# Patient Record
Sex: FEMALE | Race: WHITE | NOT HISPANIC OR LATINO | Employment: OTHER | URBAN - METROPOLITAN AREA
[De-identification: names, ages, dates, MRNs, and addresses within clinical notes are randomized per-mention and may not be internally consistent; named-entity substitution may affect disease eponyms.]

---

## 2017-01-17 ENCOUNTER — APPOINTMENT (OUTPATIENT)
Dept: PREADMISSION TESTING | Facility: HOSPITAL | Age: 47
End: 2017-01-17

## 2017-01-17 ENCOUNTER — HOSPITAL ENCOUNTER (OUTPATIENT)
Dept: GENERAL RADIOLOGY | Facility: HOSPITAL | Age: 47
Discharge: HOME | End: 2017-01-17
Admitting: ORTHOPAEDIC SURGERY

## 2017-01-17 VITALS — WEIGHT: 268.96 LBS | HEIGHT: 72 IN | BODY MASS INDEX: 36.43 KG/M2

## 2017-01-17 LAB
ALBUMIN SERPL-MCNC: 4.1 G/DL (ref 3.2–4.8)
ALBUMIN/GLOB SERPL: 1.1 G/DL (ref 1.5–2.5)
ALP SERPL-CCNC: 93 U/L (ref 25–100)
ALT SERPL W P-5'-P-CCNC: 25 U/L (ref 7–40)
ANION GAP SERPL CALCULATED.3IONS-SCNC: 11 MMOL/L (ref 3–11)
AST SERPL-CCNC: 31 U/L (ref 0–33)
BILIRUB SERPL-MCNC: 0.3 MG/DL (ref 0.3–1.2)
BUN BLD-MCNC: 10 MG/DL (ref 9–23)
BUN/CREAT SERPL: 14.3 (ref 7–25)
CALCIUM SPEC-SCNC: 9.8 MG/DL (ref 8.7–10.4)
CHLORIDE SERPL-SCNC: 100 MMOL/L (ref 99–109)
CO2 SERPL-SCNC: 35 MMOL/L (ref 20–31)
CREAT BLD-MCNC: 0.7 MG/DL (ref 0.6–1.3)
DEPRECATED RDW RBC AUTO: 48.5 FL (ref 37–54)
ERYTHROCYTE [DISTWIDTH] IN BLOOD BY AUTOMATED COUNT: 17.1 % (ref 11.3–14.5)
GFR SERPL CREATININE-BSD FRML MDRD: 90 ML/MIN/1.73
GLOBULIN UR ELPH-MCNC: 3.6 GM/DL
GLUCOSE BLD-MCNC: 127 MG/DL (ref 70–100)
HBA1C MFR BLD: 5.4 % (ref 4.8–5.6)
HCT VFR BLD AUTO: 41.2 % (ref 34.5–44)
HGB BLD-MCNC: 12.9 G/DL (ref 11.5–15.5)
MCH RBC QN AUTO: 24.5 PG (ref 27–31)
MCHC RBC AUTO-ENTMCNC: 31.3 G/DL (ref 32–36)
MCV RBC AUTO: 78.3 FL (ref 80–99)
PLATELET # BLD AUTO: 180 10*3/MM3 (ref 150–450)
PMV BLD AUTO: 9 FL (ref 6–12)
POTASSIUM BLD-SCNC: 3.6 MMOL/L (ref 3.5–5.5)
PROT SERPL-MCNC: 7.7 G/DL (ref 5.7–8.2)
RBC # BLD AUTO: 5.26 10*6/MM3 (ref 3.89–5.14)
SODIUM BLD-SCNC: 146 MMOL/L (ref 132–146)
WBC NRBC COR # BLD: 4.64 10*3/MM3 (ref 3.5–10.8)

## 2017-01-17 PROCEDURE — 85027 COMPLETE CBC AUTOMATED: CPT | Performed by: ORTHOPAEDIC SURGERY

## 2017-01-17 PROCEDURE — 80053 COMPREHEN METABOLIC PANEL: CPT | Performed by: ORTHOPAEDIC SURGERY

## 2017-01-17 PROCEDURE — 71020 HC CHEST PA AND LATERAL: CPT

## 2017-01-17 PROCEDURE — 83036 HEMOGLOBIN GLYCOSYLATED A1C: CPT | Performed by: ORTHOPAEDIC SURGERY

## 2017-01-17 PROCEDURE — 36415 COLL VENOUS BLD VENIPUNCTURE: CPT

## 2017-01-17 PROCEDURE — 93005 ELECTROCARDIOGRAM TRACING: CPT

## 2017-01-17 RX ORDER — PANTOPRAZOLE SODIUM 40 MG/1
40 TABLET, DELAYED RELEASE ORAL DAILY
Status: ON HOLD | COMMUNITY
End: 2017-01-23

## 2017-01-17 RX ORDER — CALCIUM POLYCARBOPHIL 625 MG 625 MG/1
625 TABLET ORAL
COMMUNITY
End: 2018-03-19

## 2017-01-17 RX ORDER — POLYETHYLENE GLYCOL 3350 17 G/17G
17 POWDER, FOR SOLUTION ORAL EVERY OTHER DAY
COMMUNITY
End: 2020-07-13

## 2017-01-17 NOTE — PAT
Patient to apply to surgical area (as instructed) the night before procedure and the AM of procedure.  MEASURED FOR TEDS/SCDS IN PAT    CALF MEASUREMENT    16.5  LENGTH MEASUREMENT 19    Called Dr. Peterson's office regarding Eliquis and spoke with Deedee. She stated that Dr. Peterson said she can stop Eliquis 2 days prior to surgery and she would place a note in Epic. I left another voice message asking to address the ASA 81 in the note to be placed in Epic. I stressed the importance of this to patient. She stated she has a follow-up appointment with Salbador blackmon.

## 2017-01-17 NOTE — PAT
Gave information to patient to stop Eliquis per md instructions x two  And per Kaelyn at Dr. Peterson patient must keep on ASA.  Notified Rebekah/Salbador's office and gave patient copy of letter

## 2017-01-23 ENCOUNTER — APPOINTMENT (OUTPATIENT)
Dept: GENERAL RADIOLOGY | Facility: HOSPITAL | Age: 47
End: 2017-01-23

## 2017-01-23 ENCOUNTER — ANESTHESIA (OUTPATIENT)
Dept: PERIOP | Facility: HOSPITAL | Age: 47
End: 2017-01-23

## 2017-01-23 ENCOUNTER — APPOINTMENT (OUTPATIENT)
Dept: GENERAL RADIOLOGY | Facility: HOSPITAL | Age: 47
End: 2017-01-23
Attending: ORTHOPAEDIC SURGERY

## 2017-01-23 ENCOUNTER — ANESTHESIA EVENT (OUTPATIENT)
Dept: PERIOP | Facility: HOSPITAL | Age: 47
End: 2017-01-23

## 2017-01-23 PROBLEM — I48.91 A-FIB: Status: ACTIVE | Noted: 2017-01-23

## 2017-01-23 PROBLEM — I50.9 CHF (CONGESTIVE HEART FAILURE): Status: ACTIVE | Noted: 2017-01-23

## 2017-01-23 PROBLEM — Z96.641 STATUS POST TOTAL REPLACEMENT OF RIGHT HIP: Status: ACTIVE | Noted: 2017-01-23

## 2017-01-23 PROBLEM — M16.11 ARTHRITIS OF RIGHT HIP: Status: ACTIVE | Noted: 2017-01-23

## 2017-01-23 PROBLEM — G47.33 OSA (OBSTRUCTIVE SLEEP APNEA): Status: ACTIVE | Noted: 2017-01-23

## 2017-01-23 PROCEDURE — 73502 X-RAY EXAM HIP UNI 2-3 VIEWS: CPT

## 2017-01-23 PROCEDURE — 25010000002 PROPOFOL 10 MG/ML EMULSION: Performed by: NURSE ANESTHETIST, CERTIFIED REGISTERED

## 2017-01-23 PROCEDURE — 25010000002 MIDAZOLAM PER 1 MG: Performed by: NURSE ANESTHETIST, CERTIFIED REGISTERED

## 2017-01-23 PROCEDURE — 25010000002 ONDANSETRON PER 1 MG: Performed by: NURSE ANESTHETIST, CERTIFIED REGISTERED

## 2017-01-23 PROCEDURE — 25010000002 NEOSTIGMINE PER 0.5 MG: Performed by: NURSE ANESTHETIST, CERTIFIED REGISTERED

## 2017-01-23 RX ORDER — PROPOFOL 10 MG/ML
VIAL (ML) INTRAVENOUS AS NEEDED
Status: DISCONTINUED | OUTPATIENT
Start: 2017-01-23 | End: 2017-01-23 | Stop reason: SURG

## 2017-01-23 RX ORDER — ONDANSETRON 2 MG/ML
INJECTION INTRAMUSCULAR; INTRAVENOUS AS NEEDED
Status: DISCONTINUED | OUTPATIENT
Start: 2017-01-23 | End: 2017-01-23 | Stop reason: SURG

## 2017-01-23 RX ORDER — ESMOLOL HYDROCHLORIDE 10 MG/ML
INJECTION INTRAVENOUS AS NEEDED
Status: DISCONTINUED | OUTPATIENT
Start: 2017-01-23 | End: 2017-01-23 | Stop reason: SURG

## 2017-01-23 RX ORDER — GLYCOPYRROLATE 0.2 MG/ML
INJECTION INTRAMUSCULAR; INTRAVENOUS AS NEEDED
Status: DISCONTINUED | OUTPATIENT
Start: 2017-01-23 | End: 2017-01-23 | Stop reason: SURG

## 2017-01-23 RX ORDER — PROPOFOL 10 MG/ML
VIAL (ML) INTRAVENOUS CONTINUOUS PRN
Status: DISCONTINUED | OUTPATIENT
Start: 2017-01-23 | End: 2017-01-23 | Stop reason: SURG

## 2017-01-23 RX ORDER — MIDAZOLAM HYDROCHLORIDE 1 MG/ML
INJECTION INTRAMUSCULAR; INTRAVENOUS AS NEEDED
Status: DISCONTINUED | OUTPATIENT
Start: 2017-01-23 | End: 2017-01-23 | Stop reason: SURG

## 2017-01-23 RX ORDER — ATRACURIUM BESYLATE 10 MG/ML
INJECTION, SOLUTION INTRAVENOUS AS NEEDED
Status: DISCONTINUED | OUTPATIENT
Start: 2017-01-23 | End: 2017-01-23 | Stop reason: SURG

## 2017-01-23 RX ORDER — LIDOCAINE HYDROCHLORIDE 10 MG/ML
INJECTION, SOLUTION INFILTRATION; PERINEURAL AS NEEDED
Status: DISCONTINUED | OUTPATIENT
Start: 2017-01-23 | End: 2017-01-23 | Stop reason: SURG

## 2017-01-23 RX ADMIN — PROPOFOL 25 MCG/KG/MIN: 10 INJECTION, EMULSION INTRAVENOUS at 11:05

## 2017-01-23 RX ADMIN — PROPOFOL 30 MG: 10 INJECTION, EMULSION INTRAVENOUS at 12:06

## 2017-01-23 RX ADMIN — ROBINUL 0.4 MG: 0.2 INJECTION INTRAMUSCULAR; INTRAVENOUS at 12:58

## 2017-01-23 RX ADMIN — ATRACURIUM BESYLATE 10 MG: 10 INJECTION, SOLUTION INTRAVENOUS at 11:45

## 2017-01-23 RX ADMIN — MIDAZOLAM HYDROCHLORIDE 2 MG: 1 INJECTION, SOLUTION INTRAMUSCULAR; INTRAVENOUS at 10:53

## 2017-01-23 RX ADMIN — EPHEDRINE SULFATE 10 MG: 50 INJECTION INTRAMUSCULAR; INTRAVENOUS; SUBCUTANEOUS at 11:10

## 2017-01-23 RX ADMIN — ONDANSETRON 4 MG: 2 INJECTION INTRAMUSCULAR; INTRAVENOUS at 12:46

## 2017-01-23 RX ADMIN — LIDOCAINE HYDROCHLORIDE 50 MG: 10 INJECTION, SOLUTION INFILTRATION; PERINEURAL at 10:54

## 2017-01-23 RX ADMIN — TRANEXAMIC ACID 1000 MG: 100 INJECTION, SOLUTION INTRAVENOUS at 11:05

## 2017-01-23 RX ADMIN — TRANEXAMIC ACID 1000 MG: 100 INJECTION, SOLUTION INTRAVENOUS at 12:41

## 2017-01-23 RX ADMIN — Medication 4 MG: at 12:58

## 2017-01-23 RX ADMIN — EPHEDRINE SULFATE 10 MG: 50 INJECTION INTRAMUSCULAR; INTRAVENOUS; SUBCUTANEOUS at 12:05

## 2017-01-23 RX ADMIN — EPHEDRINE SULFATE 10 MG: 50 INJECTION INTRAMUSCULAR; INTRAVENOUS; SUBCUTANEOUS at 12:24

## 2017-01-23 RX ADMIN — PROPOFOL 200 MG: 10 INJECTION, EMULSION INTRAVENOUS at 10:54

## 2017-01-23 RX ADMIN — ESMOLOL HYDROCHLORIDE 30 MG: 10 INJECTION, SOLUTION INTRAVENOUS at 10:53

## 2017-01-23 RX ADMIN — EPHEDRINE SULFATE 10 MG: 50 INJECTION INTRAMUSCULAR; INTRAVENOUS; SUBCUTANEOUS at 11:13

## 2017-01-23 RX ADMIN — EPHEDRINE SULFATE 20 MG: 50 INJECTION INTRAMUSCULAR; INTRAVENOUS; SUBCUTANEOUS at 12:41

## 2017-01-23 RX ADMIN — ATRACURIUM BESYLATE 50 MG: 10 INJECTION, SOLUTION INTRAVENOUS at 10:54

## 2017-01-23 NOTE — ANESTHESIA PROCEDURE NOTES
Airway  Urgency: elective    Airway not difficult    General Information and Staff    Patient location during procedure: OR  CRNA: MARBIN WHITMAN    Indications and Patient Condition  Indications for airway management: airway protection    Preoxygenated: yes  MILS not maintained throughout  Mask difficulty assessment: 1 - vent by mask    Final Airway Details  Final airway type: endotracheal airway      Successful airway: ETT  Cuffed: yes   Successful intubation technique: direct laryngoscopy  Endotracheal tube insertion site: oral  Blade: Juanis  Blade size: #3  ETT size: 7.0 mm  Cormack-Lehane Classification: grade I - full view of glottis  Placement verified by: chest auscultation and capnometry   Cuff volume (mL): 8  Measured from: lips  ETT to lips (cm): 20  Number of attempts at approach: 1    Additional Comments  Negative epigastric sounds, Breath sound equal bilaterally with symmetric chest rise and fall, Atraumatic, no damage to lips or teeth during intubation

## 2017-01-23 NOTE — ANESTHESIA POSTPROCEDURE EVALUATION
Patient: Danae Fong    Procedure Summary     Date Anesthesia Start Anesthesia Stop Room / Location    01/23/17 1053 1309 [facility] / [facility]       Procedure Diagnosis Surgeon Provider    RIGHT TOTAL HIP ARTHROPLASTY ANTERIOR  (Right Hip) No diagnosis on file. MD Lloyd Vanegas MD          Anesthesia Type: spinal, MAC  Last vitals  BP  124/56   Temp   98   Pulse  78   Resp   16   SpO2   99     Post Anesthesia Care and Evaluation    Patient location during evaluation: PACU  Patient participation: complete - patient participated  Level of consciousness: awake and alert  Pain score: 0  Pain management: adequate  Airway patency: patent  Anesthetic complications: No anesthetic complications  PONV Status: none  Cardiovascular status: hemodynamically stable and acceptable  Respiratory status: nonlabored ventilation, acceptable and nasal cannula  Hydration status: acceptable

## 2017-01-23 NOTE — ANESTHESIA PREPROCEDURE EVALUATION
Anesthesia Evaluation     Patient summary reviewed and Nursing notes reviewed    Airway   Mallampati: I  TM distance: >3 FB  Neck ROM: full  no difficulty expected  Dental      Pulmonary    (+) sleep apnea,   Cardiovascular   (+) hypertension, valvular problems/murmurs (AI now SP AVR - Mild MR), dysrhythmias (EKG NOW SB ) Atrial Fib, CHF (EF 55%),     ECG reviewed  Patient on routine beta blocker  ROS comment: EKG SB   ELIQUIS    Neuro/Psych  (+) psychiatric history Anxiety,    GI/Hepatic/Renal/Endo    (+)  liver disease (MCKEON ), diabetes mellitus (NO RX),     Musculoskeletal     Abdominal    Substance History      OB/GYN          Other   (+) arthritis    ROS/Med Hx Other: AVR 2016 FB pericaridal effusion drainage and pericardial stripping 2016      Phys Exam Other: Grade 1 intubation last year ( T Young)                      Anesthesia Plan    ASA 3     spinal and MAC   (Eliquis stopped)  intravenous induction   Anesthetic plan and risks discussed with patient.    Plan discussed with CRNA.

## 2017-01-23 NOTE — ANESTHESIA PROCEDURE NOTES
Peripheral Block    Patient location during procedure: pre-op  Start time: 1/23/2017 10:56 AM  Stop time: 1/23/2017 10:59 AM  Reason for block: procedure for pain and at surgeon's request  Performed by  CRNA: MARBIN WHITMAN  Preanesthetic Checklist  Completed: patient identified, site marked, surgical consent, pre-op evaluation, timeout performed, IV checked, risks and benefits discussed and monitors and equipment checked  Peripheral Block Prep:  Sterile barriers:cap, gloves and mask  Prep: ChloraPrep  Patient monitoring: blood pressure monitoring, continuous pulse oximetry and EKG  Peripheral Procedure  Sedation:no  Guidance:ultrasound guided  Images:still images obtained  Block Type:fascia iliaca catheter  Injection Technique:single-shotNeedle Type:echogenic  Needle Gauge:20 G  Catheter size: 20g.  Medications  Preservative Free Saline:0ml  Comment:PF Decadron 2mg  Buprenex 300mcg  Local Injected:bupivacaine 0.25% without epinephrine Local Amount Injected:50mL  Post Assessment  Patient Tolerance:comfortable throughout block  Complications:no  Additional Notes  Procedure:                                     Analgesia was achieved with General Anesthesia      Pt placed in supine position.   The insertion site was prepped in sterile fashion with Chlorapreop and clear plastic drapes.  A B-Trent 18 g , 4 inch echogenic Touhy needle was advance In-plane under ultrasound guidance. The   Anterior superior Iliac crest was initially visualized and the probe was directed slightly medially and slightly towards the umbilicus.  The course of the needle was tracked over the sartorius muscle through the fascia Iliacus and into the anterior portion of the Iliacus muscle.  Major vessels where identified and avoided as where structures of the peritoneal cavity.  LA injection was made incrementally in 1-5ml amounts spread was visualized superiorly below fascia iliacus.  Injection was completed with negative aspiration of blood and  negative intravascular injection.  Injection pressures where normal or minimal resistance.

## 2017-01-25 PROBLEM — D62 ACUTE BLOOD LOSS ANEMIA: Status: ACTIVE | Noted: 2017-01-25

## 2017-01-26 ENCOUNTER — ANESTHESIA EVENT (OUTPATIENT)
Dept: PERIOP | Facility: HOSPITAL | Age: 47
End: 2017-01-26

## 2017-01-26 ENCOUNTER — ANESTHESIA (OUTPATIENT)
Dept: PERIOP | Facility: HOSPITAL | Age: 47
End: 2017-01-26

## 2017-01-26 PROBLEM — M00.251 STREPTOCOCCAL ARTHRITIS OF RIGHT HIP (HCC): Status: ACTIVE | Noted: 2017-01-23

## 2017-01-26 PROBLEM — T84.51XA INFECTED PROSTHESIS OF RIGHT HIP (HCC): Status: ACTIVE | Noted: 2017-01-23

## 2017-01-26 PROBLEM — T84.52XA LEFT HIP PROSTHETIC JOINT INFECTION (HCC): Status: ACTIVE | Noted: 2017-01-23

## 2017-01-26 PROBLEM — M00.9 PYOGENIC ARTHRITIS OF RIGHT HIP (HCC): Status: ACTIVE | Noted: 2017-01-23

## 2017-01-26 PROBLEM — I48.0 PAROXYSMAL ATRIAL FIBRILLATION (HCC): Status: ACTIVE | Noted: 2017-01-23

## 2017-01-26 PROBLEM — I50.32 CHRONIC DIASTOLIC CONGESTIVE HEART FAILURE (HCC): Status: ACTIVE | Noted: 2017-01-23

## 2017-01-26 PROCEDURE — 25010000002 DEXAMETHASONE PER 1 MG: Performed by: NURSE ANESTHETIST, CERTIFIED REGISTERED

## 2017-01-26 PROCEDURE — 25010000002 PROPOFOL 10 MG/ML EMULSION: Performed by: NURSE ANESTHETIST, CERTIFIED REGISTERED

## 2017-01-26 PROCEDURE — 25010000002 FENTANYL CITRATE (PF) 100 MCG/2ML SOLUTION: Performed by: NURSE ANESTHETIST, CERTIFIED REGISTERED

## 2017-01-26 PROCEDURE — 25010000002 MIDAZOLAM PER 1 MG: Performed by: NURSE ANESTHETIST, CERTIFIED REGISTERED

## 2017-01-26 PROCEDURE — 25010000002 PROPOFOL 1000 MG/ML EMULSION: Performed by: NURSE ANESTHETIST, CERTIFIED REGISTERED

## 2017-01-26 PROCEDURE — 25010000002 ONDANSETRON PER 1 MG: Performed by: NURSE PRACTITIONER

## 2017-01-26 PROCEDURE — 25010000002 NEOSTIGMINE PER 0.5 MG: Performed by: NURSE ANESTHETIST, CERTIFIED REGISTERED

## 2017-01-26 RX ORDER — MIDAZOLAM HYDROCHLORIDE 1 MG/ML
INJECTION INTRAMUSCULAR; INTRAVENOUS AS NEEDED
Status: DISCONTINUED | OUTPATIENT
Start: 2017-01-26 | End: 2017-01-26 | Stop reason: SURG

## 2017-01-26 RX ORDER — FENTANYL CITRATE 50 UG/ML
INJECTION, SOLUTION INTRAMUSCULAR; INTRAVENOUS AS NEEDED
Status: DISCONTINUED | OUTPATIENT
Start: 2017-01-26 | End: 2017-01-26 | Stop reason: SURG

## 2017-01-26 RX ORDER — ROCURONIUM BROMIDE 10 MG/ML
INJECTION, SOLUTION INTRAVENOUS AS NEEDED
Status: DISCONTINUED | OUTPATIENT
Start: 2017-01-26 | End: 2017-01-26 | Stop reason: SURG

## 2017-01-26 RX ORDER — PROPOFOL 10 MG/ML
VIAL (ML) INTRAVENOUS AS NEEDED
Status: DISCONTINUED | OUTPATIENT
Start: 2017-01-26 | End: 2017-01-26 | Stop reason: SURG

## 2017-01-26 RX ORDER — DEXAMETHASONE SODIUM PHOSPHATE 4 MG/ML
INJECTION, SOLUTION INTRA-ARTICULAR; INTRALESIONAL; INTRAMUSCULAR; INTRAVENOUS; SOFT TISSUE AS NEEDED
Status: DISCONTINUED | OUTPATIENT
Start: 2017-01-26 | End: 2017-01-26 | Stop reason: SURG

## 2017-01-26 RX ORDER — KETAMINE HCL IN 0.9 % NACL 50 MG/5 ML
SYRINGE (ML) INTRAVENOUS AS NEEDED
Status: DISCONTINUED | OUTPATIENT
Start: 2017-01-26 | End: 2017-01-26 | Stop reason: SURG

## 2017-01-26 RX ORDER — GLYCOPYRROLATE 0.2 MG/ML
INJECTION INTRAMUSCULAR; INTRAVENOUS AS NEEDED
Status: DISCONTINUED | OUTPATIENT
Start: 2017-01-26 | End: 2017-01-26 | Stop reason: SURG

## 2017-01-26 RX ORDER — LIDOCAINE HYDROCHLORIDE 10 MG/ML
INJECTION, SOLUTION INFILTRATION; PERINEURAL AS NEEDED
Status: DISCONTINUED | OUTPATIENT
Start: 2017-01-26 | End: 2017-01-26 | Stop reason: SURG

## 2017-01-26 RX ADMIN — SODIUM CHLORIDE, POTASSIUM CHLORIDE, SODIUM LACTATE AND CALCIUM CHLORIDE: 600; 310; 30; 20 INJECTION, SOLUTION INTRAVENOUS at 10:28

## 2017-01-26 RX ADMIN — KETAMINE HCL-NACL SOLN PREF SY 50 MG/5ML-0.9% (10MG/ML) 10 MG: 10 SOLUTION PREFILLED SYRINGE at 11:40

## 2017-01-26 RX ADMIN — PROPOFOL 200 MG: 10 INJECTION, EMULSION INTRAVENOUS at 10:40

## 2017-01-26 RX ADMIN — FENTANYL CITRATE 100 MCG: 50 INJECTION, SOLUTION INTRAMUSCULAR; INTRAVENOUS at 10:31

## 2017-01-26 RX ADMIN — KETAMINE HCL-NACL SOLN PREF SY 50 MG/5ML-0.9% (10MG/ML) 20 MG: 10 SOLUTION PREFILLED SYRINGE at 10:46

## 2017-01-26 RX ADMIN — GLYCOPYRROLATE 0.4 MG: 0.2 INJECTION, SOLUTION INTRAMUSCULAR; INTRAVENOUS at 12:10

## 2017-01-26 RX ADMIN — PROPOFOL 25 MCG/KG/MIN: 10 INJECTION, EMULSION INTRAVENOUS at 10:46

## 2017-01-26 RX ADMIN — ONDANSETRON 4 MG: 2 INJECTION INTRAMUSCULAR; INTRAVENOUS at 12:08

## 2017-01-26 RX ADMIN — DEXAMETHASONE SODIUM PHOSPHATE 8 MG: 4 INJECTION, SOLUTION INTRAMUSCULAR; INTRAVENOUS at 11:23

## 2017-01-26 RX ADMIN — LIDOCAINE HYDROCHLORIDE 50 MG: 10 INJECTION, SOLUTION INFILTRATION; PERINEURAL at 10:40

## 2017-01-26 RX ADMIN — EPHEDRINE SULFATE 10 MG: 50 INJECTION INTRAMUSCULAR; INTRAVENOUS; SUBCUTANEOUS at 11:04

## 2017-01-26 RX ADMIN — MIDAZOLAM HYDROCHLORIDE 2 MG: 1 INJECTION, SOLUTION INTRAMUSCULAR; INTRAVENOUS at 10:31

## 2017-01-26 RX ADMIN — EPHEDRINE SULFATE 10 MG: 50 INJECTION INTRAMUSCULAR; INTRAVENOUS; SUBCUTANEOUS at 11:39

## 2017-01-26 RX ADMIN — Medication 2 MG: at 12:10

## 2017-01-26 RX ADMIN — KETAMINE HCL-NACL SOLN PREF SY 50 MG/5ML-0.9% (10MG/ML) 20 MG: 10 SOLUTION PREFILLED SYRINGE at 10:40

## 2017-01-26 RX ADMIN — ROCURONIUM BROMIDE 40 MG: 10 INJECTION INTRAVENOUS at 10:41

## 2017-01-26 RX ADMIN — SODIUM CHLORIDE, POTASSIUM CHLORIDE, SODIUM LACTATE AND CALCIUM CHLORIDE: 600; 310; 30; 20 INJECTION, SOLUTION INTRAVENOUS at 11:55

## 2017-01-26 NOTE — ANESTHESIA PREPROCEDURE EVALUATION
Anesthesia Evaluation     Patient summary reviewed and Nursing notes reviewed    Airway   Mallampati: I  TM distance: >3 FB  no difficulty expected  Dental      Pulmonary    (+) sleep apnea,   Cardiovascular   (+) hypertension, valvular problems/murmurs, dysrhythmias,     Neuro/Psych  GI/Hepatic/Renal/Endo    (+)  liver disease, diabetes mellitus,     Musculoskeletal     Abdominal    Substance History      OB/GYN          Other       ROS/Med Hx Other: HCT 26        SEE below : NSC since last sx, which she tolerated well   SP HIP SX 48 hours ago w p/o bleed   Now for W/O of hematoma         Anesthesia Plan    ASA 3     general   (GA  anti PONV propofol infusion )  intravenous induction   Anesthetic plan and risks discussed with patient.    Plan discussed with CRNA.     Patient summary reviewed and Nursing notes reviewed   Airway   Mallampati: I  TM distance: >3 FB  Neck ROM: full  no difficulty expected Dental     Pulmonary    (+) sleep apnea,  Cardiovascular   (+) hypertension, valvular problems/murmurs (AI now SP AVR - Mild MR), dysrhythmias (EKG NOW SB ) Atrial Fib, CHF (EF 55%),     ECG reviewed  Patient on routine beta blocker  ROS comment: EKG SB   ELIQUIS   Neuro/Psych  (+) psychiatric history Anxiety,  GI/Hepatic/Renal/Endo    (+) liver disease (MCKEON ), diabetes mellitus (NO RX),     Musculoskeletal  Abdominal    Substance History  OB/GYN       Other   (+) arthritis    ROS/Med Hx Other: AVR 2016 FB pericaridal effusion drainage and pericardial stripping 2016      Phys Exam Other: Grade 1 intubation last year ( AMANDA Walker)

## 2017-01-26 NOTE — ANESTHESIA PROCEDURE NOTES
Airway  Urgency: elective    Airway not difficult    General Information and Staff    Patient location during procedure: OR    Indications and Patient Condition  Indications for airway management: airway protection    Preoxygenated: yes  MILS not maintained throughout  Mask difficulty assessment: 1 - vent by mask    Final Airway Details  Final airway type: endotracheal airway      Successful airway: ETT  Cuffed: yes   Successful intubation technique: direct laryngoscopy  Endotracheal tube insertion site: oral  Blade: Juanis  Blade size: #3  ETT size: 7.0 mm  Cormack-Lehane Classification: grade I - full view of glottis  Placement verified by: chest auscultation and capnometry   Measured from: lips  ETT to lips (cm): 20  Number of attempts at approach: 1    Additional Comments  Negative epigastric sounds, Breath sound equal bilaterally with symmetric chest rise and fall, lips and teeth as pre op

## 2017-01-26 NOTE — ANESTHESIA POSTPROCEDURE EVALUATION
"Patient: Danae Fong    Procedure Summary     Date Anesthesia Start Anesthesia Stop Room / Location    01/26/17 1031  [facility] / [facility]       Procedure Diagnosis Surgeon Provider    RIGHT HIP WOUND WASHOUT AND DEBRIDEMENT, WITH POLYETHELENE FEMORAL HEAD EXCHANGE  (Right Hip) No diagnosis on file. MD Lloyd Vanegas MD          Anesthesia Type: general  Last vitals  BP      Temp      Pulse     Resp      SpO2        Post Anesthesia Care and Evaluation    Patient location during evaluation: PACU  Patient participation: complete - patient participated  Level of consciousness: awake and alert  Pain score: 0  Pain management: adequate  Airway patency: patent  Anesthetic complications: No anesthetic complications  PONV Status: none  Cardiovascular status: hemodynamically stable and acceptable  Respiratory status: nonlabored ventilation, acceptable and nasal cannula  Hydration status: acceptable    Comments: /59 (BP Location: Right arm, Patient Position: Lying)  Pulse 75 Temp 97.7 °F (36.5 °C) (Temporal Artery )   Resp 18  Ht 74\" (188 cm)  Wt 268 lb 15.4 oz (122 kg)  SpO2 99% on 4LNC BMI 34.53 kg/m2        "

## 2017-01-28 ENCOUNTER — APPOINTMENT (OUTPATIENT)
Dept: GENERAL RADIOLOGY | Facility: HOSPITAL | Age: 47
End: 2017-01-28

## 2017-01-28 PROCEDURE — 73502 X-RAY EXAM HIP UNI 2-3 VIEWS: CPT

## 2017-01-29 ENCOUNTER — ANESTHESIA EVENT (OUTPATIENT)
Dept: PERIOP | Facility: HOSPITAL | Age: 47
End: 2017-01-29

## 2017-01-29 ENCOUNTER — ANESTHESIA (OUTPATIENT)
Dept: PERIOP | Facility: HOSPITAL | Age: 47
End: 2017-01-29

## 2017-01-29 ENCOUNTER — APPOINTMENT (OUTPATIENT)
Dept: GENERAL RADIOLOGY | Facility: HOSPITAL | Age: 47
End: 2017-01-29

## 2017-01-29 PROCEDURE — 25010000002 FENTANYL CITRATE (PF) 100 MCG/2ML SOLUTION: Performed by: ANESTHESIOLOGY

## 2017-01-29 PROCEDURE — 25010000002 PROPOFOL 10 MG/ML EMULSION: Performed by: ANESTHESIOLOGY

## 2017-01-29 PROCEDURE — 72170 X-RAY EXAM OF PELVIS: CPT

## 2017-01-29 PROCEDURE — 25010000002 ONDANSETRON PER 1 MG: Performed by: ANESTHESIOLOGY

## 2017-01-29 PROCEDURE — 25010000002 DEXAMETHASONE PER 1 MG: Performed by: ANESTHESIOLOGY

## 2017-01-29 RX ORDER — LIDOCAINE HYDROCHLORIDE 10 MG/ML
INJECTION, SOLUTION INFILTRATION; PERINEURAL AS NEEDED
Status: DISCONTINUED | OUTPATIENT
Start: 2017-01-29 | End: 2017-01-29 | Stop reason: SURG

## 2017-01-29 RX ORDER — SODIUM CHLORIDE, SODIUM LACTATE, POTASSIUM CHLORIDE, CALCIUM CHLORIDE 600; 310; 30; 20 MG/100ML; MG/100ML; MG/100ML; MG/100ML
INJECTION, SOLUTION INTRAVENOUS CONTINUOUS PRN
Status: DISCONTINUED | OUTPATIENT
Start: 2017-01-29 | End: 2017-01-29 | Stop reason: SURG

## 2017-01-29 RX ORDER — FENTANYL CITRATE 50 UG/ML
INJECTION, SOLUTION INTRAMUSCULAR; INTRAVENOUS AS NEEDED
Status: DISCONTINUED | OUTPATIENT
Start: 2017-01-29 | End: 2017-01-29 | Stop reason: SURG

## 2017-01-29 RX ORDER — DEXAMETHASONE SODIUM PHOSPHATE 4 MG/ML
INJECTION, SOLUTION INTRA-ARTICULAR; INTRALESIONAL; INTRAMUSCULAR; INTRAVENOUS; SOFT TISSUE AS NEEDED
Status: DISCONTINUED | OUTPATIENT
Start: 2017-01-29 | End: 2017-01-29 | Stop reason: SURG

## 2017-01-29 RX ORDER — PROPOFOL 10 MG/ML
VIAL (ML) INTRAVENOUS AS NEEDED
Status: DISCONTINUED | OUTPATIENT
Start: 2017-01-29 | End: 2017-01-29 | Stop reason: SURG

## 2017-01-29 RX ORDER — ONDANSETRON 2 MG/ML
INJECTION INTRAMUSCULAR; INTRAVENOUS AS NEEDED
Status: DISCONTINUED | OUTPATIENT
Start: 2017-01-29 | End: 2017-01-29 | Stop reason: SURG

## 2017-01-29 RX ADMIN — PROPOFOL 150 MG: 10 INJECTION, EMULSION INTRAVENOUS at 08:10

## 2017-01-29 RX ADMIN — DEXAMETHASONE SODIUM PHOSPHATE 4 MG: 4 INJECTION, SOLUTION INTRAMUSCULAR; INTRAVENOUS at 08:13

## 2017-01-29 RX ADMIN — FENTANYL CITRATE 50 MCG: 50 INJECTION, SOLUTION INTRAMUSCULAR; INTRAVENOUS at 08:10

## 2017-01-29 RX ADMIN — LIDOCAINE HYDROCHLORIDE 50 MG: 10 INJECTION, SOLUTION INFILTRATION; PERINEURAL at 08:10

## 2017-01-29 RX ADMIN — SODIUM CHLORIDE, POTASSIUM CHLORIDE, SODIUM LACTATE AND CALCIUM CHLORIDE: 600; 310; 30; 20 INJECTION, SOLUTION INTRAVENOUS at 08:05

## 2017-01-29 RX ADMIN — ONDANSETRON 4 MG: 2 INJECTION INTRAMUSCULAR; INTRAVENOUS at 08:17

## 2017-01-29 NOTE — ANESTHESIA PREPROCEDURE EVALUATION
Anesthesia Evaluation     Patient summary reviewed and Nursing notes reviewed    No history of anesthetic complications   Airway   Mallampati: II  TM distance: >3 FB  Neck ROM: full  no difficulty expected  Dental - normal exam     Pulmonary - negative pulmonary ROS and normal exam    breath sounds clear to auscultation  Cardiovascular - normal exam  (+) hypertension well controlled, valvular problems/murmurs (h/o SBE secondary to septic arthritis s/p AVR), dysrhythmias, CHF (h/o CHF secondary to AI),     ECG reviewed  Patient on routine beta blocker and Beta blocker given within 24 hours of surgery  Rhythm: regular  Rate: normal  ROS comment: S/p avr, pericardial window, pericardiectomy.  EF 55%, mild MR, trace AI    Neuro/Psych- negative ROS  GI/Hepatic/Renal/Endo    (+) obesity,  diabetes mellitus (diet controlled),     Musculoskeletal         ROS comment: Dislocated left hip  Abdominal    Substance History      OB/GYN negative ob/gyn ROS         Other   (+) arthritis                          Anesthesia Plan    ASA 3     general     intravenous induction   Anesthetic plan and risks discussed with patient.

## 2017-01-29 NOTE — ANESTHESIA POSTPROCEDURE EVALUATION
Patient: Danae Fong    Procedure Summary     Date Anesthesia Start Anesthesia Stop Room / Location    01/29/17 0805 0835  [facility] / [facility]       Procedure Diagnosis Surgeon Provider    HIP CLOSED REDUCTION (N/A Hip) Dislocation of internal right hip prosthesis, initial encounter MD Amos Lim MD          Anesthesia Type: general  Last vitals  BP      Temp      Pulse     Resp      SpO2        Post Anesthesia Care and Evaluation    Patient location during evaluation: PACU  Patient participation: complete - patient participated  Level of consciousness: awake and alert  Pain score: 0  Pain management: adequate  Airway patency: patent  Anesthetic complications: No anesthetic complications  PONV Status: none  Cardiovascular status: hemodynamically stable and acceptable  Respiratory status: nonlabored ventilation, acceptable and nasal cannula  Hydration status: acceptable

## 2017-01-29 NOTE — ANESTHESIA PROCEDURE NOTES
Airway  Urgency: elective    Airway not difficult    General Information and Staff    Patient location during procedure: OR    Indications and Patient Condition  Indications for airway management: airway protection    Preoxygenated: yes  MILS not maintained throughout  Mask difficulty assessment: 1 - vent by mask    Final Airway Details  Final airway type: endotracheal airway      Successful airway: ETT  Cuffed: yes   Successful intubation technique: direct laryngoscopy  Endotracheal tube insertion site: oral  Blade: Juanis  Blade size: #3  ETT size: 7.0 mm  Cormack-Lehane Classification: grade I - full view of glottis  Placement verified by: chest auscultation and capnometry   Measured from: lips  ETT to lips (cm): 20  Number of attempts at approach: 1    Additional Comments  Negative epigastric sounds, Breath sound equal bilaterally with symmetric chest rise and fall

## 2017-01-30 ENCOUNTER — APPOINTMENT (OUTPATIENT)
Dept: GENERAL RADIOLOGY | Facility: HOSPITAL | Age: 47
End: 2017-01-30

## 2017-01-30 ENCOUNTER — ANESTHESIA EVENT (OUTPATIENT)
Dept: PERIOP | Facility: HOSPITAL | Age: 47
End: 2017-01-30

## 2017-01-30 ENCOUNTER — ANESTHESIA (OUTPATIENT)
Dept: PERIOP | Facility: HOSPITAL | Age: 47
End: 2017-01-30

## 2017-01-30 PROCEDURE — 25010000002 FENTANYL CITRATE (PF) 100 MCG/2ML SOLUTION: Performed by: NURSE ANESTHETIST, CERTIFIED REGISTERED

## 2017-01-30 PROCEDURE — 25010000003 CEFAZOLIN IN DEXTROSE 2-4 GM/100ML-% SOLUTION: Performed by: NURSE ANESTHETIST, CERTIFIED REGISTERED

## 2017-01-30 PROCEDURE — 25010000002 DEXAMETHASONE PER 1 MG: Performed by: NURSE ANESTHETIST, CERTIFIED REGISTERED

## 2017-01-30 PROCEDURE — 73502 X-RAY EXAM HIP UNI 2-3 VIEWS: CPT

## 2017-01-30 PROCEDURE — 25010000002 PROPOFOL 10 MG/ML EMULSION: Performed by: NURSE ANESTHETIST, CERTIFIED REGISTERED

## 2017-01-30 PROCEDURE — 25010000002 ONDANSETRON PER 1 MG: Performed by: NURSE PRACTITIONER

## 2017-01-30 RX ORDER — LIDOCAINE HYDROCHLORIDE 10 MG/ML
INJECTION, SOLUTION EPIDURAL; INFILTRATION; INTRACAUDAL; PERINEURAL AS NEEDED
Status: DISCONTINUED | OUTPATIENT
Start: 2017-01-30 | End: 2017-01-30 | Stop reason: SURG

## 2017-01-30 RX ORDER — FENTANYL CITRATE 50 UG/ML
INJECTION, SOLUTION INTRAMUSCULAR; INTRAVENOUS AS NEEDED
Status: DISCONTINUED | OUTPATIENT
Start: 2017-01-30 | End: 2017-01-30 | Stop reason: SURG

## 2017-01-30 RX ORDER — PROPOFOL 10 MG/ML
VIAL (ML) INTRAVENOUS AS NEEDED
Status: DISCONTINUED | OUTPATIENT
Start: 2017-01-30 | End: 2017-01-30 | Stop reason: SURG

## 2017-01-30 RX ORDER — DEXAMETHASONE SODIUM PHOSPHATE 4 MG/ML
INJECTION, SOLUTION INTRA-ARTICULAR; INTRALESIONAL; INTRAMUSCULAR; INTRAVENOUS; SOFT TISSUE AS NEEDED
Status: DISCONTINUED | OUTPATIENT
Start: 2017-01-30 | End: 2017-01-30 | Stop reason: SURG

## 2017-01-30 RX ORDER — CEFAZOLIN SODIUM 2 G/100ML
INJECTION, SOLUTION INTRAVENOUS AS NEEDED
Status: DISCONTINUED | OUTPATIENT
Start: 2017-01-30 | End: 2017-01-30 | Stop reason: SURG

## 2017-01-30 RX ORDER — ATRACURIUM BESYLATE 10 MG/ML
INJECTION, SOLUTION INTRAVENOUS AS NEEDED
Status: DISCONTINUED | OUTPATIENT
Start: 2017-01-30 | End: 2017-01-30 | Stop reason: SURG

## 2017-01-30 RX ADMIN — DEXAMETHASONE SODIUM PHOSPHATE 8 MG: 4 INJECTION, SOLUTION INTRAMUSCULAR; INTRAVENOUS at 16:45

## 2017-01-30 RX ADMIN — LIDOCAINE HYDROCHLORIDE 30 MG: 10 INJECTION, SOLUTION EPIDURAL; INFILTRATION; INTRACAUDAL; PERINEURAL at 16:45

## 2017-01-30 RX ADMIN — PROPOFOL 50 MG: 10 INJECTION, EMULSION INTRAVENOUS at 18:21

## 2017-01-30 RX ADMIN — CEFAZOLIN SODIUM 2 G: 2 INJECTION, SOLUTION INTRAVENOUS at 17:21

## 2017-01-30 RX ADMIN — ONDANSETRON 4 MG: 2 INJECTION INTRAMUSCULAR; INTRAVENOUS at 18:38

## 2017-01-30 RX ADMIN — FENTANYL CITRATE 100 MCG: 50 INJECTION, SOLUTION INTRAMUSCULAR; INTRAVENOUS at 16:45

## 2017-01-30 RX ADMIN — ATRACURIUM BESYLATE 40 MG: 10 INJECTION, SOLUTION INTRAVENOUS at 16:45

## 2017-01-30 RX ADMIN — PROPOFOL 200 MG: 10 INJECTION, EMULSION INTRAVENOUS at 16:45

## 2017-01-30 RX ADMIN — SODIUM CHLORIDE, POTASSIUM CHLORIDE, SODIUM LACTATE AND CALCIUM CHLORIDE: 600; 310; 30; 20 INJECTION, SOLUTION INTRAVENOUS at 16:42

## 2017-01-30 NOTE — ANESTHESIA POSTPROCEDURE EVALUATION
Patient: Danae Fong    Procedure Summary     Date Anesthesia Start Anesthesia Stop Room / Location    01/30/17 1642  [facility] / [facility]       Procedure Diagnosis Surgeon Provider    REPEAT WOUND DEBRIDEMENT ACETABULAR REVISION (Right Hip) No diagnosis on file. MD Lloyd Vanegas MD          Anesthesia Type: general  Last vitals  /88 (01/30/17 1849)    Temp 99.8 °F (37.7 °C) (01/30/17 1849)    Pulse 70 (01/30/17 1849)   Resp 18 (01/30/17 1849)    SpO2   100     Post Anesthesia Care and Evaluation    Patient location during evaluation: PACU  Patient participation: complete - patient participated  Level of consciousness: awake and alert  Pain score: 0  Pain management: adequate  Airway patency: patent  Anesthetic complications: No anesthetic complications  PONV Status: none  Cardiovascular status: hemodynamically stable and acceptable  Respiratory status: nonlabored ventilation, acceptable and nasal cannula  Hydration status: acceptable

## 2017-01-30 NOTE — ANESTHESIA PREPROCEDURE EVALUATION
Anesthesia Evaluation     Patient summary reviewed and Nursing notes reviewed    Airway   Mallampati: I  TM distance: >3 FB  Neck ROM: full  no difficulty expected  Dental - normal exam     Pulmonary    (+) sleep apnea,   Cardiovascular   (+) hypertension, valvular problems/murmurs (AVR), dysrhythmias Atrial Fib,     ECG reviewed  ROS comment: Diastolic CHF    Neuro/Psych  GI/Hepatic/Renal/Endo    (+)  liver disease, diabetes mellitus type 2,     Musculoskeletal     Abdominal    Substance History      OB/GYN          Other                           Anesthesia Plan    ASA 3     general     intravenous induction   Anesthetic plan and risks discussed with patient.    Plan discussed with CRNA.

## 2017-01-30 NOTE — ANESTHESIA PROCEDURE NOTES
Airway  Urgency: elective    Airway not difficult    General Information and Staff    Patient location during procedure: OR  Anesthesiologist: AIXA JACOBS  CRNA: MATEO DOWNS    Indications and Patient Condition  Indications for airway management: airway protection    Preoxygenated: yes  MILS not maintained throughout  Mask difficulty assessment: 1 - vent by mask    Final Airway Details  Final airway type: endotracheal airway      Successful airway: ETT  Cuffed: yes   Successful intubation technique: direct laryngoscopy  Endotracheal tube insertion site: oral  Blade: Cosby  Blade size: #2  ETT size: 7.0 mm  Cormack-Lehane Classification: grade I - full view of glottis  Placement verified by: chest auscultation and capnometry   Cuff volume (mL): 2  Measured from: lips  ETT to lips (cm): 23  Number of attempts at approach: 1    Additional Comments  Negative epigastric sounds, Breath sound equal bilaterally with symmetric chest rise and fall

## 2017-02-01 PROBLEM — E87.6 HYPOKALEMIA: Status: ACTIVE | Noted: 2017-02-01

## 2017-02-02 ENCOUNTER — APPOINTMENT (OUTPATIENT)
Dept: ULTRASOUND IMAGING | Facility: HOSPITAL | Age: 47
End: 2017-02-02

## 2017-02-02 PROCEDURE — 76775 US EXAM ABDO BACK WALL LIM: CPT

## 2017-05-08 ENCOUNTER — TELEPHONE (OUTPATIENT)
Dept: CARDIOLOGY | Facility: CLINIC | Age: 47
End: 2017-05-08

## 2017-06-06 ENCOUNTER — OFFICE VISIT (OUTPATIENT)
Dept: CARDIOLOGY | Facility: CLINIC | Age: 47
End: 2017-06-06

## 2017-06-06 VITALS
DIASTOLIC BLOOD PRESSURE: 84 MMHG | BODY MASS INDEX: 39.68 KG/M2 | HEIGHT: 72 IN | SYSTOLIC BLOOD PRESSURE: 120 MMHG | HEART RATE: 51 BPM | WEIGHT: 293 LBS

## 2017-06-06 DIAGNOSIS — I35.1 NONRHEUMATIC AORTIC VALVE INSUFFICIENCY: ICD-10-CM

## 2017-06-06 DIAGNOSIS — IMO0001 NORMAL CORONARY ARTERIES: ICD-10-CM

## 2017-06-06 DIAGNOSIS — Q21.12 PFO (PATENT FORAMEN OVALE): ICD-10-CM

## 2017-06-06 DIAGNOSIS — I10 ESSENTIAL HYPERTENSION: Primary | ICD-10-CM

## 2017-06-06 DIAGNOSIS — I48.19 PERSISTENT ATRIAL FIBRILLATION (HCC): ICD-10-CM

## 2017-06-06 DIAGNOSIS — I50.32 CHRONIC DIASTOLIC CONGESTIVE HEART FAILURE (HCC): ICD-10-CM

## 2017-06-06 PROCEDURE — 93000 ELECTROCARDIOGRAM COMPLETE: CPT | Performed by: NURSE PRACTITIONER

## 2017-06-06 PROCEDURE — 99214 OFFICE O/P EST MOD 30 MIN: CPT | Performed by: NURSE PRACTITIONER

## 2017-06-06 NOTE — ASSESSMENT & PLAN NOTE
· NYHA class II-III symptoms.  · Increase Bumex 3 mg daily ×4 days.  · Decrease metoprolol titrate 12.5 mg twice a day  · Repeat CMP in one week

## 2017-06-06 NOTE — PROGRESS NOTES
Encounter Date:06/06/2017    Patient ID: Danae Fong is a 46 y.o. female who resides in [place], [place].    CC/Reason for visit:  Atrial Fibrillation          Danae Fong returns today for follow-up after a lengthy hospitalization last fall where she was admitted in heart failure due to severe aortic regurgitation that was damaged because of strep septic arthritis to her right hip.  She underwent an aortic valve replacement and postoperatively had difficulties with atrial fibrillation that returned despite cardioversion.  She is continuing to take amiodarone 200 mg daily.  She has not experienced any more episodes of atrial fibrillation since her final conversion prior to discharge.  She is currently not taking Ellik was any longer.  She reports this was discontinued a couple of months ago prior to his treatment tooth extraction.  During her hospital course she also developed a pericardial effusion that required a pericardial window and ultimately a pericardiectomy.  This past January she underwent a hip replacement and then suffered a dislocation of her prosthetic hip requiring multiple surgeries.  She has gained in excess of 40 pounds since her hospitalization last fall.  She does not think this is fluid gain from heart failure but due to an increase in caloric intake as during her hospitalization she was unable to eat solid foods and was maintained mostly on boost supplemental drinks.  She has noticed in the past week some lower extremity edema and her feet.  She has also noticed a mild increase in her shortness of breath.  She was evaluated in December by Dr. Mauricio Grove and a repeat echocardiogram showed a normal functioning prosthetic aortic valve.   She denies chest pain, orthopnea, palpitations or syncope.  She denies any signs or symptoms of a TIA or stroke.  An EKG performed in the office verifies sinus bradycardia at 50.  The patient is asymptomatic with this and actually increased  "her beta blocker dose to 25 mg twice a day because she felt her blood pressure was getting too high.    Review of Systems   Cardiovascular: Positive for dyspnea on exertion.   Respiratory: Positive for shortness of breath.    Musculoskeletal: Positive for back pain.       The patient's past medical, social, and family history reviewed in the patient's electronic medical record.    Allergies  Review of patient's allergies indicates no known allergies.    Outpatient Prescriptions Marked as Taking for the 6/6/17 encounter (Office Visit) with BAUTISTA Malcolm   Medication Sig Dispense Refill   • amoxicillin (AMOXIL) 500 MG capsule Take 1 capsule by mouth 2 (Two) Times a Day. 60 capsule 0   • aspirin 81 MG chewable tablet Chew 1 tablet Daily for 360 days. Resume in 1 month 90 tablet 3   • Bumetanide (BUMEX PO) Take 1 mg by mouth 2 (Two) Times a Day.     • Docusate Calcium (STOOL SOFTENER PO) Take 1 tablet by mouth daily.     • ferrous sulfate 325 (65 FE) MG tablet Take 325 mg by mouth daily with breakfast.     • metoprolol tartrate (LOPRESSOR) 25 MG tablet Take 0.5 tablets by mouth 2 (Two) Times a Day. (Patient taking differently: Take 25 mg by mouth 2 (Two) Times a Day.) 30 tablet 0   • pantoprazole (PROTONIX) 40 MG EC tablet Take 40 mg by mouth Daily.     • PARoxetine (PAXIL) 20 MG tablet Take 20 mg by mouth every morning.     • polycarbophil (FIBERCON) 625 MG tablet Take 625 mg by mouth Every Other Day As Needed.     • polyethylene glycol (MIRALAX) packet Take 17 g by mouth Every Other Day.     • potassium chloride (K-DUR,KLOR-CON) 10 MEQ CR tablet Take 20 mEq by mouth Daily.     • [DISCONTINUED] amiodarone (PACERONE) 200 MG tablet Take 1 tablet by mouth daily. 90 tablet 0         Blood pressure 120/84, pulse 51, height 74\" (188 cm), weight (!) 313 lb 12.8 oz (142 kg).  Body mass index is 40.29 kg/(m^2).    Physical Exam   Constitutional: She is oriented to person, place, and time. She appears well-developed " and well-nourished.   HENT:   Head: Normocephalic and atraumatic.   Eyes: Pupils are equal, round, and reactive to light. No scleral icterus.   Neck: No JVD present. Carotid bruit is not present. No thyromegaly present.   Cardiovascular: Normal rate, regular rhythm, S1 normal and S2 normal.  Exam reveals no gallop.    No murmur heard.  Pulmonary/Chest: Effort normal and breath sounds normal.   Abdominal: Soft. There is no tenderness.   Musculoskeletal: She exhibits edema.   Neurological: She is alert and oriented to person, place, and time.   Skin: Skin is warm and dry. No cyanosis. Nails show no clubbing.   Psychiatric: She has a normal mood and affect. Her behavior is normal.       Data Review:     ECG 12 Lead  Date/Time: 6/6/2017 3:25 PM  Performed by: RAI GARCIA  Authorized by: RAI GARCIA   Rhythm: sinus bradycardia  BPM: 50  Clinical impression: normal ECG  Comments: Sinus bradycardia  QRS 96 MS  QT/QTc 474/432 MS          Laboratory data obtained 05/26/2017: Potassium 3.4, sodium 139, BUN/creatinine 10, creatinine 0.7, AST 29, a LT 35, WBC 6.2, hemoglobin 13.3, hematocrit 42.1, platelets 143, hemoglobin A1c 6.0       Problem List Items Addressed This Visit        Cardiovascular and Mediastinum    Severe Nonrheumatic aortic valve insufficiency status post bioprosthetic AVR 8/30/60    Overview     · Severe nonrheumatic aortic insufficiency by [facility], 8/24/2016  · Bioprosthetic AVR (25 mm Magna Ease) by Dr. Mauricio Grove, 8/30/3016  · Initial possibility of vegetation turned out to be a flail leaflet  · Echo (12/2/2017): Normal functioning bioprosthetic valve.  Mild MR.  LVEF 50-55%         Current Assessment & Plan     · Most recent echocardiogram shows normal functioning valve.         PFO (patent foramen ovale)    Current Assessment & Plan     · Continue aspirin 81 mg daily         Normal coronary arteries    Persistent atrial fibrillation    Overview     · Noted on postoperative day one status  post AVR  · Cardioversion on 9/2/2016 with subsequent return to atrial fibrillation  · ECV on 9/12/16 with initiation of amiodarone  · Recurrent atrial fibrillation, 9/22/16  · Finally resolved post pericardial stripping 10/5/16  · Chads Vasc= 2'  · Eliquis discontinued         Current Assessment & Plan     · Discontinue amiodarone         Chronic diastolic congestive heart failure    Overview     · Echo (10/10/16): Normal LVEF.  Bioprosthetic aortic valve functioning normally.  · Echo (12/2017); LVEF 50-55%.  Bioprosthetic aortic valve functioning normally         Current Assessment & Plan     · NYHA class II-III symptoms.  · Increase Bumex 3 mg daily ×4 days.  · Decrease metoprolol titrate 12.5 mg twice a day  · Repeat CMP in one week         Relevant Orders    Comprehensive Metabolic Panel    RESOLVED: Essential hypertension - Primary    Current Assessment & Plan     · Continue metoprolol tartrate 25 mg twice a day             Mrs. Fong is doing well considering her complicated lengthy hospitalization that occurred last fall.  She has not experienced any more atrial fibrillation.  She has already stopped her Eliquis.  We will stop her amiodarone.  She is concerned about a slight increase in shortness of breath and mild lower extremity edema.  We will have her increase her Bumex dose to 3 mg daily ×4 days with a repeat CMP in one week.  She will need to take a half tablet extra potassium during this time.  She is bradycardic with a heart rate of 50.  Although she is asymptomatic we will decrease her metoprolol to 12.5 mg twice a day we will schedule her for a 3 month follow-up       · Discontinue amiodarone  · Increase Bumex 3 mg daily ×4 days and extra half tablet potassium.  Repeat a CMP in one week  · Decrease metoprolol to 12.5 mg twice a day  · Follow-up 3 months or sooner if needed    Bertha BAUM evaluated this patient independently    BAUTISTA Finn  6/6/2017

## 2018-01-02 DIAGNOSIS — R06.02 SHORTNESS OF BREATH: Primary | ICD-10-CM

## 2018-01-25 ENCOUNTER — APPOINTMENT (OUTPATIENT)
Dept: GENERAL RADIOLOGY | Facility: HOSPITAL | Age: 48
End: 2018-01-25

## 2018-01-25 ENCOUNTER — APPOINTMENT (OUTPATIENT)
Dept: MRI IMAGING | Facility: HOSPITAL | Age: 48
End: 2018-01-25

## 2018-01-25 ENCOUNTER — HOSPITAL ENCOUNTER (INPATIENT)
Facility: HOSPITAL | Age: 48
LOS: 6 days | Discharge: HOME HEALTH | End: 2018-01-31
Attending: EMERGENCY MEDICINE | Admitting: HOSPITALIST

## 2018-01-25 DIAGNOSIS — Z74.09 IMPAIRED FUNCTIONAL MOBILITY, BALANCE, GAIT, AND ENDURANCE: ICD-10-CM

## 2018-01-25 DIAGNOSIS — E11.9 CONTROLLED TYPE 2 DIABETES MELLITUS WITHOUT COMPLICATION, WITHOUT LONG-TERM CURRENT USE OF INSULIN (HCC): ICD-10-CM

## 2018-01-25 DIAGNOSIS — M86.471 CHRONIC OSTEOMYELITIS OF RIGHT FOOT WITH DRAINING SINUS (HCC): ICD-10-CM

## 2018-01-25 DIAGNOSIS — M86.9 OSTEOMYELITIS OF RIGHT FOOT, UNSPECIFIED TYPE (HCC): Primary | ICD-10-CM

## 2018-01-25 LAB
ALBUMIN SERPL-MCNC: 4 G/DL (ref 3.2–4.8)
ALBUMIN/GLOB SERPL: 0.9 G/DL (ref 1.5–2.5)
ALP SERPL-CCNC: 140 U/L (ref 25–100)
ALT SERPL W P-5'-P-CCNC: 27 U/L (ref 7–40)
ANION GAP SERPL CALCULATED.3IONS-SCNC: 7 MMOL/L (ref 3–11)
AST SERPL-CCNC: 37 U/L (ref 0–33)
BASOPHILS # BLD AUTO: 0.04 10*3/MM3 (ref 0–0.2)
BASOPHILS NFR BLD AUTO: 0.6 % (ref 0–1)
BILIRUB SERPL-MCNC: 0.5 MG/DL (ref 0.3–1.2)
BUN BLD-MCNC: 7 MG/DL (ref 9–23)
BUN/CREAT SERPL: 8.8 (ref 7–25)
CALCIUM SPEC-SCNC: 9 MG/DL (ref 8.7–10.4)
CHLORIDE SERPL-SCNC: 98 MMOL/L (ref 99–109)
CO2 SERPL-SCNC: 29 MMOL/L (ref 20–31)
CREAT BLD-MCNC: 0.8 MG/DL (ref 0.6–1.3)
CRP SERPL-MCNC: 5 MG/DL (ref 0–1)
D-LACTATE SERPL-SCNC: 1.6 MMOL/L (ref 0.5–2)
DEPRECATED RDW RBC AUTO: 45.2 FL (ref 37–54)
EOSINOPHIL # BLD AUTO: 0.15 10*3/MM3 (ref 0–0.3)
EOSINOPHIL NFR BLD AUTO: 2.2 % (ref 0–3)
ERYTHROCYTE [DISTWIDTH] IN BLOOD BY AUTOMATED COUNT: 15 % (ref 11.3–14.5)
ERYTHROCYTE [SEDIMENTATION RATE] IN BLOOD: >130 MM/HR (ref 0–20)
GFR SERPL CREATININE-BSD FRML MDRD: 77 ML/MIN/1.73
GLOBULIN UR ELPH-MCNC: 4.3 GM/DL
GLUCOSE BLD-MCNC: 179 MG/DL (ref 70–100)
GLUCOSE BLDC GLUCOMTR-MCNC: 103 MG/DL (ref 70–130)
HCT VFR BLD AUTO: 45.3 % (ref 34.5–44)
HGB BLD-MCNC: 14.6 G/DL (ref 11.5–15.5)
IMM GRANULOCYTES # BLD: 0.03 10*3/MM3 (ref 0–0.03)
IMM GRANULOCYTES NFR BLD: 0.4 % (ref 0–0.6)
LYMPHOCYTES # BLD AUTO: 1.71 10*3/MM3 (ref 0.6–4.8)
LYMPHOCYTES NFR BLD AUTO: 24.8 % (ref 24–44)
MCH RBC QN AUTO: 26.7 PG (ref 27–31)
MCHC RBC AUTO-ENTMCNC: 32.2 G/DL (ref 32–36)
MCV RBC AUTO: 83 FL (ref 80–99)
MONOCYTES # BLD AUTO: 0.47 10*3/MM3 (ref 0–1)
MONOCYTES NFR BLD AUTO: 6.8 % (ref 0–12)
NEUTROPHILS # BLD AUTO: 4.5 10*3/MM3 (ref 1.5–8.3)
NEUTROPHILS NFR BLD AUTO: 65.2 % (ref 41–71)
PLATELET # BLD AUTO: 171 10*3/MM3 (ref 150–450)
PMV BLD AUTO: 9.2 FL (ref 6–12)
POTASSIUM BLD-SCNC: 3.5 MMOL/L (ref 3.5–5.5)
PROT SERPL-MCNC: 8.3 G/DL (ref 5.7–8.2)
RBC # BLD AUTO: 5.46 10*6/MM3 (ref 3.89–5.14)
SODIUM BLD-SCNC: 134 MMOL/L (ref 132–146)
WBC NRBC COR # BLD: 6.9 10*3/MM3 (ref 3.5–10.8)

## 2018-01-25 PROCEDURE — 99223 1ST HOSP IP/OBS HIGH 75: CPT | Performed by: HOSPITALIST

## 2018-01-25 PROCEDURE — 73630 X-RAY EXAM OF FOOT: CPT

## 2018-01-25 PROCEDURE — 73718 MRI LOWER EXTREMITY W/O DYE: CPT

## 2018-01-25 PROCEDURE — 86140 C-REACTIVE PROTEIN: CPT | Performed by: EMERGENCY MEDICINE

## 2018-01-25 PROCEDURE — 63710000001 INSULIN LISPRO (HUMAN) PER 5 UNITS: Performed by: HOSPITALIST

## 2018-01-25 PROCEDURE — 85025 COMPLETE CBC W/AUTO DIFF WBC: CPT | Performed by: EMERGENCY MEDICINE

## 2018-01-25 PROCEDURE — 82962 GLUCOSE BLOOD TEST: CPT

## 2018-01-25 PROCEDURE — 5A09357 ASSISTANCE WITH RESPIRATORY VENTILATION, LESS THAN 24 CONSECUTIVE HOURS, CONTINUOUS POSITIVE AIRWAY PRESSURE: ICD-10-PCS | Performed by: HOSPITALIST

## 2018-01-25 PROCEDURE — 80053 COMPREHEN METABOLIC PANEL: CPT | Performed by: EMERGENCY MEDICINE

## 2018-01-25 PROCEDURE — 25010000002 PIPERACILLIN SOD-TAZOBACTAM PER 1 G: Performed by: HOSPITALIST

## 2018-01-25 PROCEDURE — 83605 ASSAY OF LACTIC ACID: CPT | Performed by: EMERGENCY MEDICINE

## 2018-01-25 PROCEDURE — 99284 EMERGENCY DEPT VISIT MOD MDM: CPT

## 2018-01-25 PROCEDURE — 25010000002 VANCOMYCIN 10 G RECONSTITUTED SOLUTION: Performed by: EMERGENCY MEDICINE

## 2018-01-25 PROCEDURE — 25010000002 HEPARIN (PORCINE) PER 1000 UNITS: Performed by: HOSPITALIST

## 2018-01-25 PROCEDURE — 87040 BLOOD CULTURE FOR BACTERIA: CPT | Performed by: EMERGENCY MEDICINE

## 2018-01-25 PROCEDURE — 73660 X-RAY EXAM OF TOE(S): CPT

## 2018-01-25 PROCEDURE — 85652 RBC SED RATE AUTOMATED: CPT | Performed by: EMERGENCY MEDICINE

## 2018-01-25 RX ORDER — ACETAMINOPHEN 325 MG/1
650 TABLET ORAL EVERY 6 HOURS PRN
Status: DISCONTINUED | OUTPATIENT
Start: 2018-01-25 | End: 2018-01-31 | Stop reason: HOSPADM

## 2018-01-25 RX ORDER — SACCHAROMYCES BOULARDII 250 MG
250 CAPSULE ORAL 2 TIMES DAILY
Status: DISCONTINUED | OUTPATIENT
Start: 2018-01-25 | End: 2018-01-31 | Stop reason: HOSPADM

## 2018-01-25 RX ORDER — DEXTROSE MONOHYDRATE 25 G/50ML
25 INJECTION, SOLUTION INTRAVENOUS
Status: DISCONTINUED | OUTPATIENT
Start: 2018-01-25 | End: 2018-01-31 | Stop reason: HOSPADM

## 2018-01-25 RX ORDER — POTASSIUM CHLORIDE 750 MG/1
20 CAPSULE, EXTENDED RELEASE ORAL DAILY
Status: DISCONTINUED | OUTPATIENT
Start: 2018-01-25 | End: 2018-01-30 | Stop reason: SDUPTHER

## 2018-01-25 RX ORDER — ASPIRIN 81 MG/1
81 TABLET ORAL DAILY
Status: DISCONTINUED | OUTPATIENT
Start: 2018-01-25 | End: 2018-01-30 | Stop reason: SDUPTHER

## 2018-01-25 RX ORDER — DOCUSATE SODIUM 100 MG/1
100 CAPSULE, LIQUID FILLED ORAL 2 TIMES DAILY
Status: DISCONTINUED | OUTPATIENT
Start: 2018-01-25 | End: 2018-01-30

## 2018-01-25 RX ORDER — NICOTINE POLACRILEX 4 MG
15 LOZENGE BUCCAL
Status: DISCONTINUED | OUTPATIENT
Start: 2018-01-25 | End: 2018-01-31 | Stop reason: HOSPADM

## 2018-01-25 RX ORDER — SODIUM CHLORIDE 0.9 % (FLUSH) 0.9 %
1-10 SYRINGE (ML) INJECTION AS NEEDED
Status: DISCONTINUED | OUTPATIENT
Start: 2018-01-25 | End: 2018-01-31 | Stop reason: HOSPADM

## 2018-01-25 RX ORDER — PAROXETINE HYDROCHLORIDE 20 MG/1
20 TABLET, FILM COATED ORAL DAILY
Status: DISCONTINUED | OUTPATIENT
Start: 2018-01-25 | End: 2018-01-26

## 2018-01-25 RX ORDER — FERROUS SULFATE 325(65) MG
325 TABLET ORAL
Status: DISCONTINUED | OUTPATIENT
Start: 2018-01-26 | End: 2018-01-30 | Stop reason: SDUPTHER

## 2018-01-25 RX ORDER — HEPARIN SODIUM 5000 [USP'U]/ML
5000 INJECTION, SOLUTION INTRAVENOUS; SUBCUTANEOUS EVERY 8 HOURS SCHEDULED
Status: DISCONTINUED | OUTPATIENT
Start: 2018-01-25 | End: 2018-01-29

## 2018-01-25 RX ORDER — BUMETANIDE 1 MG/1
1 TABLET ORAL DAILY
Status: DISCONTINUED | OUTPATIENT
Start: 2018-01-25 | End: 2018-01-28

## 2018-01-25 RX ORDER — BUMETANIDE 1 MG/1
1 TABLET ORAL 2 TIMES DAILY
COMMUNITY
End: 2023-03-21 | Stop reason: SDUPTHER

## 2018-01-25 RX ORDER — PANTOPRAZOLE SODIUM 40 MG/1
40 TABLET, DELAYED RELEASE ORAL
Status: DISCONTINUED | OUTPATIENT
Start: 2018-01-26 | End: 2018-01-30

## 2018-01-25 RX ORDER — SODIUM CHLORIDE 0.9 % (FLUSH) 0.9 %
10 SYRINGE (ML) INJECTION AS NEEDED
Status: DISCONTINUED | OUTPATIENT
Start: 2018-01-25 | End: 2018-01-31 | Stop reason: HOSPADM

## 2018-01-25 RX ADMIN — ACETAMINOPHEN 650 MG: 325 TABLET, FILM COATED ORAL at 22:32

## 2018-01-25 RX ADMIN — METOPROLOL TARTRATE 25 MG: 25 TABLET ORAL at 20:16

## 2018-01-25 RX ADMIN — TAZOBACTAM SODIUM AND PIPERACILLIN SODIUM 3.38 G: 375; 3 INJECTION, SOLUTION INTRAVENOUS at 17:54

## 2018-01-25 RX ADMIN — VANCOMYCIN HYDROCHLORIDE 2000 MG: 10 INJECTION, POWDER, LYOPHILIZED, FOR SOLUTION INTRAVENOUS at 15:43

## 2018-01-25 RX ADMIN — HEPARIN SODIUM 5000 UNITS: 5000 INJECTION, SOLUTION INTRAVENOUS; SUBCUTANEOUS at 20:16

## 2018-01-25 RX ADMIN — BUMETANIDE 1 MG: 1 TABLET ORAL at 20:16

## 2018-01-26 ENCOUNTER — APPOINTMENT (OUTPATIENT)
Dept: CARDIOLOGY | Facility: HOSPITAL | Age: 48
End: 2018-01-26
Attending: ORTHOPAEDIC SURGERY

## 2018-01-26 PROBLEM — M86.471 CHRONIC OSTEOMYELITIS OF RIGHT FOOT WITH DRAINING SINUS: Status: ACTIVE | Noted: 2018-01-25

## 2018-01-26 PROBLEM — E11.9 CONTROLLED TYPE 2 DIABETES MELLITUS WITHOUT COMPLICATION, WITHOUT LONG-TERM CURRENT USE OF INSULIN: Status: ACTIVE | Noted: 2018-01-25

## 2018-01-26 LAB
ANION GAP SERPL CALCULATED.3IONS-SCNC: 7 MMOL/L (ref 3–11)
BH CV LOWER ARTERIAL LEFT ABI RATIO: 1.17
BH CV LOWER ARTERIAL LEFT DORSALIS PEDIS SYS MAX: 118 MMHG
BH CV LOWER ARTERIAL LEFT GREAT TOE SYS MAX: 78 MMHG
BH CV LOWER ARTERIAL LEFT POST TIBIAL SYS MAX: 130 MMHG
BH CV LOWER ARTERIAL LEFT TBI RATIO: 0.7
BH CV LOWER ARTERIAL RIGHT ABI RATIO: 1.14
BH CV LOWER ARTERIAL RIGHT DORSALIS PEDIS SYS MAX: 116 MMHG
BH CV LOWER ARTERIAL RIGHT GREAT TOE SYS MAX: 102 MMHG
BH CV LOWER ARTERIAL RIGHT POST TIBIAL SYS MAX: 126 MMHG
BH CV LOWER ARTERIAL RIGHT TBI RATIO: 0.92
BNP SERPL-MCNC: 12 PG/ML (ref 0–100)
BUN BLD-MCNC: 7 MG/DL (ref 9–23)
BUN/CREAT SERPL: 10 (ref 7–25)
CALCIUM SPEC-SCNC: 8.7 MG/DL (ref 8.7–10.4)
CHLORIDE SERPL-SCNC: 101 MMOL/L (ref 99–109)
CO2 SERPL-SCNC: 26 MMOL/L (ref 20–31)
CREAT BLD-MCNC: 0.7 MG/DL (ref 0.6–1.3)
DEPRECATED RDW RBC AUTO: 45 FL (ref 37–54)
ERYTHROCYTE [DISTWIDTH] IN BLOOD BY AUTOMATED COUNT: 14.9 % (ref 11.3–14.5)
GFR SERPL CREATININE-BSD FRML MDRD: 90 ML/MIN/1.73
GLUCOSE BLD-MCNC: 196 MG/DL (ref 70–100)
GLUCOSE BLDC GLUCOMTR-MCNC: 128 MG/DL (ref 70–130)
GLUCOSE BLDC GLUCOMTR-MCNC: 130 MG/DL (ref 70–130)
GLUCOSE BLDC GLUCOMTR-MCNC: 162 MG/DL (ref 70–130)
GLUCOSE BLDC GLUCOMTR-MCNC: 92 MG/DL (ref 70–130)
HBA1C MFR BLD: 7.5 % (ref 4.8–5.6)
HCT VFR BLD AUTO: 41.7 % (ref 34.5–44)
HGB BLD-MCNC: 13.3 G/DL (ref 11.5–15.5)
MAGNESIUM SERPL-MCNC: 2 MG/DL (ref 1.3–2.7)
MCH RBC QN AUTO: 26.6 PG (ref 27–31)
MCHC RBC AUTO-ENTMCNC: 31.9 G/DL (ref 32–36)
MCV RBC AUTO: 83.4 FL (ref 80–99)
PHOSPHATE SERPL-MCNC: 2.9 MG/DL (ref 2.4–5.1)
PLATELET # BLD AUTO: 151 10*3/MM3 (ref 150–450)
PMV BLD AUTO: 9.8 FL (ref 6–12)
POTASSIUM BLD-SCNC: 3.4 MMOL/L (ref 3.5–5.5)
RBC # BLD AUTO: 5 10*6/MM3 (ref 3.89–5.14)
SODIUM BLD-SCNC: 134 MMOL/L (ref 132–146)
UPPER ARTERIAL LEFT ARM BRACHIAL SYS MAX: 111 MMHG
WBC NRBC COR # BLD: 5.07 10*3/MM3 (ref 3.5–10.8)

## 2018-01-26 PROCEDURE — 82962 GLUCOSE BLOOD TEST: CPT

## 2018-01-26 PROCEDURE — 84100 ASSAY OF PHOSPHORUS: CPT | Performed by: HOSPITALIST

## 2018-01-26 PROCEDURE — 25010000002 HEPARIN (PORCINE) PER 1000 UNITS: Performed by: HOSPITALIST

## 2018-01-26 PROCEDURE — 99254 IP/OBS CNSLTJ NEW/EST MOD 60: CPT | Performed by: ORTHOPAEDIC SURGERY

## 2018-01-26 PROCEDURE — 83735 ASSAY OF MAGNESIUM: CPT | Performed by: HOSPITALIST

## 2018-01-26 PROCEDURE — 87147 CULTURE TYPE IMMUNOLOGIC: CPT | Performed by: PHYSICIAN ASSISTANT

## 2018-01-26 PROCEDURE — 83036 HEMOGLOBIN GLYCOSYLATED A1C: CPT | Performed by: HOSPITALIST

## 2018-01-26 PROCEDURE — 87070 CULTURE OTHR SPECIMN AEROBIC: CPT | Performed by: PHYSICIAN ASSISTANT

## 2018-01-26 PROCEDURE — 93923 UPR/LXTR ART STDY 3+ LVLS: CPT

## 2018-01-26 PROCEDURE — 80048 BASIC METABOLIC PNL TOTAL CA: CPT | Performed by: HOSPITALIST

## 2018-01-26 PROCEDURE — 93923 UPR/LXTR ART STDY 3+ LVLS: CPT | Performed by: INTERNAL MEDICINE

## 2018-01-26 PROCEDURE — 0JBR0ZZ EXCISION OF LEFT FOOT SUBCUTANEOUS TISSUE AND FASCIA, OPEN APPROACH: ICD-10-PCS | Performed by: ORTHOPAEDIC SURGERY

## 2018-01-26 PROCEDURE — 87076 CULTURE ANAEROBE IDENT EACH: CPT | Performed by: PHYSICIAN ASSISTANT

## 2018-01-26 PROCEDURE — 87077 CULTURE AEROBIC IDENTIFY: CPT | Performed by: PHYSICIAN ASSISTANT

## 2018-01-26 PROCEDURE — 87186 SC STD MICRODIL/AGAR DIL: CPT | Performed by: PHYSICIAN ASSISTANT

## 2018-01-26 PROCEDURE — 87205 SMEAR GRAM STAIN: CPT | Performed by: PHYSICIAN ASSISTANT

## 2018-01-26 PROCEDURE — 99232 SBSQ HOSP IP/OBS MODERATE 35: CPT | Performed by: INTERNAL MEDICINE

## 2018-01-26 PROCEDURE — 83880 ASSAY OF NATRIURETIC PEPTIDE: CPT | Performed by: HOSPITALIST

## 2018-01-26 PROCEDURE — 87075 CULTR BACTERIA EXCEPT BLOOD: CPT | Performed by: PHYSICIAN ASSISTANT

## 2018-01-26 PROCEDURE — 25010000002 LINEZOLID 600 MG/300ML SOLUTION: Performed by: INTERNAL MEDICINE

## 2018-01-26 PROCEDURE — 97597 DBRDMT OPN WND 1ST 20 CM/<: CPT | Performed by: ORTHOPAEDIC SURGERY

## 2018-01-26 PROCEDURE — 85027 COMPLETE CBC AUTOMATED: CPT | Performed by: HOSPITALIST

## 2018-01-26 PROCEDURE — 25010000002 PIPERACILLIN SOD-TAZOBACTAM PER 1 G: Performed by: HOSPITALIST

## 2018-01-26 PROCEDURE — 94799 UNLISTED PULMONARY SVC/PX: CPT

## 2018-01-26 PROCEDURE — 94660 CPAP INITIATION&MGMT: CPT

## 2018-01-26 PROCEDURE — 25010000002 VANCOMYCIN 10 G RECONSTITUTED SOLUTION

## 2018-01-26 RX ORDER — LINEZOLID 2 MG/ML
600 INJECTION, SOLUTION INTRAVENOUS EVERY 12 HOURS
Status: DISCONTINUED | OUTPATIENT
Start: 2018-01-26 | End: 2018-01-28

## 2018-01-26 RX ORDER — GLIPIZIDE 2.5 MG/1
2.5 TABLET, EXTENDED RELEASE ORAL
Status: DISCONTINUED | OUTPATIENT
Start: 2018-01-26 | End: 2018-01-30

## 2018-01-26 RX ORDER — MUPIROCIN CALCIUM 20 MG/G
CREAM TOPICAL
Status: DISCONTINUED | OUTPATIENT
Start: 2018-01-26 | End: 2018-01-31 | Stop reason: HOSPADM

## 2018-01-26 RX ADMIN — Medication 325 MG: at 10:03

## 2018-01-26 RX ADMIN — ASPIRIN 81 MG: 81 TABLET, COATED ORAL at 10:03

## 2018-01-26 RX ADMIN — Medication 250 MG: at 10:03

## 2018-01-26 RX ADMIN — LINEZOLID 600 MG: 600 INJECTION, SOLUTION INTRAVENOUS at 21:09

## 2018-01-26 RX ADMIN — HEPARIN SODIUM 5000 UNITS: 5000 INJECTION, SOLUTION INTRAVENOUS; SUBCUTANEOUS at 15:12

## 2018-01-26 RX ADMIN — TAZOBACTAM SODIUM AND PIPERACILLIN SODIUM 3.38 G: 375; 3 INJECTION, SOLUTION INTRAVENOUS at 17:14

## 2018-01-26 RX ADMIN — PAROXETINE HYDROCHLORIDE 20 MG: 20 TABLET, FILM COATED ORAL at 10:02

## 2018-01-26 RX ADMIN — ACETAMINOPHEN 650 MG: 325 TABLET, FILM COATED ORAL at 05:42

## 2018-01-26 RX ADMIN — METOPROLOL TARTRATE 25 MG: 25 TABLET ORAL at 21:08

## 2018-01-26 RX ADMIN — METOPROLOL TARTRATE 25 MG: 25 TABLET ORAL at 10:03

## 2018-01-26 RX ADMIN — POLYETHYLENE GLYCOL 3350 17 G: 17 POWDER, FOR SOLUTION ORAL at 10:04

## 2018-01-26 RX ADMIN — INSULIN LISPRO 3 UNITS: 100 INJECTION, SOLUTION INTRAVENOUS; SUBCUTANEOUS at 21:08

## 2018-01-26 RX ADMIN — TAZOBACTAM SODIUM AND PIPERACILLIN SODIUM 3.38 G: 375; 3 INJECTION, SOLUTION INTRAVENOUS at 10:03

## 2018-01-26 RX ADMIN — BUMETANIDE 1 MG: 1 TABLET ORAL at 10:03

## 2018-01-26 RX ADMIN — MUPIROCIN 1 APPLICATION: 2 CREAM TOPICAL at 15:13

## 2018-01-26 RX ADMIN — HEPARIN SODIUM 5000 UNITS: 5000 INJECTION, SOLUTION INTRAVENOUS; SUBCUTANEOUS at 21:08

## 2018-01-26 RX ADMIN — DOCUSATE SODIUM 100 MG: 100 CAPSULE, LIQUID FILLED ORAL at 10:02

## 2018-01-26 RX ADMIN — GLIPIZIDE 2.5 MG: 2.5 TABLET, FILM COATED, EXTENDED RELEASE ORAL at 17:14

## 2018-01-26 RX ADMIN — DOCUSATE SODIUM 100 MG: 100 CAPSULE, LIQUID FILLED ORAL at 21:08

## 2018-01-26 RX ADMIN — TAZOBACTAM SODIUM AND PIPERACILLIN SODIUM 3.38 G: 375; 3 INJECTION, SOLUTION INTRAVENOUS at 23:40

## 2018-01-26 RX ADMIN — VANCOMYCIN HYDROCHLORIDE 1750 MG: 10 INJECTION, POWDER, LYOPHILIZED, FOR SOLUTION INTRAVENOUS at 05:42

## 2018-01-26 RX ADMIN — INSULIN LISPRO 3 UNITS: 100 INJECTION, SOLUTION INTRAVENOUS; SUBCUTANEOUS at 10:04

## 2018-01-26 RX ADMIN — TAZOBACTAM SODIUM AND PIPERACILLIN SODIUM 3.38 G: 375; 3 INJECTION, SOLUTION INTRAVENOUS at 01:14

## 2018-01-26 RX ADMIN — Medication 250 MG: at 21:08

## 2018-01-26 RX ADMIN — PANTOPRAZOLE SODIUM 40 MG: 40 TABLET, DELAYED RELEASE ORAL at 05:42

## 2018-01-26 RX ADMIN — ACETAMINOPHEN 650 MG: 325 TABLET, FILM COATED ORAL at 17:21

## 2018-01-26 RX ADMIN — POTASSIUM CHLORIDE 20 MEQ: 750 CAPSULE, EXTENDED RELEASE ORAL at 10:03

## 2018-01-26 RX ADMIN — HEPARIN SODIUM 5000 UNITS: 5000 INJECTION, SOLUTION INTRAVENOUS; SUBCUTANEOUS at 05:42

## 2018-01-27 LAB
GLUCOSE BLDC GLUCOMTR-MCNC: 126 MG/DL (ref 70–130)
GLUCOSE BLDC GLUCOMTR-MCNC: 148 MG/DL (ref 70–130)
GLUCOSE BLDC GLUCOMTR-MCNC: 66 MG/DL (ref 70–130)
GLUCOSE BLDC GLUCOMTR-MCNC: 92 MG/DL (ref 70–130)
GLUCOSE BLDC GLUCOMTR-MCNC: 98 MG/DL (ref 70–130)

## 2018-01-27 PROCEDURE — 25010000002 PIPERACILLIN SOD-TAZOBACTAM PER 1 G: Performed by: INTERNAL MEDICINE

## 2018-01-27 PROCEDURE — 94799 UNLISTED PULMONARY SVC/PX: CPT

## 2018-01-27 PROCEDURE — 82962 GLUCOSE BLOOD TEST: CPT

## 2018-01-27 PROCEDURE — 25010000002 LINEZOLID 600 MG/300ML SOLUTION: Performed by: INTERNAL MEDICINE

## 2018-01-27 PROCEDURE — 25010000002 HEPARIN (PORCINE) PER 1000 UNITS: Performed by: HOSPITALIST

## 2018-01-27 PROCEDURE — 99232 SBSQ HOSP IP/OBS MODERATE 35: CPT | Performed by: INTERNAL MEDICINE

## 2018-01-27 PROCEDURE — 25010000002 PIPERACILLIN SOD-TAZOBACTAM PER 1 G: Performed by: HOSPITALIST

## 2018-01-27 RX ADMIN — HEPARIN SODIUM 5000 UNITS: 5000 INJECTION, SOLUTION INTRAVENOUS; SUBCUTANEOUS at 13:23

## 2018-01-27 RX ADMIN — HEPARIN SODIUM 5000 UNITS: 5000 INJECTION, SOLUTION INTRAVENOUS; SUBCUTANEOUS at 22:01

## 2018-01-27 RX ADMIN — Medication 325 MG: at 08:38

## 2018-01-27 RX ADMIN — LINEZOLID 600 MG: 600 INJECTION, SOLUTION INTRAVENOUS at 18:51

## 2018-01-27 RX ADMIN — Medication 250 MG: at 08:39

## 2018-01-27 RX ADMIN — PANTOPRAZOLE SODIUM 40 MG: 40 TABLET, DELAYED RELEASE ORAL at 05:24

## 2018-01-27 RX ADMIN — TAZOBACTAM SODIUM AND PIPERACILLIN SODIUM 3.38 G: 375; 3 INJECTION, SOLUTION INTRAVENOUS at 08:38

## 2018-01-27 RX ADMIN — Medication 250 MG: at 22:01

## 2018-01-27 RX ADMIN — ASPIRIN 81 MG: 81 TABLET, COATED ORAL at 08:39

## 2018-01-27 RX ADMIN — DOCUSATE SODIUM 100 MG: 100 CAPSULE, LIQUID FILLED ORAL at 22:01

## 2018-01-27 RX ADMIN — HEPARIN SODIUM 5000 UNITS: 5000 INJECTION, SOLUTION INTRAVENOUS; SUBCUTANEOUS at 05:24

## 2018-01-27 RX ADMIN — DOCUSATE SODIUM 100 MG: 100 CAPSULE, LIQUID FILLED ORAL at 08:39

## 2018-01-27 RX ADMIN — GLIPIZIDE 2.5 MG: 2.5 TABLET, FILM COATED, EXTENDED RELEASE ORAL at 08:38

## 2018-01-27 RX ADMIN — MUPIROCIN: 2 CREAM TOPICAL at 08:38

## 2018-01-27 RX ADMIN — BUMETANIDE 1 MG: 1 TABLET ORAL at 08:38

## 2018-01-27 RX ADMIN — POTASSIUM CHLORIDE 20 MEQ: 750 CAPSULE, EXTENDED RELEASE ORAL at 08:39

## 2018-01-27 RX ADMIN — METOPROLOL TARTRATE 25 MG: 25 TABLET ORAL at 22:00

## 2018-01-27 RX ADMIN — TAZOBACTAM SODIUM AND PIPERACILLIN SODIUM 3.38 G: 375; 3 INJECTION, SOLUTION INTRAVENOUS at 15:49

## 2018-01-27 RX ADMIN — LINEZOLID 600 MG: 600 INJECTION, SOLUTION INTRAVENOUS at 05:24

## 2018-01-27 RX ADMIN — TAZOBACTAM SODIUM AND PIPERACILLIN SODIUM 3.38 G: 375; 3 INJECTION, SOLUTION INTRAVENOUS at 22:01

## 2018-01-27 RX ADMIN — POLYETHYLENE GLYCOL 3350 17 G: 17 POWDER, FOR SOLUTION ORAL at 08:38

## 2018-01-27 RX ADMIN — ACETAMINOPHEN 650 MG: 325 TABLET, FILM COATED ORAL at 18:56

## 2018-01-28 LAB
BACTERIA SPEC AEROBE CULT: ABNORMAL
BACTERIA SPEC AEROBE CULT: ABNORMAL
GLUCOSE BLDC GLUCOMTR-MCNC: 127 MG/DL (ref 70–130)
GLUCOSE BLDC GLUCOMTR-MCNC: 84 MG/DL (ref 70–130)
GLUCOSE BLDC GLUCOMTR-MCNC: 93 MG/DL (ref 70–130)
GLUCOSE BLDC GLUCOMTR-MCNC: 99 MG/DL (ref 70–130)
GRAM STN SPEC: ABNORMAL

## 2018-01-28 PROCEDURE — 94660 CPAP INITIATION&MGMT: CPT

## 2018-01-28 PROCEDURE — 25010000003 CEFTRIAXONE PER 250 MG: Performed by: INTERNAL MEDICINE

## 2018-01-28 PROCEDURE — 99231 SBSQ HOSP IP/OBS SF/LOW 25: CPT | Performed by: INTERNAL MEDICINE

## 2018-01-28 PROCEDURE — 94799 UNLISTED PULMONARY SVC/PX: CPT

## 2018-01-28 PROCEDURE — 25010000002 PIPERACILLIN SOD-TAZOBACTAM PER 1 G: Performed by: INTERNAL MEDICINE

## 2018-01-28 PROCEDURE — 25010000002 HEPARIN (PORCINE) PER 1000 UNITS: Performed by: HOSPITALIST

## 2018-01-28 PROCEDURE — 82962 GLUCOSE BLOOD TEST: CPT

## 2018-01-28 PROCEDURE — 97161 PT EVAL LOW COMPLEX 20 MIN: CPT

## 2018-01-28 RX ORDER — CEFTRIAXONE SODIUM 2 G/50ML
2 INJECTION, SOLUTION INTRAVENOUS EVERY 24 HOURS
Status: DISCONTINUED | OUTPATIENT
Start: 2018-01-28 | End: 2018-01-31 | Stop reason: HOSPADM

## 2018-01-28 RX ORDER — BUMETANIDE 1 MG/1
1 TABLET ORAL
Status: DISCONTINUED | OUTPATIENT
Start: 2018-01-28 | End: 2018-01-30 | Stop reason: SDUPTHER

## 2018-01-28 RX ORDER — PAROXETINE HYDROCHLORIDE 20 MG/1
20 TABLET, FILM COATED ORAL DAILY
Status: DISCONTINUED | OUTPATIENT
Start: 2018-01-28 | End: 2018-01-30 | Stop reason: SDUPTHER

## 2018-01-28 RX ADMIN — GLIPIZIDE 2.5 MG: 2.5 TABLET, FILM COATED, EXTENDED RELEASE ORAL at 08:38

## 2018-01-28 RX ADMIN — BUMETANIDE 1 MG: 1 TABLET ORAL at 08:37

## 2018-01-28 RX ADMIN — METOPROLOL TARTRATE 12.5 MG: 25 TABLET, FILM COATED ORAL at 20:55

## 2018-01-28 RX ADMIN — BUMETANIDE 1 MG: 1 TABLET ORAL at 17:05

## 2018-01-28 RX ADMIN — TAZOBACTAM SODIUM AND PIPERACILLIN SODIUM 3.38 G: 375; 3 INJECTION, SOLUTION INTRAVENOUS at 02:47

## 2018-01-28 RX ADMIN — HEPARIN SODIUM 5000 UNITS: 5000 INJECTION, SOLUTION INTRAVENOUS; SUBCUTANEOUS at 20:56

## 2018-01-28 RX ADMIN — TAZOBACTAM SODIUM AND PIPERACILLIN SODIUM 3.38 G: 375; 3 INJECTION, SOLUTION INTRAVENOUS at 14:18

## 2018-01-28 RX ADMIN — POTASSIUM CHLORIDE 20 MEQ: 750 CAPSULE, EXTENDED RELEASE ORAL at 08:38

## 2018-01-28 RX ADMIN — DOCUSATE SODIUM 100 MG: 100 CAPSULE, LIQUID FILLED ORAL at 08:38

## 2018-01-28 RX ADMIN — HEPARIN SODIUM 5000 UNITS: 5000 INJECTION, SOLUTION INTRAVENOUS; SUBCUTANEOUS at 14:18

## 2018-01-28 RX ADMIN — Medication 250 MG: at 20:56

## 2018-01-28 RX ADMIN — MUPIROCIN: 2 CREAM TOPICAL at 12:18

## 2018-01-28 RX ADMIN — Medication 325 MG: at 08:37

## 2018-01-28 RX ADMIN — ASPIRIN 81 MG: 81 TABLET, COATED ORAL at 08:38

## 2018-01-28 RX ADMIN — PAROXETINE HYDROCHLORIDE 20 MG: 20 TABLET, FILM COATED ORAL at 12:18

## 2018-01-28 RX ADMIN — CEFTRIAXONE SODIUM 2 G: 2 INJECTION, SOLUTION INTRAVENOUS at 17:05

## 2018-01-28 RX ADMIN — Medication 250 MG: at 08:38

## 2018-01-28 RX ADMIN — POLYETHYLENE GLYCOL 3350 17 G: 17 POWDER, FOR SOLUTION ORAL at 08:37

## 2018-01-29 LAB
GLUCOSE BLDC GLUCOMTR-MCNC: 101 MG/DL (ref 70–130)
GLUCOSE BLDC GLUCOMTR-MCNC: 105 MG/DL (ref 70–130)
GLUCOSE BLDC GLUCOMTR-MCNC: 116 MG/DL (ref 70–130)
GLUCOSE BLDC GLUCOMTR-MCNC: 80 MG/DL (ref 70–130)

## 2018-01-29 PROCEDURE — 82962 GLUCOSE BLOOD TEST: CPT

## 2018-01-29 PROCEDURE — 99231 SBSQ HOSP IP/OBS SF/LOW 25: CPT | Performed by: INTERNAL MEDICINE

## 2018-01-29 PROCEDURE — 93010 ELECTROCARDIOGRAM REPORT: CPT | Performed by: INTERNAL MEDICINE

## 2018-01-29 PROCEDURE — 25010000003 CEFTRIAXONE PER 250 MG: Performed by: INTERNAL MEDICINE

## 2018-01-29 PROCEDURE — 93005 ELECTROCARDIOGRAM TRACING: CPT | Performed by: NURSE PRACTITIONER

## 2018-01-29 PROCEDURE — 25010000002 PROMETHAZINE PER 50 MG: Performed by: NURSE PRACTITIONER

## 2018-01-29 PROCEDURE — 99024 POSTOP FOLLOW-UP VISIT: CPT | Performed by: ORTHOPAEDIC SURGERY

## 2018-01-29 PROCEDURE — 25010000002 HEPARIN (PORCINE) PER 1000 UNITS: Performed by: HOSPITALIST

## 2018-01-29 RX ORDER — ONDANSETRON 4 MG/1
4 TABLET, FILM COATED ORAL EVERY 6 HOURS PRN
Qty: 15 TABLET | Refills: 0 | Status: SHIPPED | OUTPATIENT
Start: 2018-01-29 | End: 2018-03-19

## 2018-01-29 RX ORDER — OXYCODONE HYDROCHLORIDE AND ACETAMINOPHEN 5; 325 MG/1; MG/1
1-2 TABLET ORAL EVERY 6 HOURS PRN
Qty: 30 TABLET | Refills: 0 | Status: SHIPPED | OUTPATIENT
Start: 2018-01-29 | End: 2018-03-19

## 2018-01-29 RX ORDER — PROMETHAZINE HYDROCHLORIDE 25 MG/ML
6.25 INJECTION, SOLUTION INTRAMUSCULAR; INTRAVENOUS EVERY 6 HOURS PRN
Status: DISCONTINUED | OUTPATIENT
Start: 2018-01-29 | End: 2018-01-31 | Stop reason: HOSPADM

## 2018-01-29 RX ORDER — HYDROCODONE BITARTRATE AND ACETAMINOPHEN 7.5; 325 MG/1; MG/1
1-2 TABLET ORAL EVERY 6 HOURS PRN
Qty: 30 TABLET | Refills: 0 | Status: SHIPPED | OUTPATIENT
Start: 2018-01-29 | End: 2018-03-19

## 2018-01-29 RX ADMIN — Medication 250 MG: at 21:57

## 2018-01-29 RX ADMIN — POTASSIUM CHLORIDE 20 MEQ: 750 CAPSULE, EXTENDED RELEASE ORAL at 09:43

## 2018-01-29 RX ADMIN — PANTOPRAZOLE SODIUM 40 MG: 40 TABLET, DELAYED RELEASE ORAL at 05:58

## 2018-01-29 RX ADMIN — Medication 325 MG: at 09:42

## 2018-01-29 RX ADMIN — PAROXETINE HYDROCHLORIDE 20 MG: 20 TABLET, FILM COATED ORAL at 09:42

## 2018-01-29 RX ADMIN — MUPIROCIN: 2 CREAM TOPICAL at 09:48

## 2018-01-29 RX ADMIN — BUMETANIDE 1 MG: 1 TABLET ORAL at 19:08

## 2018-01-29 RX ADMIN — ASPIRIN 81 MG: 81 TABLET, COATED ORAL at 13:27

## 2018-01-29 RX ADMIN — METOPROLOL TARTRATE 12.5 MG: 25 TABLET, FILM COATED ORAL at 21:57

## 2018-01-29 RX ADMIN — Medication 250 MG: at 09:43

## 2018-01-29 RX ADMIN — HEPARIN SODIUM 5000 UNITS: 5000 INJECTION, SOLUTION INTRAVENOUS; SUBCUTANEOUS at 05:58

## 2018-01-29 RX ADMIN — HEPARIN SODIUM 5000 UNITS: 5000 INJECTION, SOLUTION INTRAVENOUS; SUBCUTANEOUS at 13:27

## 2018-01-29 RX ADMIN — Medication 10 ML: at 22:19

## 2018-01-29 RX ADMIN — GLIPIZIDE 2.5 MG: 2.5 TABLET, FILM COATED, EXTENDED RELEASE ORAL at 09:48

## 2018-01-29 RX ADMIN — CEFTRIAXONE SODIUM 2 G: 2 INJECTION, SOLUTION INTRAVENOUS at 19:35

## 2018-01-29 RX ADMIN — BUMETANIDE 1 MG: 1 TABLET ORAL at 09:43

## 2018-01-29 RX ADMIN — PROMETHAZINE HYDROCHLORIDE 6.25 MG: 25 INJECTION INTRAMUSCULAR; INTRAVENOUS at 22:19

## 2018-01-30 ENCOUNTER — ANESTHESIA EVENT (OUTPATIENT)
Dept: PERIOP | Facility: HOSPITAL | Age: 48
End: 2018-01-30

## 2018-01-30 ENCOUNTER — ANESTHESIA (OUTPATIENT)
Dept: PERIOP | Facility: HOSPITAL | Age: 48
End: 2018-01-30

## 2018-01-30 LAB
ANION GAP SERPL CALCULATED.3IONS-SCNC: 10 MMOL/L (ref 3–11)
B-HCG UR QL: NEGATIVE
BACTERIA SPEC AEROBE CULT: NORMAL
BACTERIA SPEC AEROBE CULT: NORMAL
BASOPHILS # BLD AUTO: 0.06 10*3/MM3 (ref 0–0.2)
BASOPHILS NFR BLD AUTO: 1.3 % (ref 0–1)
BUN BLD-MCNC: 6 MG/DL (ref 9–23)
BUN/CREAT SERPL: 8.6 (ref 7–25)
CALCIUM SPEC-SCNC: 8.7 MG/DL (ref 8.7–10.4)
CHLORIDE SERPL-SCNC: 101 MMOL/L (ref 99–109)
CO2 SERPL-SCNC: 24 MMOL/L (ref 20–31)
CREAT BLD-MCNC: 0.7 MG/DL (ref 0.6–1.3)
DEPRECATED RDW RBC AUTO: 45.6 FL (ref 37–54)
EOSINOPHIL # BLD AUTO: 0.1 10*3/MM3 (ref 0–0.3)
EOSINOPHIL NFR BLD AUTO: 2.2 % (ref 0–3)
ERYTHROCYTE [DISTWIDTH] IN BLOOD BY AUTOMATED COUNT: 15 % (ref 11.3–14.5)
GFR SERPL CREATININE-BSD FRML MDRD: 90 ML/MIN/1.73
GLUCOSE BLD-MCNC: 120 MG/DL (ref 70–100)
GLUCOSE BLDC GLUCOMTR-MCNC: 112 MG/DL (ref 70–130)
GLUCOSE BLDC GLUCOMTR-MCNC: 167 MG/DL (ref 70–130)
GLUCOSE BLDC GLUCOMTR-MCNC: 81 MG/DL (ref 70–130)
GLUCOSE BLDC GLUCOMTR-MCNC: 99 MG/DL (ref 70–130)
HCT VFR BLD AUTO: 46.1 % (ref 34.5–44)
HGB BLD-MCNC: 14.8 G/DL (ref 11.5–15.5)
IMM GRANULOCYTES # BLD: 0.02 10*3/MM3 (ref 0–0.03)
IMM GRANULOCYTES NFR BLD: 0.4 % (ref 0–0.6)
INTERNAL NEGATIVE CONTROL: NORMAL
INTERNAL POSITIVE CONTROL: NORMAL
LYMPHOCYTES # BLD AUTO: 1.66 10*3/MM3 (ref 0.6–4.8)
LYMPHOCYTES NFR BLD AUTO: 37.1 % (ref 24–44)
Lab: NORMAL
MCH RBC QN AUTO: 26.9 PG (ref 27–31)
MCHC RBC AUTO-ENTMCNC: 32.1 G/DL (ref 32–36)
MCV RBC AUTO: 83.7 FL (ref 80–99)
MONOCYTES # BLD AUTO: 0.36 10*3/MM3 (ref 0–1)
MONOCYTES NFR BLD AUTO: 8 % (ref 0–12)
NEUTROPHILS # BLD AUTO: 2.28 10*3/MM3 (ref 1.5–8.3)
NEUTROPHILS NFR BLD AUTO: 51 % (ref 41–71)
PLATELET # BLD AUTO: 170 10*3/MM3 (ref 150–450)
PMV BLD AUTO: 9.8 FL (ref 6–12)
POTASSIUM BLD-SCNC: 3.8 MMOL/L (ref 3.5–5.5)
RBC # BLD AUTO: 5.51 10*6/MM3 (ref 3.89–5.14)
SODIUM BLD-SCNC: 135 MMOL/L (ref 132–146)
WBC NRBC COR # BLD: 4.48 10*3/MM3 (ref 3.5–10.8)

## 2018-01-30 PROCEDURE — 99232 SBSQ HOSP IP/OBS MODERATE 35: CPT | Performed by: INTERNAL MEDICINE

## 2018-01-30 PROCEDURE — 25010000002 FENTANYL CITRATE (PF) 100 MCG/2ML SOLUTION: Performed by: ANESTHESIOLOGY

## 2018-01-30 PROCEDURE — 88311 DECALCIFY TISSUE: CPT | Performed by: ORTHOPAEDIC SURGERY

## 2018-01-30 PROCEDURE — 82962 GLUCOSE BLOOD TEST: CPT

## 2018-01-30 PROCEDURE — 87102 FUNGUS ISOLATION CULTURE: CPT | Performed by: ORTHOPAEDIC SURGERY

## 2018-01-30 PROCEDURE — 88305 TISSUE EXAM BY PATHOLOGIST: CPT | Performed by: ORTHOPAEDIC SURGERY

## 2018-01-30 PROCEDURE — 25010000002 ONDANSETRON PER 1 MG: Performed by: NURSE ANESTHETIST, CERTIFIED REGISTERED

## 2018-01-30 PROCEDURE — C1894 INTRO/SHEATH, NON-LASER: HCPCS

## 2018-01-30 PROCEDURE — 87206 SMEAR FLUORESCENT/ACID STAI: CPT | Performed by: ORTHOPAEDIC SURGERY

## 2018-01-30 PROCEDURE — 87075 CULTR BACTERIA EXCEPT BLOOD: CPT | Performed by: ORTHOPAEDIC SURGERY

## 2018-01-30 PROCEDURE — 0Y6R0Z0 DETACHMENT AT RIGHT 2ND TOE, COMPLETE, OPEN APPROACH: ICD-10-PCS | Performed by: ORTHOPAEDIC SURGERY

## 2018-01-30 PROCEDURE — 02HV33Z INSERTION OF INFUSION DEVICE INTO SUPERIOR VENA CAVA, PERCUTANEOUS APPROACH: ICD-10-PCS | Performed by: INTERNAL MEDICINE

## 2018-01-30 PROCEDURE — 87077 CULTURE AEROBIC IDENTIFY: CPT | Performed by: ORTHOPAEDIC SURGERY

## 2018-01-30 PROCEDURE — 87186 SC STD MICRODIL/AGAR DIL: CPT | Performed by: ORTHOPAEDIC SURGERY

## 2018-01-30 PROCEDURE — 25010000002 MIDAZOLAM PER 1 MG: Performed by: NURSE ANESTHETIST, CERTIFIED REGISTERED

## 2018-01-30 PROCEDURE — 25010000002 FENTANYL CITRATE (PF) 100 MCG/2ML SOLUTION: Performed by: NURSE ANESTHETIST, CERTIFIED REGISTERED

## 2018-01-30 PROCEDURE — 4A02X4A MEASUREMENT OF CARDIAC ELECTRICAL ACTIVITY, GUIDANCE, EXTERNAL APPROACH: ICD-10-PCS | Performed by: INTERNAL MEDICINE

## 2018-01-30 PROCEDURE — 87205 SMEAR GRAM STAIN: CPT | Performed by: ORTHOPAEDIC SURGERY

## 2018-01-30 PROCEDURE — 85025 COMPLETE CBC W/AUTO DIFF WBC: CPT | Performed by: INTERNAL MEDICINE

## 2018-01-30 PROCEDURE — 25010000002 HYDROMORPHONE PER 4 MG: Performed by: ANESTHESIOLOGY

## 2018-01-30 PROCEDURE — 25010000003 CEFTRIAXONE PER 250 MG: Performed by: INTERNAL MEDICINE

## 2018-01-30 PROCEDURE — 28820 AMPUTATION OF TOE: CPT | Performed by: ORTHOPAEDIC SURGERY

## 2018-01-30 PROCEDURE — 80048 BASIC METABOLIC PNL TOTAL CA: CPT | Performed by: INTERNAL MEDICINE

## 2018-01-30 PROCEDURE — 87176 TISSUE HOMOGENIZATION CULTR: CPT | Performed by: ORTHOPAEDIC SURGERY

## 2018-01-30 PROCEDURE — 25010000002 PROPOFOL 1000 MG/ML EMULSION: Performed by: NURSE ANESTHETIST, CERTIFIED REGISTERED

## 2018-01-30 PROCEDURE — C1751 CATH, INF, PER/CENT/MIDLINE: HCPCS

## 2018-01-30 PROCEDURE — 87147 CULTURE TYPE IMMUNOLOGIC: CPT | Performed by: ORTHOPAEDIC SURGERY

## 2018-01-30 PROCEDURE — 87070 CULTURE OTHR SPECIMN AEROBIC: CPT | Performed by: ORTHOPAEDIC SURGERY

## 2018-01-30 PROCEDURE — 94799 UNLISTED PULMONARY SVC/PX: CPT

## 2018-01-30 PROCEDURE — 87116 MYCOBACTERIA CULTURE: CPT | Performed by: ORTHOPAEDIC SURGERY

## 2018-01-30 RX ORDER — CALCIUM POLYCARBOPHIL 625 MG
625 TABLET ORAL
Status: DISCONTINUED | OUTPATIENT
Start: 2018-01-30 | End: 2018-01-31 | Stop reason: HOSPADM

## 2018-01-30 RX ORDER — DIPHENOXYLATE HYDROCHLORIDE AND ATROPINE SULFATE 2.5; .025 MG/1; MG/1
1 TABLET ORAL DAILY
Status: DISCONTINUED | OUTPATIENT
Start: 2018-01-30 | End: 2018-01-31 | Stop reason: HOSPADM

## 2018-01-30 RX ORDER — FENTANYL CITRATE 50 UG/ML
INJECTION, SOLUTION INTRAMUSCULAR; INTRAVENOUS AS NEEDED
Status: DISCONTINUED | OUTPATIENT
Start: 2018-01-30 | End: 2018-01-30 | Stop reason: SURG

## 2018-01-30 RX ORDER — MEPERIDINE HYDROCHLORIDE 25 MG/ML
12.5 INJECTION INTRAMUSCULAR; INTRAVENOUS; SUBCUTANEOUS
Status: DISCONTINUED | OUTPATIENT
Start: 2018-01-30 | End: 2018-01-30 | Stop reason: HOSPADM

## 2018-01-30 RX ORDER — POTASSIUM CHLORIDE 750 MG/1
20 CAPSULE, EXTENDED RELEASE ORAL DAILY
Status: DISCONTINUED | OUTPATIENT
Start: 2018-01-30 | End: 2018-01-31 | Stop reason: HOSPADM

## 2018-01-30 RX ORDER — AMOXICILLIN 250 MG/1
500 CAPSULE ORAL EVERY 8 HOURS SCHEDULED
Status: DISCONTINUED | OUTPATIENT
Start: 2018-01-30 | End: 2018-01-31 | Stop reason: HOSPADM

## 2018-01-30 RX ORDER — FENTANYL CITRATE 50 UG/ML
50 INJECTION, SOLUTION INTRAMUSCULAR; INTRAVENOUS
Status: DISCONTINUED | OUTPATIENT
Start: 2018-01-30 | End: 2018-01-30 | Stop reason: HOSPADM

## 2018-01-30 RX ORDER — LIDOCAINE HYDROCHLORIDE 10 MG/ML
0.5 INJECTION, SOLUTION EPIDURAL; INFILTRATION; INTRACAUDAL; PERINEURAL ONCE AS NEEDED
Status: DISCONTINUED | OUTPATIENT
Start: 2018-01-30 | End: 2018-01-30 | Stop reason: HOSPADM

## 2018-01-30 RX ORDER — SODIUM CHLORIDE 0.9 % (FLUSH) 0.9 %
1-10 SYRINGE (ML) INJECTION AS NEEDED
Status: DISCONTINUED | OUTPATIENT
Start: 2018-01-30 | End: 2018-01-31 | Stop reason: HOSPADM

## 2018-01-30 RX ORDER — OXYCODONE HYDROCHLORIDE AND ACETAMINOPHEN 5; 325 MG/1; MG/1
1 TABLET ORAL EVERY 4 HOURS PRN
Status: DISCONTINUED | OUTPATIENT
Start: 2018-01-30 | End: 2018-01-30

## 2018-01-30 RX ORDER — PANTOPRAZOLE SODIUM 40 MG/1
40 TABLET, DELAYED RELEASE ORAL DAILY
Status: DISCONTINUED | OUTPATIENT
Start: 2018-01-30 | End: 2018-01-31 | Stop reason: HOSPADM

## 2018-01-30 RX ORDER — ONDANSETRON 4 MG/1
4 TABLET, FILM COATED ORAL EVERY 6 HOURS PRN
Status: DISCONTINUED | OUTPATIENT
Start: 2018-01-30 | End: 2018-01-31 | Stop reason: HOSPADM

## 2018-01-30 RX ORDER — SODIUM CHLORIDE, SODIUM LACTATE, POTASSIUM CHLORIDE, CALCIUM CHLORIDE 600; 310; 30; 20 MG/100ML; MG/100ML; MG/100ML; MG/100ML
INJECTION, SOLUTION INTRAVENOUS CONTINUOUS PRN
Status: DISCONTINUED | OUTPATIENT
Start: 2018-01-30 | End: 2018-01-30 | Stop reason: SURG

## 2018-01-30 RX ORDER — SODIUM CHLORIDE 0.9 % (FLUSH) 0.9 %
1-10 SYRINGE (ML) INJECTION AS NEEDED
Status: DISCONTINUED | OUTPATIENT
Start: 2018-01-30 | End: 2018-01-30 | Stop reason: HOSPADM

## 2018-01-30 RX ORDER — HYDROCODONE BITARTRATE AND ACETAMINOPHEN 7.5; 325 MG/1; MG/1
1 TABLET ORAL EVERY 4 HOURS PRN
Status: DISCONTINUED | OUTPATIENT
Start: 2018-01-30 | End: 2018-01-31 | Stop reason: HOSPADM

## 2018-01-30 RX ORDER — HYDROMORPHONE HYDROCHLORIDE 1 MG/ML
0.5 INJECTION, SOLUTION INTRAMUSCULAR; INTRAVENOUS; SUBCUTANEOUS
Status: DISCONTINUED | OUTPATIENT
Start: 2018-01-30 | End: 2018-01-30 | Stop reason: HOSPADM

## 2018-01-30 RX ORDER — ONDANSETRON 2 MG/ML
4 INJECTION INTRAMUSCULAR; INTRAVENOUS ONCE AS NEEDED
Status: DISCONTINUED | OUTPATIENT
Start: 2018-01-30 | End: 2018-01-30 | Stop reason: HOSPADM

## 2018-01-30 RX ORDER — DOCUSATE SODIUM 100 MG/1
100 CAPSULE, LIQUID FILLED ORAL DAILY
Status: DISCONTINUED | OUTPATIENT
Start: 2018-01-30 | End: 2018-01-31 | Stop reason: HOSPADM

## 2018-01-30 RX ORDER — OXYCODONE HYDROCHLORIDE AND ACETAMINOPHEN 5; 325 MG/1; MG/1
1 TABLET ORAL EVERY 6 HOURS PRN
Status: DISCONTINUED | OUTPATIENT
Start: 2018-01-30 | End: 2018-01-31 | Stop reason: HOSPADM

## 2018-01-30 RX ORDER — OXYCODONE HYDROCHLORIDE AND ACETAMINOPHEN 5; 325 MG/1; MG/1
1 TABLET ORAL ONCE AS NEEDED
Status: DISCONTINUED | OUTPATIENT
Start: 2018-01-30 | End: 2018-01-30 | Stop reason: HOSPADM

## 2018-01-30 RX ORDER — LABETALOL HYDROCHLORIDE 5 MG/ML
5 INJECTION, SOLUTION INTRAVENOUS
Status: DISCONTINUED | OUTPATIENT
Start: 2018-01-30 | End: 2018-01-30 | Stop reason: HOSPADM

## 2018-01-30 RX ORDER — ONDANSETRON 2 MG/ML
INJECTION INTRAMUSCULAR; INTRAVENOUS AS NEEDED
Status: DISCONTINUED | OUTPATIENT
Start: 2018-01-30 | End: 2018-01-30 | Stop reason: SURG

## 2018-01-30 RX ORDER — OXYCODONE HYDROCHLORIDE AND ACETAMINOPHEN 5; 325 MG/1; MG/1
2 TABLET ORAL EVERY 4 HOURS PRN
Status: DISCONTINUED | OUTPATIENT
Start: 2018-01-30 | End: 2018-01-30

## 2018-01-30 RX ORDER — FAMOTIDINE 10 MG/ML
20 INJECTION, SOLUTION INTRAVENOUS ONCE
Status: CANCELLED | OUTPATIENT
Start: 2018-01-30 | End: 2018-01-30

## 2018-01-30 RX ORDER — LIDOCAINE HYDROCHLORIDE 10 MG/ML
INJECTION, SOLUTION EPIDURAL; INFILTRATION; INTRACAUDAL; PERINEURAL AS NEEDED
Status: DISCONTINUED | OUTPATIENT
Start: 2018-01-30 | End: 2018-01-30 | Stop reason: SURG

## 2018-01-30 RX ORDER — FERROUS SULFATE 325(65) MG
325 TABLET ORAL
Status: DISCONTINUED | OUTPATIENT
Start: 2018-01-30 | End: 2018-01-31 | Stop reason: HOSPADM

## 2018-01-30 RX ORDER — MUPIROCIN CALCIUM 20 MG/G
CREAM TOPICAL AS NEEDED
Status: DISCONTINUED | OUTPATIENT
Start: 2018-01-30 | End: 2018-01-30 | Stop reason: HOSPADM

## 2018-01-30 RX ORDER — BUMETANIDE 1 MG/1
1 TABLET ORAL 2 TIMES DAILY
Status: DISCONTINUED | OUTPATIENT
Start: 2018-01-30 | End: 2018-01-31 | Stop reason: HOSPADM

## 2018-01-30 RX ORDER — EPHEDRINE SULFATE 50 MG/ML
5 INJECTION, SOLUTION INTRAVENOUS ONCE AS NEEDED
Status: DISCONTINUED | OUTPATIENT
Start: 2018-01-30 | End: 2018-01-30 | Stop reason: HOSPADM

## 2018-01-30 RX ORDER — FAMOTIDINE 20 MG/1
20 TABLET, FILM COATED ORAL ONCE
Status: COMPLETED | OUTPATIENT
Start: 2018-01-30 | End: 2018-01-30

## 2018-01-30 RX ORDER — HYDROCODONE BITARTRATE AND ACETAMINOPHEN 7.5; 325 MG/1; MG/1
2 TABLET ORAL EVERY 4 HOURS PRN
Status: DISCONTINUED | OUTPATIENT
Start: 2018-01-30 | End: 2018-01-30

## 2018-01-30 RX ORDER — MIDAZOLAM HYDROCHLORIDE 1 MG/ML
INJECTION INTRAMUSCULAR; INTRAVENOUS AS NEEDED
Status: DISCONTINUED | OUTPATIENT
Start: 2018-01-30 | End: 2018-01-30 | Stop reason: SURG

## 2018-01-30 RX ORDER — ASPIRIN 81 MG/1
81 TABLET, CHEWABLE ORAL DAILY
Status: DISCONTINUED | OUTPATIENT
Start: 2018-01-30 | End: 2018-01-31 | Stop reason: HOSPADM

## 2018-01-30 RX ORDER — HYDROCODONE BITARTRATE AND ACETAMINOPHEN 7.5; 325 MG/1; MG/1
1 TABLET ORAL EVERY 6 HOURS PRN
Status: DISCONTINUED | OUTPATIENT
Start: 2018-01-30 | End: 2018-01-31 | Stop reason: HOSPADM

## 2018-01-30 RX ORDER — SODIUM CHLORIDE, SODIUM LACTATE, POTASSIUM CHLORIDE, CALCIUM CHLORIDE 600; 310; 30; 20 MG/100ML; MG/100ML; MG/100ML; MG/100ML
100 INJECTION, SOLUTION INTRAVENOUS CONTINUOUS
Status: DISCONTINUED | OUTPATIENT
Start: 2018-01-30 | End: 2018-01-31 | Stop reason: HOSPADM

## 2018-01-30 RX ORDER — PAROXETINE HYDROCHLORIDE 20 MG/1
20 TABLET, FILM COATED ORAL EVERY MORNING
Status: DISCONTINUED | OUTPATIENT
Start: 2018-01-30 | End: 2018-01-31 | Stop reason: HOSPADM

## 2018-01-30 RX ORDER — NALOXONE HCL 0.4 MG/ML
0.4 VIAL (ML) INJECTION AS NEEDED
Status: DISCONTINUED | OUTPATIENT
Start: 2018-01-30 | End: 2018-01-30 | Stop reason: HOSPADM

## 2018-01-30 RX ADMIN — PAROXETINE HYDROCHLORIDE 20 MG: 20 TABLET, FILM COATED ORAL at 12:45

## 2018-01-30 RX ADMIN — Medication 325 MG: at 12:45

## 2018-01-30 RX ADMIN — FENTANYL CITRATE 50 MCG: 50 INJECTION, SOLUTION INTRAMUSCULAR; INTRAVENOUS at 10:58

## 2018-01-30 RX ADMIN — FENTANYL CITRATE 50 MCG: 50 INJECTION, SOLUTION INTRAMUSCULAR; INTRAVENOUS at 10:30

## 2018-01-30 RX ADMIN — HYDROMORPHONE HYDROCHLORIDE 0.5 MG: 2 INJECTION INTRAMUSCULAR; INTRAVENOUS; SUBCUTANEOUS at 11:10

## 2018-01-30 RX ADMIN — FAMOTIDINE 20 MG: 20 TABLET, FILM COATED ORAL at 08:36

## 2018-01-30 RX ADMIN — BUMETANIDE 1 MG: 1 TABLET ORAL at 20:31

## 2018-01-30 RX ADMIN — MIDAZOLAM HYDROCHLORIDE 2 MG: 1 INJECTION, SOLUTION INTRAMUSCULAR; INTRAVENOUS at 09:37

## 2018-01-30 RX ADMIN — METOPROLOL TARTRATE 12.5 MG: 25 TABLET, FILM COATED ORAL at 08:35

## 2018-01-30 RX ADMIN — Medication 10 ML: at 20:32

## 2018-01-30 RX ADMIN — METOPROLOL TARTRATE 12.5 MG: 25 TABLET, FILM COATED ORAL at 20:32

## 2018-01-30 RX ADMIN — CEFTRIAXONE SODIUM 2 G: 2 INJECTION, SOLUTION INTRAVENOUS at 17:19

## 2018-01-30 RX ADMIN — AMOXICILLIN 500 MG: 250 CAPSULE ORAL at 14:13

## 2018-01-30 RX ADMIN — BUMETANIDE 1 MG: 1 TABLET ORAL at 12:45

## 2018-01-30 RX ADMIN — ONDANSETRON 4 MG: 2 INJECTION INTRAMUSCULAR; INTRAVENOUS at 10:02

## 2018-01-30 RX ADMIN — POLYETHYLENE GLYCOL 3350 17 G: 17 POWDER, FOR SOLUTION ORAL at 12:45

## 2018-01-30 RX ADMIN — SODIUM CHLORIDE, POTASSIUM CHLORIDE, SODIUM LACTATE AND CALCIUM CHLORIDE: 600; 310; 30; 20 INJECTION, SOLUTION INTRAVENOUS at 10:00

## 2018-01-30 RX ADMIN — OXYCODONE AND ACETAMINOPHEN 2 TABLET: 5; 325 TABLET ORAL at 11:49

## 2018-01-30 RX ADMIN — Medication 250 MG: at 20:31

## 2018-01-30 RX ADMIN — INSULIN LISPRO 2 UNITS: 100 INJECTION, SOLUTION INTRAVENOUS; SUBCUTANEOUS at 17:17

## 2018-01-30 RX ADMIN — FENTANYL CITRATE 100 MCG: 50 INJECTION, SOLUTION INTRAMUSCULAR; INTRAVENOUS at 09:37

## 2018-01-30 RX ADMIN — LIDOCAINE HYDROCHLORIDE 30 MG: 10 INJECTION, SOLUTION EPIDURAL; INFILTRATION; INTRACAUDAL; PERINEURAL at 09:37

## 2018-01-30 RX ADMIN — POTASSIUM CHLORIDE 20 MEQ: 750 CAPSULE, EXTENDED RELEASE ORAL at 12:45

## 2018-01-30 RX ADMIN — Medication 1 TABLET: at 12:44

## 2018-01-30 RX ADMIN — PANTOPRAZOLE SODIUM 40 MG: 40 TABLET, DELAYED RELEASE ORAL at 12:45

## 2018-01-30 RX ADMIN — HYDROCODONE BITARTRATE AND ACETAMINOPHEN 1 TABLET: 7.5; 325 TABLET ORAL at 20:32

## 2018-01-30 RX ADMIN — ASPIRIN 81 MG 81 MG: 81 TABLET ORAL at 12:45

## 2018-01-30 RX ADMIN — AMOXICILLIN 500 MG: 250 CAPSULE ORAL at 20:31

## 2018-01-30 RX ADMIN — PROPOFOL 125 MCG/KG/MIN: 10 INJECTION, EMULSION INTRAVENOUS at 09:37

## 2018-01-30 RX ADMIN — SODIUM CHLORIDE, POTASSIUM CHLORIDE, SODIUM LACTATE AND CALCIUM CHLORIDE: 600; 310; 30; 20 INJECTION, SOLUTION INTRAVENOUS at 09:32

## 2018-01-31 VITALS
RESPIRATION RATE: 16 BRPM | TEMPERATURE: 97.8 F | DIASTOLIC BLOOD PRESSURE: 73 MMHG | HEART RATE: 58 BPM | SYSTOLIC BLOOD PRESSURE: 121 MMHG | WEIGHT: 293 LBS | HEIGHT: 72 IN | BODY MASS INDEX: 39.68 KG/M2 | OXYGEN SATURATION: 91 %

## 2018-01-31 LAB
ANION GAP SERPL CALCULATED.3IONS-SCNC: 7 MMOL/L (ref 3–11)
BUN BLD-MCNC: 5 MG/DL (ref 9–23)
BUN/CREAT SERPL: 7.1 (ref 7–25)
CALCIUM SPEC-SCNC: 8.8 MG/DL (ref 8.7–10.4)
CHLORIDE SERPL-SCNC: 100 MMOL/L (ref 99–109)
CO2 SERPL-SCNC: 30 MMOL/L (ref 20–31)
CREAT BLD-MCNC: 0.7 MG/DL (ref 0.6–1.3)
CYTO UR: NORMAL
DEPRECATED RDW RBC AUTO: 46.2 FL (ref 37–54)
ERYTHROCYTE [DISTWIDTH] IN BLOOD BY AUTOMATED COUNT: 15.1 % (ref 11.3–14.5)
GFR SERPL CREATININE-BSD FRML MDRD: 90 ML/MIN/1.73
GLUCOSE BLD-MCNC: 88 MG/DL (ref 70–100)
GLUCOSE BLDC GLUCOMTR-MCNC: 103 MG/DL (ref 70–130)
GLUCOSE BLDC GLUCOMTR-MCNC: 89 MG/DL (ref 70–130)
HCT VFR BLD AUTO: 42 % (ref 34.5–44)
HGB BLD-MCNC: 13.2 G/DL (ref 11.5–15.5)
LAB AP CASE REPORT: NORMAL
LAB AP CLINICAL INFORMATION: NORMAL
Lab: NORMAL
MCH RBC QN AUTO: 26.5 PG (ref 27–31)
MCHC RBC AUTO-ENTMCNC: 31.4 G/DL (ref 32–36)
MCV RBC AUTO: 84.3 FL (ref 80–99)
PATH REPORT.FINAL DX SPEC: NORMAL
PATH REPORT.GROSS SPEC: NORMAL
PLATELET # BLD AUTO: 153 10*3/MM3 (ref 150–450)
PMV BLD AUTO: 9.8 FL (ref 6–12)
POTASSIUM BLD-SCNC: 3.7 MMOL/L (ref 3.5–5.5)
RBC # BLD AUTO: 4.98 10*6/MM3 (ref 3.89–5.14)
SODIUM BLD-SCNC: 137 MMOL/L (ref 132–146)
WBC NRBC COR # BLD: 4.7 10*3/MM3 (ref 3.5–10.8)

## 2018-01-31 PROCEDURE — 99024 POSTOP FOLLOW-UP VISIT: CPT | Performed by: ORTHOPAEDIC SURGERY

## 2018-01-31 PROCEDURE — 25010000003 CEFTRIAXONE PER 250 MG: Performed by: INTERNAL MEDICINE

## 2018-01-31 PROCEDURE — 85027 COMPLETE CBC AUTOMATED: CPT | Performed by: INTERNAL MEDICINE

## 2018-01-31 PROCEDURE — 99239 HOSP IP/OBS DSCHRG MGMT >30: CPT | Performed by: NURSE PRACTITIONER

## 2018-01-31 PROCEDURE — 80048 BASIC METABOLIC PNL TOTAL CA: CPT | Performed by: INTERNAL MEDICINE

## 2018-01-31 PROCEDURE — 82962 GLUCOSE BLOOD TEST: CPT

## 2018-01-31 RX ORDER — SACCHAROMYCES BOULARDII 250 MG
250 CAPSULE ORAL 2 TIMES DAILY
Qty: 60 CAPSULE | Refills: 0 | Status: SHIPPED | OUTPATIENT
Start: 2018-01-31 | End: 2018-03-19

## 2018-01-31 RX ORDER — MUPIROCIN CALCIUM 20 MG/G
CREAM TOPICAL
Qty: 15 G | Refills: 0 | Status: SHIPPED | OUTPATIENT
Start: 2018-01-31 | End: 2018-03-19

## 2018-01-31 RX ORDER — DIPHENOXYLATE HYDROCHLORIDE AND ATROPINE SULFATE 2.5; .025 MG/1; MG/1
1 TABLET ORAL DAILY
Qty: 30 TABLET
Start: 2018-02-01 | End: 2020-07-13

## 2018-01-31 RX ORDER — ASPIRIN 81 MG/1
81 TABLET, CHEWABLE ORAL DAILY
Status: ON HOLD
Start: 2018-02-01 | End: 2021-03-16 | Stop reason: SDUPTHER

## 2018-01-31 RX ORDER — ACETAMINOPHEN 325 MG/1
650 TABLET ORAL EVERY 6 HOURS PRN
Start: 2018-01-31 | End: 2018-03-19

## 2018-01-31 RX ORDER — CEFTRIAXONE SODIUM 2 G/50ML
2 INJECTION, SOLUTION INTRAVENOUS EVERY 24 HOURS
Refills: 0
Start: 2018-01-31 | End: 2018-02-04

## 2018-01-31 RX ADMIN — AMOXICILLIN 500 MG: 250 CAPSULE ORAL at 14:31

## 2018-01-31 RX ADMIN — CEFTRIAXONE SODIUM 2 G: 2 INJECTION, SOLUTION INTRAVENOUS at 14:31

## 2018-01-31 RX ADMIN — MUPIROCIN: 2 CREAM TOPICAL at 14:31

## 2018-01-31 RX ADMIN — Medication 250 MG: at 08:24

## 2018-01-31 RX ADMIN — ASPIRIN 81 MG 81 MG: 81 TABLET ORAL at 08:24

## 2018-01-31 RX ADMIN — AMOXICILLIN 500 MG: 250 CAPSULE ORAL at 06:13

## 2018-01-31 RX ADMIN — BUMETANIDE 1 MG: 1 TABLET ORAL at 08:24

## 2018-01-31 RX ADMIN — PANTOPRAZOLE SODIUM 40 MG: 40 TABLET, DELAYED RELEASE ORAL at 08:24

## 2018-01-31 RX ADMIN — Medication 325 MG: at 08:24

## 2018-01-31 RX ADMIN — POTASSIUM CHLORIDE 20 MEQ: 750 CAPSULE, EXTENDED RELEASE ORAL at 08:24

## 2018-01-31 RX ADMIN — PAROXETINE HYDROCHLORIDE 20 MG: 20 TABLET, FILM COATED ORAL at 06:13

## 2018-01-31 RX ADMIN — Medication 1 TABLET: at 08:25

## 2018-02-01 NOTE — PAYOR COMM NOTE
"Danae Fong (47 y.o. Female)     Date of Birth Social Security Number Address Home Phone MRN    1970  [address] [facility] [address] [phone] [id number]    Zoroastrianism Marital Status          Unknown        Admission Date Admission Type Admitting Provider Attending Provider Department, Room/Bed    1/25/18 [facility], Jono PARSONS MD  [facility] 3G, S365/1    Discharge Date Discharge Disposition Discharge Destination        1/31/2018 Home or Self Care Home            Attending Provider: (none)    Allergies:  No Known Allergies    Isolation:  None   Infection:  MRSA (08/28/15)   Code Status:  Prior    Ht:  185.4 cm (73\")   Wt:  155 kg (341 lb)    Admission Cmt:  None   Principal Problem:  Osteomyelitis of right foot [M86.9]                 Active Insurance as of 1/25/2018     Primary Coverage     Payor Plan Insurance Group Employer/Plan Group    [employer]UE [employer] PPO [id number]     Payor Plan Address Payor Plan Phone Number Effective From Effective To    [address] [phone] 4/26/2017     [place] [id number]       Subscriber Name Subscriber Birth Date Member ID       DANAE FONG 1970 [id number]                 Emergency Contacts      (Rel.) Home Phone Work Phone Mobile Phone    Anna Fong (Sister) [phone] -- --          Mookie Hernandez MD Physician Signed Infectious Disease Progress Notes Date of Service: 1/30/2018 12:50 PM         []Hide copied text  [facility] Progress Note     Admission Date: 1/25/2018     Danaeambika Fong  1970  [id number]     Date: 1/30/2018     Meds:    Anti-Infectives     Ordered       Dose/Rate Route Frequency Start Stop     01/30/18 1138   amoxicillin (AMOXIL) capsule 500 mg     Ordering Provider:  Charleen Nath MD     500 mg Oral Every 8 Hours Scheduled 01/30/18 1400 02/02/18 2159     01/28/18 1506   cefTRIAXone (ROCEPHIN) IVPB 2 g     Ordering Provider:  " Mookie Hernandez MD     2 g  100 mL/hr over 30 Minutes Intravenous Every 24 Hours 18 1600 18 1559     18 1710   piperacillin-tazobactam (ZOSYN) 3.375 g in iso-osmotic dextrose 50 ml (premix)     Ordering Provider:  Kanu Ahuja MD     3.375 g  over 30 Minutes Intravenous Once 18 1711 18 1837     18 1526   vancomycin 2000 mg/500 mL 0.9% NS IVPB ([facility])     Ordering Provider:  RHIANNON Lewis     2,000 mg  over 120 Minutes Intravenous Once 18 1528 18 1752            CC:  Right foot osteomyelitis     SUBJECTIVE:  Patient is a 47 y.o. female, known to Dr. Mookie Winchester, with h/o Afib, VHD/AVR, CHF, HTN, Obesity, controlled DM, pericarditis/pericardial window , MRSA nasal colonization, and GBS right hip septic arthritis/dislocation with revision in 2017 on chronic Amoxicillin suppressive therapy who presented to [facility] ED with fever and chills for the last couple of days.  She has right 2nd toe swelling and erythema and left plantar diabetic foot ulcer.  She has had h/o of left foot osteomyelitis with surgical debridement in the past.  She has been afebrile here with no elevation of WBC.  She has elevated inflammatory markers.  Xrays of both feet and MRI of both feet shows left plantar ulcer without evidence of osteomyelitis and right 2nd toe swelling with evidence of osteomyelitis. She was started on Zosyn and Vancomycin.  ID was asked to evaluate and manage her antibiotic therapy.      18; doing better; less pain, no diarrhea, rash, sore throat  18:   She thinks she broke her fever last pm.  No n/v/d.   18: Denies f/c, sob, n/v/d, rashes.  Awaiting surgery tomorrow for right 2nd toe amputation.  Denies pain.      18; had toe amputation today; feels well; no complaints, denies diarrhea, rash, sore throat     PE:   Vital Signs  Temp (24hrs), Av.4 °F (36.3 °C), Min:97 °F (36.1 °C), Max:98 °F (36.7 °C)     Temp  Min: 97 °F (36.1 °C)  Max: 98  °F (36.7 °C)  BP  Min: 111/77  Max: 149/88  Pulse  Min: 45  Max: 78  Resp  Min: 16  Max: 20  SpO2  Min: 95 %  Max: 100 %     GENERAL: Awake and alert, in no acute distress.   HEENT:  No conjunctival injection. No icterus. Oropharynx clear without evidence of thrush or exudate.     HEART: RRR; No murmur, rubs, gallops.   LUNGS: Clear to auscultation bilaterally without wheezing, rales, rhonchi. Normal respiratory effort. Nonlabored. No dullness.  ABDOMEN: Soft, nontender, nondistended. Positive bowel sounds. No rebound or guarding. NO mass or HSM.  EXT:  No cyanosis, clubbing or edema. No cord.     SKIN: Warm and dry without cutaneous eruptions on Inspection/palpation.   Left foot wrapped  NEURO: Alert and oriented x 3, Motor 5/5 bilaterally     Laboratory Data        Results from last 7 days  Lab Units 01/30/18  0734 01/26/18  0829 01/25/18  1307   WBC 10*3/mm3 4.48 5.07 6.90   HEMOGLOBIN g/dL 14.8 13.3 14.6   HEMATOCRIT % 46.1* 41.7 45.3*   PLATELETS 10*3/mm3 170 151 171         Results from last 7 days  Lab Units 01/30/18  0734   SODIUM mmol/L 135   POTASSIUM mmol/L 3.8   CHLORIDE mmol/L 101   CO2 mmol/L 24.0   BUN mg/dL 6*   CREATININE mg/dL 0.70   GLUCOSE mg/dL 120*   CALCIUM mg/dL 8.7         Results from last 7 days  Lab Units 01/25/18  1307   ALK PHOS U/L 140*   BILIRUBIN mg/dL 0.5   ALT (SGPT) U/L 27   AST (SGOT) U/L 37*         Results from last 7 days  Lab Units 01/25/18  1307   SED RATE mm/hr >130*         Results from last 7 days  Lab Units 01/25/18  1307   CRP mg/dL 5.00*         Results from last 7 days  Lab Units 01/25/18  1307   LACTATE mmol/L 1.6              Estimated Creatinine Clearance: 167.8 mL/min (by C-G formula based on Cr of 0.7).     Microbiology:        Blood Culture   Date Value Ref Range Status   01/25/2018 No growth at 3 days   Preliminary   01/25/2018 No growth at 3 days   Preliminary                                    Wound Culture   Date Value Ref Range Status   01/26/2018 Moderate  growth (3+) Staphylococcus aureus (A)   Final   MSSA     Radiology:      Imaging Results (last 72 hours)      ** No results found for the last 72 hours. **          Impression:   --Acute right 2nd digit infection/cellulitis/osteomyelitis, awaiting amputation  --Subacute left plantar diabetic foot ulcer, MSSA  --H/o MRSA infection/colonization  --Elevated inflammatory markers  --T2DM, no meds, only worsens with infections.   --H/o GBS right hip septic arthritis/dislocation with multiple surgeries and revision with last revision in 2017.  On chronic amoxicillin suppressive therapy.  --VHD/AVR  --Afib on no meds  --Obesity  --HTN  --H/o CHF ?systolic.  --H/o pericarditis/pericardial window      PLAN/RECOMMENDATIONS:       --Monitor cultures, labs, FLAKO.    --Continue rocephin  --Continue wound care  -- s/p right 2nd toe amputation     While cultures grew MSSA from left foot I suspect this may be involving right foot as well     Given toe has been removed will try 2-6 week course of luba bx     picc today  Iv rocephin x 2-6 weeks  Probable d/c tomorrow  F/u in office in 1 week  Dp/cm time 20 minutes           Mookie Hernandez MD  2018  12:50 PM                               Jono Arrington MD Physician Signed Medicine Progress Notes Date of Service: 2018 12:11 PM      Expand All Collapse All    []Hide copied text       [facility] Medicine Services  PROGRESS NOTE     Patient Name: Danae Fong  : 1970  MRN: [id number]     Date of Admission: 2018  Length of Stay: 5  Primary Care Physician: Leigh Casey MD        Subjective      Subjective      CC:  Right second toe and foot swelling erythema pain and fever     HPI:  Post op pain ok. No dyspnea.   Review of Systems     Otherwise ROS is negative except as mentioned in the HPI.        Objective      Objective      Vital Signs:   Temp:  [97 °F (36.1 °C)-98 °F (36.7 °C)] 97.3 °F (36.3 °C)  Heart Rate:  [45-78]  50  Resp:  [16-20] 18  BP: (111-149)/(61-89) 130/75      Physical Exam:  Vital signs reviewed and within normal range  Gen. appearance: Pleasant  female in no distress.  is awake and alert. She is oriented to person place and time  HEENT: Pupils reactive and responsive to light. Extraocular muscles are intact. Dry mucous membrane: Diminished skin turgor. No oral lesions thrush.  Chest: Clear to auscultation bilaterally.  No wheezing, rales or rhonchi  Cardiovascular: Regular rate and rhythm. No murmurs rubs or gallop no increased jugular venous pulsation  Abdomen: Soft. Non tender to palpation. No palpable masses. No CVA tenderness. Normal bowel sounds.   Extremities: Right foot cleanly dressed post op  Neurologic examination: Motor tone and strength are normal. Intact deep tendon reflexes. No focal neurological deficit.  Psychiatric: appropriate affect     Results Reviewed:  I have personally reviewed current lab, radiology, and data and agree.        Results from last 7 days  Lab Units 01/30/18  0734 01/26/18  0829 01/25/18  1307   WBC 10*3/mm3 4.48 5.07 6.90   HEMOGLOBIN g/dL 14.8 13.3 14.6   HEMATOCRIT % 46.1* 41.7 45.3*   PLATELETS 10*3/mm3 170 151 171        Results from last 7 days  Lab Units 01/30/18  0734 01/26/18  0829 01/25/18  1307   SODIUM mmol/L 135 134 134   POTASSIUM mmol/L 3.8 3.4* 3.5   CHLORIDE mmol/L 101 101 98*   CO2 mmol/L 24.0 26.0 29.0   BUN mg/dL 6* 7* 7*   CREATININE mg/dL 0.70 0.70 0.80   GLUCOSE mg/dL 120* 196* 179*   CALCIUM mg/dL 8.7 8.7 9.0   ALT (SGPT) U/L  --   --  27   AST (SGOT) U/L  --   --  37*     Estimated Creatinine Clearance: 167.8 mL/min (by C-G formula based on Cr of 0.7).  No results found for: BNP  No results found for: PHART     Microbiology Results Abnormal     Procedure Component Value - Date/Time            Blood Culture - Blood, [[id number]]  (Normal) Collected:  01/25/18 1319     Lab Status:  Preliminary result Specimen:  Blood from Arm, Right Updated:   01/29/18 1346       Blood Culture No growth at 4 days     Blood Culture - Blood, [[id number]]  (Normal) Collected:  01/25/18 1319     Lab Status:  Preliminary result Specimen:  Blood from Arm, Left Updated:  01/29/18 1346       Blood Culture No growth at 4 days     Wound Culture - Wound, Foot, Left [[id number]]  (Abnormal)  (Susceptibility) Collected:  01/26/18 1243     Lab Status:  Final result Specimen:  Wound from Foot, Left Updated:  01/28/18 1422       Wound Culture --         Moderate growth (3+) Staphylococcus aureus (A)       Gram Stain Result Occasional WBCs seen         Few (2+) Gram variable bacilli         Occasional Gram positive cocci in pairs               Susceptibility                  Staphylococcus aureus        CORNELIO        Ceftriaxone <=8 ug/ml Susceptible        Clindamycin >4 ug/ml Resistant        Erythromycin >4 ug/ml Resistant        Gentamicin <=4 ug/ml Susceptible        Levofloxacin <=1 ug/ml Susceptible  [1]         Linezolid 2 ug/ml Susceptible        Oxacillin <=0.25 ug/ml Susceptible        Penicillin G Resistant        Quinupristin + Dalfopristin <=0.5 ug/ml Susceptible        Rifampin <=1 ug/ml Susceptible        Tetracycline <=4 ug/ml Susceptible        Trimethoprim + Sulfamethoxazole <=0.5/9.5 ug/ml Susceptible        Vancomycin 2 ug/ml Susceptible                           [1]    Staphylococcus species may develop resistance during prolonged therapy with quinolones.  Isolates that are initially susceptible may become resistant within three to four days after initiation of therapy. Testing of repeat isolates may be warranted.                        Anaerobic Culture - Swab, Foot, Left [[id number]]  (Normal) Collected:  01/26/18 1243     Lab Status:  Preliminary result Specimen:  Swab from Foot, Left Updated:  01/28/18 1239       Culture No anaerobes isolated                Imaging Results (last 24 hours)      ** No results found for the last 24 hours. **          Results for orders  placed in visit on 12/02/16   SCANNED - ECHOCARDIOGRAM         I have reviewed the medications.        Assessment/Plan      Assessment / Plan          Hospital Problem List      * (Principal)Osteomyelitis of right foot     Controlled type 2 diabetes mellitus without complication, without long-term current use of insulin     Overview Signed 1/26/2018  1:36 PM by Charleen Nath MD       Added automatically from request for surgery [id number]                 This is a pleasant 47-year-old morbidly obese  female with past medical history significant for long-standing essential hypertension, hyperlipidemia, impaired fasting glucose, probable obstructive sleep apnea, Paroxysmal atrial fibrillation with previous ECG, severe nonrheumatic aortic valve insufficiency status post aortic valve replacement with bioprosthetic 25 mm magna valve on August 30, 2016 complicated by postoperative pericardial effusion and tamponade requiring pericardial window subsequent pericardectomy  Right hip septic arthritis status post washout debridement and right hip arthroplasty     She presents with 2 days of increased swelling and pain in her right second toe as well as dorsum of her foot associated with fever and chills findings of cellulitis and osteomyelitis of her second toe as well as acute skin and soft tissue infection of the dorsum of the foot  Evaluated by orthopedic surgery with plans for right second toe amputation     1.  Acute skin and soft tissue infection of right foot as well as second toe, complicated by osteomyelitis of the second toe.  This is due to onychomycosis and breakdown of the skin integrity.  Wound culture growing methicillin sensitive staph aureus.  Vancomycin and Zosyn discontinued and patient transition to intravenous ceftriaxone  -s/p second toe amputation on Tuesday, January 30, 2018     2.  Left foot ulcer without evidence of infection Status post debridement   Non-weightbearing to left foot to heal the  ulcer     3.  History of impaired fasting glucose.  Stable     4.  History of essential hypertension, stable     5.  Remote history of paroxysmal atrial fibrillation not on anticoagulation.  Currently in sinus rhythm     6.  History of severe nonrheumatic aortic valve insufficiency status post aortic valve replacement with bioprosthetic 25 mm magna valve on August 30, 2016 complicated by postoperative pericardial effusion and tamponade requiring pericardial window subsequent pericardectomy  No cardiac symptoms such as dyspnea, orthopnea, paroxysmal nocturnal dyspnea or chest pain        7.  History of group B streptococcus right hip septic arthritis and dislocation with multiple surgeries and revisions last revision in January 2017 on chronic amoxicillin suppressive therapy     8.  Morbid obesity with probable obstructive sleep apnea  sleep study recommended to the patient  ----------------------------------------------------  Plan:  -d/c glipizide while inpatient; a1c 7.5 (was diet controlled at home)  -metoprolol 12.5mg bid (down from 25mg bid due to mild asx bradycardia)  -cbc, bmp in a.m.  -s/p toe amputation today 1/30, per dr. Nath, ok w/ d/c home tomorrow  -final abx recs per dr. lowe        DVT Prophylaxis:  Lovenox     CODE STATUS: Full Code     Disposition: I expect the patient to be discharged to possibly home tomorrow if ok w/ id and final abx arranged  Mason Miller MD  01/30/18  12:11 PM                               Charleen Nath MD Physician Signed Orthopedics Op Note Date of Service: 1/30/2018 10:06 AM     Case ID: [id number] Case Time: 1/30/2018  9:55 AM Surgeon: Charleen Nath MD     Procedure: AMPUTATION RIGHT 2ND TOE Location:  [facility]          []Hide copied text  []Hover for attribution information  Operative Report     01/30/18  10:06 AM     Preoperative diagnosis: chronic osteomyelitis right 2nd toe     Postoperative diagnosis: Same     Anesthesia: local with sedation     Surgeon:  Charleen Nath M.D.     Assistant: Maryanne JURADO, she was present for the entire procedure including prepping, draping, retraction, closure, dressing.     Operative procedure: amputate right 2nd toe at MTPJ     Operative indications: this is a very pleasant 47 year old woman with severe diabetic neuropathy.  She has a greater than 6 months history of enlargement of the right 2nd toe with chronic ulcer on the distal phalanx.  She was admitted last week with cellulitis of the right foot.  She also has a chronic diabetic ulcer on the left foot under the 2nd metatarsal head, but no infection.  I was consulted regarding amputation of the right 2nd toe.  X-rays and MRI show chronic osteomyelitis of the toe, and clinically it is enlarged, stiff, dysfunctional with a chronic ulcer and exposed bone at the tip.  The toe is not salvageable and I recommended amputation.  She and I discussed it in detail and she agreed.       Operative procedure: the patient was taken to the operating room where she was given sedation.  She was already on IV antibiotics.  I placed a digital block in the 2nd toe using 10cc of 0.5% ropivacaine and 1% lidocaine.  The right leg was then prepped an draped in the usual sterile fashion.  The appropriate time out was called and I made a fish-mouth incision over the proximal phalanx of the right 2nd to. Vessels were coagulated, nerves were placed on stretch, cut sharply and allowed to retract proximally.  The toe was removed through the MTPJ which was the best clean level without infection.  I opened it on the back table to send cultures.  These instruments were discarded.  The wound was irrigated with copious amounts of antibiotic solution using pulsatile lavage.  No further necrosis nor sign of infection remained.  The incision was closed with nylon.  The foot was dressed sterily, she was awakened and transferred to recovery in stable condition.  Post op plan will be weight bearing on the heel in  a post op shoe.       Estimated blood loss: less than 5cc     Specimens: cultures and toe     Drains: none     Complications: none     Charleen Nath MD  01/30/18  10:06 AM

## 2018-02-02 ENCOUNTER — TELEPHONE (OUTPATIENT)
Dept: ORTHOPEDIC SURGERY | Facility: CLINIC | Age: 48
End: 2018-02-02

## 2018-02-02 LAB
BACTERIA SPEC AEROBE CULT: ABNORMAL
BACTERIA SPEC AEROBE CULT: ABNORMAL
GRAM STN SPEC: ABNORMAL

## 2018-02-02 NOTE — TELEPHONE ENCOUNTER
[facility] inform [facility] of a critical value on Ms. Fong. According to the lab she has MRSA in the Right toe tissue. I passed this along to Dr. Nath. --aco

## 2018-02-06 ENCOUNTER — OFFICE VISIT (OUTPATIENT)
Dept: CARDIOLOGY | Facility: CLINIC | Age: 48
End: 2018-02-06

## 2018-02-06 ENCOUNTER — HOSPITAL ENCOUNTER (OUTPATIENT)
Dept: CARDIOLOGY | Facility: HOSPITAL | Age: 48
Discharge: HOME | End: 2018-02-06
Admitting: NURSE PRACTITIONER

## 2018-02-06 VITALS
SYSTOLIC BLOOD PRESSURE: 140 MMHG | HEIGHT: 72 IN | HEART RATE: 61 BPM | OXYGEN SATURATION: 95 % | WEIGHT: 293 LBS | BODY MASS INDEX: 39.68 KG/M2 | DIASTOLIC BLOOD PRESSURE: 86 MMHG

## 2018-02-06 DIAGNOSIS — I50.32 CHRONIC DIASTOLIC CONGESTIVE HEART FAILURE (HCC): ICD-10-CM

## 2018-02-06 DIAGNOSIS — I48.19 PERSISTENT ATRIAL FIBRILLATION (HCC): ICD-10-CM

## 2018-02-06 DIAGNOSIS — I35.1 NONRHEUMATIC AORTIC VALVE INSUFFICIENCY: Primary | ICD-10-CM

## 2018-02-06 DIAGNOSIS — IMO0001 NORMAL CORONARY ARTERIES: ICD-10-CM

## 2018-02-06 DIAGNOSIS — R06.02 SHORTNESS OF BREATH: ICD-10-CM

## 2018-02-06 PROBLEM — D62 ACUTE BLOOD LOSS ANEMIA: Status: RESOLVED | Noted: 2017-01-25 | Resolved: 2018-02-06

## 2018-02-06 PROBLEM — I48.0 PAROXYSMAL ATRIAL FIBRILLATION: Status: RESOLVED | Noted: 2017-01-23 | Resolved: 2018-02-06

## 2018-02-06 PROBLEM — E87.6 HYPOKALEMIA: Status: RESOLVED | Noted: 2017-02-01 | Resolved: 2018-02-06

## 2018-02-06 LAB
AORTIC DIMENSIONLESS INDEX: 0.5 (DI)
BACTERIA SPEC ANAEROBE CULT: NORMAL
BH CV ECHO MEAS - AO MAX PG (FULL): 15.6 MMHG
BH CV ECHO MEAS - AO MAX PG: 20.6 MMHG
BH CV ECHO MEAS - AO MEAN PG (FULL): 8.8 MMHG
BH CV ECHO MEAS - AO MEAN PG: 11 MMHG
BH CV ECHO MEAS - AO ROOT AREA (BSA CORRECTED): 1.1
BH CV ECHO MEAS - AO ROOT AREA: 7.5 CM^2
BH CV ECHO MEAS - AO ROOT DIAM: 3.1 CM
BH CV ECHO MEAS - AO V2 MAX: 226.7 CM/SEC
BH CV ECHO MEAS - AO V2 MEAN: 146 CM/SEC
BH CV ECHO MEAS - AO V2 VTI: 59.8 CM
BH CV ECHO MEAS - AVA(I,A): 1.7 CM^2
BH CV ECHO MEAS - AVA(I,D): 1.7 CM^2
BH CV ECHO MEAS - AVA(V,A): 1.6 CM^2
BH CV ECHO MEAS - AVA(V,D): 1.6 CM^2
BH CV ECHO MEAS - BSA(HAYCOCK): 2.9 M^2
BH CV ECHO MEAS - BSA: 2.7 M^2
BH CV ECHO MEAS - BZI_BMI: 45 KILOGRAMS/M^2
BH CV ECHO MEAS - BZI_METRIC_HEIGHT: 185.4 CM
BH CV ECHO MEAS - BZI_METRIC_WEIGHT: 154.7 KG
BH CV ECHO MEAS - EDV(CUBED): 139.2 ML
BH CV ECHO MEAS - EDV(TEICH): 128.5 ML
BH CV ECHO MEAS - EF(CUBED): 65.2 %
BH CV ECHO MEAS - EF(TEICH): 56.4 %
BH CV ECHO MEAS - ESV(CUBED): 48.4 ML
BH CV ECHO MEAS - ESV(TEICH): 56.1 ML
BH CV ECHO MEAS - FS: 29.7 %
BH CV ECHO MEAS - IVS/LVPW: 0.95
BH CV ECHO MEAS - IVSD: 1.2 CM
BH CV ECHO MEAS - LA DIMENSION: 4.2 CM
BH CV ECHO MEAS - LA/AO: 1.4
BH CV ECHO MEAS - LV MASS(C)D: 257 GRAMS
BH CV ECHO MEAS - LV MASS(C)DI: 95.2 GRAMS/M^2
BH CV ECHO MEAS - LV MAX PG: 4.9 MMHG
BH CV ECHO MEAS - LV MEAN PG: 2.2 MMHG
BH CV ECHO MEAS - LV V1 MAX: 111.1 CM/SEC
BH CV ECHO MEAS - LV V1 MEAN: 66.6 CM/SEC
BH CV ECHO MEAS - LV V1 VTI: 31.3 CM
BH CV ECHO MEAS - LVIDD: 5.2 CM
BH CV ECHO MEAS - LVIDS: 3.6 CM
BH CV ECHO MEAS - LVOT AREA (M): 3.1 CM^2
BH CV ECHO MEAS - LVOT AREA: 3.2 CM^2
BH CV ECHO MEAS - LVOT DIAM: 2 CM
BH CV ECHO MEAS - LVPWD: 1.3 CM
BH CV ECHO MEAS - MV A MAX VEL: 50.3 CM/SEC
BH CV ECHO MEAS - MV DEC SLOPE: 366.1 CM/SEC^2
BH CV ECHO MEAS - MV DEC TIME: 0.25 SEC
BH CV ECHO MEAS - MV E MAX VEL: 69.6 CM/SEC
BH CV ECHO MEAS - MV E/A: 1.4
BH CV ECHO MEAS - MV P1/2T MAX VEL: 92.3 CM/SEC
BH CV ECHO MEAS - MV P1/2T: 73.8 MSEC
BH CV ECHO MEAS - MVA P1/2T LCG: 2.4 CM^2
BH CV ECHO MEAS - MVA(P1/2T): 3 CM^2
BH CV ECHO MEAS - PA ACC SLOPE: 417.6 CM/SEC^2
BH CV ECHO MEAS - PA ACC TIME: 0.17 SEC
BH CV ECHO MEAS - PA PR(ACCEL): 2.9 MMHG
BH CV ECHO MEAS - RV MAX PG: 1.8 MMHG
BH CV ECHO MEAS - RV V1 MAX: 67.6 CM/SEC
BH CV ECHO MEAS - SI(AO): 166.1 ML/M^2
BH CV ECHO MEAS - SI(CUBED): 33.6 ML/M^2
BH CV ECHO MEAS - SI(LVOT): 37.6 ML/M^2
BH CV ECHO MEAS - SI(TEICH): 26.8 ML/M^2
BH CV ECHO MEAS - SV(AO): 448.3 ML
BH CV ECHO MEAS - SV(CUBED): 90.8 ML
BH CV ECHO MEAS - SV(LVOT): 101.5 ML
BH CV ECHO MEAS - SV(TEICH): 72.5 ML
BH CV ECHO MEAS - TAPSE (>1.6): 1.6 CM2
BH CV XLRA - RV BASE: 4.6 CM
BH CV XLRA - RV LENGTH: 7.4 CM
BH CV XLRA - RV MID: 4 CM
BH CV XLRA - TDI S': 8.01 CM/SEC
LV EF 2D ECHO EST: 55 %
MAXIMAL PREDICTED HEART RATE: 173 BPM
STRESS TARGET HR: 147 BPM

## 2018-02-06 PROCEDURE — 93306 TTE W/DOPPLER COMPLETE: CPT | Performed by: INTERNAL MEDICINE

## 2018-02-06 PROCEDURE — 93306 TTE W/DOPPLER COMPLETE: CPT

## 2018-02-06 PROCEDURE — 99214 OFFICE O/P EST MOD 30 MIN: CPT | Performed by: NURSE PRACTITIONER

## 2018-02-06 NOTE — ASSESSMENT & PLAN NOTE
· Normal functioning bioprosthetic valve noted on echocardiogram  · We will repeat echo in 12 months

## 2018-02-06 NOTE — ASSESSMENT & PLAN NOTE
· Table NYHA class 2-3 symptoms  · Continue metoprolol tartrate 12.5 mg twice a day  · Continue Bumex 1 mg twice a day along with potassium 20 mEq daily

## 2018-02-06 NOTE — PROGRESS NOTES
Encounter Date:02/06/2018    Patient ID: Danae Fong is a 47 y.o. female who is single and resides in [place], [place].  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  The patient had a lengthy hospitalization last year where she was admitted with severe aortic regurgitation that was damaged due to strep septic arthritis of her right hip.  She underwent an aortic valve replacement and postoperatively suffered from persistent atrial fibrillation that would return despite multiple external cardioversions.  Since her last visit she has been off all anticoagulation and antiarrhythmics.  She has had no further episodes of atrial fibrillation that she can recall.  She denies any signs or symptoms of a TIA or stroke.  She unfortunately has suffered from a strep B and MRSA infection of her right foot that resulted in osteomyelitis and amputation of her second toe of the right foot.  She is requiring follow-up with infectious disease for antibiotics.  She is currently walking with a boot and walker.  She still maintains a positive attitude despite her multiple health issues over the past year and a half.  She is continuing to take daily aspirin and metoprolol.  Her blood pressures have stayed controlled.  She underwent an echocardiogram that showed a normal functioning bioprosthetic valve.    Review of Systems   Constitution: Positive for decreased appetite, weakness and weight gain.   Cardiovascular: Positive for dyspnea on exertion.   Musculoskeletal: Positive for arthritis, back pain, joint pain and joint swelling.   Gastrointestinal: Positive for bloating and diarrhea.       The patient's past medical, social, family history and ROS reviewed in the patient's electronic medical  record.    Allergies  Review of patient's allergies indicates no known allergies.    Outpatient Prescriptions Marked as Taking for the 2/6/18 encounter (Office Visit) with BAUTISTA Malcolm   Medication Sig Dispense Refill   • aspirin 81 MG chewable tablet Chew 1 tablet Daily.     • bumetanide (BUMEX) 1 MG tablet Take 1 mg by mouth 2 (Two) Times a Day.     • CefTRIAXone Sodium (ROCEPHIN IV) Infuse  into a venous catheter Daily.     • docusate sodium (COLACE) 50 MG capsule Take  by mouth Daily.     • ferrous sulfate 325 (65 FE) MG tablet Take 325 mg by mouth daily with breakfast.     • HYDROcodone-acetaminophen (NORCO) 7.5-325 MG per tablet Take 1-2 tablets by mouth Every 6 (Six) Hours As Needed for Moderate Pain  (as needed for pain). 30 tablet 0   • metoprolol tartrate (LOPRESSOR) 25 MG tablet Take 0.5 tablets by mouth Every 12 (Twelve) Hours. Home med/ dose decreased to 1/2 tab.  No new rx     • multivitamin (THERAGRAN) tablet tablet Take 1 tablet by mouth Daily. OTC 30 tablet    • mupirocin (BACTROBAN) 2 % cream Apply  topically Daily. Per Dr Brian yadav to wound with daily drsg changes 15 g 0   • ondansetron (ZOFRAN) 4 MG tablet Take 1 tablet by mouth Every 6 (Six) Hours As Needed for Nausea or Vomiting. 15 tablet 0   • oxyCODONE-acetaminophen (PERCOCET) 5-325 MG per tablet Take 1-2 tablets by mouth Every 6 (Six) Hours As Needed for Severe Pain  (as needed for severe pain). 30 tablet 0   • pantoprazole (PROTONIX) 40 MG EC tablet Take 40 mg by mouth Daily.     • PARoxetine (PAXIL) 20 MG tablet Take 20 mg by mouth As Needed.     • polycarbophil (FIBERCON) 625 MG tablet Take 625 mg by mouth Every Other Day As Needed.     • polyethylene glycol (MIRALAX) packet Take 17 g by mouth Every Other Day.     • potassium chloride (K-DUR,KLOR-CON) 10 MEQ CR tablet Take 20 mEq by mouth Daily.     • saccharomyces boulardii (FLORASTOR) 250 MG capsule Take 1 capsule by mouth 2 (Two) Times a Day. 60 capsule 0         Blood  "pressure 140/86, pulse 61, height 186.7 cm (73.5\"), weight (!) 155 kg (341 lb), SpO2 95 %, not currently breastfeeding.  Body mass index is 44.38 kg/(m^2).    Physical Exam   Constitutional: She is oriented to person, place, and time. She appears well-developed and well-nourished.   HENT:   Head: Normocephalic and atraumatic.   Eyes: Pupils are equal, round, and reactive to light. No scleral icterus.   Neck: No JVD present. Carotid bruit is not present. No thyromegaly present.   Cardiovascular: Normal rate, regular rhythm, S1 normal and S2 normal.  Exam reveals no gallop.    No murmur heard.  Pulmonary/Chest: Effort normal and breath sounds normal.   Abdominal: Soft. There is no hepatosplenomegaly. There is no tenderness.   Neurological: She is alert and oriented to person, place, and time.   Skin: Skin is warm and dry. No cyanosis. Nails show no clubbing.   Psychiatric: She has a normal mood and affect. Her behavior is normal.        Data Review:   Procedures       Problem List Items Addressed This Visit        Cardiovascular and Mediastinum    Status post aortic valve replacement with bioprosthetic valve - Primary    Overview     · Severe nonrheumatic aortic insufficiency by AIRAM, 8/24/2016  · Bioprosthetic AVR (25 mm Magna Ease) by Dr. Mauricio Grove, 8/30/3016  · Initial possibility of vegetation turned out to be a flail leaflet  · Echo (12/2/2017): Normal functioning bioprosthetic valve.  Mild MR.  LVEF 50-55%  · Echo (2/6/18): LVEF 55%. Normal functioning bioprosthetic aortic valve         Current Assessment & Plan     · Normal functioning bioprosthetic valve noted on echocardiogram  · We will repeat echo in 12 months         Normal coronary arteries    Persistent atrial fibrillation    Overview     · Postoperative afib after AVR  · Chads Vasc= 2         Current Assessment & Plan     · No further episodes of atrial fibrillation.         Chronic diastolic congestive heart failure    Overview     · Echo " (10/10/16): Normal LVEF.  Bioprosthetic aortic valve functioning normally.  · Echo (12/2017): LVEF 50-55%.  Bioprosthetic aortic valve functioning normally  ·          Current Assessment & Plan     · Table NYHA class 2-3 symptoms  · Continue metoprolol tartrate 12.5 mg twice a day  · Continue Bumex 1 mg twice a day along with potassium 20 mEq daily             The patient is doing well from a cardiovascular standpoint despite her multiple orthopedic issues due to osteomyelitis.  We will continue her daily aspirin, Bumex and metoprolol.  She has no signs or symptoms to suggest an acute exacerbation of heart failure or angina.  We'll schedule follow-up for 6 months or sooner if needed       · Continue current medications  · Follow-up 6 months or sooner if needed    Susan Butler, BAUTISTA  2/6/2018

## 2018-02-12 ENCOUNTER — OFFICE VISIT (OUTPATIENT)
Dept: ORTHOPEDIC SURGERY | Facility: CLINIC | Age: 48
End: 2018-02-12

## 2018-02-12 DIAGNOSIS — L97.524 DIABETIC ULCER OF OTHER PART OF LEFT FOOT ASSOCIATED WITH DIABETES MELLITUS DUE TO UNDERLYING CONDITION, WITH NECROSIS OF BONE (HCC): ICD-10-CM

## 2018-02-12 DIAGNOSIS — M86.9 OSTEOMYELITIS OF RIGHT FOOT, UNSPECIFIED TYPE (HCC): Primary | ICD-10-CM

## 2018-02-12 DIAGNOSIS — E08.621 DIABETIC ULCER OF OTHER PART OF LEFT FOOT ASSOCIATED WITH DIABETES MELLITUS DUE TO UNDERLYING CONDITION, WITH NECROSIS OF BONE (HCC): ICD-10-CM

## 2018-02-12 PROBLEM — L97.509 DIABETIC FOOT ULCER (HCC): Status: ACTIVE | Noted: 2018-02-12

## 2018-02-12 PROBLEM — E11.621 DIABETIC FOOT ULCER (HCC): Status: ACTIVE | Noted: 2018-02-12

## 2018-02-12 PROCEDURE — 97597 DBRDMT OPN WND 1ST 20 CM/<: CPT | Performed by: ORTHOPAEDIC SURGERY

## 2018-02-12 PROCEDURE — 99024 POSTOP FOLLOW-UP VISIT: CPT | Performed by: ORTHOPAEDIC SURGERY

## 2018-02-12 PROCEDURE — 99212 OFFICE O/P EST SF 10 MIN: CPT | Performed by: ORTHOPAEDIC SURGERY

## 2018-02-12 RX ORDER — LINEZOLID 600 MG/1
600 TABLET, FILM COATED ORAL
COMMUNITY
Start: 2018-02-07 | End: 2018-03-19

## 2018-02-12 RX ORDER — FLUCONAZOLE 200 MG/1
200 TABLET ORAL
COMMUNITY
Start: 2018-02-06 | End: 2018-03-19

## 2018-02-12 RX ORDER — EPINEPHRINE 0.3 MG/.3ML
INJECTION SUBCUTANEOUS ONCE
COMMUNITY
Start: 2018-01-31 | End: 2019-01-08

## 2018-02-12 NOTE — PROGRESS NOTES
Post-op (2 weeks s/p AMPUTATION RIGHT 2ND TOE - 01/30/18)      Danae Fong is 2 weeks status post amputation right second toe, 1/30/18. She reports no fever, chills.  She reports pain is well controlled.  They have been taking aspirin for DVT prophylaxis.  They have been using a half shoe on the left foot to keep pressure off the diabetic ulcer, and a postop shoe on the right foot      Past Surgical History:   Procedure Laterality Date   • AMPUTATION DIGIT Right 1/30/2018    Procedure: AMPUTATION RIGHT 2ND TOE;  Surgeon: Charleen Nath MD;  Location:  [facility] OR;  Service:    • AORTIC VALVE REPAIR/REPLACEMENT N/A 8/30/2016    Procedure: AIRAM, MEDIAN STERNOTOMY, AORTIC VALVE REPLACEMENT;  Surgeon: Mauricio Grove MD;  Location:  [facility] OR;  Service:    • CARDIAC CATHETERIZATION N/A 8/25/2016    Procedure: Left Heart Cath;  Surgeon: Tyler Peterson MD;  Location:  [facility]IVE [facility];  Service:    • CARDIAC CATHETERIZATION      x 2   • DILATATION AND CURETTAGE      of uterus   • FOOT SURGERY Left     for osteomyelitis   • HIP SURGERY Right     washout x 2 by Dr. Espitia   • INCISION AND DRAINAGE HIP Right 1/26/2017    Procedure: RIGHT HIP WOUND WASHOUT AND DEBRIDEMENT, WITH POLYETHELENE FEMORAL HEAD EXCHANGE ;  Surgeon: Owen Siu MD;  Location:  [facility] OR;  Service:    • OTHER SURGICAL HISTORY      Open heart surgery   • PERICARDIAL WINDOW N/A 9/16/2016    Procedure: PERICARDIOCENTESIS WITH PLACEMENT OF DRAINAGE TUBE .;  Surgeon: Mauricio Grove MD;  Location:  [facility];  Service:    • PERICARDIAL WINDOW N/A 9/20/2016    Procedure: SUBXYPHOID PERICARDIAL WINDOW;  Surgeon: Mauricio Grove MD;  Location:  [facility] OR;  Service:    • NV CLOSED RX TRAUMATIC HIP DISLOCATN N/A 1/29/2017    Procedure: HIP CLOSED REDUCTION;  Surgeon: Leander Li MD;  Location:  [facility] OR;  Service: Orthopedics   • THORACOTOMY Left 10/5/2016    Procedure: THORACOTOMY Pericardectomy,  AIRAM,  Placement pacing leads,  Pump standby;  Surgeon: Mauricio Grove MD;  Location:  [facility];  Service:    • TOTAL HIP ARTHROPLASTY Right 1/23/2017    Procedure: RIGHT TOTAL HIP ARTHROPLASTY ANTERIOR ;  Surgeon: Owen Siu MD;  Location:  [facility];  Service:    • TOTAL HIP ARTHROPLASTY REVISION Right 1/30/2017    Procedure: REPEAT WOUND DEBRIDEMENT ACETABULAR REVISION;  Surgeon: Owen Siu MD;  Location:  [facility];  Service:        There were no vitals taken for this visit.        No erythema, no drainage, no sign of infection, normal post op swelling. Right second toe amputation site is healing, not yet ready for all the stitches to be remove    Left foot diabetic ulcer is also much improved.  I debrided it extensively and the ulcers actually healed.  The central aspect of the skin is still tight quite tenuous.  I want her to keep using the half shoe for another week or 2.  I want her to start using a pumice stone on the surrounding callus.            none    Assessment and Plan:   1. Osteomyelitis of right foot, unspecified type  Right toe amputation site is healing, but not really ready for all the sutures to come out.  She will see Mrs. Nguyễn in a week.  If it looks good then the sutures can be removed.  At that time she may then get into a shoe with diabetic insert      Using a scalpel and pickups, I used sterile technique to extensively debride the ulcer and callus    2. Diabetic ulcer of other part of left foot associated with diabetes mellitus due to underlying condition, with necrosis of bone  On the left side she needs to stay in the half shoe and start working with a ped egg and moisturizing

## 2018-02-15 LAB
BACTERIA SPEC ANAEROBE CULT: ABNORMAL
STREP GROUPING: ABNORMAL

## 2018-02-19 ENCOUNTER — OFFICE VISIT (OUTPATIENT)
Dept: ORTHOPEDIC SURGERY | Facility: CLINIC | Age: 48
End: 2018-02-19

## 2018-02-19 DIAGNOSIS — L97.524 DIABETIC ULCER OF OTHER PART OF LEFT FOOT ASSOCIATED WITH DIABETES MELLITUS DUE TO UNDERLYING CONDITION, WITH NECROSIS OF BONE (HCC): ICD-10-CM

## 2018-02-19 DIAGNOSIS — E08.621 DIABETIC ULCER OF OTHER PART OF LEFT FOOT ASSOCIATED WITH DIABETES MELLITUS DUE TO UNDERLYING CONDITION, WITH NECROSIS OF BONE (HCC): ICD-10-CM

## 2018-02-19 DIAGNOSIS — M86.9 OSTEOMYELITIS OF RIGHT FOOT, UNSPECIFIED TYPE (HCC): Primary | ICD-10-CM

## 2018-02-19 PROCEDURE — 99024 POSTOP FOLLOW-UP VISIT: CPT | Performed by: PHYSICIAN ASSISTANT

## 2018-02-19 NOTE — PROGRESS NOTES
[facility] [place] [facility] [place]    Subjective     Follow-up of the Right Foot (1 week fu/Osteomyelitis of right foot/3 weeks s/p AMPUTATION RIGHT 2ND TOE - 01/30/18) and Follow-up of the Left Foot (1 week f/u/ Diabetic ulcer of other part of left foot associated with diabetes mellitus due to underlying condition, with necrosis of bone)      SHERMAN Fong is a 47 y.o. female. Patient returns to clinic for postop visit.  She is now 3 weeks status post amputation right second toe (DOS 1/30/18).  Patient feels that she's doing quite well following this procedure.  She is wearing her postop shoe on the right side as directed.  No reported fever, chills or night sweats.  She continues to use aspirin for DVT prophylaxis.  She also has a left foot diabetic pressure ulcer that's been debrided.  Patient is currently wearing a half shoe for this.  She's been using PedEgg and moisturizer as directed by Dr. Nath.    Past Medical History:   Diagnosis Date   • A-fib    • Active smoker    • Aftercare following right hip joint replacement surgery    • Anemia    • Anemia    • Anxiety    • Aortic valve regurgitation    • Cancer    • Chest pain    • CHF (congestive heart failure)    • Constipation    • Controlled type 2 diabetes mellitus without complication     not medicated??? not sure if type 2   • Diabetic neuropathy    • Diabetic Ulcer    • Diastolic murmur    • Dysfunctional uterine bleeding    • Heart valve implant    • Hypertension    • Iron deficiency anemia    • Low back pain    • MRSA infection 09/2015    TOE   • On supplemental oxygen therapy     at night 2 liters   • Primary Osteoarthritis of both hips    • Septic arthritis    • Sleep apnea     sleeps with 2l of 02 at night   • Sleep apnea    • SOB (shortness of breath)    • Streptococcal arthritis of right hip    • Subacute osteomyelitis of left foot    • Surgical aftercare, musculoskeletal system    • Thin blood    • Transfusion history    • Wears  reading eyeglasses       Past Surgical History:   Procedure Laterality Date   • AMPUTATION DIGIT Right 1/30/2018    Procedure: AMPUTATION RIGHT 2ND TOE;  Surgeon: Charleen Nath MD;  Location:  [facility];  Service:    • AORTIC VALVE REPAIR/REPLACEMENT N/A 8/30/2016    Procedure: AIRAM, MEDIAN STERNOTOMY, AORTIC VALVE REPLACEMENT;  Surgeon: Mauricio Grove MD;  Location:  [facility];  Service:    • CARDIAC CATHETERIZATION N/A 8/25/2016    Procedure: Left Heart Cath;  Surgeon: Tyler Peterson MD;  Location: [facility] CATH INVASIVE LOCATION;  Service:    • CARDIAC CATHETERIZATION      x 2   • DILATATION AND CURETTAGE      of uterus   • FOOT SURGERY Left     for osteomyelitis   • HIP SURGERY Right     washout x 2 by Dr. Espitia   • INCISION AND DRAINAGE HIP Right 1/26/2017    Procedure: RIGHT HIP WOUND WASHOUT AND DEBRIDEMENT, WITH POLYETHELENE FEMORAL HEAD EXCHANGE ;  Surgeon: Owen Siu MD;  Location:  [facility];  Service:    • OTHER SURGICAL HISTORY      Open heart surgery   • PERICARDIAL WINDOW N/A 9/16/2016    Procedure: PERICARDIOCENTESIS WITH PLACEMENT OF DRAINAGE TUBE .;  Surgeon: Mauricio Grove MD;  Location:  [facility];  Service:    • PERICARDIAL WINDOW N/A 9/20/2016    Procedure: SUBXYPHOID PERICARDIAL WINDOW;  Surgeon: Mauricio Grove MD;  Location:  [facility];  Service:    • MD CLOSED RX TRAUMATIC HIP DISLOCATN N/A 1/29/2017    Procedure: HIP CLOSED REDUCTION;  Surgeon: Leander Li MD;  Location:  [facility];  Service: Orthopedics   • THORACOTOMY Left 10/5/2016    Procedure: THORACOTOMY Pericardectomy,  AIRAM,  Placement pacing leads, Pump standby;  Surgeon: Mauricio Grove MD;  Location:  [facility];  Service:    • TOTAL HIP ARTHROPLASTY Right 1/23/2017    Procedure: RIGHT TOTAL HIP ARTHROPLASTY ANTERIOR ;  Surgeon: Owen Siu MD;  Location:  [facility];  Service:    • TOTAL HIP ARTHROPLASTY REVISION Right 1/30/2017    Procedure: REPEAT WOUND DEBRIDEMENT ACETABULAR REVISION;  Surgeon: Owen Siu,  MD;  Location: [facility];  Service:       Family History   Problem Relation Age of Onset   • Diabetes Mother    • Heart disease Mother    • Hypertension Mother    • Osteoarthritis Mother    • Diabetes Sister    • Arthritis Sister    • No Known Problems Father    • Diabetes Sister    • Diabetes Sister    • Heart disease Other    • Heart attack Other    • Heart disease Other    • Hypertension Other    • Osteoarthritis Other    • Rheum arthritis Other    • Cancer Neg Hx      Social History     Social History   • Marital status:      Spouse name: N/A   • Number of children: 0   • Years of education: N/A     Occupational History   • Employed      [employer]     Social History Main Topics   • Smoking status: Former Smoker     Packs/day: 0.50     Years: 15.00     Quit date: 7/15/2016   • Smokeless tobacco: Never Used   • Alcohol use No   • Drug use: No   • Sexual activity: Defer     Other Topics Concern   • Not on file     Social History Narrative    Patient denies caffeine intake    Patient lives at home with her sister.      Current Outpatient Prescriptions on File Prior to Visit   Medication Sig Dispense Refill   • acetaminophen (TYLENOL) 325 MG tablet Take 2 tablets by mouth Every 6 (Six) Hours As Needed for Mild Pain .     • amiodarone (PACERONE) 200 MG tablet Take 200 mg by mouth Take As Directed.     • amoxicillin (AMOXIL) 500 MG capsule Take 500 mg by mouth Take As Directed.     • aspirin 81 MG chewable tablet Chew 1 tablet Daily.     • bumetanide (BUMEX) 1 MG tablet Take 1 mg by mouth 2 (Two) Times a Day.     • CefTRIAXone Sodium (ROCEPHIN IV) Infuse  into a venous catheter Daily.     • docusate sodium (COLACE) 50 MG capsule Take  by mouth Daily.     • EPINEPHrine (EPIPEN) 0.3 MG/0.3ML solution auto-injector injection      • ferrous sulfate 325 (65 FE) MG tablet Take 325 mg by mouth daily with breakfast.     • fluconazole (DIFLUCAN) 200 MG tablet Take 200 mg by mouth.     •  HYDROcodone-acetaminophen (NORCO) 7.5-325 MG per tablet Take 1-2 tablets by mouth Every 6 (Six) Hours As Needed for Moderate Pain  (as needed for pain). 30 tablet 0   • linezolid (ZYVOX) 600 MG tablet Take 600 mg by mouth.     • metoprolol tartrate (LOPRESSOR) 25 MG tablet Take 0.5 tablets by mouth Every 12 (Twelve) Hours. Home med/ dose decreased to 1/2 tab.  No new rx     • Metoprolol-HCTZ ER 25-12.5 MG tablet sustained-release 24 hour Take  by mouth Take As Directed.     • multivitamin (THERAGRAN) tablet tablet Take 1 tablet by mouth Daily. OTC 30 tablet    • mupirocin (BACTROBAN) 2 % cream Apply  topically Daily. Per Dr Brian yadav to wound with daily drsg changes 15 g 0   • ondansetron (ZOFRAN) 4 MG tablet Take 1 tablet by mouth Every 6 (Six) Hours As Needed for Nausea or Vomiting. 15 tablet 0   • oxyCODONE-acetaminophen (PERCOCET) 5-325 MG per tablet Take 1-2 tablets by mouth Every 6 (Six) Hours As Needed for Severe Pain  (as needed for severe pain). 30 tablet 0   • pantoprazole (PROTONIX) 40 MG EC tablet Take 40 mg by mouth Daily.     • PARoxetine (PAXIL) 20 MG tablet Take 20 mg by mouth As Needed.     • polycarbophil (FIBERCON) 625 MG tablet Take 625 mg by mouth Every Other Day As Needed.     • polyethylene glycol (MIRALAX) packet Take 17 g by mouth Every Other Day.     • potassium chloride (K-DUR,KLOR-CON) 10 MEQ CR tablet Take 20 mEq by mouth Daily.     • potassium chloride (KLOR-CON) 20 MEQ packet Take 20 mEq by mouth Take As Directed.     • saccharomyces boulardii (FLORASTOR) 250 MG capsule Take 1 capsule by mouth 2 (Two) Times a Day. 60 capsule 0     No current facility-administered medications on file prior to visit.       No Known Allergies     The following portions of the patient's history were reviewed and updated as appropriate: allergies, current medications, past family history, past medical history, past social history, past surgical history and problem list.    Review of Systems    Constitutional: Positive for activity change, appetite change and fatigue.   HENT: Negative.    Eyes: Negative.    Respiratory: Negative.    Cardiovascular: Negative.    Gastrointestinal: Positive for abdominal pain and nausea.   Endocrine: Negative.    Genitourinary: Negative.    Musculoskeletal: Positive for arthralgias.   Skin: Negative.    Allergic/Immunologic: Negative.    Neurological: Negative.    Hematological: Negative.    Psychiatric/Behavioral: Negative.         Objective      Physical Exam  There were no vitals taken for this visit.    There is no height or weight on file to calculate BMI.    General  Mental Status - alert  General Appearance - cooperative, well groomed, not in acute distress  Orientation - Oriented X3  Build & Nutrition - well developed and well nourished  Posture - normal posture  Gait - normal gait     Integumentary  Global Assessment  Examination of related systems reveals - no lymphadenopathy  General Characteristics  Overall examination of the patient's skin reveals - no rashes, no evidence of scars, no suspicious lesions and no bruises.  Color - normal coloration of skin.    Ortho Exam  Right foot  Skin: No erythema, drainage or evidence of infection.  Still with mild postoperative swelling to the soft tissues.  Incision site is healing well and sutures will be removed today.    Left foot  Skin: The aforementioned diabetic ulcer has continued to heal in quite nicely.  You can also tell that she's been focusing on debriding as directed with the pumice stone.    Imaging/Studies  None today    Assessment:  1. Osteomyelitis of right foot, unspecified type    2. Diabetic ulcer of other part of left foot associated with diabetes mellitus due to underlying condition, with necrosis of bone        Plan:  1. Discussion was had with patient regarding exam, assessment and treatment options.  2. Sutures were removed today from the amputation site.  3. She has brought in and may now go into  her diabetic shoe with insert.  4. Reviewed with the patient signs and symptoms of infection and if she notes any knee she has returned back to the clinic or to the emergency department for further evaluation.  5. On the left foot she is continue to stay in the half shoe as well as continuing with her PedEgg and moisturizing.  6. Follow back up in 2 weeks for repeat evaluation sooner if there is any issues.  7. Questions and concerns answered.        Nayana Nguyễn PA-C  02/19/18  3:00 PM

## 2018-02-27 DIAGNOSIS — R07.9 CHEST PAIN, UNSPECIFIED TYPE: Primary | ICD-10-CM

## 2018-03-05 ENCOUNTER — OFFICE VISIT (OUTPATIENT)
Dept: ORTHOPEDIC SURGERY | Facility: CLINIC | Age: 48
End: 2018-03-05

## 2018-03-05 DIAGNOSIS — L97.524 DIABETIC ULCER OF OTHER PART OF LEFT FOOT ASSOCIATED WITH DIABETES MELLITUS DUE TO UNDERLYING CONDITION, WITH NECROSIS OF BONE (HCC): Primary | ICD-10-CM

## 2018-03-05 DIAGNOSIS — M86.9 OSTEOMYELITIS OF RIGHT FOOT, UNSPECIFIED TYPE (HCC): ICD-10-CM

## 2018-03-05 DIAGNOSIS — E08.621 DIABETIC ULCER OF OTHER PART OF LEFT FOOT ASSOCIATED WITH DIABETES MELLITUS DUE TO UNDERLYING CONDITION, WITH NECROSIS OF BONE (HCC): Primary | ICD-10-CM

## 2018-03-05 PROCEDURE — 99024 POSTOP FOLLOW-UP VISIT: CPT | Performed by: PHYSICIAN ASSISTANT

## 2018-03-05 NOTE — PROGRESS NOTES
[facility] Orthopaedic Surgery Clinic Note    Subjective     CC: Follow-up of the Left Foot (2 week follow up: Diabetic ulcer of other part of left foot associated with diabetes mellitus due to underlying condition, with necrosis of bone) and Post-op (2 week follow up, 4 weeks status post: Right foot amputation of 2nd toe - 01/30/18 and osteomyelitis of right foot)      SHERMAN Fong is a 47 y.o. female. Patient returns today to clinic for follow-up left foot diabetic pressure ulcer.  As well a 5 week status post right second toe amputation (DOS 1/30/18).  Patient is doing well no complaints.  She is a pumice stone to her left foot and moisturizer as directed.  She is wearing her diabetic shoe with insert on the right side and the half shoe on the left as directed.    PICC line to be discontinued next week per patient.    ROS:    Constiutional:Pt denies fever, chills, nausea, or vomiting.  MSK:as above    Objective      Past Medical History  Past Medical History:   Diagnosis Date   • A-fib    • Active smoker    • Aftercare following right hip joint replacement surgery    • Anemia    • Anemia    • Anxiety    • Aortic valve regurgitation    • Cancer    • Chest pain    • CHF (congestive heart failure)    • Constipation    • Controlled type 2 diabetes mellitus without complication     not medicated??? not sure if type 2   • Diabetic neuropathy    • Diabetic Ulcer    • Diastolic murmur    • Dysfunctional uterine bleeding    • Heart valve implant    • Hypertension    • Iron deficiency anemia    • Low back pain    • MRSA infection 09/2015    TOE   • On supplemental oxygen therapy     at night 2 liters   • Primary Osteoarthritis of both hips    • Septic arthritis    • Sleep apnea     sleeps with 2l of 02 at night   • Sleep apnea    • SOB (shortness of breath)    • Streptococcal arthritis of right hip    • Subacute osteomyelitis of left foot    • Surgical aftercare, musculoskeletal system    • Thin blood    •  Transfusion history    • Wears reading eyeglasses          Physical Exam  There were no vitals taken for this visit.    There is no height or weight on file to calculate BMI.    Patient is well nourished and well developed.        Ortho Exam  Sensation feet: absent.    Left foot  Skin: Diabetic ulcer is continuing to heal in nicely. Center scabbed area noted.  You can also tell that she's been focusing on debriding as directed with the pumice stone.    Right foot  Skin: No erythema, drainage or evidence of infection.  Still postoperative swelling to the soft tissues.  Incision site is healing well, no drainage or evidence of infection.       Imaging/Labs/EMG Reviewed:  None today.    Assessment:  1. Diabetic ulcer of other part of left foot associated with diabetes mellitus due to underlying condition, with necrosis of bone    2. Osteomyelitis of right foot, unspecified type        Plan:  1. Discussion was had with patient regarding exam, assessment and treatment options.  2. Left foot central skin was debrided today sterilely.  3. Left foot was cleaned and dressed.  4. Patient will continue with her diabetic shoe on the right side and her half shoe on the left side.  5. Continue with use of moisturizing and PedEgg.  6. Once again reviewed signs and symptoms of infection and if she notes any of these she should return to the clinic or to the ED for further evaluation and treatment.  7. Follow-up in 2 weeks repeat evaluation, sooner if there is any issues.  8. Questions and concerns answered.          Nayana Nguyễn PA-C  03/05/18  1:07 PM

## 2018-03-07 ENCOUNTER — HOSPITAL ENCOUNTER (OUTPATIENT)
Dept: CARDIOLOGY | Facility: HOSPITAL | Age: 48
Discharge: HOME | End: 2018-03-07
Attending: INTERNAL MEDICINE

## 2018-03-07 PROCEDURE — 93017 CV STRESS TEST TRACING ONLY: CPT

## 2018-03-07 PROCEDURE — A9500 TC99M SESTAMIBI: HCPCS | Performed by: INTERNAL MEDICINE

## 2018-03-07 PROCEDURE — 0 TECHNETIUM SESTAMIBI: Performed by: INTERNAL MEDICINE

## 2018-03-07 PROCEDURE — 25010000002 REGADENOSON 0.4 MG/5ML SOLUTION: Performed by: INTERNAL MEDICINE

## 2018-03-07 PROCEDURE — 78452 HT MUSCLE IMAGE SPECT MULT: CPT

## 2018-03-07 PROCEDURE — 78452 HT MUSCLE IMAGE SPECT MULT: CPT | Performed by: INTERNAL MEDICINE

## 2018-03-07 PROCEDURE — 93018 CV STRESS TEST I&R ONLY: CPT | Performed by: INTERNAL MEDICINE

## 2018-03-07 RX ADMIN — TECHNETIUM TC 99M SESTAMIBI 1 DOSE: 1 INJECTION INTRAVENOUS at 07:55

## 2018-03-07 RX ADMIN — REGADENOSON 0.4 MG: 0.08 INJECTION, SOLUTION INTRAVENOUS at 09:52

## 2018-03-07 RX ADMIN — TECHNETIUM TC 99M SESTAMIBI 1 DOSE: 1 INJECTION INTRAVENOUS at 09:55

## 2018-03-08 ENCOUNTER — TELEPHONE (OUTPATIENT)
Dept: CARDIOLOGY | Facility: CLINIC | Age: 48
End: 2018-03-08

## 2018-03-08 DIAGNOSIS — R94.39 ABNORMAL STRESS TEST: Primary | ICD-10-CM

## 2018-03-08 LAB
BH CV STRESS BP STAGE 1: NORMAL
BH CV STRESS BP STAGE 3: NORMAL
BH CV STRESS COMMENTS STAGE 1: NORMAL
BH CV STRESS DOSE REGADENOSON STAGE 1: 0.4
BH CV STRESS DURATION MIN STAGE 1: 0
BH CV STRESS DURATION MIN STAGE 2: 1
BH CV STRESS DURATION MIN STAGE 3: 1
BH CV STRESS DURATION MIN STAGE 4: 1
BH CV STRESS DURATION SEC STAGE 1: 10
BH CV STRESS DURATION SEC STAGE 2: 0
BH CV STRESS HR STAGE 1: 97
BH CV STRESS HR STAGE 2: 115
BH CV STRESS HR STAGE 3: 101
BH CV STRESS HR STAGE 4: 103
BH CV STRESS PROTOCOL 1: NORMAL
BH CV STRESS RECOVERY BP: NORMAL MMHG
BH CV STRESS RECOVERY HR: 85 BPM
BH CV STRESS STAGE 1: 1
BH CV STRESS STAGE 2: 2
BH CV STRESS STAGE 3: 3
BH CV STRESS STAGE 4: 4
LV EF NUC BP: 55 %
MAXIMAL PREDICTED HEART RATE: 173 BPM
PERCENT MAX PREDICTED HR: 67.05 %
STRESS BASELINE BP: NORMAL MMHG
STRESS BASELINE HR: 75 BPM
STRESS PERCENT HR: 79 %
STRESS POST PEAK BP: NORMAL MMHG
STRESS POST PEAK HR: 116 BPM
STRESS TARGET HR: 147 BPM

## 2018-03-08 RX ORDER — ISOSORBIDE MONONITRATE 30 MG/1
30 TABLET, EXTENDED RELEASE ORAL DAILY
Qty: 30 TABLET | Refills: 11 | Status: SHIPPED | OUTPATIENT
Start: 2018-03-08 | End: 2019-01-08

## 2018-03-08 NOTE — TELEPHONE ENCOUNTER
BAUTISTA Finn called the patient to review her stress results with her. The patient reports she is not feeling well. Susan spoke with [facility] and d/t abnormal stress test and patient symptoms, he would like a [facility]. Start on isosorbide  30 mg daily. Patient verbalized understanding. Orders placed.

## 2018-03-13 PROBLEM — R94.39 ABNORMAL STRESS TEST: Status: ACTIVE | Noted: 2018-03-13

## 2018-03-13 LAB
FUNGUS WND CULT: NORMAL
MYCOBACTERIUM SPEC CULT: NORMAL
NIGHT BLUE STAIN TISS: NORMAL

## 2018-03-15 ENCOUNTER — PREP FOR SURGERY (OUTPATIENT)
Dept: OTHER | Facility: HOSPITAL | Age: 48
End: 2018-03-15

## 2018-03-15 DIAGNOSIS — R94.39 ABNORMAL STRESS TEST: Primary | ICD-10-CM

## 2018-03-15 DIAGNOSIS — Z00.00 HEALTH CARE MAINTENANCE: ICD-10-CM

## 2018-03-15 RX ORDER — ASPIRIN 81 MG/1
81 TABLET ORAL DAILY
Status: CANCELLED | OUTPATIENT
Start: 2018-03-16

## 2018-03-15 RX ORDER — NITROGLYCERIN 0.4 MG/1
0.4 TABLET SUBLINGUAL
Status: CANCELLED | OUTPATIENT
Start: 2018-03-15

## 2018-03-15 RX ORDER — ACETAMINOPHEN 325 MG/1
650 TABLET ORAL EVERY 4 HOURS PRN
Status: CANCELLED | OUTPATIENT
Start: 2018-03-15

## 2018-03-15 RX ORDER — ASPIRIN 81 MG/1
324 TABLET, CHEWABLE ORAL ONCE
Status: CANCELLED | OUTPATIENT
Start: 2018-03-15 | End: 2018-03-15

## 2018-03-15 RX ORDER — SODIUM CHLORIDE 0.9 % (FLUSH) 0.9 %
1-10 SYRINGE (ML) INJECTION AS NEEDED
Status: CANCELLED | OUTPATIENT
Start: 2018-03-15

## 2018-03-16 ENCOUNTER — TELEPHONE (OUTPATIENT)
Dept: ORTHOPEDIC SURGERY | Facility: CLINIC | Age: 48
End: 2018-03-16

## 2018-03-16 NOTE — TELEPHONE ENCOUNTER
THE LAST [facility] PAPERWORK I HAVE RECEIVED WAS ON 2/19/18 AND IT WAS COMPLETED AND FAXED ON 2/23/18.  I WILL GLADLY FAX THE MOST CURRENT OFFICE NOTES TO THEM TODAY.

## 2018-03-16 NOTE — TELEPHONE ENCOUNTER
----- Message from Danae Fong sent at 3/15/2018  4:48 PM EDT -----  Regarding: RE: RE: Visit Follow-Up Question  Contact: [phone]    I am sorry...I do not.  [facility] has done all of the foot work on it.  I apologize I don't have that information.  I know they had sent one set of records to them already from prior visits. Thank you  ----- Message -----  From: Josette LUTHER  Sent: 3/15/18 4:45 PM  To: Danae Fong  Subject: RE: Visit Follow-Up Question    Do you know who was taking care of that, Debbie or Les?    ----- Message -----     From: Danae Fong     Sent: 3/15/2018  4:28 PM EDT       To: Charleen Torres MD  Subject: Visit Follow-Up Question    I was just wondering if my most recent medical records have been sent to [facility], my disability insurance?  They need the last office visit information. They said they had requested info right after my last visit

## 2018-03-19 ENCOUNTER — APPOINTMENT (OUTPATIENT)
Dept: PREADMISSION TESTING | Facility: HOSPITAL | Age: 48
End: 2018-03-19

## 2018-03-19 ENCOUNTER — OFFICE VISIT (OUTPATIENT)
Dept: ORTHOPEDIC SURGERY | Facility: CLINIC | Age: 48
End: 2018-03-19

## 2018-03-19 ENCOUNTER — HOSPITAL ENCOUNTER (OUTPATIENT)
Dept: GENERAL RADIOLOGY | Facility: HOSPITAL | Age: 48
Discharge: HOME | End: 2018-03-19
Admitting: NURSE PRACTITIONER

## 2018-03-19 DIAGNOSIS — Z89.421 S/P AMPUTATION OF LESSER TOE, RIGHT (HCC): ICD-10-CM

## 2018-03-19 DIAGNOSIS — E08.621 DIABETIC ULCER OF OTHER PART OF LEFT FOOT ASSOCIATED WITH DIABETES MELLITUS DUE TO UNDERLYING CONDITION, WITH NECROSIS OF BONE (HCC): Primary | ICD-10-CM

## 2018-03-19 DIAGNOSIS — Z00.00 HEALTH CARE MAINTENANCE: ICD-10-CM

## 2018-03-19 DIAGNOSIS — R94.39 ABNORMAL STRESS TEST: ICD-10-CM

## 2018-03-19 DIAGNOSIS — L97.524 DIABETIC ULCER OF OTHER PART OF LEFT FOOT ASSOCIATED WITH DIABETES MELLITUS DUE TO UNDERLYING CONDITION, WITH NECROSIS OF BONE (HCC): Primary | ICD-10-CM

## 2018-03-19 LAB
ALBUMIN SERPL-MCNC: 4.3 G/DL (ref 3.2–4.8)
ALBUMIN/GLOB SERPL: 1 G/DL (ref 1.5–2.5)
ALP SERPL-CCNC: 116 U/L (ref 25–100)
ALT SERPL W P-5'-P-CCNC: 45 U/L (ref 7–40)
ANION GAP SERPL CALCULATED.3IONS-SCNC: 15 MMOL/L (ref 3–11)
AST SERPL-CCNC: 45 U/L (ref 0–33)
B-HCG UR QL: NEGATIVE
BASOPHILS # BLD AUTO: 0.02 10*3/MM3 (ref 0–0.2)
BASOPHILS NFR BLD AUTO: 0.2 % (ref 0–1)
BILIRUB SERPL-MCNC: 0.5 MG/DL (ref 0.3–1.2)
BUN BLD-MCNC: 8 MG/DL (ref 9–23)
BUN/CREAT SERPL: 10 (ref 7–25)
CALCIUM SPEC-SCNC: 8.9 MG/DL (ref 8.7–10.4)
CHLORIDE SERPL-SCNC: 100 MMOL/L (ref 99–109)
CO2 SERPL-SCNC: 26 MMOL/L (ref 20–31)
CREAT BLD-MCNC: 0.8 MG/DL (ref 0.6–1.3)
DEPRECATED RDW RBC AUTO: 49.7 FL (ref 37–54)
EOSINOPHIL # BLD AUTO: 0.15 10*3/MM3 (ref 0–0.3)
EOSINOPHIL NFR BLD AUTO: 1.5 % (ref 0–3)
ERYTHROCYTE [DISTWIDTH] IN BLOOD BY AUTOMATED COUNT: 16.6 % (ref 11.3–14.5)
GFR SERPL CREATININE-BSD FRML MDRD: 77 ML/MIN/1.73
GLOBULIN UR ELPH-MCNC: 4.2 GM/DL
GLUCOSE BLD-MCNC: 82 MG/DL (ref 70–100)
HBA1C MFR BLD: 7.2 % (ref 4.8–5.6)
HCT VFR BLD AUTO: 45.9 % (ref 34.5–44)
HGB BLD-MCNC: 15.2 G/DL (ref 11.5–15.5)
IMM GRANULOCYTES # BLD: 0.01 10*3/MM3 (ref 0–0.03)
IMM GRANULOCYTES NFR BLD: 0.1 % (ref 0–0.6)
LYMPHOCYTES # BLD AUTO: 2.58 10*3/MM3 (ref 0.6–4.8)
LYMPHOCYTES NFR BLD AUTO: 26.1 % (ref 24–44)
MCH RBC QN AUTO: 27.6 PG (ref 27–31)
MCHC RBC AUTO-ENTMCNC: 33.1 G/DL (ref 32–36)
MCV RBC AUTO: 83.5 FL (ref 80–99)
MONOCYTES # BLD AUTO: 0.93 10*3/MM3 (ref 0–1)
MONOCYTES NFR BLD AUTO: 9.4 % (ref 0–12)
NEUTROPHILS # BLD AUTO: 6.18 10*3/MM3 (ref 1.5–8.3)
NEUTROPHILS NFR BLD AUTO: 62.7 % (ref 41–71)
PLATELET # BLD AUTO: 191 10*3/MM3 (ref 150–450)
PMV BLD AUTO: 9.7 FL (ref 6–12)
POTASSIUM BLD-SCNC: 4.4 MMOL/L (ref 3.5–5.5)
PROT SERPL-MCNC: 8.5 G/DL (ref 5.7–8.2)
RBC # BLD AUTO: 5.5 10*6/MM3 (ref 3.89–5.14)
SODIUM BLD-SCNC: 141 MMOL/L (ref 132–146)
WBC NRBC COR # BLD: 9.87 10*3/MM3 (ref 3.5–10.8)

## 2018-03-19 PROCEDURE — 36415 COLL VENOUS BLD VENIPUNCTURE: CPT

## 2018-03-19 PROCEDURE — 85025 COMPLETE CBC W/AUTO DIFF WBC: CPT | Performed by: NURSE PRACTITIONER

## 2018-03-19 PROCEDURE — 80053 COMPREHEN METABOLIC PANEL: CPT | Performed by: NURSE PRACTITIONER

## 2018-03-19 PROCEDURE — 81025 URINE PREGNANCY TEST: CPT | Performed by: NURSE PRACTITIONER

## 2018-03-19 PROCEDURE — 83036 HEMOGLOBIN GLYCOSYLATED A1C: CPT | Performed by: NURSE PRACTITIONER

## 2018-03-19 PROCEDURE — 93005 ELECTROCARDIOGRAM TRACING: CPT

## 2018-03-19 PROCEDURE — 99024 POSTOP FOLLOW-UP VISIT: CPT | Performed by: PHYSICIAN ASSISTANT

## 2018-03-19 PROCEDURE — 71046 X-RAY EXAM CHEST 2 VIEWS: CPT

## 2018-03-19 PROCEDURE — 93010 ELECTROCARDIOGRAM REPORT: CPT | Performed by: INTERNAL MEDICINE

## 2018-03-19 RX ORDER — METOPROLOL TARTRATE 50 MG/1
50 TABLET, FILM COATED ORAL DAILY
COMMUNITY
End: 2020-10-13

## 2018-03-19 NOTE — PROGRESS NOTES
[facility] Orthopaedic Surgery Clinic Note    Subjective     Post-op (2 weeks - Diabetic ulcer of other part of left foot associated with diabetes mellitus due to underlying condition, with necrosis of bone) and Post-op (2 week follow up, 4 weeks status post: Right foot amputation of 2nd toe - 01/30/18 and osteomyelitis of right foot))       SHERMAN Fong is a 47 y.o. female. The clinic for follow-up evaluation of left foot diabetic pressure ulcer and subsequently 7 weeks status post right second toe amputation (DOS 1/30/18).  Patient has no new complaints or issues at this time she is doing quite well.  She is continuing with prescribed footcare using pumice stone and moisturizing.  She also still wearing her diabetic shoe on the right side and the half shoe on the left side as directed.       ROS:    Constiutional:Pt denies fever, chills, nausea, or vomiting.  MSK:as above    Objective      Physical Exam  There were no vitals taken for this visit.    There is no height or weight on file to calculate BMI.        Ortho Exam  Sensation feet: absent.     Left foot  Skin: Diabetic ulcer has continued to heal.     Right foot  Skin: No erythema, drainage or evidence of infection.  Postoperative swelling improved.  Incision site is healed, no drainage or evidence of infection.       Imaging  None today      Assessment:  1. Diabetic ulcer of other part of left foot associated with diabetes mellitus due to underlying condition, with necrosis of bone    2. S/P amputation of lesser toe, right        Plan:  1. Discussion was had with patient regarding exam, assessment and treatment options.  2. Patient was given a prescription for diabetic shoes (extra-depth) and custom-made orthotics.  3. Patient understands the importance of continuing with moisturizing and use of pumice stone/Ped Egg for callus debridement.  4. Given a banana pad for the left foot to help alleviate pressure at the ulcer site.  5. She'll follow back  up in 6-8 weeks for repeat evaluation.  On questions and concerns answered.          Nayana Nguyễn PA-C  03/19/18  9:38 AM

## 2018-03-19 NOTE — DISCHARGE INSTRUCTIONS
The following instructions given during Pre Admission Testing visit:    Do not eat or drink anything after MN except for sips of water with your a.m. Prescription meds unless otherwise instructed by your physician.    Glasses and jewelry may be worn, but dentures must be removed prior to cath/procedure.    Leave any items you consider valuable at home.    Family members may wait in [facility] waiting area during procedure.    Bring all medications in their original containers the day of procedure.    Bring photo ID and insurance cards on the day of procedure.    Need to make arrangements for transportation prior to discharge.    The following handouts were given:     Heart Cath pathway (if applicable)   Cardiac Cath booklet published by [employer]    OR appropriate [employer] procedure booklet    If applicable, pt instructed to bring CPAP mask and tubing the day of procedure.

## 2018-03-21 ENCOUNTER — HOSPITAL ENCOUNTER (OUTPATIENT)
Facility: HOSPITAL | Age: 48
Setting detail: HOSPITAL OUTPATIENT SURGERY
Discharge: HOME | End: 2018-03-21
Attending: INTERNAL MEDICINE | Admitting: INTERNAL MEDICINE

## 2018-03-21 VITALS
SYSTOLIC BLOOD PRESSURE: 108 MMHG | DIASTOLIC BLOOD PRESSURE: 68 MMHG | HEIGHT: 72 IN | TEMPERATURE: 98.4 F | RESPIRATION RATE: 16 BRPM | OXYGEN SATURATION: 91 % | HEART RATE: 52 BPM | BODY MASS INDEX: 39.68 KG/M2 | WEIGHT: 293 LBS

## 2018-03-21 DIAGNOSIS — R94.39 ABNORMAL STRESS TEST: ICD-10-CM

## 2018-03-21 PROBLEM — R07.89 NON-CARDIAC CHEST PAIN: Status: ACTIVE | Noted: 2018-03-13

## 2018-03-21 PROBLEM — IMO0001 CLASS 3 OBESITY DUE TO EXCESS CALORIES IN ADULT: Status: ACTIVE | Noted: 2018-03-21

## 2018-03-21 LAB
GLUCOSE BLDC GLUCOMTR-MCNC: 118 MG/DL (ref 70–130)
GLUCOSE BLDC GLUCOMTR-MCNC: 98 MG/DL (ref 70–130)
HCG INTACT+B SERPL-ACNC: <5 MIU/ML

## 2018-03-21 PROCEDURE — 82962 GLUCOSE BLOOD TEST: CPT

## 2018-03-21 PROCEDURE — 0 IOPAMIDOL PER 1 ML: Performed by: INTERNAL MEDICINE

## 2018-03-21 PROCEDURE — C1894 INTRO/SHEATH, NON-LASER: HCPCS | Performed by: INTERNAL MEDICINE

## 2018-03-21 PROCEDURE — C1769 GUIDE WIRE: HCPCS | Performed by: INTERNAL MEDICINE

## 2018-03-21 PROCEDURE — C1894 INTRO/SHEATH, NON-LASER: HCPCS

## 2018-03-21 PROCEDURE — 93454 CORONARY ARTERY ANGIO S&I: CPT | Performed by: INTERNAL MEDICINE

## 2018-03-21 PROCEDURE — 25010000002 HEPARIN (PORCINE) PER 1000 UNITS: Performed by: INTERNAL MEDICINE

## 2018-03-21 PROCEDURE — 25010000002 FENTANYL CITRATE (PF) 100 MCG/2ML SOLUTION: Performed by: INTERNAL MEDICINE

## 2018-03-21 PROCEDURE — 25010000002 MIDAZOLAM PER 1 MG: Performed by: INTERNAL MEDICINE

## 2018-03-21 PROCEDURE — S0260 H&P FOR SURGERY: HCPCS | Performed by: INTERNAL MEDICINE

## 2018-03-21 PROCEDURE — 84702 CHORIONIC GONADOTROPIN TEST: CPT | Performed by: INTERNAL MEDICINE

## 2018-03-21 RX ORDER — ASPIRIN 81 MG/1
81 TABLET ORAL DAILY
Status: DISCONTINUED | OUTPATIENT
Start: 2018-03-22 | End: 2018-03-21 | Stop reason: HOSPADM

## 2018-03-21 RX ORDER — MIDAZOLAM HYDROCHLORIDE 1 MG/ML
INJECTION INTRAMUSCULAR; INTRAVENOUS AS NEEDED
Status: DISCONTINUED | OUTPATIENT
Start: 2018-03-21 | End: 2018-03-21 | Stop reason: HOSPADM

## 2018-03-21 RX ORDER — SODIUM CHLORIDE 9 MG/ML
1-3 INJECTION, SOLUTION INTRAVENOUS CONTINUOUS
Status: DISCONTINUED | OUTPATIENT
Start: 2018-03-21 | End: 2018-03-21 | Stop reason: HOSPADM

## 2018-03-21 RX ORDER — NITROGLYCERIN 0.4 MG/1
0.4 TABLET SUBLINGUAL
Status: DISCONTINUED | OUTPATIENT
Start: 2018-03-21 | End: 2018-03-21 | Stop reason: HOSPADM

## 2018-03-21 RX ORDER — SODIUM CHLORIDE 0.9 % (FLUSH) 0.9 %
1-10 SYRINGE (ML) INJECTION AS NEEDED
Status: DISCONTINUED | OUTPATIENT
Start: 2018-03-21 | End: 2018-03-21 | Stop reason: HOSPADM

## 2018-03-21 RX ORDER — ACETAMINOPHEN 325 MG/1
650 TABLET ORAL EVERY 4 HOURS PRN
Status: DISCONTINUED | OUTPATIENT
Start: 2018-03-21 | End: 2018-03-21 | Stop reason: HOSPADM

## 2018-03-21 RX ORDER — HEPARIN SODIUM 1000 [USP'U]/ML
INJECTION, SOLUTION INTRAVENOUS; SUBCUTANEOUS AS NEEDED
Status: DISCONTINUED | OUTPATIENT
Start: 2018-03-21 | End: 2018-03-21 | Stop reason: HOSPADM

## 2018-03-21 RX ORDER — LIDOCAINE HYDROCHLORIDE 10 MG/ML
INJECTION, SOLUTION EPIDURAL; INFILTRATION; INTRACAUDAL; PERINEURAL AS NEEDED
Status: DISCONTINUED | OUTPATIENT
Start: 2018-03-21 | End: 2018-03-21 | Stop reason: HOSPADM

## 2018-03-21 RX ORDER — FENTANYL CITRATE 50 UG/ML
INJECTION, SOLUTION INTRAMUSCULAR; INTRAVENOUS AS NEEDED
Status: DISCONTINUED | OUTPATIENT
Start: 2018-03-21 | End: 2018-03-21 | Stop reason: HOSPADM

## 2018-03-21 RX ORDER — ASPIRIN 81 MG/1
324 TABLET, CHEWABLE ORAL ONCE
Status: COMPLETED | OUTPATIENT
Start: 2018-03-21 | End: 2018-03-21

## 2018-03-21 RX ADMIN — ASPIRIN 81 MG 324 MG: 81 TABLET ORAL at 06:55

## 2018-03-21 RX ADMIN — SODIUM CHLORIDE 2.16 ML/KG/HR: 9 INJECTION, SOLUTION INTRAVENOUS at 08:10

## 2018-03-21 NOTE — CONSULTS
Patient does not meet diabetes education order criteria (A1c>7.5%), therefore patient was not seen for diabetes education at this time. A1c was 7.2%. Please re consult as needed.

## 2018-03-21 NOTE — H&P
Pre-cardiac Catheterization Report  Cardiovascular Laboratory  [facility]      Patient:  Danae Fong  :  1970  PCP:  Leigh Casey MD  PHONE:  [phone]    DATE: 3/21/2018    BRIEF HPI:  Danae Fong is a 47 y.o. female with an extensive medical history that had a lengthy hospitalization in  of  that underwent an aortic valve replacement due to damage caused by septic arthritis of her right hip. Post operatively she suffered from atrial fibrillation, pericardial effusion with tamponade requiring a pericardial window and subsequent pericardectomy.  She has since underwent a hip replacement x 2 and more recently was hospitalized due to strep B and MRSA that resulted in osteomyelitis of her right foot requiring amputation of her second toe with long term IV antibiotics managed by infectious disease.  She contacted our office earlier this month with reports of increased dyspnea and chest pain with mild activities.  She underwent a stress test that indicated a severe area of ischemia in the apex and  presents today to undergo a cardiac catheterization. Her last echocardiogram in February showed a normal functioning bioprosthetic aortic valve with mean gradient of 11 mmHg.  Her last cath in  prior to AVR showed normal coronaries. Her HgA1C is elevated at 7.2 indicating type II DM but she is not currently on any medications for this.      Cardiac Risk Factors: Obesity, Type II DM, HTN    Anginal class in last 2 weeks:  CCS class III    CHF Class in last 2 weeks:  NYHA Class III    Cardiogenic shock:  no    Cardiac arrest <24 hours:  no    Stress test within last 6 months:   yes   Details: MPS (2018): Small-sized, moderately severe area of ischemia located in the apex. This area of ischemia affects 3% of the myocardium. LVEF 55%    Previous cardiac catheterization:  yes  Details: Cardiac cath (2016): Normal coronaries    Previous CABG:  no  Details:       Allergies:     IV contrast allergy:  no  No Known Allergies    MEDICATIONS:  Prior to Admission medications    Medication Sig Start Date End Date Taking? Authorizing Provider   amoxicillin (AMOXIL) 500 MG capsule Take 500 mg by mouth Take As Directed. Dr sevilla aware   Yes Historical Provider, MD   aspirin 81 MG chewable tablet Chew 1 tablet Daily. 2/1/18  Yes BAUTISTA Wilson   bumetanide (BUMEX) 1 MG tablet Take 1 mg by mouth 2 (Two) Times a Day.   Yes Historical Provider, MD   docusate sodium (COLACE) 50 MG capsule Take  by mouth As Needed for Constipation.   Yes Historical Provider, MD   ferrous sulfate 325 (65 FE) MG tablet Take 325 mg by mouth daily with breakfast.   Yes Historical Provider, MD   isosorbide mononitrate (IMDUR) 30 MG 24 hr tablet Take 1 tablet by mouth Daily. 3/8/18  Yes BAUTISTA Malcolm   METOPROLOL TARTRATE PO Take 12.5 mg by mouth 2 (Two) Times a Day. Takes 50mg tablet but cuts in 1/4 tablet bid so 12.5 mg bid so didn't have to get new prescription   Yes Historical Provider, MD   multivitamin (THERAGRAN) tablet tablet Take 1 tablet by mouth Daily. OTC 2/1/18  Yes BAUTISTA Wilson   pantoprazole (PROTONIX) 40 MG EC tablet Take 40 mg by mouth Daily.   Yes Historical Provider, MD   PARoxetine (PAXIL) 20 MG tablet Take 20 mg by mouth Daily.   Yes Historical Provider, MD   polyethylene glycol (MIRALAX) packet Take 17 g by mouth Every Other Day.   Yes Historical Provider, MD   potassium chloride (K-DUR,KLOR-CON) 10 MEQ CR tablet Take 20 mEq by mouth Daily.   Yes Historical Provider, MD   EPINEPHrine (EPIPEN) 0.3 MG/0.3ML solution auto-injector injection 1 (One) Time. Pt has in case of antibiotic reaction, has not used 1/31/18   Historical Provider, MD       Past medical & surgical history, social and family history reviewed in the electronic medical record.    ROS:  Negative except pertinent positives listed in HPI all other systems reviewed and were  negative    Physical Exam:    Vitals:   Vitals:    03/21/18 0630   BP: 147/93   Pulse: 60   Resp: 16   Temp: 98.4 °F (36.9 °C)   SpO2: 92%    1    03/21/18  0630   Weight: (!) 153 kg (337 lb 1.3 oz)     Physical Exam   Constitutional: She is oriented to person, place, and time. She appears well-developed and well-nourished.   HENT:   Head: Normocephalic and atraumatic.   Eyes: Pupils are equal, round, and reactive to light. No scleral icterus.   Neck: No JVD present. Carotid bruit is not present. No thyromegaly present.   Cardiovascular: Normal rate, regular rhythm, S1 normal and S2 normal.  Exam reveals no gallop.    Murmur heard.  Pulmonary/Chest: Effort normal and breath sounds normal.   Abdominal: Soft. There is no hepatosplenomegaly. There is no tenderness.   Neurological: She is alert and oriented to person, place, and time.   Skin: Skin is warm and dry. No cyanosis. Nails show no clubbing.   Psychiatric: She has a normal mood and affect. Her behavior is normal.     Barbaeu Test:  Left: Normal  (oxymetric Allens) Right: Not assessed      Results from last 7 days  Lab Units 03/19/18  1245   SODIUM mmol/L 141   POTASSIUM mmol/L 4.4   CHLORIDE mmol/L 100   CO2 mmol/L 26.0   BUN mg/dL 8*   CREATININE mg/dL 0.80   GLUCOSE mg/dL 82   CALCIUM mg/dL 8.9       Results from last 7 days  Lab Units 03/19/18  1245   WBC 10*3/mm3 9.87   HEMOGLOBIN g/dL 15.2   HEMATOCRIT % 45.9*   PLATELETS 10*3/mm3 191     Lab Results   Component Value Date    TRIG 99 08/26/2016    HDL 27 (L) 08/26/2016    AST 45 (H) 03/19/2018    ALT 45 (H) 03/19/2018       Results from last 7 days  Lab Units 03/19/18  1245   HEMOGLOBIN A1C % 7.20*       IMPRESSION:  Hospital Problem List     * (Principal)Abnormal stress test    Overview Addendum 3/21/2018  8:05 AM by BAUTISTA Malcolm     · MPS (03/08/2018): Small-sized, moderately severe area of ischemia located in the apex. This area of ischemia affects 3% of the myocardium. LVEF 55%         Class  3 obesity due to excess calories in adult    Essential hypertension          PLAN:  · Procedure to perform: LHC  · Planned access:  Left radial  · DM needs to be treated  · Lifestyle modifications with weight loss and consistent low carbohydrate diet  · Further recommendations to follow      Bertha BAUM scribed for Dr Chapin Peterson IV, MD  3/21/2018

## 2018-03-22 ENCOUNTER — TELEPHONE (OUTPATIENT)
Dept: ORTHOPEDIC SURGERY | Facility: CLINIC | Age: 48
End: 2018-03-22

## 2018-03-22 NOTE — TELEPHONE ENCOUNTER
----- Message from Danae Fong sent at 3/22/2018  1:18 PM EDT -----  Regarding: Visit Follow-Up Question  Contact: [phone]  I sent a copy of my return to work statement on monday and today.  This has to be signed and returned before I can return to work on Monday the 26th.  I called office today and was advised that Les was not in.  Could someone please take care of this matter?  Thank you

## 2018-03-29 ENCOUNTER — TELEPHONE (OUTPATIENT)
Dept: ORTHOPEDIC SURGERY | Facility: CLINIC | Age: 48
End: 2018-03-29

## 2018-03-29 NOTE — TELEPHONE ENCOUNTER
----- Message from Danae Fong sent at 3/29/2018 12:55 PM EDT -----  Regarding: Non-Urgent Medical Question  Contact: [phone]  I am experiencing swelling and redness and heat in my left foot and ankle area.  This is the foot that she was treating the ulcer on the bottom.  There is no break in the skin or drainage.  The pain and swelling are moving up my shin area.  I am back at work and do sit at my job.  I am trying to elevate foot as much as possible.  I was wondering what if any, advice she might have or if she thought I might need an ultrasound to rule out DVT?  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      Thank you so much,  Danae   [phone]

## 2018-03-29 NOTE — TELEPHONE ENCOUNTER
----- Message from Danae Fong sent at 3/29/2018 12:55 PM EDT -----  Regarding: Non-Urgent Medical Question  Contact: [phone]  I am experiencing swelling and redness and heat in my left foot and ankle area.  This is the foot that she was treating the ulcer on the bottom.  There is no break in the skin or drainage.  The pain and swelling are moving up my shin area.  I am back at work and do sit at my job.  I am trying to elevate foot as much as possible.  I was wondering what if any, advice she might have or if she thought I might need an ultrasound to rule out DVT?  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      Thank you so much,  Danae   [phone]

## 2018-03-30 NOTE — TELEPHONE ENCOUNTER
Called patient again this morning. She states that she is having some swelling in the foot and ankle- mainly at work. She is not able to elevate as much as she can at home. She states that it is a little red, but not hot to touch, and she is not having any draingae. She states that the pain is more in the chin area- not in the calf area. I explained that we would be more concerned if the pain were in the calf area and if the calf were tight. 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 She does not have any open ulcers at the current moment. She is going to monitor her symptoms over the weekend and call back the first of the week if things get worse.     Linda

## 2018-04-11 ENCOUNTER — OFFICE VISIT (OUTPATIENT)
Dept: ORTHOPEDIC SURGERY | Facility: CLINIC | Age: 48
End: 2018-04-11

## 2018-04-11 DIAGNOSIS — S98.131A AMPUTATED TOE OF RIGHT FOOT (HCC): ICD-10-CM

## 2018-04-11 DIAGNOSIS — E11.42 DIABETIC POLYNEUROPATHY ASSOCIATED WITH TYPE 2 DIABETES MELLITUS (HCC): ICD-10-CM

## 2018-04-11 DIAGNOSIS — I87.8 VENOUS STASIS: ICD-10-CM

## 2018-04-11 DIAGNOSIS — M14.672 CHARCOT'S JOINT OF LEFT FOOT: Primary | ICD-10-CM

## 2018-04-11 PROCEDURE — 99214 OFFICE O/P EST MOD 30 MIN: CPT | Performed by: ORTHOPAEDIC SURGERY

## 2018-04-11 PROCEDURE — 99024 POSTOP FOLLOW-UP VISIT: CPT | Performed by: ORTHOPAEDIC SURGERY

## 2018-04-11 RX ORDER — POTASSIUM CHLORIDE 750 MG/1
20 TABLET, FILM COATED, EXTENDED RELEASE ORAL DAILY
Refills: 1 | COMMUNITY
Start: 2018-03-14 | End: 2019-01-08

## 2018-04-11 NOTE — PROGRESS NOTES
Post-op (3 weeks - 10 weeks S/P Right foot amputation of 2nd toe - 01/30/18 and osteomyelitis of right foot) and Follow-up (8 weeks - Diabetic ulcer of other part of left foot )      Danae Fong is here with a new problem.  The right foot amputation site is healed.  She now has left midfoot pain and swelling.  It is worse with increased activity, goes down somewhat over night.  No injury.  She reports her glucose control is improved.       Past Surgical History:   Procedure Laterality Date   • AMPUTATION DIGIT Right 1/30/2018    Procedure: AMPUTATION RIGHT 2ND TOE;  Surgeon: Charleen Nath MD;  Location:  [facility] OR;  Service:    • AORTIC VALVE REPAIR/REPLACEMENT N/A 8/30/2016    Procedure: AIRAM, MEDIAN STERNOTOMY, AORTIC VALVE REPLACEMENT;  Surgeon: Mauricio Grove MD;  Location:  [facility] OR;  Service:    • CARDIAC CATHETERIZATION N/A 8/25/2016    Procedure: Left Heart Cath;  Surgeon: Tyler Peterson MD;  Location:  [facility] CATH INVASIVE LOCATION;  Service:    • CARDIAC CATHETERIZATION      x 2   • CARDIAC CATHETERIZATION N/A 3/21/2018    Procedure: Coronary angiography;  Surgeon: Tyler Peterson IV, MD;  Location:  [facility] CATH INVASIVE LOCATION;  Service: Cardiovascular   • DILATATION AND CURETTAGE      of uterus   • FOOT SURGERY Left     for osteomyelitis   • HIP SURGERY Right     washout x 2 by Dr. Espitia   • INCISION AND DRAINAGE HIP Right 1/26/2017    Procedure: RIGHT HIP WOUND WASHOUT AND DEBRIDEMENT, WITH POLYETHELENE FEMORAL HEAD EXCHANGE ;  Surgeon: Owen Siu MD;  Location:  [facility] OR;  Service:    • OTHER SURGICAL HISTORY      Open heart surgery   • PERICARDIAL WINDOW N/A 9/16/2016    Procedure: PERICARDIOCENTESIS WITH PLACEMENT OF DRAINAGE TUBE .;  Surgeon: Mauricio Grove MD;  Location:  [facility] OR;  Service:    • PERICARDIAL WINDOW N/A 9/20/2016    Procedure: SUBXYPHOID PERICARDIAL WINDOW;  Surgeon: Mauricio Grove MD;  Location:  [facility] OR;  Service:    • IN CLOSED RX TRAUMATIC HIP  ERNA N/A 1/29/2017    Procedure: HIP CLOSED REDUCTION;  Surgeon: Leander Li MD;  Location:  [facility];  Service: Orthopedics   • THORACOTOMY Left 10/5/2016    Procedure: THORACOTOMY Pericardectomy,  AIRAM,  Placement pacing leads, Pump standby;  Surgeon: Mauricio Grove MD;  Location:  [facility];  Service:    • TOTAL HIP ARTHROPLASTY Right 1/23/2017    Procedure: RIGHT TOTAL HIP ARTHROPLASTY ANTERIOR ;  Surgeon: Owen Siu MD;  Location:  [facility];  Service:    • TOTAL HIP ARTHROPLASTY REVISION Right 1/30/2017    Procedure: REPEAT WOUND DEBRIDEMENT ACETABULAR REVISION;  Surgeon: Owen Siu MD;  Location:  [facility];  Service:    • WISDOM TOOTH EXTRACTION      all 4       There were no vitals taken for this visit.       Right foot toe amputation is healed.      Left foot has significant swelling and warmth in the midfoot, no deformity.  No change in dense neuropathy, no calluses, no new ulcers, no pre ulcerous lesions,    ordered and reviewed x-rays today    Assessment and Plan:   1. Charcot's joint of left foot  She has a new charcot joint in the left foot.  She has fragmentation of the navicular, and some irregularity of the middle cuneiform.  CHARCOT JOINTS       Roc Limon M.D. was a [place] physician who practiced in the late 1800's.  He was a neurologist and in 1868 he found patients with crumbling, fracture and dislocation of joints in their feet.  The problem occurred spontaneously in patients with neuropathy.  Any type of neuropathy (nerves that don't work) can lead to this problem which we call Charcot Arthropathy (Charcot joints).      In the [place], the leading cause of neuropathy is diabetes.  [place] has the largest diabetic population (%) in the country.  We have lots of Charcot joints.     SIGNS AND SYMPTOMS OF CHARCOT JOINTS      1. Hot, red, swollen foot or ankle.  Sometimes you see a deformity of the foot.    2. Pain- often aching and burning.    THREE STAGES OF CHARCOT JOINTS        Stage I: the early breaking apart phase.  The foot is at its most swollen, red and hot.  Initial x-rays might be normal, it can be confused with infection.    Stage II: middle phase.  Often seen on x-ray now, fragmentation and dislocation of the bones in the foot and/or ankle.  Often can now see deformity and collapse.    Stage III: end phase.  The bones are consolidated- not normal but they scar together like rocks in cement or fruit cocktail in a jello mold.  The swelling resolves, the redness and heat are gone.     TREATMENT      The best results occur if this is identified and treated early- before there is collapse and deformity.  The mainstay of treatment is casting.  Depending on the joints involved you might be able to walk on the cast.  Casting is done with changes every 6-8 weeks until the patient is in stage III.  This may take 6-9 MONTHS.  The goal of treatment is to keep the foot/ankle as straight as possible so the patient will be able to walk, and to have the lowest risk of ulcers and amputation.    When the foot/ankle is in stage III, the patient will be given a prescription for diabetic(neuropathic) shoes and inserts, and might need a permanent brace.  This will be determined based on the position and condition of the leg/ankle/foot. Most of the time, the patient will also need to wear a support stocking permanently.  (put the stocking on in the morning, take off at night)    PREVENTION    The best prevention is GOOD SUGAR/GLUCOSE CONTROL.  This means an HgA1C of less than 7.0, preferably under 6.0. The better control a patient has, the lower the risk of neuropathy and other complications.       2. Diabetic polyneuropathy associated with type 2 diabetes mellitus  As above, new charcot joint    3. Venous stasis  Needs to wear these daily    4. Amputated toe of right foot  Needs to wear diabetic shoe on the right instead of the Ked's she has on today- not safe!!      She needs to be in a tall boot  on the left, limit ambulation, and I will see her in 6 weeks for xray 2 left foot

## 2018-05-23 ENCOUNTER — OFFICE VISIT (OUTPATIENT)
Dept: ORTHOPEDIC SURGERY | Facility: CLINIC | Age: 48
End: 2018-05-23

## 2018-05-23 DIAGNOSIS — M14.672 CHARCOT'S JOINT OF LEFT FOOT: Primary | ICD-10-CM

## 2018-05-23 PROCEDURE — 99213 OFFICE O/P EST LOW 20 MIN: CPT | Performed by: ORTHOPAEDIC SURGERY

## 2018-05-23 NOTE — PROGRESS NOTES
ESTABLISHED PATIENT    Patient: Danae Fong  : 1970    Primary Care Provider: Leigh Casey MD    Requesting Provider: As above    No chief complaint on file.      History    Chief Complaint: left charcot foot    History of Present Illness: she is here for f/u of left Charcot midfoot.  The right toe amputation site is doing well    Current Outpatient Prescriptions on File Prior to Visit   Medication Sig Dispense Refill   • amoxicillin (AMOXIL) 500 MG capsule Take 500 mg by mouth Take As Directed. Dr sevilla aware     • aspirin 81 MG chewable tablet Chew 1 tablet Daily.     • bumetanide (BUMEX) 1 MG tablet Take 1 mg by mouth 2 (Two) Times a Day.     • docusate sodium (COLACE) 50 MG capsule Take  by mouth As Needed for Constipation.     • EPINEPHrine (EPIPEN) 0.3 MG/0.3ML solution auto-injector injection 1 (One) Time. Pt has in case of antibiotic reaction, has not used     • ferrous sulfate 325 (65 FE) MG tablet Take 325 mg by mouth daily with breakfast.     • isosorbide mononitrate (IMDUR) 30 MG 24 hr tablet Take 1 tablet by mouth Daily. 30 tablet 11   • KLOR-CON 10 MEQ CR tablet Take 20 mEq by mouth Daily.  1   • METOPROLOL TARTRATE PO Take 12.5 mg by mouth 2 (Two) Times a Day. Takes 50mg tablet but cuts in 1/4 tablet bid so 12.5 mg bid so didn't have to get new prescription     • multivitamin (THERAGRAN) tablet tablet Take 1 tablet by mouth Daily. OTC 30 tablet    • pantoprazole (PROTONIX) 40 MG EC tablet Take 40 mg by mouth Daily.     • PARoxetine (PAXIL) 20 MG tablet Take 20 mg by mouth Daily.     • polyethylene glycol (MIRALAX) packet Take 17 g by mouth Every Other Day.     • potassium chloride (K-DUR,KLOR-CON) 10 MEQ CR tablet Take 20 mEq by mouth Daily.       No current facility-administered medications on file prior to visit.       No Known Allergies   Past Medical History:   Diagnosis Date   • A-fib    • Active smoker    • Aftercare following right hip joint replacement surgery    • Anemia     • Anemia    • Anxiety    • Aortic valve regurgitation    • Cancer     elevated ca 125 but never diagnoised with anything    • Chest pain     no at this time    • CHF (congestive heart failure)    • Constipation    • Controlled type 2 diabetes mellitus without complication     not medicated??? not sure if type 2-  borderline diabetes    • Diabetic neuropathy    • Diabetic Ulcer    • Diastolic murmur    • Dysfunctional uterine bleeding    • Heart valve implant    • Hypertension    • Iron deficiency anemia    • Low back pain    • MRSA infection 09/2015    TOE   • On supplemental oxygen therapy     at night 2 liters   • Primary Osteoarthritis of both hips    • Septic arthritis    • Sleep apnea     sleeps with 2l of 02 at night   • Sleep apnea    • SOB (shortness of breath) on exertion    • Streptococcal arthritis of right hip    • Streptococcus infection, group B    • Subacute osteomyelitis of left foot    • Surgical aftercare, musculoskeletal system    • Thin blood    • Transfusion history    • Wears reading eyeglasses      Past Surgical History:   Procedure Laterality Date   • AMPUTATION DIGIT Right 1/30/2018    Procedure: AMPUTATION RIGHT 2ND TOE;  Surgeon: Charleen Nath MD;  Location:  [facility];  Service:    • AORTIC VALVE REPAIR/REPLACEMENT N/A 8/30/2016    Procedure: AIRAM, MEDIAN STERNOTOMY, AORTIC VALVE REPLACEMENT;  Surgeon: Mauricio Grove MD;  Location:  [facility];  Service:    • CARDIAC CATHETERIZATION N/A 8/25/2016    Procedure: Left Heart Cath;  Surgeon: Tyler Peterson MD;  Location:  [facility] CATH INVASIVE LOCATION;  Service:    • CARDIAC CATHETERIZATION      x 2   • CARDIAC CATHETERIZATION N/A 3/21/2018    Procedure: Coronary angiography;  Surgeon: Tyler Peterson IV, MD;  Location:  [facility] CATH INVASIVE LOCATION;  Service: Cardiovascular   • DILATATION AND CURETTAGE      of uterus   • FOOT SURGERY Left     for osteomyelitis   • HIP SURGERY Right     washout x 2 by Dr. Espitia   • INCISION  AND DRAINAGE HIP Right 1/26/2017    Procedure: RIGHT HIP WOUND WASHOUT AND DEBRIDEMENT, WITH POLYETHELENE FEMORAL HEAD EXCHANGE ;  Surgeon: Owen Siu MD;  Location:  [facility];  Service:    • OTHER SURGICAL HISTORY      Open heart surgery   • PERICARDIAL WINDOW N/A 9/16/2016    Procedure: PERICARDIOCENTESIS WITH PLACEMENT OF DRAINAGE TUBE .;  Surgeon: Mauricio Grove MD;  Location:  [facility];  Service:    • PERICARDIAL WINDOW N/A 9/20/2016    Procedure: SUBXYPHOID PERICARDIAL WINDOW;  Surgeon: Mauricio Grove MD;  Location:  [facility];  Service:    • MD CLOSED RX TRAUMATIC HIP DISLOCATN N/A 1/29/2017    Procedure: HIP CLOSED REDUCTION;  Surgeon: Leander Li MD;  Location:  [facility];  Service: Orthopedics   • THORACOTOMY Left 10/5/2016    Procedure: THORACOTOMY Pericardectomy,  AIRAM,  Placement pacing leads, Pump standby;  Surgeon: Mauricio Grove MD;  Location:  [facility];  Service:    • TOTAL HIP ARTHROPLASTY Right 1/23/2017    Procedure: RIGHT TOTAL HIP ARTHROPLASTY ANTERIOR ;  Surgeon: Owen Siu MD;  Location:  [facility];  Service:    • TOTAL HIP ARTHROPLASTY REVISION Right 1/30/2017    Procedure: REPEAT WOUND DEBRIDEMENT ACETABULAR REVISION;  Surgeon: Owen Siu MD;  Location:  [facility];  Service:    • WISDOM TOOTH EXTRACTION      all 4     Family History   Problem Relation Age of Onset   • Diabetes Mother    • Heart disease Mother    • Hypertension Mother    • Osteoarthritis Mother    • Diabetes Sister    • Arthritis Sister    • No Known Problems Father    • Diabetes Sister    • Diabetes Sister    • Heart disease Other    • Heart attack Other    • Heart disease Other    • Hypertension Other    • Osteoarthritis Other    • Rheum arthritis Other    • Cancer Neg Hx       Social History     Social History   • Marital status:      Spouse name: N/A   • Number of children: 0   • Years of education: N/A     Occupational History   • Employed      [employer]     Social History  Main Topics   • Smoking status: Former Smoker     Packs/day: 0.50     Years: 15.00     Types: Cigarettes     Quit date: 7/15/2016   • Smokeless tobacco: Never Used   • Alcohol use No   • Drug use: No   • Sexual activity: Defer     Other Topics Concern   • Not on file     Social History Narrative    Patient denies caffeine intake    Patient lives at home with her sister.        Review of Systems   Constitutional: Negative.    HENT: Negative.    Eyes: Negative.    Respiratory: Negative.    Cardiovascular: Negative.    Gastrointestinal: Negative.    Endocrine: Negative.    Genitourinary: Negative.    Musculoskeletal: Positive for joint swelling.   Skin: Negative.    Allergic/Immunologic: Negative.    Neurological: Negative.    Hematological: Negative.    Psychiatric/Behavioral: Negative.        The following portions of the patient's history were reviewed and updated as appropriate: allergies, current medications, past family history, past medical history, past social history, past surgical history and problem list.    Physical Exam:   There were no vitals taken for this visit.  GENERAL: Body habitus: morbidly obese    Lower extremity edema: Left: 1+ pitting; Right: 1+ pitting    Gait: antalgic     Mental Status:  awake and alert; oriented to person, place, and time  MSK:          Foot:  Right:  no callus, no ulcers, no new Charcot, the amputation site is healed; Left:  less warmth in midfoot, less swelling    NEURO Sensation:  globally diminished,       Medical Decision Making    Data Review:   ordered and reviewed x-rays today    Assessment/Plan/Diagnosis/Treatment Options:   1. Charcot's joint of left foot  I think she is entering stage II, continue boot, continue support stockings.  I will see her  In 8 weeks, xray left foot.  Right foot is healed  - XR Foot 2 View Left

## 2018-07-18 ENCOUNTER — OFFICE VISIT (OUTPATIENT)
Dept: ORTHOPEDIC SURGERY | Facility: CLINIC | Age: 48
End: 2018-07-18

## 2018-07-18 VITALS — WEIGHT: 293 LBS | OXYGEN SATURATION: 96 % | HEART RATE: 75 BPM | HEIGHT: 72 IN | BODY MASS INDEX: 39.68 KG/M2

## 2018-07-18 DIAGNOSIS — M14.672 CHARCOT'S JOINT OF LEFT FOOT: Primary | ICD-10-CM

## 2018-07-18 PROCEDURE — 99213 OFFICE O/P EST LOW 20 MIN: CPT | Performed by: ORTHOPAEDIC SURGERY

## 2018-07-18 NOTE — PROGRESS NOTES
ESTABLISHED PATIENT    Patient: Danae Fong  : 1970    Primary Care Provider: Leigh Casey MD    Requesting Provider: As above    Follow-up (8 week - Charcot's joint of left foot )      History    Chief Complaint: Diabetic foot problems    History of Present Illness: She returns for follow-up of her left Charcot foot, right second toe amputation.  No current ulcerations.    Current Outpatient Prescriptions on File Prior to Visit   Medication Sig Dispense Refill   • amoxicillin (AMOXIL) 500 MG capsule Take 500 mg by mouth Take As Directed. Dr sevilla aware     • aspirin 81 MG chewable tablet Chew 1 tablet Daily.     • bumetanide (BUMEX) 1 MG tablet Take 1 mg by mouth 2 (Two) Times a Day.     • docusate sodium (COLACE) 50 MG capsule Take  by mouth As Needed for Constipation.     • EPINEPHrine (EPIPEN) 0.3 MG/0.3ML solution auto-injector injection 1 (One) Time. Pt has in case of antibiotic reaction, has not used     • ferrous sulfate 325 (65 FE) MG tablet Take 325 mg by mouth daily with breakfast.     • isosorbide mononitrate (IMDUR) 30 MG 24 hr tablet Take 1 tablet by mouth Daily. 30 tablet 11   • KLOR-CON 10 MEQ CR tablet Take 20 mEq by mouth Daily.  1   • METOPROLOL TARTRATE PO Take 12.5 mg by mouth 2 (Two) Times a Day. Takes 50mg tablet but cuts in 1/4 tablet bid so 12.5 mg bid so didn't have to get new prescription     • multivitamin (THERAGRAN) tablet tablet Take 1 tablet by mouth Daily. OTC 30 tablet    • pantoprazole (PROTONIX) 40 MG EC tablet Take 40 mg by mouth Daily.     • PARoxetine (PAXIL) 20 MG tablet Take 20 mg by mouth Daily.     • polyethylene glycol (MIRALAX) packet Take 17 g by mouth Every Other Day.     • potassium chloride (K-DUR,KLOR-CON) 10 MEQ CR tablet Take 20 mEq by mouth Daily.       No current facility-administered medications on file prior to visit.       No Known Allergies   Past Medical History:   Diagnosis Date   • A-fib (CMS/HCC)    • Active smoker    • Aftercare  following right hip joint replacement surgery    • Anemia    • Anemia    • Anxiety    • Aortic valve regurgitation    • Cancer (CMS/HCC)     elevated ca 125 but never diagnoised with anything    • Chest pain     no at this time    • CHF (congestive heart failure) (CMS/HCC)    • Constipation    • Controlled type 2 diabetes mellitus without complication (CMS/HCC)     not medicated??? not sure if type 2-  borderline diabetes    • Diabetic neuropathy (CMS/HCC)    • Diabetic Ulcer    • Diastolic murmur    • Dysfunctional uterine bleeding    • Heart valve implant    • Hypertension    • Iron deficiency anemia    • Low back pain    • MRSA infection 09/2015    TOE   • On supplemental oxygen therapy     at night 2 liters   • Primary Osteoarthritis of both hips    • Septic arthritis (CMS/HCC)    • Sleep apnea     sleeps with 2l of 02 at night   • Sleep apnea    • SOB (shortness of breath) on exertion    • 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HCC)    • Streptococcus infection, group B    • Subacute osteomyelitis of left foot (CMS/HCC)    • Surgical aftercare, musculoskeletal system    • Thin blood (CMS/[facility])    • Transfusion history    • Wears reading eyeglasses      Past Surgical History:   Procedure Laterality Date   • AMPUTATION DIGIT Right 1/30/2018    Procedure: AMPUTATION RIGHT 2ND TOE;  Surgeon: Charleen Nath MD;  Location:  [facility];  Service:    • AORTIC VALVE REPAIR/REPLACEMENT N/A 8/30/2016    Procedure: AIRAM, MEDIAN STERNOTOMY, AORTIC VALVE REPLACEMENT;  Surgeon: Mauricio Grove MD;  Location:  [facility];  Service:    • CARDIAC CATHETERIZATION N/A 8/25/2016    Procedure: Left Heart Cath;  Surgeon: Tyler Peterson MD;  Location:  [facility] INVASIVE [facility];  Service:    • CARDIAC CATHETERIZATION      x 2   • CARDIAC CATHETERIZATION N/A 3/21/2018    Procedure: Coronary angiography;  Surgeon: Tyler Peterson IV, MD;  Location:  [facility] INVASIVE LOC[facility];  Service: Cardiovascular   •  DILATATION AND CURETTAGE      of uterus   • FOOT SURGERY Left     for osteomyelitis   • HIP SURGERY Right     washout x 2 by Dr. Espitia   • INCISION AND DRAINAGE HIP Right 1/26/2017    Procedure: RIGHT HIP WOUND WASHOUT AND DEBRIDEMENT, WITH POLYETHELENE FEMORAL HEAD EXCHANGE ;  Surgeon: Owen Siu MD;  Location:  [facility];  Service:    • OTHER SURGICAL HISTORY      Open heart surgery   • PERICARDIAL WINDOW N/A 9/16/2016    Procedure: PERICARDIOCENTESIS WITH PLACEMENT OF DRAINAGE TUBE .;  Surgeon: Mauricio Grove MD;  Location:  [facility];  Service:    • PERICARDIAL WINDOW N/A 9/20/2016    Procedure: SUBXYPHOID PERICARDIAL WINDOW;  Surgeon: Mauricio Grove MD;  Location:  [facility];  Service:    • CA CLOSED RX TRAUMATIC HIP DISLOCATN N/A 1/29/2017    Procedure: HIP CLOSED REDUCTION;  Surgeon: Leander Li MD;  Location:  [facility];  Service: Orthopedics   • THORACOTOMY Left 10/5/2016    Procedure: THORACOTOMY Pericardectomy,  AIRAM,  Placement pacing leads, Pump standby;  Surgeon: Mauricio Grove MD;  Location:  [facility];  Service:    • TOTAL HIP ARTHROPLASTY Right 1/23/2017    Procedure: RIGHT TOTAL HIP ARTHROPLASTY ANTERIOR ;  Surgeon: Owen Siu MD;  Location:  [facility];  Service:    • TOTAL HIP ARTHROPLASTY REVISION Right 1/30/2017    Procedure: REPEAT WOUND DEBRIDEMENT ACETABULAR REVISION;  Surgeon: Owen Siu MD;  Location:  [facility];  Service:    • WISDOM TOOTH EXTRACTION      all 4     Family History   Problem Relation Age of Onset   • Diabetes Mother    • Heart disease Mother    • Hypertension Mother    • Osteoarthritis Mother    • Diabetes Sister    • Arthritis Sister    • No Known Problems Father    • Diabetes Sister    • Diabetes Sister    • Heart disease Other    • Heart attack Other    • Heart disease Other    • Hypertension Other    • Osteoarthritis Other    • Rheum arthritis Other    • Cancer Neg Hx       Social History     Social History   • Marital status:      Spouse name: N/A  "  • Number of children: 0   • Years of education: N/A     Occupational History   • Employed      [employer]     Social History Main Topics   • Smoking status: Former Smoker     Packs/day: 0.50     Years: 15.00     Types: Cigarettes     Quit date: 7/15/2016   • Smokeless tobacco: Never Used   • Alcohol use No   • Drug use: No   • Sexual activity: Defer     Other Topics Concern   • Not on file     Social History Narrative    Patient denies caffeine intake    Patient lives at home with her sister.        Review of Systems    The following portions of the patient's history were reviewed and updated as appropriate: allergies, current medications, past family history, past medical history, past social history, past surgical history and problem list.    Physical Exam:   Pulse 75   Ht 188 cm (74.02\")   Wt (!) 153 kg (337 lb 4.9 oz)   SpO2 96%   BMI 43.29 kg/m²   GENERAL: Body habitus: morbidly obese    Lower extremity edema: Left: 1+ pitting; Right: 1+ pitting    Gait: antalgic     Mental Status:  awake and alert; oriented to person, place, and time  MSK  Diabetic Foot Exam:  Right: No ulcer, moderate claw toes, I showed her how to use a banana pad Left:  No ulcer, still warm over the Charcot joints but much less swelling  NEURO Sensation:  absent     Medical Decision Making    Data Review:   ordered and reviewed x-rays today    Assessment/Plan/Diagnosis/Treatment Options:   1. Charcot's joint of left foot  She is improving steadily with respect to the Charcot joint.  I want her to stay in the boot.  I'll see her again in 8 weeks with standing 2 views of the foot, hopefully she will be in stage III.  With respect to the right foot hammertoes I showed her how to use a banana pad.  We again discussed good diabetic foot care.  - XR Foot 2 View Left                            "

## 2018-09-19 ENCOUNTER — OFFICE VISIT (OUTPATIENT)
Dept: ORTHOPEDIC SURGERY | Facility: CLINIC | Age: 48
End: 2018-09-19

## 2018-09-19 VITALS — OXYGEN SATURATION: 98 % | BODY MASS INDEX: 39.68 KG/M2 | WEIGHT: 293 LBS | HEART RATE: 67 BPM | HEIGHT: 72 IN

## 2018-09-19 DIAGNOSIS — M14.672 CHARCOT'S JOINT OF LEFT FOOT: Primary | ICD-10-CM

## 2018-09-19 PROCEDURE — 99213 OFFICE O/P EST LOW 20 MIN: CPT | Performed by: ORTHOPAEDIC SURGERY

## 2018-09-19 NOTE — PROGRESS NOTES
ESTABLISHED PATIENT    Patient: Danae Fong  : 1970    Primary Care Provider: Leigh Casey MD    Requesting Provider: As above    Follow-up (9 weeks - Charcot's joint of left foot & Right foot hammertoes )      History    Chief Complaint: Left Charcot joint    History of Present Illness: She returns for follow-up of her diabetic foot problems and left Charcot midfoot.  The left foot has stabilized.  No active ulcerations on either foot    Current Outpatient Prescriptions on File Prior to Visit   Medication Sig Dispense Refill   • amoxicillin (AMOXIL) 500 MG capsule Take 500 mg by mouth Take As Directed. Dr sevilla aware     • aspirin 81 MG chewable tablet Chew 1 tablet Daily.     • bumetanide (BUMEX) 1 MG tablet Take 1 mg by mouth 2 (Two) Times a Day.     • docusate sodium (COLACE) 50 MG capsule Take  by mouth As Needed for Constipation.     • EPINEPHrine (EPIPEN) 0.3 MG/0.3ML solution auto-injector injection 1 (One) Time. Pt has in case of antibiotic reaction, has not used     • ferrous sulfate 325 (65 FE) MG tablet Take 325 mg by mouth daily with breakfast.     • isosorbide mononitrate (IMDUR) 30 MG 24 hr tablet Take 1 tablet by mouth Daily. 30 tablet 11   • KLOR-CON 10 MEQ CR tablet Take 20 mEq by mouth Daily.  1   • METOPROLOL TARTRATE PO Take 12.5 mg by mouth 2 (Two) Times a Day. Takes 50mg tablet but cuts in 1/4 tablet bid so 12.5 mg bid so didn't have to get new prescription     • multivitamin (THERAGRAN) tablet tablet Take 1 tablet by mouth Daily. OTC 30 tablet    • pantoprazole (PROTONIX) 40 MG EC tablet Take 40 mg by mouth Daily.     • PARoxetine (PAXIL) 20 MG tablet Take 20 mg by mouth Daily.     • polyethylene glycol (MIRALAX) packet Take 17 g by mouth Every Other Day.     • potassium chloride (K-DUR,KLOR-CON) 10 MEQ CR tablet Take 20 mEq by mouth Daily.       No current facility-administered medications on file prior to visit.       No Known Allergies   Past Medical History:    Diagnosis Date   • A-fib (CMS/[facility])    • Active smoker    • Aftercare following right hip joint replacement surgery    • Anemia    • Anemia    • Anxiety    • Aortic valve regurgitation    • Cancer (CMS/[facility])     elevated ca 125 but never diagnoised with anything    • Chest pain     no at this time    • CHF (congestive heart failure) (CMS/[facility])    • Constipation    • Controlled type 2 diabetes mellitus without complication (CMS/[facility])     not medicated??? not sure if type 2-  borderline diabetes    • Diabetic neuropathy (CMS/[facility])    • Diabetic Ulcer    • Diastolic murmur    • Dysfunctional uterine bleeding    • Heart valve implant    • Hypertension    • Iron deficiency anemia    • Low back pain    • MRSA infection 09/2015    TOE   • On supplemental oxygen therapy     at night 2 liters   • Primary Osteoarthritis of both hips    • Septic arthritis (CMS/[facility])    • Sleep apnea     sleeps with 2l of 02 at night   • Sleep apnea    • SOB (shortness of breath) on exertion    • 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[facility])    • Streptococcus infection, group B    • Subacute osteomyelitis of left foot (CMS/[facility])    • Surgical aftercare, musculoskeletal system    • Thin blood (CMS/[facility])    • Transfusion history    • Wears reading eyeglasses      Past Surgical History:   Procedure Laterality Date   • AMPUTATION DIGIT Right 1/30/2018    Procedure: AMPUTATION RIGHT 2ND TOE;  Surgeon: Charleen Nath MD;  Location:  [facility] OR;  Service:    • AORTIC VALVE REPAIR/REPLACEMENT N/A 8/30/2016    Procedure: AIRAM, MEDIAN STERNOTOMY, AORTIC VALVE REPLACEMENT;  Surgeon: Mauricio Grove MD;  Location:  [facility];  Service:    • CARDIAC CATHETERIZATION N/A 8/25/2016    Procedure: Left Heart Cath;  Surgeon: Tyler Peterson MD;  Location:  [facility] CATH INVASIVE LOCATION;  Service:    • CARDIAC CATHETERIZATION      x 2   • CARDIAC CATHETERIZATION N/A 3/21/2018    Procedure: Coronary angiography;  Surgeon: Tyler Peterson IV, MD;   Location:  [facility] CATH INVASIVE LOCATION;  Service: Cardiovascular   • DILATATION AND CURETTAGE      of uterus   • FOOT SURGERY Left     for osteomyelitis   • HIP SURGERY Right     washout x 2 by Dr. Espitia   • INCISION AND DRAINAGE HIP Right 1/26/2017    Procedure: RIGHT HIP WOUND WASHOUT AND DEBRIDEMENT, WITH POLYETHELENE FEMORAL HEAD EXCHANGE ;  Surgeon: Owen Siu MD;  Location:  [facility] OR;  Service:    • OTHER SURGICAL HISTORY      Open heart surgery   • PERICARDIAL WINDOW N/A 9/16/2016    Procedure: PERICARDIOCENTESIS WITH PLACEMENT OF DRAINAGE TUBE .;  Surgeon: Mauricio Grove MD;  Location:  [facility];  Service:    • PERICARDIAL WINDOW N/A 9/20/2016    Procedure: SUBXYPHOID PERICARDIAL WINDOW;  Surgeon: Mauricio Grove MD;  Location:  [facility];  Service:    • AL CLOSED RX TRAUMATIC HIP DISLOCATN N/A 1/29/2017    Procedure: HIP CLOSED REDUCTION;  Surgeon: Leander Li MD;  Location:  [facility];  Service: Orthopedics   • THORACOTOMY Left 10/5/2016    Procedure: THORACOTOMY Pericardectomy,  AIRAM,  Placement pacing leads, Pump standby;  Surgeon: Mauricio Grove MD;  Location:  [facility];  Service:    • TOTAL HIP ARTHROPLASTY Right 1/23/2017    Procedure: RIGHT TOTAL HIP ARTHROPLASTY ANTERIOR ;  Surgeon: Owen Siu MD;  Location:  [facility];  Service:    • TOTAL HIP ARTHROPLASTY REVISION Right 1/30/2017    Procedure: REPEAT WOUND DEBRIDEMENT ACETABULAR REVISION;  Surgeon: Owen Siu MD;  Location:  [facility];  Service:    • WISDOM TOOTH EXTRACTION      all 4     Family History   Problem Relation Age of Onset   • Diabetes Mother    • Heart disease Mother    • Hypertension Mother    • Osteoarthritis Mother    • Diabetes Sister    • Arthritis Sister    • No Known Problems Father    • Diabetes Sister    • Diabetes Sister    • Heart disease Other    • Heart attack Other    • Heart disease Other    • Hypertension Other    • Osteoarthritis Other    • Rheum arthritis Other    • Cancer Neg Hx       Social History  "    Social History   • Marital status:      Spouse name: N/A   • Number of children: 0   • Years of education: N/A     Occupational History   • Employed      [employer]     Social History Main Topics   • Smoking status: Former Smoker     Packs/day: 0.50     Years: 15.00     Types: Cigarettes     Quit date: 7/15/2016   • Smokeless tobacco: Never Used   • Alcohol use No   • Drug use: No   • Sexual activity: Defer     Other Topics Concern   • Not on file     Social History Narrative    Patient denies caffeine intake    Patient lives at home with her sister.        Review of Systems   Constitutional: Negative.    HENT: Negative.    Eyes: Negative.    Respiratory: Negative.    Cardiovascular: Negative.    Gastrointestinal: Negative.    Endocrine: Negative.    Genitourinary: Negative.    Musculoskeletal: Positive for arthralgias, gait problem and joint swelling.   Skin: Negative.    Allergic/Immunologic: Negative.    Hematological: Negative.    Psychiatric/Behavioral: Negative.        The following portions of the patient's history were reviewed and updated as appropriate: allergies, current medications, past family history, past medical history, past social history, past surgical history and problem list.    Physical Exam:   Pulse 67   Ht 188 cm (74.02\")   Wt (!) 151 kg (333 lb 5.4 oz)   SpO2 98%   BMI 42.78 kg/m²   GENERAL: Body habitus: trunkal obesity    Lower extremity edema: Left: none; Right: none    Gait: normal     Mental Status:  awake and alert; oriented to person, place, and time  MSK:  Right foot has no ulcerations, no signs of new Charcot, no change in dense neuropathy    Left foot Charcot joint is stable, no swelling, no warmth, no ulcerations, she does have a small callus under the fourth metatarsal head which is a little more prominent now due to the Charcot joint.  Discussed callus care.  Medical Decision Making    Data Review:   ordered and reviewed x-rays " today    Assessment/Plan/Diagnosis/Treatment Options:   1. Charcot's joint of left foot  She may now come out of her boot into a new diabetic shoe with new custom insert.  I gave her prescription for this.  She needs to take care of that callus under the fourth metatarsal head every day.  She needs to watch and make sure she does not develop an ulcer.  If she does develop an ulcer she must be nonweightbearing and return to the office.  I will see her any time  - XR Foot 2 View Left      2.  Right foot is stable, again we discussed diabetic foot care

## 2019-01-08 ENCOUNTER — OFFICE VISIT (OUTPATIENT)
Dept: CARDIOLOGY | Facility: CLINIC | Age: 49
End: 2019-01-08

## 2019-01-08 VITALS
HEIGHT: 72 IN | HEART RATE: 53 BPM | SYSTOLIC BLOOD PRESSURE: 124 MMHG | DIASTOLIC BLOOD PRESSURE: 68 MMHG | WEIGHT: 293 LBS | BODY MASS INDEX: 39.68 KG/M2

## 2019-01-08 DIAGNOSIS — R07.89 NON-CARDIAC CHEST PAIN: ICD-10-CM

## 2019-01-08 DIAGNOSIS — K74.60 LIVER CIRRHOSIS SECONDARY TO NASH (HCC): ICD-10-CM

## 2019-01-08 DIAGNOSIS — K75.81 LIVER CIRRHOSIS SECONDARY TO NASH (HCC): ICD-10-CM

## 2019-01-08 DIAGNOSIS — E11.9 CONTROLLED TYPE 2 DIABETES MELLITUS WITHOUT COMPLICATION, WITHOUT LONG-TERM CURRENT USE OF INSULIN (HCC): ICD-10-CM

## 2019-01-08 DIAGNOSIS — Z95.3 STATUS POST AORTIC VALVE REPLACEMENT WITH BIOPROSTHETIC VALVE: Primary | ICD-10-CM

## 2019-01-08 DIAGNOSIS — I50.32 CHRONIC DIASTOLIC CONGESTIVE HEART FAILURE (HCC): ICD-10-CM

## 2019-01-08 DIAGNOSIS — I48.19 PERSISTENT ATRIAL FIBRILLATION (HCC): ICD-10-CM

## 2019-01-08 PROCEDURE — 99214 OFFICE O/P EST MOD 30 MIN: CPT | Performed by: INTERNAL MEDICINE

## 2019-01-08 RX ORDER — SPIRONOLACTONE 50 MG/1
50 TABLET, FILM COATED ORAL DAILY
Qty: 90 TABLET | Refills: 1 | Status: SHIPPED | OUTPATIENT
Start: 2019-01-08 | End: 2020-03-04 | Stop reason: SDUPTHER

## 2019-01-08 NOTE — ASSESSMENT & PLAN NOTE
· No recurrent chest pain symptoms  · Stop isosorbide mononitrate  · Okay to discontinue metoprolol

## 2019-01-08 NOTE — ASSESSMENT & PLAN NOTE
· Patient previously scheduled to see Dr. Lee Re: [facility] but missed appointment due to family illness.  · Will call his office to schedule appointment with him  · Start spironolactone 50 mg daily for additional diuresis and suspected cirrhosis.

## 2019-01-08 NOTE — PROGRESS NOTES
Encounter Date:01/08/2019    Patient ID: Danae Fong is a  48 y.o. female who resides in [place], [place]. 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    CC/Reason for visit:  Severe Nonrheumatic aortic valve insufficiency and Hypertension           Problem List Items Addressed This Visit        Cardiology Problems    Status post aortic valve replacement with bioprosthetic valve - Primary    Overview     · Severe nonrheumatic aortic insufficiency by AIRAM, 8/24/2016  · Bioprosthetic AVR (25 mm Magna Ease) by Dr. Mauricio Grove, 8/30/3016  · Initial possibility of vegetation turned out to be a flail leaflet  · Echo (12/2/2017): Normal functioning bioprosthetic valve.  Mild MR.  LVEF 50-55%  · Echo (2/6/18): LVEF 55%. Normal functioning bioprosthetic aortic valve         Current Assessment & Plan     · Asymptomatic  · Repeat echocardiogram in one year         Relevant Orders    Adult Transthoracic Echo Complete W/ Cont if Necessary Per Protocol    Persistent atrial fibrillation (CMS/HCC)    Overview     · Postoperative afib after AVR  · Chads Vasc= 2         Current Assessment & Plan     · No symptomatic recurrence         Chronic diastolic congestive heart failure (CMS/HCC)    Overview     · Echo (10/10/16): Normal LVEF.  Bioprosthetic aortic valve functioning normally.  · Echo (12/2017): LVEF 50-55%.  Bioprosthetic aortic valve functioning normally         Relevant Medications    spironolactone (ALDACTONE) 50 MG tablet    Other Relevant Orders    Basic Metabolic Panel       Other    Non-cardiac chest pain    Overview     · Pharmacologic nuclear stress (03/08/2018): Small-sized, moderately severe area of ischemia located in the apex. This area of ischemia affects 3% of the myocardium. LVEF 55%  · Cardiac catheterization (3/21/18): Normal coronary arteries.         Current Assessment & Plan     · No recurrent chest pain symptoms  · Stop isosorbide mononitrate  · Okay to  discontinue metoprolol         Liver cirrhosis secondary to MCKEON (CMS/HCC)    Current Assessment & Plan     · Patient previously scheduled to see Dr. Lee Re: [facility] but missed appointment due to family illness.  · Will call his office to schedule appointment with him  · Start spironolactone 50 mg daily for additional diuresis and suspected cirrhosis.         Controlled type 2 diabetes mellitus without complication, without long-term current use of insulin (CMS/HCC)    Overview     Added automatically from request for surgery [id number]         Current Assessment & Plan     · Consider addition of statin and ACE inhibitor therapy due to diabetic status                    · Stop isosorbide mononitrate  · Stop metoprolol  · Start spironolactone 50 mg daily  · Discontinue potassium supplements  · BMP in 2-3 weeks  · Return in about 12 months (around 1/8/2020).         Danae Fong returns for follow-up of her non-rheumatic aortic valve insufficiency s/p replacement, persistent atrial fibrillation, and chronic diastolic congestive heart failure. She has not been in the hospital for almost a year. Since last visit, she has been doing well overall from a cardiovascular standpoint, though she does complain of intermittent SOB and dizziness. She believes this might be due to fluid buildup, and takes Bumex 1 mg BID for edema. She notes some intermittent memory loss since her valve replacement in 2016, and wonders whether this will improve over time. Patient otherwise denies chest pain, palpitations, syncope, or presyncope at this time.      Review of Systems   Constitution: Negative for weakness and malaise/fatigue.   Eyes: Negative for vision loss in left eye and vision loss in right eye.   Cardiovascular: Negative for chest pain, dyspnea on exertion, near-syncope, orthopnea, palpitations, paroxysmal nocturnal dyspnea and syncope.   Respiratory: Positive for shortness of breath.    Musculoskeletal: Negative for myalgias.  "  Neurological: Positive for dizziness. Negative for brief paralysis, excessive daytime sleepiness, focal weakness, numbness and paresthesias.   All other systems reviewed and are negative.      The patient's past medical, social, family history and ROS reviewed in the patient's electronic medical record.    Allergies  Patient has no known allergies.    Outpatient Medications Marked as Taking for the 1/8/19 encounter (Office Visit) with Tyler Peterson IV, MD   Medication Sig Dispense Refill   • amoxicillin (AMOXIL) 500 MG capsule Take 500 mg by mouth Take As Directed. Dr peterson aware     • aspirin 81 MG chewable tablet Chew 1 tablet Daily.     • bumetanide (BUMEX) 1 MG tablet Take 1 mg by mouth 2 (Two) Times a Day.     • docusate sodium (COLACE) 50 MG capsule Take  by mouth As Needed for Constipation.     • ferrous sulfate 325 (65 FE) MG tablet Take 325 mg by mouth daily with breakfast.     • METOPROLOL TARTRATE PO Take 12.5 mg by mouth 2 (Two) Times a Day. Takes 50mg tablet but cuts in 1/4 tablet bid so 12.5 mg bid so didn't have to get new prescription     • multivitamin (THERAGRAN) tablet tablet Take 1 tablet by mouth Daily. OTC 30 tablet    • pantoprazole (PROTONIX) 40 MG EC tablet Take 40 mg by mouth Daily.     • PARoxetine (PAXIL) 20 MG tablet Take 20 mg by mouth Daily.     • polyethylene glycol (MIRALAX) packet Take 17 g by mouth Every Other Day.     • [DISCONTINUED] EPINEPHrine (EPIPEN) 0.3 MG/0.3ML solution auto-injector injection 1 (One) Time. Pt has in case of antibiotic reaction, has not used     • [DISCONTINUED] isosorbide mononitrate (IMDUR) 30 MG 24 hr tablet Take 1 tablet by mouth Daily. 30 tablet 11   • [DISCONTINUED] KLOR-CON 10 MEQ CR tablet Take 20 mEq by mouth Daily.  1         Blood pressure 124/68, pulse 53, height 186.7 cm (73.5\"), weight (!) 154 kg (340 lb 9.6 oz), not currently breastfeeding.  Body mass index is 44.33 kg/m².       Physical Exam   Constitutional: She is oriented " to person, place, and time. She appears well-developed and well-nourished.   HENT:   Head: Normocephalic and atraumatic.   Eyes: Pupils are equal, round, and reactive to light. No scleral icterus.   Neck: No JVD present. Carotid bruit is not present. No thyromegaly present.   Cardiovascular: Normal rate, regular rhythm, S1 normal and S2 normal. Exam reveals no gallop.   Murmur heard.   Harsh midsystolic murmur is present with a grade of 2/6 at the upper right sternal border radiating to the neck.  Pulmonary/Chest: Effort normal and breath sounds normal.   Abdominal: Soft. There is no hepatosplenomegaly. There is no tenderness.   Neurological: She is alert and oriented to person, place, and time.   Skin: Skin is warm and dry. No cyanosis. Nails show no clubbing.   Psychiatric: She has a normal mood and affect. Her behavior is normal.       Data Review:     Procedures    Lab Results   Component Value Date    CHOL 160 08/26/2016    TRIG 99 08/26/2016    HDL 27 (L) 08/26/2016     08/26/2016    AST 45 (H) 03/19/2018    ALT 45 (H) 03/19/2018     Lab Results   Component Value Date    GLUCOSE 82 03/19/2018    CALCIUM 8.9 03/19/2018     03/19/2018    K 4.4 03/19/2018    CO2 26.0 03/19/2018     03/19/2018    BUN 8 (L) 03/19/2018    CREATININE 0.80 03/19/2018    EGFRIFNONA 77 03/19/2018    BCR 10.0 03/19/2018    ANIONGAP 15.0 (H) 03/19/2018     Lab Results   Component Value Date    WBC 9.87 03/19/2018    HGB 15.2 03/19/2018    HCT 45.9 (H) 03/19/2018    MCV 83.5 03/19/2018     03/19/2018     Lab Results   Component Value Date    HGBA1C 7.20 (H) 03/19/2018         Scribed for Tyler Peterson IV, MD by Cecily Cooley. 1/8/2019  5:41 PM      Tyler Peterson IV, MD  1/8/2019

## 2019-09-12 DIAGNOSIS — Z95.3 S/P AORTIC VALVE REPLACEMENT WITH BIOPROSTHETIC VALVE: Primary | ICD-10-CM

## 2019-09-17 DIAGNOSIS — R07.89 OTHER CHEST PAIN: ICD-10-CM

## 2019-09-17 DIAGNOSIS — R05.9 COUGH: Primary | ICD-10-CM

## 2020-01-13 NOTE — PROGRESS NOTES
[facility]  Office Visit Note    Encounter Date:01/14/2020    Patient ID: Danae Fong is a 49 y.o. female who is  and resides in [place], [place].    CC/Reason for visit:    • Valve problems         Problem List Items Addressed This Visit        Cardiovascular and Mediastinum    Status post aortic valve replacement with bioprosthetic valve - Primary    Overview     · Severe nonrheumatic aortic insufficiency by AIRAM, 8/24/2016  · Bioprosthetic AVR (25 mm Magna Ease) by Dr. Mauricio Grove, 8/30/3016  · Initial possibility of vegetation turned out to be a flail leaflet  · Echo (12/2/2017): Normal functioning bioprosthetic valve.  Mild MR.  LVEF 50-55%  · Echo (2/6/18): LVEF 55%. Normal functioning bioprosthetic aortic valve  · Echo (1/14/2020):LVEF = 60%.  Grade 1 diastolic dysfunction.  Bioprosthetic aortic valve well-seated and functions normally.  Mean gradient 14 mmHg           Current Assessment & Plan     · Stable NYHA class I-II symptoms  · Plan on repeat echocardiogram for surveillance of bioprosthetic valve in 2021  · Continue Bumex 1 mg twice daily  · Continue aspirin 81 mg daily         Persistent atrial fibrillation    Overview     · Postoperative afib after AVR  · Chads Vasc= 2         Current Assessment & Plan     · No further episodes of A. Fib.  Defer anticoagulation  · Signs or symptoms of a TIA or CVA         Chronic diastolic congestive heart failure (CMS/HCC)    Overview     · Echo (10/10/16): Normal LVEF.  Bioprosthetic aortic valve functioning normally.  · Echo (12/2017): LVEF 50-55%.  Bioprosthetic aortic valve functioning normally         Current Assessment & Plan     · Stable NYHA class II symptoms  · Continue spironolactone 50 mg daily         Essential hypertension    Current Assessment & Plan     · Hypertension is controlled  · Continue spironolactone 50 mg daily  · Metoprolol tartrate 12.5 mg as needed for SBP greater than 140          History of pericardial effusion    Overview     · Recurrent pericardial effusion resulting in tamponade requiring emergent pericardiocentesis and window, 9/19/16  · Recurrent effusion with pericardial stripping 10/5/16            Digestive    Liver cirrhosis secondary to MCKEON (CMS/HCC)    Current Assessment & Plan     · Schedule consult with Dr. Lee for work-up of liver cirrhosis secondary to MCKEON  · Continue spironolactone 50 mg daily            Other    Healthcare maintenance    Current Assessment & Plan     · Obtain CBC, CMP and lipid profile         Relevant Orders    Comprehensive Metabolic Panel    Lipid Panel    CBC & Differential        The patient is doing well from a cardiovascular standpoint.  Her heart failure symptoms are stable.  She underwent echocardiogram prior to her visit today that showed normal functioning bioprosthetic aortic valve with mean gradient 14 mmHg.  The patient has had no recent lab work and has a history of liver cirrhosis with Mckeon.  Send her a CBC, CMP and lipid profile today.  We will work on rescheduling her a consultation with Dr. Lee for evaluation of her liver cirrhosis.       · Continue current medications  · Schedule consultation with Dr. Lee for evaluation of liver cirrhosis  · Obtain CMP, CBC and lipid profile today  Return in about 1 year (around 1/14/2021), or if symptoms worsen or fail to improve.              Danae Fong returns today for yearly follow-up of her aortic valve replacement, persistent atrial fibrillation, chronic diastolic heart failure and cardiac risk factors.  At her last visit metoprolol and isosorbide were discontinued and she was started on spironolactone.  She reported having liver cirrhosis from Mckeon and was scheduled for follow-up with liver specialist but she canceled her appointment.  She has not had any repeat blood work since 2018.  She sometimes feels bloated and knows that she is holding onto fluid in her abdomen.   She denies any chest pain/pressure or tightness.  She denies increased dyspnea, orthopnea, palpitations or syncope.  She takes daily spironolactone as well as Bumex twice daily.  She has metoprolol that she will take approximately 1 time per month if she thinks her blood pressure is up.  Overall she reports feeling well and thinks her breathing is stable.  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    Review of Systems   Constitution: Negative for malaise/fatigue.   Eyes: Negative for vision loss in left eye and vision loss in right eye.   Cardiovascular: Positive for dyspnea on exertion. Negative for chest pain, near-syncope, orthopnea, palpitations, paroxysmal nocturnal dyspnea and syncope.   Respiratory: Positive for shortness of breath and wheezing.    Musculoskeletal: Negative for myalgias.   Neurological: Positive for light-headedness. Negative for brief paralysis, excessive daytime sleepiness, focal weakness, numbness, paresthesias and weakness.   All other systems reviewed and are negative.      The patient's past medical, social, family history and ROS reviewed in the patient's electronic medical record.    No Known Allergies      Current Outpatient Medications:   •  amoxicillin (AMOXIL) 500 MG capsule, Take 500 mg by mouth Take As Directed. Dr sevilla aware, Disp: , Rfl:   •  aspirin 81 MG chewable tablet, Chew 1 tablet Daily., Disp: , Rfl:   •  bumetanide (BUMEX) 1 MG tablet, Take 1 mg by mouth 2 (Two) Times a Day., Disp: , Rfl:   •  docusate sodium (COLACE) 50 MG capsule, Take  by mouth As Needed for Constipation., Disp: , Rfl:   •  FLUoxetine (PROzac) 40 MG capsule, Take 40 mg by mouth Daily., Disp: , Rfl:   •  METOPROLOL TARTRATE PO, Take 12.5 mg by mouth As Needed., Disp: , Rfl:   •  multivitamin (THERAGRAN) tablet tablet, Take 1 tablet by mouth Daily. OTC, Disp: 30 tablet, Rfl:   •  polyethylene glycol (MIRALAX) packet, Take 17 g by mouth Every Other Day., Disp: , Rfl:   •   spironolactone (ALDACTONE) 50 MG tablet, Take 1 tablet by mouth Daily., Disp: 90 tablet, Rfl: 1    Past Medical History:   Diagnosis Date   • A-fib (CMS/[facility])    • Active smoker    • Aftercare following right hip joint replacement surgery    • Anemia    • Anemia    • Anxiety    • Aortic valve regurgitation    • Cancer (CMS/[facility])     elevated ca 125 but never diagnoised with anything    • Chest pain     no at this time    • CHF (congestive heart failure) (CMS/[facility])    • Constipation    • Controlled type 2 diabetes mellitus without complication (CMS/[facility])     not medicated??? not sure if type 2-  borderline diabetes    • Diabetic neuropathy (CMS/[facility])    • Diabetic Ulcer    • Diastolic murmur    • Dysfunctional uterine bleeding    • Heart valve implant    • Hypertension    • Iron deficiency anemia    • Low back pain    • MRSA infection 09/2015    TOE   • On supplemental oxygen therapy     at night 2 liters   • Primary Osteoarthritis of both hips    • Septic arthritis (CMS/[facility])    • Sleep apnea     sleeps with 2l of 02 at night   • Sleep apnea    • SOB (shortness of breath) on exertion    • 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[facility])    • Streptococcus infection, group B    • Subacute osteomyelitis of left foot (CMS/[facility])    • Surgical aftercare, musculoskeletal system    • Thin blood (CMS/[facility])    • Transfusion history    • Wears reading eyeglasses        Past Surgical History:   Procedure Laterality Date   • AMPUTATION DIGIT Right 1/30/2018    Procedure: AMPUTATION RIGHT 2ND TOE;  Surgeon: Charleen Nath MD;  Location:  [facility];  Service:    • AORTIC VALVE REPAIR/REPLACEMENT N/A 8/30/2016    Procedure: AIRAM, MEDIAN STERNOTOMY, AORTIC VALVE REPLACEMENT;  Surgeon: Mauricio Grove MD;  Location:  [facility];  Service:    • CARDIAC CATHETERIZATION N/A 8/25/2016    Procedure: Left Heart Cath;  Surgeon: Tyler Peterson MD;  Location:  [facility];  Service:    • CARDIAC CATHETERIZATION      x 2   •  CARDIAC CATHETERIZATION N/A 3/21/2018    Procedure: Coronary angiography;  Surgeon: Tyler Peterson IV, MD;  Location:  [facility];  Service: Cardiovascular   • DILATATION AND CURETTAGE      of uterus   • FOOT SURGERY Left     for osteomyelitis   • HIP SURGERY Right     washout x 2 by Dr. Espitia   • INCISION AND DRAINAGE HIP Right 1/26/2017    Procedure: RIGHT HIP WOUND WASHOUT AND DEBRIDEMENT, WITH POLYETHELENE FEMORAL HEAD EXCHANGE ;  Surgeon: Owen Siu MD;  Location:  [facility];  Service:    • OTHER SURGICAL HISTORY      Open heart surgery   • PERICARDIAL WINDOW N/A 9/16/2016    Procedure: PERICARDIOCENTESIS WITH PLACEMENT OF DRAINAGE TUBE .;  Surgeon: Mauricio Grove MD;  Location:  [facility];  Service:    • PERICARDIAL WINDOW N/A 9/20/2016    Procedure: SUBXYPHOID PERICARDIAL WINDOW;  Surgeon: Mauricio Grove MD;  Location:  [facility];  Service:    • PA CLOSED RX TRAUMATIC HIP DISLOCATN N/A 1/29/2017    Procedure: HIP CLOSED REDUCTION;  Surgeon: Leander Li MD;  Location:  [facility];  Service: Orthopedics   • THORACOTOMY Left 10/5/2016    Procedure: THORACOTOMY Pericardectomy,  AIRAM,  Placement pacing leads, Pump standby;  Surgeon: Mauricio Grove MD;  Location:  [facility];  Service:    • TOE AMPUTATION      2nd toe right foot    • TOTAL HIP ARTHROPLASTY Right 1/23/2017    Procedure: RIGHT TOTAL HIP ARTHROPLASTY ANTERIOR ;  Surgeon: Owen Siu MD;  Location:  [facility];  Service:    • TOTAL HIP ARTHROPLASTY REVISION Right 1/30/2017    Procedure: REPEAT WOUND DEBRIDEMENT ACETABULAR REVISION;  Surgeon: Owen Siu MD;  Location:  [facility];  Service:    • WISDOM TOOTH EXTRACTION      all 4       Family History   Problem Relation Age of Onset   • Diabetes Mother    • Heart disease Mother    • Hypertension Mother    • Osteoarthritis Mother    • Diabetes Sister    • Arthritis Sister    • No Known Problems Father    • Diabetes Sister    • Diabetes Sister    • Heart disease Other   "  • Heart attack Other    • Heart disease Other    • Hypertension Other    • Osteoarthritis Other    • Rheum arthritis Other    • Cancer Neg Hx        Social History     Tobacco Use   • Smoking status: Former Smoker     Packs/day: 0.50     Years: 15.00     Pack years: 7.50     Types: Cigarettes     Last attempt to quit: 7/15/2016     Years since quitting: 3.5   • Smokeless tobacco: Never Used   Substance Use Topics   • Alcohol use: No           Blood pressure 126/80, pulse 55, height 186.7 cm (73.5\"), weight 130 kg (287 lb), SpO2 97 %, not currently breastfeeding.  Body mass index is 37.35 kg/m².  Vitals:    01/14/20 1543   Patient Position: Sitting       Physical Exam   Constitutional: She is oriented to person, place, and time. She appears well-developed and well-nourished.   HENT:   Head: Normocephalic and atraumatic.   Eyes: Conjunctivae are normal. No scleral icterus.   Neck: Normal range of motion. No JVD present. Carotid bruit is not present. No thyromegaly present.   Cardiovascular: Normal rate and regular rhythm. Exam reveals no gallop.   No murmur heard.  Pulmonary/Chest: Effort normal and breath sounds normal.   Abdominal: Soft. She exhibits no distension and no mass. There is no hepatosplenomegaly.   Musculoskeletal: She exhibits no edema.   Neurological: She is alert and oriented to person, place, and time.   Skin: Skin is warm and dry. No rash noted.   Psychiatric: She has a normal mood and affect. Her behavior is normal.       Data Review (reviewed with patient):     Procedures    Lab Results   Component Value Date    CHOL 160 08/26/2016    TRIG 99 08/26/2016    HDL 27 (L) 08/26/2016     08/26/2016    AST 45 (H) 03/19/2018    ALT 45 (H) 03/19/2018       Lab Results   Component Value Date    HGBA1C 7.20 (H) 03/19/2018           BAUTISTA Malcolm    1/14/2020  "

## 2020-01-14 ENCOUNTER — LAB (OUTPATIENT)
Dept: LAB | Facility: HOSPITAL | Age: 50
End: 2020-01-14

## 2020-01-14 ENCOUNTER — HOSPITAL ENCOUNTER (OUTPATIENT)
Dept: CARDIOLOGY | Facility: HOSPITAL | Age: 50
Discharge: HOME | End: 2020-01-14
Admitting: INTERNAL MEDICINE

## 2020-01-14 ENCOUNTER — OFFICE VISIT (OUTPATIENT)
Dept: CARDIOLOGY | Facility: CLINIC | Age: 50
End: 2020-01-14

## 2020-01-14 VITALS — HEIGHT: 72 IN | BODY MASS INDEX: 39.68 KG/M2 | WEIGHT: 293 LBS

## 2020-01-14 VITALS
HEART RATE: 55 BPM | WEIGHT: 287 LBS | SYSTOLIC BLOOD PRESSURE: 126 MMHG | BODY MASS INDEX: 38.87 KG/M2 | DIASTOLIC BLOOD PRESSURE: 80 MMHG | HEIGHT: 72 IN | OXYGEN SATURATION: 97 %

## 2020-01-14 DIAGNOSIS — Z95.3 STATUS POST AORTIC VALVE REPLACEMENT WITH BIOPROSTHETIC VALVE: Primary | ICD-10-CM

## 2020-01-14 DIAGNOSIS — Z00.00 HEALTHCARE MAINTENANCE: ICD-10-CM

## 2020-01-14 DIAGNOSIS — I48.19 PERSISTENT ATRIAL FIBRILLATION (HCC): ICD-10-CM

## 2020-01-14 DIAGNOSIS — I10 ESSENTIAL HYPERTENSION: ICD-10-CM

## 2020-01-14 DIAGNOSIS — K75.81 NASH (NONALCOHOLIC STEATOHEPATITIS): Primary | ICD-10-CM

## 2020-01-14 DIAGNOSIS — K75.81 LIVER CIRRHOSIS SECONDARY TO NASH (HCC): ICD-10-CM

## 2020-01-14 DIAGNOSIS — I50.32 CHRONIC DIASTOLIC CONGESTIVE HEART FAILURE (HCC): ICD-10-CM

## 2020-01-14 DIAGNOSIS — I31.39 PERICARDIAL EFFUSION: ICD-10-CM

## 2020-01-14 DIAGNOSIS — Z95.3 S/P AORTIC VALVE REPLACEMENT WITH BIOPROSTHETIC VALVE: ICD-10-CM

## 2020-01-14 DIAGNOSIS — K74.60 LIVER CIRRHOSIS SECONDARY TO NASH (HCC): ICD-10-CM

## 2020-01-14 LAB
ALBUMIN SERPL-MCNC: 4.3 G/DL (ref 3.5–5.2)
ALBUMIN/GLOB SERPL: 0.9 G/DL
ALP SERPL-CCNC: 124 U/L (ref 39–117)
ALT SERPL W P-5'-P-CCNC: 22 U/L (ref 1–33)
ANION GAP SERPL CALCULATED.3IONS-SCNC: 14.1 MMOL/L (ref 5–15)
AST SERPL-CCNC: 28 U/L (ref 1–32)
BASOPHILS # BLD AUTO: 0.08 10*3/MM3 (ref 0–0.2)
BASOPHILS NFR BLD AUTO: 0.8 % (ref 0–1.5)
BH CV ECHO MEAS - AO MAX PG (FULL): 29.8 MMHG
BH CV ECHO MEAS - AO MAX PG: 32.3 MMHG
BH CV ECHO MEAS - AO MEAN PG (FULL): 17.7 MMHG
BH CV ECHO MEAS - AO MEAN PG: 19 MMHG
BH CV ECHO MEAS - AO ROOT AREA (BSA CORRECTED): 1.2
BH CV ECHO MEAS - AO ROOT AREA: 8 CM^2
BH CV ECHO MEAS - AO ROOT DIAM: 3.2 CM
BH CV ECHO MEAS - AO V2 MAX: 284 CM/SEC
BH CV ECHO MEAS - AO V2 MEAN: 203 CM/SEC
BH CV ECHO MEAS - AO V2 VTI: 67.1 CM
BH CV ECHO MEAS - AVA(I,A): 0.82 CM^2
BH CV ECHO MEAS - AVA(I,D): 0.82 CM^2
BH CV ECHO MEAS - AVA(V,A): 0.87 CM^2
BH CV ECHO MEAS - AVA(V,D): 0.87 CM^2
BH CV ECHO MEAS - BSA(HAYCOCK): 2.9 M^2
BH CV ECHO MEAS - BSA: 2.7 M^2
BH CV ECHO MEAS - BZI_BMI: 44.9 KILOGRAMS/M^2
BH CV ECHO MEAS - BZI_METRIC_HEIGHT: 185.4 CM
BH CV ECHO MEAS - BZI_METRIC_WEIGHT: 154.2 KG
BH CV ECHO MEAS - EDV(CUBED): 138.2 ML
BH CV ECHO MEAS - EDV(MOD-SP2): 82 ML
BH CV ECHO MEAS - EDV(MOD-SP4): 106 ML
BH CV ECHO MEAS - EDV(TEICH): 127.8 ML
BH CV ECHO MEAS - EF(CUBED): 63.6 %
BH CV ECHO MEAS - EF(MOD-BP): 55 %
BH CV ECHO MEAS - EF(MOD-SP2): 54.9 %
BH CV ECHO MEAS - EF(MOD-SP4): 54.7 %
BH CV ECHO MEAS - EF(TEICH): 54.8 %
BH CV ECHO MEAS - ESV(CUBED): 50.2 ML
BH CV ECHO MEAS - ESV(MOD-SP2): 37 ML
BH CV ECHO MEAS - ESV(MOD-SP4): 48 ML
BH CV ECHO MEAS - ESV(TEICH): 57.8 ML
BH CV ECHO MEAS - FS: 28.6 %
BH CV ECHO MEAS - IVS/LVPW: 0.96
BH CV ECHO MEAS - IVSD: 1.1 CM
BH CV ECHO MEAS - LA/AO: 1.3
BH CV ECHO MEAS - LAD MAJOR: 5.7 CM
BH CV ECHO MEAS - LAT PEAK E' VEL: 8 CM/SEC
BH CV ECHO MEAS - LATERAL E/E' RATIO: 5.9
BH CV ECHO MEAS - LV DIASTOLIC VOL/BSA (35-75): 39.3 ML/M^2
BH CV ECHO MEAS - LV MASS(C)D: 222.8 GRAMS
BH CV ECHO MEAS - LV MASS(C)DI: 82.6 GRAMS/M^2
BH CV ECHO MEAS - LV MAX PG: 2.5 MMHG
BH CV ECHO MEAS - LV MEAN PG: 1.3 MMHG
BH CV ECHO MEAS - LV SYSTOLIC VOL/BSA (12-30): 17.8 ML/M^2
BH CV ECHO MEAS - LV V1 MAX: 78.5 CM/SEC
BH CV ECHO MEAS - LV V1 MEAN: 55.6 CM/SEC
BH CV ECHO MEAS - LV V1 VTI: 17.6 CM
BH CV ECHO MEAS - LVIDD: 5.2 CM
BH CV ECHO MEAS - LVIDS: 3.7 CM
BH CV ECHO MEAS - LVLD AP2: 8.1 CM
BH CV ECHO MEAS - LVLD AP4: 8.3 CM
BH CV ECHO MEAS - LVLS AP2: 7.3 CM
BH CV ECHO MEAS - LVLS AP4: 7.6 CM
BH CV ECHO MEAS - LVOT AREA (M): 3.1 CM^2
BH CV ECHO MEAS - LVOT AREA: 3.1 CM^2
BH CV ECHO MEAS - LVOT DIAM: 2 CM
BH CV ECHO MEAS - LVPWD: 1.1 CM
BH CV ECHO MEAS - MED PEAK E' VEL: 4.3 CM/SEC
BH CV ECHO MEAS - MEDIAL E/E' RATIO: 11
BH CV ECHO MEAS - MV A MAX VEL: 61.2 CM/SEC
BH CV ECHO MEAS - MV DEC TIME: 0.56 SEC
BH CV ECHO MEAS - MV E MAX VEL: 47.4 CM/SEC
BH CV ECHO MEAS - MV E/A: 0.77
BH CV ECHO MEAS - PA ACC SLOPE: 443 CM/SEC^2
BH CV ECHO MEAS - PA ACC TIME: 0.13 SEC
BH CV ECHO MEAS - PA PR(ACCEL): 20.5 MMHG
BH CV ECHO MEAS - PI END-D VEL: 71.8 CM/SEC
BH CV ECHO MEAS - SI(AO): 200.2 ML/M^2
BH CV ECHO MEAS - SI(CUBED): 32.6 ML/M^2
BH CV ECHO MEAS - SI(LVOT): 20.5 ML/M^2
BH CV ECHO MEAS - SI(MOD-SP2): 16.7 ML/M^2
BH CV ECHO MEAS - SI(MOD-SP4): 21.5 ML/M^2
BH CV ECHO MEAS - SI(TEICH): 26 ML/M^2
BH CV ECHO MEAS - SV(AO): 539.7 ML
BH CV ECHO MEAS - SV(CUBED): 87.9 ML
BH CV ECHO MEAS - SV(LVOT): 55.2 ML
BH CV ECHO MEAS - SV(MOD-SP2): 45 ML
BH CV ECHO MEAS - SV(MOD-SP4): 58 ML
BH CV ECHO MEAS - SV(TEICH): 70 ML
BH CV ECHO MEAS - TAPSE (>1.6): 1.2 CM2
BH CV ECHO MEASUREMENTS AVERAGE E/E' RATIO: 7.71
BH CV VAS BP LEFT ARM: NORMAL MMHG
BH CV XLRA - RV BASE: 3.8 CM
BH CV XLRA - RV LENGTH: 8.2 CM
BH CV XLRA - RV MID: 3.2 CM
BH CV XLRA - TDI S': 7.37 CM/SEC
BILIRUB SERPL-MCNC: 0.3 MG/DL (ref 0.2–1.2)
BUN BLD-MCNC: 9 MG/DL (ref 6–20)
BUN/CREAT SERPL: 10.6 (ref 7–25)
CALCIUM SPEC-SCNC: 9.7 MG/DL (ref 8.6–10.5)
CHLORIDE SERPL-SCNC: 94 MMOL/L (ref 98–107)
CHOLEST SERPL-MCNC: 211 MG/DL (ref 0–200)
CO2 SERPL-SCNC: 26.9 MMOL/L (ref 22–29)
CREAT BLD-MCNC: 0.85 MG/DL (ref 0.57–1)
DEPRECATED RDW RBC AUTO: 43.5 FL (ref 37–54)
EOSINOPHIL # BLD AUTO: 0.12 10*3/MM3 (ref 0–0.4)
EOSINOPHIL NFR BLD AUTO: 1.2 % (ref 0.3–6.2)
ERYTHROCYTE [DISTWIDTH] IN BLOOD BY AUTOMATED COUNT: 15.9 % (ref 12.3–15.4)
GFR SERPL CREATININE-BSD FRML MDRD: 71 ML/MIN/1.73
GLOBULIN UR ELPH-MCNC: 4.7 GM/DL
GLUCOSE BLD-MCNC: 80 MG/DL (ref 65–99)
HCT VFR BLD AUTO: 44.9 % (ref 34–46.6)
HDLC SERPL-MCNC: 33 MG/DL (ref 40–60)
HGB BLD-MCNC: 15.2 G/DL (ref 12–15.9)
IMM GRANULOCYTES # BLD AUTO: 0.02 10*3/MM3 (ref 0–0.05)
IMM GRANULOCYTES NFR BLD AUTO: 0.2 % (ref 0–0.5)
LDLC SERPL CALC-MCNC: 150 MG/DL (ref 0–100)
LDLC/HDLC SERPL: 4.56 {RATIO}
LEFT ATRIUM VOLUME INDEX: 16.7 ML/M^2
LEFT ATRIUM VOLUME: 45 ML
LV EF 2D ECHO EST: 60 %
LYMPHOCYTES # BLD AUTO: 3.62 10*3/MM3 (ref 0.7–3.1)
LYMPHOCYTES NFR BLD AUTO: 35.6 % (ref 19.6–45.3)
MCH RBC QN AUTO: 26.3 PG (ref 26.6–33)
MCHC RBC AUTO-ENTMCNC: 33.9 G/DL (ref 31.5–35.7)
MCV RBC AUTO: 77.5 FL (ref 79–97)
MONOCYTES # BLD AUTO: 0.7 10*3/MM3 (ref 0.1–0.9)
MONOCYTES NFR BLD AUTO: 6.9 % (ref 5–12)
NEUTROPHILS # BLD AUTO: 5.62 10*3/MM3 (ref 1.7–7)
NEUTROPHILS NFR BLD AUTO: 55.3 % (ref 42.7–76)
NRBC BLD AUTO-RTO: 0.1 /100 WBC (ref 0–0.2)
PLATELET # BLD AUTO: 223 10*3/MM3 (ref 140–450)
PMV BLD AUTO: 9.7 FL (ref 6–12)
POTASSIUM BLD-SCNC: 4 MMOL/L (ref 3.5–5.2)
PROT SERPL-MCNC: 9 G/DL (ref 6–8.5)
RBC # BLD AUTO: 5.79 10*6/MM3 (ref 3.77–5.28)
SODIUM BLD-SCNC: 135 MMOL/L (ref 136–145)
TRIGL SERPL-MCNC: 138 MG/DL (ref 0–150)
VLDLC SERPL-MCNC: 27.6 MG/DL (ref 5–40)
WBC NRBC COR # BLD: 10.16 10*3/MM3 (ref 3.4–10.8)

## 2020-01-14 PROCEDURE — 99214 OFFICE O/P EST MOD 30 MIN: CPT | Performed by: NURSE PRACTITIONER

## 2020-01-14 PROCEDURE — 85025 COMPLETE CBC W/AUTO DIFF WBC: CPT

## 2020-01-14 PROCEDURE — 80053 COMPREHEN METABOLIC PANEL: CPT

## 2020-01-14 PROCEDURE — 93306 TTE W/DOPPLER COMPLETE: CPT

## 2020-01-14 PROCEDURE — 93306 TTE W/DOPPLER COMPLETE: CPT | Performed by: INTERNAL MEDICINE

## 2020-01-14 PROCEDURE — 80061 LIPID PANEL: CPT

## 2020-01-14 PROCEDURE — 36415 COLL VENOUS BLD VENIPUNCTURE: CPT

## 2020-01-14 RX ORDER — FLUOXETINE HYDROCHLORIDE 40 MG/1
40 CAPSULE ORAL DAILY
COMMUNITY
End: 2020-08-04

## 2020-01-14 NOTE — ASSESSMENT & PLAN NOTE
· Stable NYHA class I-II symptoms  · Plan on repeat echocardiogram for surveillance of bioprosthetic valve in 2021  · Continue Bumex 1 mg twice daily  · Continue aspirin 81 mg daily

## 2020-01-14 NOTE — ASSESSMENT & PLAN NOTE
· Schedule consult with Dr. Lee for work-up of liver cirrhosis secondary to MCKEON  · Continue spironolactone 50 mg daily

## 2020-02-14 ENCOUNTER — OFFICE VISIT (OUTPATIENT)
Dept: GASTROENTEROLOGY | Facility: CLINIC | Age: 50
End: 2020-02-14

## 2020-02-14 VITALS
OXYGEN SATURATION: 98 % | RESPIRATION RATE: 18 BRPM | WEIGHT: 286 LBS | HEIGHT: 72 IN | SYSTOLIC BLOOD PRESSURE: 124 MMHG | DIASTOLIC BLOOD PRESSURE: 74 MMHG | TEMPERATURE: 97.5 F | HEART RATE: 59 BPM | BODY MASS INDEX: 38.74 KG/M2

## 2020-02-14 DIAGNOSIS — K76.0 NON-ALCOHOLIC FATTY LIVER DISEASE: ICD-10-CM

## 2020-02-14 DIAGNOSIS — R10.11 RIGHT UPPER QUADRANT ABDOMINAL PAIN: Primary | ICD-10-CM

## 2020-02-14 DIAGNOSIS — K59.00 CONSTIPATION, UNSPECIFIED CONSTIPATION TYPE: ICD-10-CM

## 2020-02-14 PROCEDURE — 99204 OFFICE O/P NEW MOD 45 MIN: CPT | Performed by: INTERNAL MEDICINE

## 2020-02-14 NOTE — PROGRESS NOTES
Answers for HPI/ROS submitted by the patient on 2/7/2020   Abdominal pain  What is the primary reason for your visit?: Abdominal Pain  PCP: Leigh Casey MD    Chief Complaint   Patient presents with   • NEW PATIENT FOR MCKEON     WILL POSSIBLY NEED A COLONSCOPY   • Constipation   • Abdominal Pain   • Nausea   • Heartburn       History of Present Illness:   HPI  Ms. Fong is a 48 yo with a history of hypertension, sleep apnea and prediabetes.  She presents for follow-up of fatty liver disease and possible MCKEON.  The patient has not had any recent imaging studies of the liver.  She admits to having nausea that can occur at anytime of the day.  There is no history of abdominal pain with meals or postprandially.  She has no unexplained weight loss.  Ms. Fong denies any difficult or painful swallowing.  She admits to issue with constipation for years.  The patient will take MiraLAX and a stool softener to help with spontaneous bowel movements.  The patient may have only one bowel movement a week.  There is a history of some bright red blood in the commode water and on the toilet paper.  She has not been aware of any yassine blood in the stool.  The patient states he has a history of having hemorrhoids.  She denies any family history of colon cancer or colon polyps.  Past Medical History:   Diagnosis Date   • A-fib (CMS/HCC)    • Active smoker    • Aftercare following right hip joint replacement surgery    • Anemia    • Anemia    • Anxiety    • Aortic valve regurgitation    • Cancer (CMS/HCC)     elevated ca 125 but never diagnoised with anything    • Chest pain     no at this time    • CHF (congestive heart failure) (CMS/HCC)    • Constipation    • Controlled type 2 diabetes mellitus without complication (CMS/HCC)     not medicated??? not sure if type 2-  borderline diabetes    • Diabetic neuropathy (CMS/HCC)    • Diabetic Ulcer    • Diastolic murmur    • Dysfunctional uterine bleeding    • Heart valve implant     • Hypertension    • Iron deficiency anemia    • Low back pain    • MRSA infection 09/2015    TOE   • On supplemental oxygen therapy     at night 2 liters   • Primary Osteoarthritis of both hips    • Septic arthritis (CMS/HCC)    • Sleep apnea     sleeps with 2l of 02 at night   • Sleep apnea    • SOB (shortness of breath) on exertion    • 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HCC)    • Streptococcus infection, group B    • Subacute osteomyelitis of left foot (CMS/[facility])    • Surgical aftercare, musculoskeletal system    • Thin blood (CMS/[facility])    • Transfusion history    • Wears reading eyeglasses        Past Surgical History:   Procedure Laterality Date   • AMPUTATION DIGIT Right 1/30/2018    Procedure: AMPUTATION RIGHT 2ND TOE;  Surgeon: Charleen Nath MD;  Location:  [facility];  Service:    • AORTIC VALVE REPAIR/REPLACEMENT N/A 8/30/2016    Procedure: AIRAM, MEDIAN STERNOTOMY, AORTIC VALVE REPLACEMENT;  Surgeon: Mauricio Grove MD;  Location:  [facility];  Service:    • CARDIAC CATHETERIZATION N/A 8/25/2016    Procedure: Left Heart Cath;  Surgeon: Tyler Peterson MD;  Location:  [facility] CATH INVASIVE LOCATION;  Service:    • CARDIAC CATHETERIZATION      x 2   • CARDIAC CATHETERIZATION N/A 3/21/2018    Procedure: Coronary angiography;  Surgeon: Tyler Peterson IV, MD;  Location:  [facility] CATH INVASIVE LOCATION;  Service: Cardiovascular   • DILATATION AND CURETTAGE      of uterus   • FOOT SURGERY Left     for osteomyelitis   • HIP SURGERY Right     washout x 2 by Dr. Espitia   • INCISION AND DRAINAGE HIP Right 1/26/2017    Procedure: RIGHT HIP WOUND WASHOUT AND DEBRIDEMENT, WITH POLYETHELENE FEMORAL HEAD EXCHANGE ;  Surgeon: Owen Siu MD;  Location:  [facility];  Service:    • OTHER SURGICAL HISTORY      Open heart surgery   • PERICARDIAL WINDOW N/A 9/16/2016    Procedure: PERICARDIOCENTESIS WITH PLACEMENT OF DRAINAGE TUBE .;  Surgeon: Mauricio Grove MD;  Location:  [facility];  Service:    •  PERICARDIAL WINDOW N/A 9/20/2016    Procedure: SUBXYPHOID PERICARDIAL WINDOW;  Surgeon: Mauricio Grove MD;  Location:  [facility];  Service:    • [place] CLOSED RX TRAUMATIC HIP DISLOCATN N/A 1/29/2017    Procedure: HIP CLOSED REDUCTION;  Surgeon: Leander Li MD;  Location:  [facility];  Service: Orthopedics   • THORACOTOMY Left 10/5/2016    Procedure: THORACOTOMY Pericardectomy,  AIRAM,  Placement pacing leads, Pump standby;  Surgeon: Mauricio Grove MD;  Location:  [facility];  Service:    • TOE AMPUTATION      2nd toe right foot    • TOTAL HIP ARTHROPLASTY Right 1/23/2017    Procedure: RIGHT TOTAL HIP ARTHROPLASTY ANTERIOR ;  Surgeon: Owen Siu MD;  Location:  [facility];  Service:    • TOTAL HIP ARTHROPLASTY REVISION Right 1/30/2017    Procedure: REPEAT WOUND DEBRIDEMENT ACETABULAR REVISION;  Surgeon: Owen Siu MD;  Location:  [facility];  Service:    • WISDOM TOOTH EXTRACTION      all 4         Current Outpatient Medications:   •  amoxicillin (AMOXIL) 500 MG capsule, Take 500 mg by mouth Take As Directed. Dr sevilla aware, Disp: , Rfl:   •  aspirin 81 MG chewable tablet, Chew 1 tablet Daily., Disp: , Rfl:   •  bumetanide (BUMEX) 1 MG tablet, Take 1 mg by mouth 2 (Two) Times a Day., Disp: , Rfl:   •  docusate sodium (COLACE) 50 MG capsule, Take  by mouth As Needed for Constipation., Disp: , Rfl:   •  FLUoxetine (PROzac) 40 MG capsule, Take 40 mg by mouth Daily., Disp: , Rfl:   •  METOPROLOL TARTRATE PO, Take 12.5 mg by mouth As Needed., Disp: , Rfl:   •  multivitamin (THERAGRAN) tablet tablet, Take 1 tablet by mouth Daily. OTC, Disp: 30 tablet, Rfl:   •  polyethylene glycol (MIRALAX) packet, Take 17 g by mouth Every Other Day., Disp: , Rfl:   •  spironolactone (ALDACTONE) 50 MG tablet, Take 1 tablet by mouth Daily., Disp: 90 tablet, Rfl: 1    No Known Allergies    Family History   Problem Relation Age of Onset   • Diabetes Mother    • Heart disease Mother    • Hypertension Mother    • Osteoarthritis Mother     • Diabetes Sister    • Arthritis Sister    • No Known Problems Father    • Diabetes Sister    • Diabetes Sister    • Heart disease Other    • Heart attack Other    • Heart disease Other    • Hypertension Other    • Osteoarthritis Other    • Rheum arthritis Other    • Cancer Neg Hx        Social History     Socioeconomic History   • Marital status:      Spouse name: Not on file   • Number of children: 0   • Years of education: Not on file   • Highest education level: Not on file   Occupational History   • Occupation: Employed     Comment: [employer]   Tobacco Use   • Smoking status: Former Smoker     Packs/day: 0.50     Years: 15.00     Pack years: 7.50     Types: Cigarettes     Last attempt to quit: 7/15/2016     Years since quitting: 3.5   • Smokeless tobacco: Never Used   Substance and Sexual Activity   • Alcohol use: No   • Drug use: No   • Sexual activity: Defer   Social History Narrative    Patient denies caffeine intake    Patient lives at home with her sister.       Review of Systems   Constitutional: Negative for appetite change, fatigue, fever and unexpected weight change.   HENT: Negative for dental problem, mouth sores, postnasal drip, sneezing, trouble swallowing and voice change.    Eyes: Negative for pain, redness and itching.   Respiratory: Negative for cough, shortness of breath and wheezing.    Cardiovascular: Negative for chest pain, palpitations and leg swelling.   Gastrointestinal: Positive for abdominal pain, constipation and nausea. Negative for abdominal distention, anal bleeding, blood in stool, diarrhea, rectal pain and vomiting.   Endocrine: Negative for cold intolerance, heat intolerance, polydipsia and polyuria.   Genitourinary: Negative for dysuria, enuresis, flank pain, frequency, hematuria and urgency.   Musculoskeletal: Positive for arthralgias and myalgias. Negative for back pain and joint swelling.   Skin: Negative for color change, pallor and rash.    Allergic/Immunologic: Negative for environmental allergies, food allergies and immunocompromised state.   Neurological: Positive for dizziness, light-headedness and headaches. Negative for tremors, seizures, facial asymmetry and numbness.   Psychiatric/Behavioral: Negative for behavioral problems, dysphoric mood, hallucinations and self-injury.       There were no vitals filed for this visit.    Physical Exam   Constitutional: She is oriented to person, place, and time. She appears well-nourished.   HENT:   Head: Atraumatic.   Mouth/Throat: Oropharynx is clear and moist. No oropharyngeal exudate.   Eyes: EOM are normal. No scleral icterus.   Neck: Neck supple. No thyromegaly present.   Cardiovascular: Normal rate and regular rhythm.   Murmur heard.  Pulmonary/Chest: Effort normal and breath sounds normal. She has no wheezes. She has no rales.   Abdominal: Soft. Bowel sounds are normal. There is tenderness (right upper quadrant). There is no rebound and no guarding.   Musculoskeletal: Normal range of motion. She exhibits no edema or tenderness.   Lymphadenopathy:     She has no cervical adenopathy.   Neurological: She is alert and oriented to person, place, and time. She exhibits normal muscle tone.   No asterixis   Skin: Skin is dry. No erythema.   No spider nevi   Psychiatric: She has a normal mood and affect. Her behavior is normal. Thought content normal.   Vitals reviewed.      Danae was seen today for new patient for keslser, constipation, abdominal pain, nausea and heartburn.    Diagnoses and all orders for this visit:    Right upper quadrant abdominal pain  -     US Liver; Future  -     Colonoscopy; Future    Non-alcoholic fatty liver disease  -     US Liver; Future    Constipation, unspecified constipation type  -     Colonoscopy; Future    The patient has nonalcoholic fatty liver disease.  She has not had any recent imaging studies to evaluate the parenchyma or for ascites.  The main synthetic liver  function tests are within normal limits and the patient also has a normal platelet count.  She has no stigmata of chronic liver disease office examination.  The patient has a issue with chronic constipation.  There does seem to be an element of slow transit constipation.  However, cannot exclude intraluminal colonic pathology and further evaluation is warranted.      Plan: Will schedule ultrasound of the liver.           Will schedule colonoscopy for further evaluation.  The risks and benefits were explained to patient and she agrees to proceed.      Answers for HPI/ROS submitted by the patient on 2/7/2020   Abdominal pain  What is the primary reason for your visit?: Abdominal Pain

## 2020-02-24 ENCOUNTER — OUTSIDE FACILITY SERVICE (OUTPATIENT)
Dept: GASTROENTEROLOGY | Facility: CLINIC | Age: 50
End: 2020-02-24

## 2020-02-24 ENCOUNTER — LAB REQUISITION (OUTPATIENT)
Dept: LAB | Facility: HOSPITAL | Age: 50
End: 2020-02-24

## 2020-02-24 DIAGNOSIS — K59.00 CONSTIPATION, UNSPECIFIED: ICD-10-CM

## 2020-02-24 DIAGNOSIS — K76.0 FATTY (CHANGE OF) LIVER, NOT ELSEWHERE CLASSIFIED: ICD-10-CM

## 2020-02-24 PROCEDURE — 88305 TISSUE EXAM BY PATHOLOGIST: CPT | Performed by: INTERNAL MEDICINE

## 2020-02-24 PROCEDURE — 45385 COLONOSCOPY W/LESION REMOVAL: CPT | Performed by: INTERNAL MEDICINE

## 2020-02-25 LAB
CYTO UR: NORMAL
LAB AP CASE REPORT: NORMAL
LAB AP CLINICAL INFORMATION: NORMAL
LAB AP DIAGNOSIS COMMENT: NORMAL
PATH REPORT.FINAL DX SPEC: NORMAL
PATH REPORT.GROSS SPEC: NORMAL

## 2020-02-26 ENCOUNTER — TELEPHONE (OUTPATIENT)
Dept: GASTROENTEROLOGY | Facility: CLINIC | Age: 50
End: 2020-02-26

## 2020-02-26 NOTE — TELEPHONE ENCOUNTER
I called and discussed the results of the polypectomy.  There was evidence of 3 adenoma-type polyps and one there was high-grade dysplasia with a negative margin.  She will need a repeat examination in 3 years.

## 2020-03-04 DIAGNOSIS — I50.32 CHRONIC DIASTOLIC CONGESTIVE HEART FAILURE (HCC): ICD-10-CM

## 2020-03-04 RX ORDER — SPIRONOLACTONE 50 MG/1
50 TABLET, FILM COATED ORAL DAILY
Qty: 90 TABLET | Refills: 1 | Status: SHIPPED | OUTPATIENT
Start: 2020-03-04 | End: 2020-04-21 | Stop reason: SDUPTHER

## 2020-04-21 DIAGNOSIS — I50.32 CHRONIC DIASTOLIC CONGESTIVE HEART FAILURE (HCC): ICD-10-CM

## 2020-04-21 RX ORDER — SPIRONOLACTONE 50 MG/1
50 TABLET, FILM COATED ORAL DAILY
Qty: 90 TABLET | Refills: 2 | Status: SHIPPED | OUTPATIENT
Start: 2020-04-21

## 2020-07-13 ENCOUNTER — OFFICE VISIT (OUTPATIENT)
Dept: ORTHOPEDIC SURGERY | Facility: CLINIC | Age: 50
End: 2020-07-13

## 2020-07-13 VITALS — BODY MASS INDEX: 38.82 KG/M2 | HEIGHT: 72 IN | HEART RATE: 74 BPM | OXYGEN SATURATION: 98 % | WEIGHT: 286.6 LBS

## 2020-07-13 DIAGNOSIS — M79.671 PAIN IN BOTH FEET: Primary | ICD-10-CM

## 2020-07-13 DIAGNOSIS — M79.672 PAIN IN BOTH FEET: Primary | ICD-10-CM

## 2020-07-13 DIAGNOSIS — E11.42 CONTROLLED TYPE 2 DIABETES MELLITUS WITH DIABETIC POLYNEUROPATHY, WITHOUT LONG-TERM CURRENT USE OF INSULIN (HCC): ICD-10-CM

## 2020-07-13 DIAGNOSIS — M14.672 CHARCOT'S JOINT OF LEFT FOOT: ICD-10-CM

## 2020-07-13 PROCEDURE — 99212 OFFICE O/P EST SF 10 MIN: CPT | Performed by: ORTHOPAEDIC SURGERY

## 2020-07-13 RX ORDER — DULOXETIN HYDROCHLORIDE 30 MG/1
30 CAPSULE, DELAYED RELEASE ORAL DAILY
COMMUNITY
Start: 2020-05-14

## 2020-07-13 RX ORDER — HYDROXYZINE HYDROCHLORIDE 25 MG/1
TABLET, FILM COATED ORAL
COMMUNITY
Start: 2020-07-13 | End: 2020-08-04

## 2020-07-13 NOTE — PROGRESS NOTES
ESTABLISHED PATIENT    Patient: Danae Fong  : 1970    Primary Care Provider: Leigh Casey MD    Requesting Provider: As above    Pain of the Right Foot and Follow-up (22 months- Charcot's joint of left foot)      History    Chief Complaint: diabetic foot exam    History of Present Illness: she is here for diabetic foot follow up.  She reports her HGA1c is under 6.  She needs new diabetic shoes and inserts.  She has having more callusing on her feet.  No open ulcers.    Current Outpatient Medications on File Prior to Visit   Medication Sig Dispense Refill   • aspirin 81 MG chewable tablet Chew 1 tablet Daily.     • bumetanide (BUMEX) 1 MG tablet Take 1 mg by mouth 2 (Two) Times a Day.     • DULoxetine (CYMBALTA) 30 MG capsule Take 30 mg by mouth Daily.     • FLUoxetine (PROzac) 40 MG capsule Take 40 mg by mouth Daily.     • hydrOXYzine (ATARAX) 25 MG tablet      • METOPROLOL TARTRATE PO Take 12.5 mg by mouth As Needed.     • Multiple Vitamins-Minerals (CENTRUM SILVER 50+WOMEN) tablet      • spironolactone (ALDACTONE) 50 MG tablet Take 1 tablet by mouth Daily. 90 tablet 2   • [DISCONTINUED] amoxicillin (AMOXIL) 500 MG capsule Take 500 mg by mouth Take As Directed. Dr sevilla aware     • [DISCONTINUED] docusate sodium (COLACE) 50 MG capsule Take  by mouth As Needed for Constipation.     • [DISCONTINUED] multivitamin (THERAGRAN) tablet tablet Take 1 tablet by mouth Daily. OTC 30 tablet    • [DISCONTINUED] polyethylene glycol (GoLYTELY) 236 g solution Fill container up at the line, add flavor . Starting at 6pm drink 1st half with 16 oz of water, at 11pm drink 2nd half with 16 oz. 4000 mL 0   • [DISCONTINUED] polyethylene glycol (MIRALAX) packet Take 17 g by mouth Every Other Day.       No current facility-administered medications on file prior to visit.       No Known Allergies   Past Medical History:   Diagnosis Date   • A-fib (CMS/HCC)    • Active smoker    • Aftercare following right hip  joint replacement surgery    • Anemia    • Anemia    • Anxiety    • Aortic valve regurgitation    • Cancer (CMS/[facility])     elevated ca 125 but never diagnoised with anything    • Chest pain     no at this time    • CHF (congestive heart failure) (CMS/[facility])    • Constipation    • Controlled type 2 diabetes mellitus without complication (CMS/[facility])     not medicated??? not sure if type 2-  borderline diabetes    • Diabetic neuropathy (CMS/[facility])    • Diabetic Ulcer    • Diastolic murmur    • Dysfunctional uterine bleeding    • Heart valve implant    • Hypertension    • Iron deficiency anemia    • Low back pain    • MRSA infection 09/2015    TOE   • On supplemental oxygen therapy     at night 2 liters   • Primary Osteoarthritis of both hips    • Septic arthritis (CMS/[facility])    • Sleep apnea     sleeps with 2l of 02 at night   • Sleep apnea    • SOB (shortness of breath) on exertion    • 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[facility])    • Streptococcus infection, group B    • Subacute osteomyelitis of left foot (CMS/[facility])    • Surgical aftercare, musculoskeletal system    • Thin blood (CMS/[facility])    • Transfusion history    • Wears reading eyeglasses      Past Surgical History:   Procedure Laterality Date   • AMPUTATION DIGIT Right 1/30/2018    Procedure: AMPUTATION RIGHT 2ND TOE;  Surgeon: Charleen Nath MD;  Location:  [facility] OR;  Service:    • AORTIC VALVE REPAIR/REPLACEMENT N/A 8/30/2016    Procedure: AIRAM, MEDIAN STERNOTOMY, AORTIC VALVE REPLACEMENT;  Surgeon: Mauricio Grove MD;  Location:  [facility] OR;  Service:    • CARDIAC CATHETERIZATION N/A 8/25/2016    Procedure: Left Heart Cath;  Surgeon: Tyler Peterson MD;  Location:  [facility] CATH INVASIVE LOCATION;  Service:    • CARDIAC CATHETERIZATION      x 2   • CARDIAC CATHETERIZATION N/A 3/21/2018    Procedure: Coronary angiography;  Surgeon: Tyler Peterson IV, MD;  Location:  [facility] CATH INVASIVE LOCATION;  Service: Cardiovascular   • DILATATION AND CURETTAGE       of uterus   • FOOT SURGERY Left     for osteomyelitis   • HIP SURGERY Right     washout x 2 by Dr. Espitia   • INCISION AND DRAINAGE HIP Right 1/26/2017    Procedure: RIGHT HIP WOUND WASHOUT AND DEBRIDEMENT, WITH POLYETHELENE FEMORAL HEAD EXCHANGE ;  Surgeon: Owen Siu MD;  Location:  [facility];  Service:    • OTHER SURGICAL HISTORY      Open heart surgery   • PERICARDIAL WINDOW N/A 9/16/2016    Procedure: PERICARDIOCENTESIS WITH PLACEMENT OF DRAINAGE TUBE .;  Surgeon: Mauricio Grove MD;  Location:  [facility];  Service:    • PERICARDIAL WINDOW N/A 9/20/2016    Procedure: SUBXYPHOID PERICARDIAL WINDOW;  Surgeon: Mauricio Grove MD;  Location:  [facility];  Service:    • NE CLOSED RX TRAUMATIC HIP DISLOCATN N/A 1/29/2017    Procedure: HIP CLOSED REDUCTION;  Surgeon: Leander Li MD;  Location:  [facility];  Service: Orthopedics   • THORACOTOMY Left 10/5/2016    Procedure: THORACOTOMY Pericardectomy,  AIRAM,  Placement pacing leads, Pump standby;  Surgeon: Mauricio Grove MD;  Location:  [facility];  Service:    • TOE AMPUTATION      2nd toe right foot    • TOTAL HIP ARTHROPLASTY Right 1/23/2017    Procedure: RIGHT TOTAL HIP ARTHROPLASTY ANTERIOR ;  Surgeon: Owen Siu MD;  Location:  [facility];  Service:    • TOTAL HIP ARTHROPLASTY REVISION Right 1/30/2017    Procedure: REPEAT WOUND DEBRIDEMENT ACETABULAR REVISION;  Surgeon: Owen Siu MD;  Location:  [facility];  Service:    • WISDOM TOOTH EXTRACTION      all 4     Family History   Problem Relation Age of Onset   • Diabetes Mother    • Heart disease Mother    • Hypertension Mother    • Osteoarthritis Mother    • Diabetes Sister    • Arthritis Sister    • No Known Problems Father    • Diabetes Sister    • Diabetes Sister    • Heart disease Other    • Heart attack Other    • Heart disease Other    • Hypertension Other    • Osteoarthritis Other    • Rheum arthritis Other    • Cancer Neg Hx       Social History     Socioeconomic History   • Marital status:   "    Spouse name: Not on file   • Number of children: 0   • Years of education: Not on file   • Highest education level: Not on file   Occupational History   • Occupation: Employed     Comment: [employer]   Tobacco Use   • Smoking status: Former Smoker     Packs/day: 0.50     Years: 15.00     Pack years: 7.50     Types: Cigarettes     Last attempt to quit: 7/15/2016     Years since quitting: 3.9   • Smokeless tobacco: Never Used   Substance and Sexual Activity   • Alcohol use: No   • Drug use: No   • Sexual activity: Defer   Social History Narrative    Patient denies caffeine intake    Patient lives at home with her sister.        Review of Systems   Constitutional: Negative.    HENT: Negative.    Eyes: Negative.    Respiratory: Negative.    Cardiovascular: Negative.    Gastrointestinal: Negative.    Endocrine: Negative.    Genitourinary: Negative.    Musculoskeletal: Positive for arthralgias.   Skin: Negative.    Allergic/Immunologic: Negative.    Neurological: Negative.    Hematological: Negative.    Psychiatric/Behavioral: Negative.        The following portions of the patient's history were reviewed and updated as appropriate: allergies, current medications, past family history, past medical history, past social history, past surgical history and problem list.    Physical Exam:   Pulse 74   Ht 186.7 cm (73.5\")   Wt 130 kg (286 lb 9.6 oz)   SpO2 98%   BMI 37.30 kg/m²   GENERAL: Body habitus: trunkal obesity    Lower extremity edema: Left: none; Right: none    Varicose veins:  Left: none; Right: none    Gait: normal     Mental Status:  awake and alert; oriented to person, place, and time    Voice:  clear    MSK:  Tibia:  Right:  non tender; Left:  non tender        Ankle:  Right: non tender; Left:  non tender        Foot:  Right:  Prior amputation healed, moderate calluses not pre-ulcerous; Left:  Moderate calluses not pre-ulcerous, no signs of new Charcot joint on either " side    NEURO     Foley-Arsalan 5.07 monofilament test: absent    Sensation:  diminished    Medical Decision Making    Data Review:   ordered and reviewed x-rays today    Assessment/Plan/Diagnosis/Treatment Options:   1.Charcot's joint of left foot  No new Charcot joint, I gave her a new prescription for shoes and inserts.  She needs to work on the calluses daily to prevent ulceration.  X-rays are stable, no signs of new Charcot joint.  She needs to take much better care of her feet to prevent further amputation    2. Controlled type 2 diabetes mellitus with diabetic polyneuropathy, without long-term current use of insulin (CMS/[facility])  Much better glucose control

## 2020-08-03 ENCOUNTER — PATIENT MESSAGE (OUTPATIENT)
Dept: GASTROENTEROLOGY | Facility: CLINIC | Age: 50
End: 2020-08-03

## 2020-08-03 NOTE — TELEPHONE ENCOUNTER
From: Danae Fong  To: Amos Lee MD  Sent: 8/3/2020 1:42 PM EDT  Subject: Test Results Question    Here is a copy of my latest abdominal ultrasound. My primary care physician is supposed to be sending referral for EGD also but, I wanted to make sure you had this. Thanks

## 2020-08-04 ENCOUNTER — OFFICE VISIT (OUTPATIENT)
Dept: CARDIOLOGY | Facility: CLINIC | Age: 50
End: 2020-08-04

## 2020-08-04 VITALS
BODY MASS INDEX: 36.19 KG/M2 | SYSTOLIC BLOOD PRESSURE: 108 MMHG | HEIGHT: 72 IN | OXYGEN SATURATION: 97 % | DIASTOLIC BLOOD PRESSURE: 72 MMHG | HEART RATE: 63 BPM | WEIGHT: 267.2 LBS

## 2020-08-04 DIAGNOSIS — I50.32 CHRONIC DIASTOLIC CONGESTIVE HEART FAILURE (HCC): Primary | ICD-10-CM

## 2020-08-04 DIAGNOSIS — I48.19 PERSISTENT ATRIAL FIBRILLATION (HCC): ICD-10-CM

## 2020-08-04 DIAGNOSIS — I10 ESSENTIAL HYPERTENSION: ICD-10-CM

## 2020-08-04 DIAGNOSIS — Q21.12 PFO (PATENT FORAMEN OVALE): ICD-10-CM

## 2020-08-04 DIAGNOSIS — Z95.3 STATUS POST AORTIC VALVE REPLACEMENT WITH BIOPROSTHETIC VALVE: ICD-10-CM

## 2020-08-04 DIAGNOSIS — R07.89 ATYPICAL CHEST PAIN: ICD-10-CM

## 2020-08-04 PROBLEM — M00.251: Status: RESOLVED | Noted: 2017-01-23 | Resolved: 2020-08-04

## 2020-08-04 PROBLEM — R07.9 CHEST PAIN: Status: ACTIVE | Noted: 2020-08-04

## 2020-08-04 PROBLEM — G47.33 OSA (OBSTRUCTIVE SLEEP APNEA): Status: RESOLVED | Noted: 2017-01-23 | Resolved: 2020-08-04

## 2020-08-04 PROBLEM — Z96.641 STATUS POST TOTAL REPLACEMENT OF RIGHT HIP: Status: RESOLVED | Noted: 2017-01-23 | Resolved: 2020-08-04

## 2020-08-04 PROBLEM — E11.42 DIABETIC POLYNEUROPATHY ASSOCIATED WITH TYPE 2 DIABETES MELLITUS: Status: RESOLVED | Noted: 2018-04-11 | Resolved: 2020-08-04

## 2020-08-04 PROBLEM — M86.9 OSTEOMYELITIS OF RIGHT FOOT: Status: RESOLVED | Noted: 2018-01-25 | Resolved: 2020-08-04

## 2020-08-04 PROBLEM — M86.471 CHRONIC OSTEOMYELITIS OF RIGHT FOOT WITH DRAINING SINUS: Status: RESOLVED | Noted: 2018-01-25 | Resolved: 2020-08-04

## 2020-08-04 PROBLEM — R00.2 PALPITATIONS: Status: ACTIVE | Noted: 2020-08-04

## 2020-08-04 PROCEDURE — 99214 OFFICE O/P EST MOD 30 MIN: CPT | Performed by: NURSE PRACTITIONER

## 2020-08-04 RX ORDER — VILAZODONE HYDROCHLORIDE 20 MG/1
20 TABLET ORAL DAILY
COMMUNITY

## 2020-08-04 NOTE — PROGRESS NOTES
"[facility]  Office Visit Note    DATE: 08/04/2020    IDENTIFICATION: Danae Fong is a 50 y.o. female who resides in [place], [place]    REASON FOR VISIT:  • Bioprosthetic aortic valve  • Diastolic heart failure  • Atrial fibrillation          Danae Fong returns today sooner than expected for follow-up of her bioprosthetic aortic valve, diastolic heart failure, persistent atrial fibrillation and cardiac risk factors.  At her last visit she was referred to Dr. Lee for evaluation of liver cirrhosis.  Patient underwent ultrasound and colonoscopyc.  Three polyps were removed and 1 was high-grade dysplasia with negative margin.  It was recommended a repeat colonoscopy in 3 years.  The patient comes in today after contacting our office reporting chest pain and worsening shortness of breath.  According to the patient she has been having \"episodes\" that have progressed and become more frequent even occurring 2 times a day.  She will feel her heart racing become short of breath and feel tightness in her chest.  She also gets lightheaded and dizzy when this occurs.  She has checked her blood pressure at home and her diastolic has been over 100 and her heart rate has been as high as the 120s.  These episodes are not related to exertional activities because actually she rides a stationary exercise bike every day for 6 miles and does not experience the symptoms.  She has had 2 cardiac caths one in 2016 1/2018 that showed normal coronary arteries.  She does have a bioprosthetic aortic valve and echocardiogram 6 months ago showed normal functioning valve and normal LVEF.  She does have a history of persistent atrial fibrillation that occurred during her lengthy hospitalization back in 2016 that kept recurring and ended up requiring cardioversion.  However, since her discharge from the hospital she has had no further episodes so her anticoagulation was discontinued sometime " back.  She denies signs or symptoms of TIA or CVA.  She denies any exertional chest pain.  She denies orthopnea or syncope.  She has metoprolol that she used to take regularly but because of bradycardia and low blood pressure she now only uses it as needed for palpitations.  For the past 2 days she is used it quite a bit.    Review of Systems   Constitution: Negative for malaise/fatigue.   Eyes: Negative for vision loss in left eye and vision loss in right eye.   Cardiovascular: Positive for chest pain and dyspnea on exertion. Negative for near-syncope, orthopnea, palpitations, paroxysmal nocturnal dyspnea and syncope.   Respiratory: Positive for shortness of breath.    Musculoskeletal: Negative for myalgias.   Neurological: Negative for brief paralysis, excessive daytime sleepiness, focal weakness, numbness, paresthesias and weakness.   All other systems reviewed and are negative.      The patient's past medical, social, family history and ROS reviewed in the patient's electronic medical record.    No Known Allergies      Current Outpatient Medications:   •  aspirin 81 MG chewable tablet, Chew 1 tablet Daily., Disp: , Rfl:   •  bumetanide (BUMEX) 1 MG tablet, Take 1 mg by mouth 2 (Two) Times a Day., Disp: , Rfl:   •  DULoxetine (CYMBALTA) 30 MG capsule, Take 30 mg by mouth Daily., Disp: , Rfl:   •  METOPROLOL TARTRATE PO, Take 12.5 mg by mouth As Needed., Disp: , Rfl:   •  Multiple Vitamins-Minerals (CENTRUM SILVER 50+WOMEN) tablet, , Disp: , Rfl:   •  spironolactone (ALDACTONE) 50 MG tablet, Take 1 tablet by mouth Daily., Disp: 90 tablet, Rfl: 2  •  vilazodone (VIIBRYD) 20 MG tablet tablet, Take 20 mg by mouth Daily., Disp: , Rfl:     Past Medical History:   Diagnosis Date   • A-fib (CMS/HCC)    • Active smoker    • Aftercare following right hip joint replacement surgery    • Anemia    • Anemia    • Anxiety    • Aortic valve regurgitation    • Cancer (CMS/HCC)     elevated ca 125 but never diagnoised with anything     • Chest pain     no at this time    • CHF (congestive heart failure) (CMS/[facility])    • Constipation    • Controlled type 2 diabetes mellitus without complication (CMS/[facility])     not medicated??? not sure if type 2-  borderline diabetes    • Diabetic neuropathy (CMS/[facility])    • Diabetic Ulcer    • Diastolic murmur    • Dysfunctional uterine bleeding    • Heart valve implant    • Hypertension    • Iron deficiency anemia    • Low back pain    • MRSA infection 09/2015    TOE   • On supplemental oxygen therapy     at night 2 liters   • Primary Osteoarthritis of both hips    • Septic arthritis (CMS/[facility])    • Sleep apnea     sleeps with 2l of 02 at night   • Sleep apnea    • SOB (shortness of breath) on exertion    • 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[facility])    • Streptococcus infection, group B    • Subacute osteomyelitis of left foot (CMS/[facility])    • Surgical aftercare, musculoskeletal system    • Thin blood (CMS/[facility])    • Transfusion history    • Wears reading eyeglasses        Past Surgical History:   Procedure Laterality Date   • AMPUTATION DIGIT Right 1/30/2018    Procedure: AMPUTATION RIGHT 2ND TOE;  Surgeon: Charleen Nath MD;  Location:  [facility];  Service:    • AORTIC VALVE REPAIR/REPLACEMENT N/A 8/30/2016    Procedure: AIRAM, MEDIAN STERNOTOMY, AORTIC VALVE REPLACEMENT;  Surgeon: Mauricio Grove MD;  Location:  [facility];  Service:    • CARDIAC CATHETERIZATION N/A 8/25/2016    Procedure: Left Heart Cath;  Surgeon: Tyler Peterson MD;  Location:  [facility] CATH INVASIVE LOCATION;  Service:    • CARDIAC CATHETERIZATION      x 2   • CARDIAC CATHETERIZATION N/A 3/21/2018    Procedure: Coronary angiography;  Surgeon: Tyler Peterson IV, MD;  Location:  [facility] CATH INVASIVE LOCATION;  Service: Cardiovascular   • DILATATION AND CURETTAGE      of uterus   • FOOT SURGERY Left     for osteomyelitis   • HIP SURGERY Right     washout x 2 by Dr. Espitia   • INCISION AND DRAINAGE HIP Right 1/26/2017    Procedure:  RIGHT HIP WOUND WASHOUT AND DEBRIDEMENT, WITH POLYETHELENE FEMORAL HEAD EXCHANGE ;  Surgeon: Owen Siu MD;  Location:  [facility];  Service:    • OTHER SURGICAL HISTORY      Open heart surgery   • PERICARDIAL WINDOW N/A 2016    Procedure: PERICARDIOCENTESIS WITH PLACEMENT OF DRAINAGE TUBE .;  Surgeon: Mauricio Grove MD;  Location:  [facility];  Service:    • PERICARDIAL WINDOW N/A 2016    Procedure: SUBXYPHOID PERICARDIAL WINDOW;  Surgeon: Mauricio Grove MD;  Location:  [facility];  Service:    • CA CLOSED RX TRAUMATIC HIP DISLOCATN N/A 2017    Procedure: HIP CLOSED REDUCTION;  Surgeon: Leander Li MD;  Location:  [facility];  Service: Orthopedics   • THORACOTOMY Left 10/5/2016    Procedure: THORACOTOMY Pericardectomy,  AIRAM,  Placement pacing leads, Pump standby;  Surgeon: Mauricio Grove MD;  Location:  [facility];  Service:    • TOE AMPUTATION      2nd toe right foot    • TOTAL HIP ARTHROPLASTY Right 2017    Procedure: RIGHT TOTAL HIP ARTHROPLASTY ANTERIOR ;  Surgeon: Owen Siu MD;  Location:  [facility];  Service:    • TOTAL HIP ARTHROPLASTY REVISION Right 2017    Procedure: REPEAT WOUND DEBRIDEMENT ACETABULAR REVISION;  Surgeon: Owen Siu MD;  Location:  [facility];  Service:    • WISDOM TOOTH EXTRACTION      all 4       Family History   Problem Relation Age of Onset   • Diabetes Mother    • Heart disease Mother    • Hypertension Mother    • Osteoarthritis Mother    • Diabetes Sister    • Arthritis Sister    • No Known Problems Father    • Diabetes Sister    • Diabetes Sister    • Heart disease Other    • Heart attack Other    • Heart disease Other    • Hypertension Other    • Osteoarthritis Other    • Rheum arthritis Other    • Cancer Neg Hx        Social History     Tobacco Use   • Smoking status: Former Smoker     Packs/day: 0.50     Years: 15.00     Pack years: 7.50     Types: Cigarettes     Last attempt to quit: 7/15/2016     Years since quittin.0   • Smokeless tobacco:  "Never Used   Substance Use Topics   • Alcohol use: No           Blood pressure 108/72, pulse 63, height 186.7 cm (73.5\"), weight 121 kg (267 lb 3.2 oz), SpO2 97 %, not currently breastfeeding.  Body mass index is 34.77 kg/m².  Vitals:    08/04/20 1522   Patient Position: Sitting       Physical Exam   Constitutional: She is oriented to person, place, and time. She appears well-developed and well-nourished.   HENT:   Head: Normocephalic and atraumatic.   Eyes: Pupils are equal, round, and reactive to light. Conjunctivae are normal. No scleral icterus.   Neck: Normal range of motion. No JVD present. Carotid bruit is not present. No thyromegaly present.   Cardiovascular: Normal rate, regular rhythm, S1 normal and S2 normal. Exam reveals no gallop.   No murmur heard.  Pulmonary/Chest: Effort normal and breath sounds normal.   Abdominal: Soft. She exhibits no distension and no mass. There is no hepatosplenomegaly. There is no tenderness.   Musculoskeletal: She exhibits no edema.   Neurological: She is alert and oriented to person, place, and time.   Skin: Skin is warm and dry. No rash noted. No cyanosis. Nails show no clubbing.   Psychiatric: She has a normal mood and affect. Her behavior is normal.       Data Review (reviewed with patient):     Procedures    Lab Results   Component Value Date    GLUCOSE 80 01/14/2020    BUN 9 01/14/2020    CREATININE 0.85 01/14/2020    EGFRIFNONA 71 01/14/2020    BCR 10.6 01/14/2020    K 4.0 01/14/2020    CO2 26.9 01/14/2020    CALCIUM 9.7 01/14/2020    ALBUMIN 4.30 01/14/2020    ALKPHOS 124 (H) 01/14/2020    AST 28 01/14/2020    ALT 22 01/14/2020      Lab Results   Component Value Date    CHOL 211 (H) 01/14/2020    TRIG 138 01/14/2020    HDL 33 (L) 01/14/2020     (H) 01/14/2020     Lab Results   Component Value Date    HGBA1C 7.20 (H) 03/19/2018     Lab Results   Component Value Date    WBC 10.16 01/14/2020    HGB 15.2 01/14/2020    HCT 44.9 01/14/2020    MCV 77.5 (L) 01/14/2020 "     01/14/2020     Lab Results   Component Value Date    TSH 2.028 01/26/2017            Problem List Items Addressed This Visit        Cardiovascular and Mediastinum    Chronic diastolic congestive heart failure (CMS/HCC) - Primary    Overview     · Echo (10/10/16): Normal LVEF.  Bioprosthetic aortic valve functioning normally.  · Echo (12/2017): LVEF 50-55%.  Bioprosthetic aortic valve functioning normally  · Echo (1/14/2020):LVEF = 60%.  Grade 1 diastolic dysfunction.  Bioprosthetic aortic valve well-seated and functions normally.  Mean gradient 14 mmHg           Current Assessment & Plan     · Patient reports worsening shortness of breath  · Continue Bumex 1 mg twice daily  · Continue spironolactone 50 mg daily         Essential hypertension    Current Assessment & Plan     · Hypertension is controlled  · Continue metoprolol tartrate 12.5 mg as needed  · Continue spironolactone 50 mg daily         Persistent atrial fibrillation    Overview     · Postoperative afib after AVR  · Chads Vasc= 2         Current Assessment & Plan     · Obtain 30-day monitor to ensure no episodes of atrial fibrillation to account for patient's symptoms         Relevant Orders    Mobile Cardiac Outpatient Telemetry    PFO (patent foramen ovale)    Current Assessment & Plan     · No signs or symptoms of TIA or CVA  · Continue aspirin 81 mg daily            Nervous and Auditory    Atypical chest pain    Overview     · Cardiac cath (8/25/2016): Normal coronaries  · MPS (3/8/2018):Small-sized, moderately severe area of ischemia located in the apex. This area of ischemia affects 3% of the myocardium.  Normal LVEF 55%\  · MPS (3/21/2018): Normal coronary arteries         Current Assessment & Plan     · Patient reports chest tightness that is associated with palpitations, and shortness of breath.  She has normal coronaries and recent echo showed normal functioning bioprosthetic aortic valve  · We will order 30-day monitor to see if  having any arrhythmias or episodes of atrial fibrillation            Other    Status post aortic valve replacement with bioprosthetic valve    Overview     · Severe nonrheumatic aortic insufficiency by AIRAM, 8/24/2016  · Bioprosthetic AVR (25 mm Magna Ease) by Dr. Mauricio Grove, 8/30/3016  · Initial possibility of vegetation turned out to be a flail leaflet  · Echo (12/2/2017): Normal functioning bioprosthetic valve.  Mild MR.  LVEF 50-55%  · Echo (2/6/18): LVEF 55%. Normal functioning bioprosthetic aortic valve  · Echo (1/14/2020):LVEF = 60%.  Grade 1 diastolic dysfunction.  Bioprosthetic aortic valve well-seated and functions normally.  Mean gradient 14 mmHg           Current Assessment & Plan     · Last echocardiogram 6 months ago showed well-seated bioprosthetic valve and normal LVEF  · Continue aspirin 81 mg daily             The patient is having episodes that could potentially be an arrhythmia such as atrial fibrillation particularly given her history.  I would ideally like to place a 30-day monitor but unfortunately her insurance will not cover this.  We will try a 30-day event monitor.  She had normal cath and recent echo showing well-seated bioprosthetic aortic valve.  We will continue all of her other medications and she will follow-up in 6 weeks to discuss the results of her monitor.       · Place 30-day event monitor to look for arrhythmias or episodes of atrial fibrillation to account for symptoms  · Return in about 6 weeks (around 9/15/2020), or if symptoms worsen or fail to improve, for Follow-up with Dr. Peterson next visit.     Bertha Butler, BAUTISTA    8/4/2020

## 2020-08-05 DIAGNOSIS — R00.2 PALPITATIONS: Primary | ICD-10-CM

## 2020-08-06 ENCOUNTER — TELEPHONE (OUTPATIENT)
Dept: GASTROENTEROLOGY | Facility: CLINIC | Age: 50
End: 2020-08-06

## 2020-08-06 PROBLEM — Z00.00 HEALTHCARE MAINTENANCE: Status: RESOLVED | Noted: 2020-01-14 | Resolved: 2020-08-06

## 2020-08-06 NOTE — TELEPHONE ENCOUNTER
Patient is cleared to have upper endoscopy as requested by PCP.    EMMANUEL Peterson MD [facility], [facility]  Interventional and General Cardiology

## 2020-08-06 NOTE — TELEPHONE ENCOUNTER
Patient appointment request sent through [facility]:    Pcp wanted egd due to nausea and decreased appetite.  I advised them to send referral, they did not. Having several episodes of nausea.  Chronic constipation.     Please note 8/4 office visit with Cardiology and let me know how to proceed.

## 2020-08-18 ENCOUNTER — LAB (OUTPATIENT)
Dept: PREADMISSION TESTING | Facility: HOSPITAL | Age: 50
End: 2020-08-18

## 2020-08-18 PROCEDURE — U0002 COVID-19 LAB TEST NON-CDC: HCPCS

## 2020-08-18 PROCEDURE — C9803 HOPD COVID-19 SPEC COLLECT: HCPCS

## 2020-08-18 PROCEDURE — U0004 COV-19 TEST NON-CDC HGH THRU: HCPCS

## 2020-08-19 LAB
REF LAB TEST METHOD: NORMAL
SARS-COV-2 RNA RESP QL NAA+PROBE: NOT DETECTED

## 2020-08-20 ENCOUNTER — OUTSIDE FACILITY SERVICE (OUTPATIENT)
Dept: GASTROENTEROLOGY | Facility: CLINIC | Age: 50
End: 2020-08-20

## 2020-08-20 PROCEDURE — 43239 EGD BIOPSY SINGLE/MULTIPLE: CPT | Performed by: INTERNAL MEDICINE

## 2020-08-20 PROCEDURE — 88305 TISSUE EXAM BY PATHOLOGIST: CPT | Performed by: INTERNAL MEDICINE

## 2020-08-21 ENCOUNTER — LAB REQUISITION (OUTPATIENT)
Dept: LAB | Facility: HOSPITAL | Age: 50
End: 2020-08-21

## 2020-08-21 DIAGNOSIS — R11.0 NAUSEA: ICD-10-CM

## 2020-08-24 LAB
CYTO UR: NORMAL
LAB AP CASE REPORT: NORMAL
LAB AP CLINICAL INFORMATION: NORMAL
PATH REPORT.FINAL DX SPEC: NORMAL
PATH REPORT.GROSS SPEC: NORMAL

## 2020-08-26 ENCOUNTER — TELEPHONE (OUTPATIENT)
Dept: GASTROENTEROLOGY | Facility: CLINIC | Age: 50
End: 2020-08-26

## 2020-08-26 DIAGNOSIS — R14.0 BLOATING: ICD-10-CM

## 2020-08-26 DIAGNOSIS — R11.0 NAUSEA: Primary | ICD-10-CM

## 2020-08-26 NOTE — TELEPHONE ENCOUNTER
I spoke with Ms Fong. I informed her that Dr eLe ordered 4 hour GES first. Depends on results of the GES before Hida scan is ordered. Patient voiced understanding.

## 2020-08-26 NOTE — TELEPHONE ENCOUNTER
----- Message from Amos Lee MD sent at 8/25/2020  5:10 PM EDT -----  Let Ms. Fong know there was no H. pylori on the stomach biopsies.  There is no evidence for celiac sprue.  The next studies to evaluate the issue with nausea would be a 4-hour gastric emptying study and HIDA scan.  Thank you,  BRAD

## 2020-08-31 ENCOUNTER — LAB (OUTPATIENT)
Dept: LAB | Facility: HOSPITAL | Age: 50
End: 2020-08-31

## 2020-08-31 ENCOUNTER — OFFICE VISIT (OUTPATIENT)
Dept: ORTHOPEDIC SURGERY | Facility: CLINIC | Age: 50
End: 2020-08-31

## 2020-08-31 VITALS — BODY MASS INDEX: 36.13 KG/M2 | HEART RATE: 73 BPM | WEIGHT: 266.76 LBS | OXYGEN SATURATION: 98 % | HEIGHT: 72 IN

## 2020-08-31 DIAGNOSIS — E11.42 CONTROLLED TYPE 2 DIABETES MELLITUS WITH DIABETIC POLYNEUROPATHY, WITHOUT LONG-TERM CURRENT USE OF INSULIN (HCC): ICD-10-CM

## 2020-08-31 DIAGNOSIS — E11.622 DIABETIC ULCER OF LOWER EXTREMITY (HCC): ICD-10-CM

## 2020-08-31 DIAGNOSIS — L97.909 DIABETIC ULCER OF LOWER EXTREMITY (HCC): ICD-10-CM

## 2020-08-31 DIAGNOSIS — E11.622 DIABETIC ULCER OF LOWER EXTREMITY (HCC): Primary | ICD-10-CM

## 2020-08-31 DIAGNOSIS — L97.909 DIABETIC ULCER OF LOWER EXTREMITY (HCC): Primary | ICD-10-CM

## 2020-08-31 LAB
BASOPHILS # BLD AUTO: 0.08 10*3/MM3 (ref 0–0.2)
BASOPHILS NFR BLD AUTO: 0.9 % (ref 0–1.5)
CRP SERPL-MCNC: 1.67 MG/DL (ref 0–0.5)
DEPRECATED RDW RBC AUTO: 43 FL (ref 37–54)
EOSINOPHIL # BLD AUTO: 0.12 10*3/MM3 (ref 0–0.4)
EOSINOPHIL NFR BLD AUTO: 1.4 % (ref 0.3–6.2)
ERYTHROCYTE [DISTWIDTH] IN BLOOD BY AUTOMATED COUNT: 14.2 % (ref 12.3–15.4)
ERYTHROCYTE [SEDIMENTATION RATE] IN BLOOD: 44 MM/HR (ref 0–20)
HCT VFR BLD AUTO: 47.1 % (ref 34–46.6)
HGB BLD-MCNC: 15.6 G/DL (ref 12–15.9)
IMM GRANULOCYTES # BLD AUTO: 0.03 10*3/MM3 (ref 0–0.05)
IMM GRANULOCYTES NFR BLD AUTO: 0.3 % (ref 0–0.5)
LYMPHOCYTES # BLD AUTO: 2.68 10*3/MM3 (ref 0.7–3.1)
LYMPHOCYTES NFR BLD AUTO: 31.2 % (ref 19.6–45.3)
MCH RBC QN AUTO: 27.7 PG (ref 26.6–33)
MCHC RBC AUTO-ENTMCNC: 33.1 G/DL (ref 31.5–35.7)
MCV RBC AUTO: 83.7 FL (ref 79–97)
MONOCYTES # BLD AUTO: 0.59 10*3/MM3 (ref 0.1–0.9)
MONOCYTES NFR BLD AUTO: 6.9 % (ref 5–12)
NEUTROPHILS NFR BLD AUTO: 5.09 10*3/MM3 (ref 1.7–7)
NEUTROPHILS NFR BLD AUTO: 59.3 % (ref 42.7–76)
NRBC BLD AUTO-RTO: 0 /100 WBC (ref 0–0.2)
PLATELET # BLD AUTO: 254 10*3/MM3 (ref 140–450)
PMV BLD AUTO: 9.6 FL (ref 6–12)
RBC # BLD AUTO: 5.63 10*6/MM3 (ref 3.77–5.28)
WBC # BLD AUTO: 8.59 10*3/MM3 (ref 3.4–10.8)

## 2020-08-31 PROCEDURE — 87070 CULTURE OTHR SPECIMN AEROBIC: CPT

## 2020-08-31 PROCEDURE — 85652 RBC SED RATE AUTOMATED: CPT

## 2020-08-31 PROCEDURE — 86140 C-REACTIVE PROTEIN: CPT

## 2020-08-31 PROCEDURE — 87075 CULTR BACTERIA EXCEPT BLOOD: CPT

## 2020-08-31 PROCEDURE — 87205 SMEAR GRAM STAIN: CPT

## 2020-08-31 PROCEDURE — 85025 COMPLETE CBC W/AUTO DIFF WBC: CPT

## 2020-08-31 PROCEDURE — 87186 SC STD MICRODIL/AGAR DIL: CPT

## 2020-08-31 PROCEDURE — 36415 COLL VENOUS BLD VENIPUNCTURE: CPT

## 2020-08-31 PROCEDURE — 99213 OFFICE O/P EST LOW 20 MIN: CPT | Performed by: ORTHOPAEDIC SURGERY

## 2020-08-31 PROCEDURE — 87147 CULTURE TYPE IMMUNOLOGIC: CPT

## 2020-08-31 PROCEDURE — 97597 DBRDMT OPN WND 1ST 20 CM/<: CPT | Performed by: ORTHOPAEDIC SURGERY

## 2020-08-31 PROCEDURE — 87076 CULTURE ANAEROBE IDENT EACH: CPT

## 2020-08-31 RX ORDER — HYDROXYZINE HYDROCHLORIDE 25 MG/1
25 TABLET, FILM COATED ORAL EVERY 8 HOURS PRN
COMMUNITY
Start: 2020-08-14 | End: 2020-10-13

## 2020-08-31 RX ORDER — CEPHALEXIN 500 MG/1
500 CAPSULE ORAL EVERY 6 HOURS
Qty: 40 CAPSULE | Refills: 0 | Status: SHIPPED | OUTPATIENT
Start: 2020-08-31 | End: 2020-10-13

## 2020-08-31 NOTE — PROGRESS NOTES
ESTABLISHED PATIENT    Patient: Danae Fong  : 1970    Primary Care Provider: Leigh Casey MD    Requesting Provider: As above    Pain of the Right Foot (ulcer)      History    Chief Complaint: new diabetic ulcer right foot, erythema    History of Present Illness: Ms Fong returns with a new diabetic ulcer on right foot, she noticed over the weekend that she had drainage and redness around the right 5th metatarsal head.  She felt chills but took her temp and it was normal.  She did use her half shoe after she found the ulcer, but is walking into the office today in flat, Ked-type tennis shoes.  She is undergoing a work up for GI problems    Current Outpatient Medications on File Prior to Visit   Medication Sig Dispense Refill   • aspirin 81 MG chewable tablet Chew 1 tablet Daily.     • bumetanide (BUMEX) 1 MG tablet Take 1 mg by mouth 2 (Two) Times a Day.     • DULoxetine (CYMBALTA) 30 MG capsule Take 30 mg by mouth Daily.     • hydrOXYzine (ATARAX) 25 MG tablet Take 25 mg by mouth Every 8 (Eight) Hours As Needed.     • METOPROLOL TARTRATE PO Take 12.5 mg by mouth As Needed.     • Multiple Vitamins-Minerals (CENTRUM SILVER 50+WOMEN) tablet      • spironolactone (ALDACTONE) 50 MG tablet Take 1 tablet by mouth Daily. 90 tablet 2   • vilazodone (VIIBRYD) 20 MG tablet tablet Take 20 mg by mouth Daily.       No current facility-administered medications on file prior to visit.       No Known Allergies   Past Medical History:   Diagnosis Date   • A-fib (CMS/HCC)    • Active smoker    • Aftercare following right hip joint replacement surgery    • Anemia    • Anemia    • Anxiety    • Aortic valve regurgitation    • Cancer (CMS/HCC)     elevated ca 125 but never diagnoised with anything    • Chest pain     no at this time    • CHF (congestive heart failure) (CMS/HCC)    • Constipation    • Controlled type 2 diabetes mellitus without complication (CMS/HCC)     not medicated??? not sure if type 2-   borderline diabetes    • Diabetic neuropathy (CMS/[facility])    • Diabetic Ulcer    • Diastolic murmur    • Dysfunctional uterine bleeding    • Heart valve implant    • Hypertension    • Iron deficiency anemia    • Low back pain    • MRSA infection 09/2015    TOE   • On supplemental oxygen therapy     at night 2 liters   • Primary Osteoarthritis of both hips    • Septic arthritis (CMS/[facility])    • Sleep apnea     sleeps with 2l of 02 at night   • Sleep apnea    • SOB (shortness of breath) on exertion    • 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[facility])    • Streptococcus infection, group B    • Subacute osteomyelitis of left foot (CMS/[facility])    • Surgical aftercare, musculoskeletal system    • Thin blood (CMS/[facility])    • Transfusion history    • Wears reading eyeglasses      Past Surgical History:   Procedure Laterality Date   • AMPUTATION DIGIT Right 1/30/2018    Procedure: AMPUTATION RIGHT 2ND TOE;  Surgeon: Charleen Nath MD;  Location:  [facility];  Service:    • AORTIC VALVE REPAIR/REPLACEMENT N/A 8/30/2016    Procedure: AIRAM, MEDIAN STERNOTOMY, AORTIC VALVE REPLACEMENT;  Surgeon: Mauricio Grove MD;  Location:  [facility];  Service:    • CARDIAC CATHETERIZATION N/A 8/25/2016    Procedure: Left Heart Cath;  Surgeon: Tyler Peterson MD;  Location:  [facility] INVASIVE LOCATION;  Service:    • CARDIAC CATHETERIZATION      x 2   • CARDIAC CATHETERIZATION N/A 3/21/2018    Procedure: Coronary angiography;  Surgeon: Tyler Peterson IV, MD;  Location:  [facility] CATH INVASIVE LOCATION;  Service: Cardiovascular   • DILATATION AND CURETTAGE      of uterus   • FOOT SURGERY Left     for osteomyelitis   • HIP SURGERY Right     washout x 2 by Dr. Espitia   • INCISION AND DRAINAGE HIP Right 1/26/2017    Procedure: RIGHT HIP WOUND WASHOUT AND DEBRIDEMENT, WITH POLYETHELENE FEMORAL HEAD EXCHANGE ;  Surgeon: Owen Siu MD;  Location:  [facility];  Service:    • OTHER SURGICAL HISTORY      Open heart surgery   • PERICARDIAL WINDOW  N/A 9/16/2016    Procedure: PERICARDIOCENTESIS WITH PLACEMENT OF DRAINAGE TUBE .;  Surgeon: Mauricio Grove MD;  Location:  [facility];  Service:    • PERICARDIAL WINDOW N/A 9/20/2016    Procedure: SUBXYPHOID PERICARDIAL WINDOW;  Surgeon: Mauricio Grove MD;  Location:  [facility];  Service:    • ME CLOSED RX TRAUMATIC HIP DISLOCATN N/A 1/29/2017    Procedure: HIP CLOSED REDUCTION;  Surgeon: Leander Li MD;  Location:  [facility];  Service: Orthopedics   • THORACOTOMY Left 10/5/2016    Procedure: THORACOTOMY Pericardectomy,  AIRAM,  Placement pacing leads, Pump standby;  Surgeon: Mauricio Grove MD;  Location:  [facility];  Service:    • TOE AMPUTATION      2nd toe right foot    • TOTAL HIP ARTHROPLASTY Right 1/23/2017    Procedure: RIGHT TOTAL HIP ARTHROPLASTY ANTERIOR ;  Surgeon: Owen Siu MD;  Location:  [facility];  Service:    • TOTAL HIP ARTHROPLASTY REVISION Right 1/30/2017    Procedure: REPEAT WOUND DEBRIDEMENT ACETABULAR REVISION;  Surgeon: Owen Siu MD;  Location:  [facility];  Service:    • WISDOM TOOTH EXTRACTION      all 4     Family History   Problem Relation Age of Onset   • Diabetes Mother    • Heart disease Mother    • Hypertension Mother    • Osteoarthritis Mother    • Diabetes Sister    • Arthritis Sister    • No Known Problems Father    • Diabetes Sister    • Diabetes Sister    • Heart disease Other    • Heart attack Other    • Heart disease Other    • Hypertension Other    • Osteoarthritis Other    • Rheum arthritis Other    • Cancer Neg Hx       Social History     Socioeconomic History   • Marital status:      Spouse name: Not on file   • Number of children: 0   • Years of education: Not on file   • Highest education level: Not on file   Occupational History   • Occupation: Employed     Comment: [employer]   Tobacco Use   • Smoking status: Former Smoker     Packs/day: 0.50     Years: 15.00     Pack years: 7.50     Types: Cigarettes     Last attempt to quit:  "7/15/2016     Years since quittin.1   • Smokeless tobacco: Never Used   Substance and Sexual Activity   • Alcohol use: No   • Drug use: No   • Sexual activity: Defer   Social History Narrative    Patient denies caffeine intake    Patient lives at home with her sister.        Review of Systems   Constitutional: Negative.    HENT: Negative.    Eyes: Negative.    Respiratory: Negative.    Cardiovascular: Negative.    Gastrointestinal: Negative.    Endocrine: Negative.    Genitourinary: Negative.    Musculoskeletal: Positive for arthralgias.   Skin: Negative.    Allergic/Immunologic: Negative.    Neurological: Negative.    Hematological: Negative.    Psychiatric/Behavioral: Negative.        The following portions of the patient's history were reviewed and updated as appropriate: allergies, current medications, past family history, past medical history, past social history, past surgical history and problem list.    Physical Exam:   Pulse 73   Ht 186.7 cm (73.5\")   Wt 121 kg (266 lb 12.1 oz)   SpO2 98%   BMI 34.71 kg/m²   GENERAL: Body habitus: trunkal obesity    Lower extremity edema: Left: none; Right: none    Gait: normal     Mental Status:  awake and alert; oriented to person, place, and time  MSK  Diabetic Foot Exam:  Right: new ulcer under 5th metatarsal head, 1cm erythema, some purulence under callus, it is superficial, no deep areas               Left:  No ulcer  NEURO Sensation:  absent     Medical Decision Making    Data Review:   phone conversation with physician, did cultures, ordered labs, antibiotics    Assessment/Plan/Diagnosis/Treatment Options:   1. Diabetic ulcer of lower extremity (CMS/HCC)  New diabetic ulcer with local erythema and some purulence on the right foot.  The ulcer is superficial, I debrided the callus and cultured the purulence under the callus.  I discussed this by phone with Dr. Mookie Winchester who is her infectious disease doctor, he will see her in the office this week.  We " will start her on Keflex.  We gave her another half shoe.  She needs to do a Bactroban dressing every day.  She absolutely needs to be nonweightbearing on this.  She is at very high risk to end up with a below-knee amputation.  I discussed this with her again in detail.  I will see her in a week, nonweightbearing 3 views of the foot with a marker on the ulcer at that time      Using a scalpel and pickups, I used sterile technique to extensively debride the ulcer and callus    - Anaerobic Culture - Swab, Foot, Right; Future  - Wound Culture - Wound, Foot, Right; Future  - CBC & Differential; Future  - C-reactive Protein; Future  - Sedimentation Rate; Future  - Ambulatory Referral to Infectious Disease    2. Controlled type 2 diabetes mellitus with diabetic polyneuropathy, without long-term current use of insulin (CMS/[facility])  Dense neuropathy unchanged

## 2020-09-02 LAB
BACTERIA SPEC AEROBE CULT: ABNORMAL
GRAM STN SPEC: ABNORMAL

## 2020-09-04 ENCOUNTER — TELEPHONE (OUTPATIENT)
Dept: ORTHOPEDIC SURGERY | Facility: CLINIC | Age: 50
End: 2020-09-04

## 2020-09-04 RX ORDER — FLUCONAZOLE 100 MG/1
150 TABLET ORAL DAILY
Qty: 1 TABLET | Refills: 2 | Status: SHIPPED | OUTPATIENT
Start: 2020-09-04 | End: 2020-10-13

## 2020-09-04 NOTE — TELEPHONE ENCOUNTER
----- Message from Danae Fong sent at 9/3/2020  7:35 PM EDT -----  Regarding: Non-Urgent Medical Question  Contact: [phone]  Could I possibly get some Diflucan or something sent in to [facility] in [place] where I am taking those antibiotics please??  THANKS SO MUCH

## 2020-09-05 ENCOUNTER — APPOINTMENT (OUTPATIENT)
Dept: GENERAL RADIOLOGY | Facility: HOSPITAL | Age: 50
End: 2020-09-05

## 2020-09-05 ENCOUNTER — APPOINTMENT (OUTPATIENT)
Dept: MRI IMAGING | Facility: HOSPITAL | Age: 50
End: 2020-09-05

## 2020-09-05 ENCOUNTER — APPOINTMENT (OUTPATIENT)
Dept: CT IMAGING | Facility: HOSPITAL | Age: 50
End: 2020-09-05

## 2020-09-05 ENCOUNTER — HOSPITAL ENCOUNTER (EMERGENCY)
Facility: HOSPITAL | Age: 50
Discharge: HOME | End: 2020-09-05
Attending: EMERGENCY MEDICINE | Admitting: EMERGENCY MEDICINE

## 2020-09-05 VITALS
DIASTOLIC BLOOD PRESSURE: 89 MMHG | RESPIRATION RATE: 18 BRPM | TEMPERATURE: 97.1 F | BODY MASS INDEX: 36.16 KG/M2 | HEIGHT: 72 IN | OXYGEN SATURATION: 95 % | HEART RATE: 84 BPM | WEIGHT: 267 LBS | SYSTOLIC BLOOD PRESSURE: 142 MMHG

## 2020-09-05 DIAGNOSIS — D72.829 LEUKOCYTOSIS, UNSPECIFIED TYPE: ICD-10-CM

## 2020-09-05 DIAGNOSIS — R70.0 ELEVATED SED RATE: ICD-10-CM

## 2020-09-05 DIAGNOSIS — M25.552 LEFT HIP PAIN: Primary | ICD-10-CM

## 2020-09-05 DIAGNOSIS — N39.0 ACUTE UTI: ICD-10-CM

## 2020-09-05 LAB
ALBUMIN SERPL-MCNC: 4.5 G/DL (ref 3.5–5.2)
ALBUMIN/GLOB SERPL: 0.9 G/DL
ALP SERPL-CCNC: 119 U/L (ref 39–117)
ALT SERPL W P-5'-P-CCNC: 21 U/L (ref 1–33)
ANION GAP SERPL CALCULATED.3IONS-SCNC: 15 MMOL/L (ref 5–15)
AST SERPL-CCNC: 32 U/L (ref 1–32)
B-HCG UR QL: NEGATIVE
BACTERIA SPEC ANAEROBE CULT: ABNORMAL
BACTERIA UR QL AUTO: ABNORMAL /HPF
BASOPHILS # BLD AUTO: 0.07 10*3/MM3 (ref 0–0.2)
BASOPHILS NFR BLD AUTO: 0.5 % (ref 0–1.5)
BILIRUB SERPL-MCNC: 0.5 MG/DL (ref 0–1.2)
BILIRUB UR QL STRIP: NEGATIVE
BUN SERPL-MCNC: 7 MG/DL (ref 6–20)
BUN/CREAT SERPL: 8.5 (ref 7–25)
CALCIUM SPEC-SCNC: 10.1 MG/DL (ref 8.6–10.5)
CHLORIDE SERPL-SCNC: 97 MMOL/L (ref 98–107)
CLARITY UR: ABNORMAL
CO2 SERPL-SCNC: 25 MMOL/L (ref 22–29)
COLOR UR: ABNORMAL
CREAT SERPL-MCNC: 0.82 MG/DL (ref 0.57–1)
CRP SERPL-MCNC: 0.63 MG/DL (ref 0–0.5)
DEPRECATED RDW RBC AUTO: 42.1 FL (ref 37–54)
EOSINOPHIL # BLD AUTO: 0.05 10*3/MM3 (ref 0–0.4)
EOSINOPHIL NFR BLD AUTO: 0.3 % (ref 0.3–6.2)
ERYTHROCYTE [DISTWIDTH] IN BLOOD BY AUTOMATED COUNT: 14.1 % (ref 12.3–15.4)
ERYTHROCYTE [SEDIMENTATION RATE] IN BLOOD: 88 MM/HR (ref 0–30)
GFR SERPL CREATININE-BSD FRML MDRD: 74 ML/MIN/1.73
GLOBULIN UR ELPH-MCNC: 5 GM/DL
GLUCOSE SERPL-MCNC: 142 MG/DL (ref 65–99)
GLUCOSE UR STRIP-MCNC: NEGATIVE MG/DL
HCT VFR BLD AUTO: 50.9 % (ref 34–46.6)
HGB BLD-MCNC: 16.7 G/DL (ref 12–15.9)
HGB UR QL STRIP.AUTO: ABNORMAL
HOLD SPECIMEN: NORMAL
HOLD SPECIMEN: NORMAL
HYALINE CASTS UR QL AUTO: ABNORMAL /LPF
IMM GRANULOCYTES # BLD AUTO: 0.06 10*3/MM3 (ref 0–0.05)
IMM GRANULOCYTES NFR BLD AUTO: 0.4 % (ref 0–0.5)
INTERNAL NEGATIVE CONTROL: NEGATIVE
INTERNAL POSITIVE CONTROL: POSITIVE
KETONES UR QL STRIP: ABNORMAL
LEUKOCYTE ESTERASE UR QL STRIP.AUTO: ABNORMAL
LIPASE SERPL-CCNC: 43 U/L (ref 13–60)
LYMPHOCYTES # BLD AUTO: 3.24 10*3/MM3 (ref 0.7–3.1)
LYMPHOCYTES NFR BLD AUTO: 21.9 % (ref 19.6–45.3)
Lab: NORMAL
MCH RBC QN AUTO: 27.2 PG (ref 26.6–33)
MCHC RBC AUTO-ENTMCNC: 32.8 G/DL (ref 31.5–35.7)
MCV RBC AUTO: 82.9 FL (ref 79–97)
MONOCYTES # BLD AUTO: 0.78 10*3/MM3 (ref 0.1–0.9)
MONOCYTES NFR BLD AUTO: 5.3 % (ref 5–12)
NEUTROPHILS NFR BLD AUTO: 10.61 10*3/MM3 (ref 1.7–7)
NEUTROPHILS NFR BLD AUTO: 71.6 % (ref 42.7–76)
NITRITE UR QL STRIP: NEGATIVE
NRBC BLD AUTO-RTO: 0 /100 WBC (ref 0–0.2)
PH UR STRIP.AUTO: 5.5 [PH] (ref 5–8)
PLATELET # BLD AUTO: 334 10*3/MM3 (ref 140–450)
PMV BLD AUTO: 9.2 FL (ref 6–12)
POTASSIUM SERPL-SCNC: 3.6 MMOL/L (ref 3.5–5.2)
PROT SERPL-MCNC: 9.5 G/DL (ref 6–8.5)
PROT UR QL STRIP: ABNORMAL
RBC # BLD AUTO: 6.14 10*6/MM3 (ref 3.77–5.28)
RBC # UR: ABNORMAL /HPF
REF LAB TEST METHOD: ABNORMAL
SODIUM SERPL-SCNC: 137 MMOL/L (ref 136–145)
SP GR UR STRIP: 1.02 (ref 1–1.03)
SQUAMOUS #/AREA URNS HPF: ABNORMAL /HPF
UROBILINOGEN UR QL STRIP: ABNORMAL
WBC # BLD AUTO: 14.81 10*3/MM3 (ref 3.4–10.8)
WBC UR QL AUTO: ABNORMAL /HPF
WHOLE BLOOD HOLD SPECIMEN: NORMAL
WHOLE BLOOD HOLD SPECIMEN: NORMAL
YEAST URNS QL MICRO: ABNORMAL /HPF

## 2020-09-05 PROCEDURE — 71045 X-RAY EXAM CHEST 1 VIEW: CPT

## 2020-09-05 PROCEDURE — 25010000002 HYDROMORPHONE PER 4 MG: Performed by: EMERGENCY MEDICINE

## 2020-09-05 PROCEDURE — 96374 THER/PROPH/DIAG INJ IV PUSH: CPT

## 2020-09-05 PROCEDURE — 83690 ASSAY OF LIPASE: CPT | Performed by: EMERGENCY MEDICINE

## 2020-09-05 PROCEDURE — 73723 MRI JOINT LWR EXTR W/O&W/DYE: CPT

## 2020-09-05 PROCEDURE — 85652 RBC SED RATE AUTOMATED: CPT | Performed by: PHYSICIAN ASSISTANT

## 2020-09-05 PROCEDURE — 81025 URINE PREGNANCY TEST: CPT | Performed by: EMERGENCY MEDICINE

## 2020-09-05 PROCEDURE — 96376 TX/PRO/DX INJ SAME DRUG ADON: CPT

## 2020-09-05 PROCEDURE — 25010000002 ONDANSETRON PER 1 MG: Performed by: EMERGENCY MEDICINE

## 2020-09-05 PROCEDURE — 86140 C-REACTIVE PROTEIN: CPT | Performed by: PHYSICIAN ASSISTANT

## 2020-09-05 PROCEDURE — 85025 COMPLETE CBC W/AUTO DIFF WBC: CPT | Performed by: EMERGENCY MEDICINE

## 2020-09-05 PROCEDURE — A9577 INJ MULTIHANCE: HCPCS | Performed by: EMERGENCY MEDICINE

## 2020-09-05 PROCEDURE — 96375 TX/PRO/DX INJ NEW DRUG ADDON: CPT

## 2020-09-05 PROCEDURE — 74177 CT ABD & PELVIS W/CONTRAST: CPT

## 2020-09-05 PROCEDURE — 87086 URINE CULTURE/COLONY COUNT: CPT | Performed by: PHYSICIAN ASSISTANT

## 2020-09-05 PROCEDURE — 25010000002 IOPAMIDOL 61 % SOLUTION: Performed by: EMERGENCY MEDICINE

## 2020-09-05 PROCEDURE — 80053 COMPREHEN METABOLIC PANEL: CPT | Performed by: EMERGENCY MEDICINE

## 2020-09-05 PROCEDURE — 0 GADOBENATE DIMEGLUMINE 529 MG/ML SOLUTION: Performed by: EMERGENCY MEDICINE

## 2020-09-05 PROCEDURE — 99284 EMERGENCY DEPT VISIT MOD MDM: CPT

## 2020-09-05 PROCEDURE — 81001 URINALYSIS AUTO W/SCOPE: CPT | Performed by: EMERGENCY MEDICINE

## 2020-09-05 RX ORDER — NITROFURANTOIN 25; 75 MG/1; MG/1
100 CAPSULE ORAL 2 TIMES DAILY
Qty: 14 CAPSULE | Refills: 0 | Status: SHIPPED | OUTPATIENT
Start: 2020-09-05 | End: 2020-10-13

## 2020-09-05 RX ORDER — HYDROMORPHONE HYDROCHLORIDE 1 MG/ML
0.5 INJECTION, SOLUTION INTRAMUSCULAR; INTRAVENOUS; SUBCUTANEOUS ONCE
Status: COMPLETED | OUTPATIENT
Start: 2020-09-05 | End: 2020-09-05

## 2020-09-05 RX ORDER — HYDROCODONE BITARTRATE AND ACETAMINOPHEN 7.5; 325 MG/1; MG/1
1 TABLET ORAL ONCE
Status: COMPLETED | OUTPATIENT
Start: 2020-09-05 | End: 2020-09-05

## 2020-09-05 RX ORDER — SODIUM CHLORIDE 0.9 % (FLUSH) 0.9 %
10 SYRINGE (ML) INJECTION AS NEEDED
Status: DISCONTINUED | OUTPATIENT
Start: 2020-09-05 | End: 2020-09-05 | Stop reason: HOSPADM

## 2020-09-05 RX ORDER — ONDANSETRON 2 MG/ML
4 INJECTION INTRAMUSCULAR; INTRAVENOUS ONCE
Status: COMPLETED | OUTPATIENT
Start: 2020-09-05 | End: 2020-09-05

## 2020-09-05 RX ORDER — CEFDINIR 300 MG/1
300 CAPSULE ORAL 2 TIMES DAILY
Qty: 14 CAPSULE | Refills: 0 | Status: SHIPPED | OUTPATIENT
Start: 2020-09-05 | End: 2020-09-05 | Stop reason: ALTCHOICE

## 2020-09-05 RX ORDER — MELOXICAM 15 MG/1
15 TABLET ORAL DAILY
Qty: 10 TABLET | Refills: 0 | Status: SHIPPED | OUTPATIENT
Start: 2020-09-05 | End: 2020-10-13

## 2020-09-05 RX ADMIN — IOPAMIDOL 100 ML: 612 INJECTION, SOLUTION INTRAVENOUS at 13:55

## 2020-09-05 RX ADMIN — HYDROCODONE BITARTRATE AND ACETAMINOPHEN 1 TABLET: 7.5; 325 TABLET ORAL at 15:21

## 2020-09-05 RX ADMIN — HYDROMORPHONE HYDROCHLORIDE 0.5 MG: 1 INJECTION, SOLUTION INTRAMUSCULAR; INTRAVENOUS; SUBCUTANEOUS at 17:16

## 2020-09-05 RX ADMIN — GADOBENATE DIMEGLUMINE 20 ML: 529 INJECTION, SOLUTION INTRAVENOUS at 20:12

## 2020-09-05 RX ADMIN — ONDANSETRON 4 MG: 2 INJECTION INTRAMUSCULAR; INTRAVENOUS at 12:16

## 2020-09-05 RX ADMIN — ONDANSETRON 4 MG: 2 INJECTION INTRAMUSCULAR; INTRAVENOUS at 17:16

## 2020-09-05 NOTE — ED PROVIDER NOTES
Subjective   50-year-old female presents to the emergency department with complaints of left hip pain.  The patient states that she has been having pain in the left hip off and on for several weeks but that it became constant over the past 24 hours.  The patient has also had some associated nausea and vomiting today.  She denies any abdominal pain.  She has chronic constipation.  The patient reports having had a low-grade fever last week but that has since resolved.  She notes a prior history of septic right hip in 2016 with arthroplasty in 2017.  She states that the sepsis in the hip came from a strep infection from the left foot after a surgery to remove a cyst was performed by Dr. Charleen Torres.  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  She is currently being treated for a right foot callus.  The patient states that she is able to bear weight but with increased pain.  No urinary symptoms.          Review of Systems   Constitutional: Positive for fever (Low-grade fever last week but now resolved). Negative for chills.   HENT: Negative for sore throat.    Respiratory: Positive for cough (Mild). Negative for shortness of breath.    Cardiovascular: Negative for chest pain and palpitations.   Gastrointestinal: Positive for constipation, nausea and vomiting. Negative for abdominal pain.   Endocrine: Negative for polydipsia, polyphagia and polyuria.   Genitourinary: Negative for dysuria.   Musculoskeletal: Positive for arthralgias (Left hip pain).   Skin: Negative for wound.   Neurological: Negative for headaches.   Hematological: Negative.    Psychiatric/Behavioral: Negative.        Past Medical History:   Diagnosis Date   • A-fib (CMS/HCC)    • Active smoker    • Aftercare following right hip joint replacement surgery    • Anemia    • Anemia    • Anxiety    • Aortic valve regurgitation    • Cancer (CMS/HCC)     elevated ca 125 but never diagnoised with anything     • Chest pain     no at this time    • CHF (congestive heart failure) (CMS/[facility])    • Constipation    • Controlled type 2 diabetes mellitus without complication (CMS/[facility])     not medicated??? not sure if type 2-  borderline diabetes    • Diabetic neuropathy (CMS/[facility])    • Diabetic Ulcer    • Diastolic murmur    • Dysfunctional uterine bleeding    • Heart valve implant    • Hypertension    • Iron deficiency anemia    • Low back pain    • MRSA infection 09/2015    TOE   • On supplemental oxygen therapy     at night 2 liters   • Primary Osteoarthritis of both hips    • Septic arthritis (CMS/[facility])    • Sleep apnea     sleeps with 2l of 02 at night   • Sleep apnea    • SOB (shortness of breath) on exertion    • 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[facility])    • Streptococcus infection, group B    • Subacute osteomyelitis of left foot (CMS/[facility])    • Surgical aftercare, musculoskeletal system    • Thin blood (CMS/[facility])    • Transfusion history    • Wears reading eyeglasses        No Known Allergies    Past Surgical History:   Procedure Laterality Date   • AMPUTATION DIGIT Right 1/30/2018    Procedure: AMPUTATION RIGHT 2ND TOE;  Surgeon: Charleen Nath MD;  Location:  [facility];  Service:    • AORTIC VALVE REPAIR/REPLACEMENT N/A 8/30/2016    Procedure: AIRAM, MEDIAN STERNOTOMY, AORTIC VALVE REPLACEMENT;  Surgeon: Mauricio Grove MD;  Location:  [facility];  Service:    • CARDIAC CATHETERIZATION N/A 8/25/2016    Procedure: Left Heart Cath;  Surgeon: Tyler Peterson MD;  Location:  [facility];  Service:    • CARDIAC CATHETERIZATION      x 2   • CARDIAC CATHETERIZATION N/A 3/21/2018    Procedure: Coronary angiography;  Surgeon: Tyler Peterson IV, MD;  Location:  [facility];  Service: Cardiovascular   • DILATATION AND CURETTAGE      of uterus   • FOOT SURGERY Left     for osteomyelitis   • HIP SURGERY Right     washout x 2 by Dr. Espitia   • INCISION AND DRAINAGE HIP Right  1/26/2017    Procedure: RIGHT HIP WOUND WASHOUT AND DEBRIDEMENT, WITH POLYETHELENE FEMORAL HEAD EXCHANGE ;  Surgeon: Owen Siu MD;  Location:  [facility];  Service:    • OTHER SURGICAL HISTORY      Open heart surgery   • PERICARDIAL WINDOW N/A 9/16/2016    Procedure: PERICARDIOCENTESIS WITH PLACEMENT OF DRAINAGE TUBE .;  Surgeon: Mauricio Grove MD;  Location:  [facility];  Service:    • PERICARDIAL WINDOW N/A 9/20/2016    Procedure: SUBXYPHOID PERICARDIAL WINDOW;  Surgeon: Mauricio Grove MD;  Location:  [facility];  Service:    • IA CLOSED RX TRAUMATIC HIP DISLOCATN N/A 1/29/2017    Procedure: HIP CLOSED REDUCTION;  Surgeon: Leander Li MD;  Location:  [facility];  Service: Orthopedics   • THORACOTOMY Left 10/5/2016    Procedure: THORACOTOMY Pericardectomy,  AIRAM,  Placement pacing leads, Pump standby;  Surgeon: Mauricio Grove MD;  Location:  [facility];  Service:    • TOE AMPUTATION      2nd toe right foot    • TOTAL HIP ARTHROPLASTY Right 1/23/2017    Procedure: RIGHT TOTAL HIP ARTHROPLASTY ANTERIOR ;  Surgeon: Owen Siu MD;  Location:  [facility];  Service:    • TOTAL HIP ARTHROPLASTY REVISION Right 1/30/2017    Procedure: REPEAT WOUND DEBRIDEMENT ACETABULAR REVISION;  Surgeon: Owen Siu MD;  Location:  [facility];  Service:    • WISDOM TOOTH EXTRACTION      all 4       Family History   Problem Relation Age of Onset   • Diabetes Mother    • Heart disease Mother    • Hypertension Mother    • Osteoarthritis Mother    • Diabetes Sister    • Arthritis Sister    • No Known Problems Father    • Diabetes Sister    • Diabetes Sister    • Heart disease Other    • Heart attack Other    • Heart disease Other    • Hypertension Other    • Osteoarthritis Other    • Rheum arthritis Other    • Cancer Neg Hx        Social History     Socioeconomic History   • Marital status:      Spouse name: Not on file   • Number of children: 0   • Years of education: Not on file   • Highest education level: Not on file    Occupational History   • Occupation: Employed     Comment: [employer]   Tobacco Use   • Smoking status: Former Smoker     Packs/day: 0.50     Years: 15.00     Pack years: 7.50     Types: Cigarettes     Last attempt to quit: 7/15/2016     Years since quittin.1   • Smokeless tobacco: Never Used   Substance and Sexual Activity   • Alcohol use: No   • Drug use: No   • Sexual activity: Defer   Social History Narrative    Patient denies caffeine intake    Patient lives at home with her sister.           Objective   Physical Exam   Constitutional: She is oriented to person, place, and time. She appears well-developed and well-nourished. No distress.   HENT:   Head: Normocephalic.   Nose: Nose normal.   Mouth/Throat: Oropharynx is clear and moist.   Eyes: Pupils are equal, round, and reactive to light. Conjunctivae are normal.   Neck: Normal range of motion. Neck supple.   Cardiovascular: Normal rate, regular rhythm, normal heart sounds and intact distal pulses.   No murmur heard.  Pulmonary/Chest: Effort normal.   Mild bilateral expiratory wheezes.   Abdominal: Soft. Bowel sounds are normal. There is no tenderness.   Musculoskeletal:   Mild to moderate pain on palpation of the anterior and lateral aspect of the left hip.  No increased heat or redness.  No deformity.  Normal range of motion.  Minimal pain on flexion and extension.   Neurological: She is alert and oriented to person, place, and time.   Skin: Skin is warm and dry. No rash noted.   Psychiatric: She has a normal mood and affect.       Procedures           ED Course      The pt has good ROM in the hip and is weight bearing, making a septic hip less likely.  She has non-specific findings of elevated inflammatory markers and a white count of 14.81.  No fever.  Her UA shows 1+ bacteria and 13-20 WBC but unfortunately also shows 7-12 epithelial cells, suggesting contamination.  She has no urinary symptoms.  Will culture the urine but not  treat with abx at this time.  CT shows some arthritic changes but no definite effusion, etc.  I spoke with Dr. Li who felt that septic hip is unlikely given her ROM and weight bearing, but felt that an MRI of the hip with and without contrast would better answer the question.  If negative, he felt she could go home to f/u with Dr. Siu on Tuesday.  I updated the pt on the plan and she is agreeable.  I will leave final dispo to Montrell Salas PA-C pending MRI results.    .                                       MDM    Final diagnoses:   Left hip pain   Leukocytosis, unspecified type   Elevated sed rate            Moody Lang PA  09/05/20 1631       Moody Lang PA  09/05/20 1645

## 2020-09-06 LAB — BACTERIA SPEC AEROBE CULT: ABNORMAL

## 2020-09-08 ENCOUNTER — TELEPHONE (OUTPATIENT)
Dept: ORTHOPEDIC SURGERY | Facility: CLINIC | Age: 50
End: 2020-09-08

## 2020-09-08 NOTE — TELEPHONE ENCOUNTER
From the patient via [employer]:    I just wanted to let you all know I was in the ER (at [facility]) on Saturday.   I presented with vomiting and left groin and back pain.  I was evaluated,  and a sedrate was drawn.  The level had increased by 40 points since Monday.  I just thought Dr Torres might want to know.   Thanks so much.  See ya Wednesday.

## 2020-09-09 ENCOUNTER — OFFICE VISIT (OUTPATIENT)
Dept: ORTHOPEDIC SURGERY | Facility: CLINIC | Age: 50
End: 2020-09-09

## 2020-09-09 VITALS — HEIGHT: 72 IN | OXYGEN SATURATION: 96 % | HEART RATE: 81 BPM | WEIGHT: 266.76 LBS | BODY MASS INDEX: 36.13 KG/M2

## 2020-09-09 DIAGNOSIS — E11.622 DIABETIC ULCER OF LOWER EXTREMITY (HCC): Primary | ICD-10-CM

## 2020-09-09 DIAGNOSIS — L97.909 DIABETIC ULCER OF LOWER EXTREMITY (HCC): Primary | ICD-10-CM

## 2020-09-09 PROCEDURE — 99212 OFFICE O/P EST SF 10 MIN: CPT | Performed by: ORTHOPAEDIC SURGERY

## 2020-09-09 NOTE — PROGRESS NOTES
ESTABLISHED PATIENT    Patient: Danae Fong  : 1970    Primary Care Provider: Leigh Casey MD    Requesting Provider: As above    Follow-up (10 days- Diabetic ulcer of lower extremity )      History    Chief Complaint: Right foot diabetic ulcer    History of Present Illness: She returns for follow-up of her right foot diabetic ulcer.  She was in the emergency room yesterday with a significant urinary infection, her sed rate and CRP were quite elevated.  The foot is getting better.  It is much less erythematous.  The cultures grew a Finegoldia magna.  This is evidently related to Peptostreptococcus, it should be covered by the Keflex.  The foot is much improved she was unable to get to infectious disease because her [employer] insurance will not pay for outpatient visits at [place] [facility], and  will only see hepatitis and HIV.  But there is an infectious disease doctor in [place] and she might be able to get an appointment there.    Current Outpatient Medications on File Prior to Visit   Medication Sig Dispense Refill   • aspirin 81 MG chewable tablet Chew 1 tablet Daily.     • bumetanide (BUMEX) 1 MG tablet Take 1 mg by mouth 2 (Two) Times a Day.     • cephalexin (KEFLEX) 500 MG capsule Take 1 capsule by mouth Every 6 (Six) Hours. 40 capsule 0   • DULoxetine (CYMBALTA) 30 MG capsule Take 30 mg by mouth Daily.     • fluconazole (Diflucan) 100 MG tablet Take 1.5 tablets by mouth Daily. 1 tablet 2   • hydrOXYzine (ATARAX) 25 MG tablet Take 25 mg by mouth Every 8 (Eight) Hours As Needed.     • meloxicam (MOBIC) 15 MG tablet Take 1 tablet by mouth Daily. 10 tablet 0   • METOPROLOL TARTRATE PO Take 12.5 mg by mouth As Needed.     • Multiple Vitamins-Minerals (CENTRUM SILVER 50+WOMEN) tablet      • mupirocin (Bactroban) 2 % ointment Apply  topically to the appropriate area as directed Daily. 30 g 5   • nitrofurantoin, macrocrystal-monohydrate, (MACROBID) 100 MG capsule Take 1 capsule by  mouth 2 (Two) Times a Day. 14 capsule 0   • spironolactone (ALDACTONE) 50 MG tablet Take 1 tablet by mouth Daily. 90 tablet 2   • vilazodone (VIIBRYD) 20 MG tablet tablet Take 20 mg by mouth Daily.       No current facility-administered medications on file prior to visit.       No Known Allergies   Past Medical History:   Diagnosis Date   • A-fib (CMS/[facility])    • Active smoker    • Aftercare following right hip joint replacement surgery    • Anemia    • Anemia    • Anxiety    • Aortic valve regurgitation    • Cancer (CMS/[facility])     elevated ca 125 but never diagnoised with anything    • Chest pain     no at this time    • CHF (congestive heart failure) (CMS/[facility])    • Constipation    • Controlled type 2 diabetes mellitus without complication (CMS/[facility])     not medicated??? not sure if type 2-  borderline diabetes    • Diabetic neuropathy (CMS/[facility])    • Diabetic Ulcer    • Diastolic murmur    • Dysfunctional uterine bleeding    • Heart valve implant    • Hypertension    • Iron deficiency anemia    • Low back pain    • MRSA infection 09/2015    TOE   • On supplemental oxygen therapy     at night 2 liters   • Primary Osteoarthritis of both hips    • Septic arthritis (CMS/[facility])    • Sleep apnea     sleeps with 2l of 02 at night   • Sleep apnea    • SOB (shortness of breath) on exertion    • 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[facility])    • Streptococcus infection, group B    • Subacute osteomyelitis of left foot (CMS/[facility])    • Surgical aftercare, musculoskeletal system    • Thin blood (CMS/[facility])    • Transfusion history    • Wears reading eyeglasses      Past Surgical History:   Procedure Laterality Date   • AMPUTATION DIGIT Right 1/30/2018    Procedure: AMPUTATION RIGHT 2ND TOE;  Surgeon: Charleen Nath MD;  Location:  [facility];  Service:    • AORTIC VALVE REPAIR/REPLACEMENT N/A 8/30/2016    Procedure: AIRAM, MEDIAN STERNOTOMY, AORTIC VALVE REPLACEMENT;  Surgeon: Mauricio Grove MD;  Location:  [facility];  Service:    • CARDIAC  CATHETERIZATION N/A 8/25/2016    Procedure: Left Heart Cath;  Surgeon: Tyler Peterson MD;  Location:  [facility];  Service:    • CARDIAC CATHETERIZATION      x 2   • CARDIAC CATHETERIZATION N/A 3/21/2018    Procedure: Coronary angiography;  Surgeon: Tyler Peterson IV, MD;  Location:  [facility];  Service: Cardiovascular   • DILATATION AND CURETTAGE      of uterus   • FOOT SURGERY Left     for osteomyelitis   • HIP SURGERY Right     washout x 2 by Dr. Espitia   • INCISION AND DRAINAGE HIP Right 1/26/2017    Procedure: RIGHT HIP WOUND WASHOUT AND DEBRIDEMENT, WITH POLYETHELENE FEMORAL HEAD EXCHANGE ;  Surgeon: Owen Siu MD;  Location:  [facility];  Service:    • OTHER SURGICAL HISTORY      Open heart surgery   • PERICARDIAL WINDOW N/A 9/16/2016    Procedure: PERICARDIOCENTESIS WITH PLACEMENT OF DRAINAGE TUBE .;  Surgeon: Mauricio Grove MD;  Location:  [facility];  Service:    • PERICARDIAL WINDOW N/A 9/20/2016    Procedure: SUBXYPHOID PERICARDIAL WINDOW;  Surgeon: Mauricio Grove MD;  Location:  [facility];  Service:    • [place] CLOSED RX TRAUMATIC HIP DISLOCATN N/A 1/29/2017    Procedure: HIP CLOSED REDUCTION;  Surgeon: Leander Li MD;  Location:  [facility];  Service: Orthopedics   • THORACOTOMY Left 10/5/2016    Procedure: THORACOTOMY Pericardectomy,  AIRAM,  Placement pacing leads, Pump standby;  Surgeon: Mauricio Grove MD;  Location:  [facility];  Service:    • TOE AMPUTATION      2nd toe right foot    • TOTAL HIP ARTHROPLASTY Right 1/23/2017    Procedure: RIGHT TOTAL HIP ARTHROPLASTY ANTERIOR ;  Surgeon: Owen Siu MD;  Location:  [facility];  Service:    • TOTAL HIP ARTHROPLASTY REVISION Right 1/30/2017    Procedure: REPEAT WOUND DEBRIDEMENT ACETABULAR REVISION;  Surgeon: Owen Siu MD;  Location:  [facility];  Service:    • WISDOM TOOTH EXTRACTION      all 4     Family History   Problem Relation Age of Onset   • Diabetes Mother    • Heart disease Mother   "  • Hypertension Mother    • Osteoarthritis Mother    • Diabetes Sister    • Arthritis Sister    • No Known Problems Father    • Diabetes Sister    • Diabetes Sister    • Heart disease Other    • Heart attack Other    • Heart disease Other    • Hypertension Other    • Osteoarthritis Other    • Rheum arthritis Other    • Cancer Neg Hx       Social History     Socioeconomic History   • Marital status:      Spouse name: Not on file   • Number of children: 0   • Years of education: Not on file   • Highest education level: Not on file   Occupational History   • Occupation: Employed     Comment: [employer]   Tobacco Use   • Smoking status: Former Smoker     Packs/day: 0.50     Years: 15.00     Pack years: 7.50     Types: Cigarettes     Last attempt to quit: 7/15/2016     Years since quittin.1   • Smokeless tobacco: Never Used   Substance and Sexual Activity   • Alcohol use: No   • Drug use: No   • Sexual activity: Defer   Social History Narrative    Patient denies caffeine intake    Patient lives at home with her sister.        Review of Systems    The following portions of the patient's history were reviewed and updated as appropriate: allergies, current medications, past family history, past medical history, past social history, past surgical history and problem list.    Physical Exam:   Pulse 81   Ht 186.7 cm (73.5\")   Wt 121 kg (266 lb 12.1 oz)   SpO2 96%   BMI 34.71 kg/m²   GENERAL: Body habitus: trunkal obesity    Lower extremity edema: Left: none; Right: none    Gait: Using half shoe     Mental Status:  awake and alert; oriented to person, place, and time  MSK  Diabetic Foot Exam:  Right: Ulcer is much more superficial                 Left:  No ulcer  NEURO Sensation:  absent     Medical Decision Making    Data Review:   ordered and reviewed x-rays today    Assessment/Plan/Diagnosis/Treatment Options:   1. Diabetic ulcer of lower extremity (CMS/HCC)  She is much improved, the " erythema has resolved, it is dry, there is no purulence, it is very superficial and smaller.  I think the Keflex is appropriate.  She will try to get an appointment with ID in [place].  She needs to stay in a half shoe and continue the Bactroban until this heals.  X-rays today did not show any obvious erosions, nothing that looks like osteomyelitis.  I debrided the ulcer again today.  - XR Foot 3+ View Right        I cut my left thumb with the scalpel after I did the debridement, no contamination of patient with my blood.  I explained this to her, she understood.

## 2020-09-09 NOTE — TELEPHONE ENCOUNTER
I spoke with the patient and she has not been in to see a ID doctor in [place] because she was unaware of this. She is currently on an antibiotic for the UTI for which she was at the ED for. I asked her how her foot looked and she mentioned that it looks good and healing up well. She did say that most of the redness that she had has gone away and she is still wearing her boot. She has an appointment with [facility] today.    Ela

## 2020-09-30 ENCOUNTER — APPOINTMENT (OUTPATIENT)
Dept: NUCLEAR MEDICINE | Facility: HOSPITAL | Age: 50
End: 2020-09-30

## 2020-10-12 NOTE — PROGRESS NOTES
[facility]  Office Visit Note    DATE: 10/13/2020    IDENTIFICATION: Danae Fong is a 50 y.o. female who resides in [place], [place]    REASON FOR VISIT:  • Chronic diastolic heart failure  • Paroxysmal atrial fibrillation  • Aortic valve replacement  • Atypical chest pain  • Palpitations            Danae Fong returns today for reassessment of her palpitations and other symptoms and to discuss results of 30-day monitor.  Her last visit the patient reported lightheadedness and palpitations.  She had not been taking her metoprolol but restarted it.  A 2-week monitor was placed and  showed rare PACs and PVCs but no episodes of atrial fibrillation.  Her lightheadedness corresponded with sinus bradycardia with a heart rate in the 50s.  She has been taking 50 mg of metoprolol once daily.  She actually feels better now than she did at her last visit.  She denies any chest pain, dyspnea, orthopnea, or syncope.  She is not really having many palpitations now.  She does still get lightheaded particularly when standing from a sitting position.       Review of Systems   Constitution: Negative for malaise/fatigue.   Eyes: Negative for vision loss in left eye and vision loss in right eye.   Cardiovascular: Negative for chest pain, dyspnea on exertion, near-syncope, orthopnea, palpitations, paroxysmal nocturnal dyspnea and syncope.   Musculoskeletal: Negative for myalgias.   Neurological: Negative for brief paralysis, excessive daytime sleepiness, focal weakness, numbness, paresthesias and weakness.   All other systems reviewed and are negative.      The patient's past medical, social, family history and ROS reviewed in the patient's electronic medical record.    No Known Allergies      Current Outpatient Medications:   •  aspirin 81 MG chewable tablet, Chew 1 tablet Daily., Disp: , Rfl:   •  bumetanide (BUMEX) 1 MG tablet, Take 1 mg by mouth 2 (Two) Times a Day., Disp: ,  Rfl:   •  DULoxetine (CYMBALTA) 30 MG capsule, Take 30 mg by mouth Daily., Disp: , Rfl:   •  metoprolol tartrate (LOPRESSOR) 25 MG tablet, Take 0.5 tablets by mouth Daily., Disp: 90 tablet, Rfl: 0  •  Multiple Vitamins-Minerals (CENTRUM SILVER 50+WOMEN) tablet, , Disp: , Rfl:   •  spironolactone (ALDACTONE) 50 MG tablet, Take 1 tablet by mouth Daily., Disp: 90 tablet, Rfl: 2  •  vilazodone (VIIBRYD) 20 MG tablet tablet, Take 20 mg by mouth Daily., Disp: , Rfl:     Past Medical History:   Diagnosis Date   • A-fib (CMS/HCC)    • Active smoker    • Aftercare following right hip joint replacement surgery    • Anemia    • Anemia    • Anxiety    • Aortic valve regurgitation    • Cancer (CMS/HCC)     elevated ca 125 but never diagnoised with anything    • Chest pain     no at this time    • CHF (congestive heart failure) (CMS/[facility])    • Constipation    • Controlled type 2 diabetes mellitus without complication (CMS/HCC)     not medicated??? not sure if type 2-  borderline diabetes    • Diabetic neuropathy (CMS/HCC)    • Diabetic Ulcer    • Diastolic murmur    • Dysfunctional uterine bleeding    • Heart valve implant    • Hypertension    • Iron deficiency anemia    • Low back pain    • MRSA infection 09/2015    TOE   • On supplemental oxygen therapy     at night 2 liters   • Primary Osteoarthritis of both hips    • Septic arthritis (CMS/HCC)    • Sleep apnea     sleeps with 2l of 02 at night   • Sleep apnea    • SOB (shortness of breath) on exertion    • 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[facility])    • Streptococcus infection, group B    • Subacute osteomyelitis of left foot (CMS/[facility])    • Surgical aftercare, musculoskeletal system    • Thin blood (CMS/HCC)    • Transfusion history    • Wears reading eyeglasses        Past Surgical History:   Procedure Laterality Date   • AMPUTATION DIGIT Right 1/30/2018    Procedure: AMPUTATION RIGHT 2ND TOE;  Surgeon: Charleen Nath MD;  Location: [facility];  Service:    • AORTIC VALVE  REPAIR/REPLACEMENT N/A 8/30/2016    Procedure: AIRAM, MEDIAN STERNOTOMY, AORTIC VALVE REPLACEMENT;  Surgeon: Mauricio Grove MD;  [facility]:  [facility];  Service:    • CARDIAC CATHETERIZATION N/A 8/25/2016    Procedure: Left Heart Cath;  Surgeon: Tyler Peterson MD;  [facility];  Service:    • CARDIAC CATHETERIZATION      x 2   • CARDIAC CATHETERIZATION N/A 3/21/2018    Procedure: Coronary angiography;  Surgeon: Tyler Peterson IV, MD;  [facility]:  [facility];  Service: Cardiovascular   • DILATATION AND CURETTAGE      of uterus   • FOOT SURGERY Left     for osteomyelitis   • HIP SURGERY Right     washout x 2 by Dr. Espitia   • INCISION AND DRAINAGE HIP Right 1/26/2017    Procedure: RIGHT HIP WOUND WASHOUT AND DEBRIDEMENT, WITH POLYETHELENE FEMORAL HEAD EXCHANGE ;  Surgeon: Owen Siu MD;  Location:  [facility];  Service:    • OTHER SURGICAL HISTORY      Open heart surgery   • PERICARDIAL WINDOW N/A 9/16/2016    Procedure: PERICARDIOCENTESIS WITH PLACEMENT OF DRAINAGE TUBE .;  Surgeon: Mauricio Grove MD;  Location:  [facility];  Service:    • PERICARDIAL WINDOW N/A 9/20/2016    Procedure: SUBXYPHOID PERICARDIAL WINDOW;  Surgeon: Mauricio Grove MD;  Location:  [facility];  Service:    • [place] CLOSED RX TRAUMATIC HIP DISLOCATN N/A 1/29/2017    Procedure: HIP CLOSED REDUCTION;  Surgeon: Leander Li MD;  Location:  [facility];  Service: Orthopedics   • THORACOTOMY Left 10/5/2016    Procedure: THORACOTOMY Pericardectomy,  AIRAM,  Placement pacing leads, Pump standby;  Surgeon: Mauricio Grove MD;  Location:  [facility];  Service:    • TOE AMPUTATION      2nd toe right foot    • TOTAL HIP ARTHROPLASTY Right 1/23/2017    Procedure: RIGHT TOTAL HIP ARTHROPLASTY ANTERIOR ;  Surgeon: Owen Siu MD;  Location:  [facility];  Service:    • TOTAL HIP ARTHROPLASTY REVISION Right 1/30/2017    Procedure: REPEAT WOUND DEBRIDEMENT ACETABULAR REVISION;  Surgeon: Owen Siu,  "MD;  Location: [facility];  Service:    • WISDOM TOOTH EXTRACTION      all 4       Family History   Problem Relation Age of Onset   • Diabetes Mother    • Heart disease Mother    • Hypertension Mother    • Osteoarthritis Mother    • Diabetes Sister    • Arthritis Sister    • No Known Problems Father    • Diabetes Sister    • Diabetes Sister    • Heart disease Other    • Heart attack Other    • Heart disease Other    • Hypertension Other    • Osteoarthritis Other    • Rheum arthritis Other    • Cancer Neg Hx        Social History     Tobacco Use   • Smoking status: Former Smoker     Packs/day: 0.50     Years: 15.00     Pack years: 7.50     Types: Cigarettes     Quit date: 7/15/2016     Years since quittin.2   • Smokeless tobacco: Never Used   Substance Use Topics   • Alcohol use: No           Blood pressure 102/66, pulse 51, temperature 96.8 °F (36 °C), height 186.7 cm (73.5\"), weight 117 kg (258 lb), SpO2 95 %, not currently breastfeeding.  Body mass index is 33.58 kg/m².  Vitals:    10/13/20 1426   Patient Position: Sitting       Constitutional:       Appearance: Healthy appearance. Well-developed.   Eyes:      General: Lids are normal. No scleral icterus.     Conjunctiva/sclera: Conjunctivae normal.   HENT:      Head: Normocephalic and atraumatic.   Neck:      Musculoskeletal: Normal range of motion.      Thyroid: No thyromegaly.      Vascular: No carotid bruit or JVD.   Pulmonary:      Effort: Pulmonary effort is normal.      Breath sounds: Normal breath sounds. No wheezing. No rhonchi. No rales.   Cardiovascular:      Normal rate. Regular rhythm.      Murmurs: There is no murmur.      No gallop. No rub.   Pulses:     Intact distal pulses.   Edema:     Peripheral edema absent.   Abdominal:      General: There is no distension.      Palpations: Abdomen is soft. There is no abdominal mass.   Skin:     General: Skin is warm and dry.      Findings: No rash.   Neurological:      General: No focal deficit present. "      Mental Status: Alert and oriented to person, place, and time.      Gait: Gait is intact.   Psychiatric:         Attention and Perception: Attention normal.         Mood and Affect: Mood normal.         Behavior: Behavior normal.         Data Review (reviewed with patient):     Procedures    Lab Results   Component Value Date    GLUCOSE 142 (H) 09/05/2020    BUN 7 09/05/2020    CREATININE 0.82 09/05/2020    EGFRIFNONA 74 09/05/2020    BCR 8.5 09/05/2020    K 3.6 09/05/2020    CO2 25.0 09/05/2020    CALCIUM 10.1 09/05/2020    ALBUMIN 4.50 09/05/2020    ALKPHOS 119 (H) 09/05/2020    AST 32 09/05/2020    ALT 21 09/05/2020      Lab Results   Component Value Date    CHOL 211 (H) 01/14/2020    TRIG 138 01/14/2020    HDL 33 (L) 01/14/2020     (H) 01/14/2020     Lab Results   Component Value Date    HGBA1C 7.20 (H) 03/19/2018     Lab Results   Component Value Date    WBC 14.81 (H) 09/05/2020    HGB 16.7 (H) 09/05/2020    HCT 50.9 (H) 09/05/2020    MCV 82.9 09/05/2020     09/05/2020     Lab Results   Component Value Date    TSH 2.028 01/26/2017            Problem List Items Addressed This Visit        Cardiovascular and Mediastinum    Chronic diastolic congestive heart failure (CMS/HCC) - Primary    Overview     · Echo (10/10/16): Normal LVEF.  Bioprosthetic aortic valve functioning normally.  · Echo (12/2017): LVEF 50-55%.  Bioprosthetic aortic valve functioning normally  · Echo (1/14/2020):LVEF = 60%.  Grade 1 diastolic dysfunction.  Bioprosthetic aortic valve well-seated and functions normally.  Mean gradient 14 mmHg           Current Assessment & Plan     · Stable NYHA class II symptoms  · Continue Bumex 1 mg BID  · Continue spironolactone 50 mg daily         Relevant Medications    metoprolol tartrate (LOPRESSOR) 25 MG tablet    Essential hypertension    Current Assessment & Plan     · Borderline low blood pressure  · Continue spironolactone 50 mg daily   · Decrease metoprolol to tartrate 25 mg twice  daily           Relevant Medications    metoprolol tartrate (LOPRESSOR) 25 MG tablet    Palpitations    Current Assessment & Plan     · Controlled on beta-blocker therapy  · Having lightheadedness due to bradycardia decrease metoprolol to tartrate 12.5 mg twice daily         Paroxysmal atrial fibrillation (CMS/HCC)    Overview     · Postoperative afib after AVR  · Chads Vasc= 2  · 2-week monitor (10/7/2020): Predominantly normal sinus rhythm without atrial fibrillation/flutter.  1% PACs and PVCs.  Lightheadedness associated with heart rates in the 50 bpm.         Current Assessment & Plan     · Defer anticoagulation as no further episodes of atrial fibrillation  · Recent cardiac monitor showed no episodes of atrial fibrillation.         Relevant Medications    metoprolol tartrate (LOPRESSOR) 25 MG tablet    PFO (patent foramen ovale)    Current Assessment & Plan     · No signs or symptoms of TIA or CVA therefore defer any need for closure         Relevant Medications    metoprolol tartrate (LOPRESSOR) 25 MG tablet       Nervous and Auditory    Atypical chest pain    Overview     · Cardiac cath (8/25/2016): Normal coronaries  · MPS (3/8/2018):Small-sized, moderately severe area of ischemia located in the apex. This area of ischemia affects 3% of the myocardium.  Normal LVEF 55%\  · MPS (3/21/2018): Normal coronary arteries         Current Assessment & Plan     · Denies any chest pain            Other    Status post aortic valve replacement with bioprosthetic valve    Overview     · Severe nonrheumatic aortic insufficiency by AIRAM, 8/24/2016  · Bioprosthetic AVR (25 mm Magna Ease) by Dr. Mauricio Grove, 8/30/3016  · Initial possibility of vegetation turned out to be a flail leaflet  · Echo (12/2/2017): Normal functioning bioprosthetic valve.  Mild MR.  LVEF 50-55%  · Echo (2/6/18): LVEF 55%. Normal functioning bioprosthetic aortic valve  · Echo (1/14/2020):LVEF = 60%.  Grade 1 diastolic dysfunction.  Bioprosthetic  aortic valve well-seated and functions normally.  Mean gradient 14 mmHg           Current Assessment & Plan     · Stable NYHA class II symptoms  · Continue Bumex 1 mg twice daily  · We will plan on repeat echocardiogram in 2022             Patient is having some lightheadedness that corresponds to bradycardia with heart rates in the 50s.  I will decrease her metoprolol to tartrate 12.5 mg twice daily.  We will continue all of her other medications as prescribed she will follow-up 6 months or sooner if needed.       · Decrease metoprolol to tartrate 12.5 mg twice daily due to symptomatic bradycardia  · Continue all other medications as prescribed  No follow-ups on file.      Bertha Butler, APRN  10/13/2020

## 2020-10-13 ENCOUNTER — OFFICE VISIT (OUTPATIENT)
Dept: CARDIOLOGY | Facility: CLINIC | Age: 50
End: 2020-10-13

## 2020-10-13 VITALS
HEIGHT: 72 IN | BODY MASS INDEX: 34.95 KG/M2 | SYSTOLIC BLOOD PRESSURE: 102 MMHG | DIASTOLIC BLOOD PRESSURE: 66 MMHG | OXYGEN SATURATION: 95 % | HEART RATE: 51 BPM | WEIGHT: 258 LBS | TEMPERATURE: 96.8 F

## 2020-10-13 DIAGNOSIS — I10 ESSENTIAL HYPERTENSION: ICD-10-CM

## 2020-10-13 DIAGNOSIS — I48.0 PAROXYSMAL ATRIAL FIBRILLATION (HCC): ICD-10-CM

## 2020-10-13 DIAGNOSIS — R07.89 ATYPICAL CHEST PAIN: ICD-10-CM

## 2020-10-13 DIAGNOSIS — Q21.12 PFO (PATENT FORAMEN OVALE): ICD-10-CM

## 2020-10-13 DIAGNOSIS — R00.2 PALPITATIONS: ICD-10-CM

## 2020-10-13 DIAGNOSIS — I50.32 CHRONIC DIASTOLIC CONGESTIVE HEART FAILURE (HCC): Primary | ICD-10-CM

## 2020-10-13 DIAGNOSIS — Z95.3 STATUS POST AORTIC VALVE REPLACEMENT WITH BIOPROSTHETIC VALVE: ICD-10-CM

## 2020-10-13 PROCEDURE — 99214 OFFICE O/P EST MOD 30 MIN: CPT | Performed by: NURSE PRACTITIONER

## 2020-10-13 NOTE — ASSESSMENT & PLAN NOTE
· Stable NYHA class II symptoms  · Continue Bumex 1 mg twice daily  · We will plan on repeat echocardiogram in 2022

## 2020-10-13 NOTE — ASSESSMENT & PLAN NOTE
· Controlled on beta-blocker therapy  · Having lightheadedness due to bradycardia decrease metoprolol to tartrate 12.5 mg twice daily

## 2020-10-13 NOTE — ASSESSMENT & PLAN NOTE
· Defer anticoagulation as no further episodes of atrial fibrillation  · Recent cardiac monitor showed no episodes of atrial fibrillation.

## 2020-10-13 NOTE — ASSESSMENT & PLAN NOTE
· Borderline low blood pressure  · Continue spironolactone 50 mg daily   · Decrease metoprolol to tartrate 25 mg twice daily

## 2020-10-27 ENCOUNTER — PATIENT MESSAGE (OUTPATIENT)
Dept: GASTROENTEROLOGY | Facility: CLINIC | Age: 50
End: 2020-10-27

## 2020-10-27 NOTE — TELEPHONE ENCOUNTER
From: Danae Fong  To: Amos Lee MD  Sent: 10/27/2020 8:31 AM EDT  Subject: Non-Urgent Medical Question    I had a gastric emptying study schedule for [employer] thru your office. I had to cancel due to an unexpected illness which has left me in an orthopedic boot. My question is, could I possibly get this scheduled at my local hospital due to my limited driving due to boot. If you could fax the order to my local hospital I can schedule it with them. I know you will have to change facility on authorization, I hope this is not too much trouble. I have tried to wait until I am out of boot, but my stomach issues are worsening.   My local hospital in [facility]. You can just let me know when it is okay to schedule. The fax number to send the order is [phone]. Thank you all so much.

## 2020-11-16 ENCOUNTER — TELEPHONE (OUTPATIENT)
Dept: GASTROENTEROLOGY | Facility: CLINIC | Age: 50
End: 2020-11-16

## 2020-11-16 NOTE — TELEPHONE ENCOUNTER
JIMI FROM  [facility] [employer] CALLED TODAY IN RE: TO PATIENT RESCHEDULE GES. STATES THE AUTH HAS SINCE  AND WANTED TO MAKE US AWARE.

## 2021-01-05 ENCOUNTER — PATIENT MESSAGE (OUTPATIENT)
Dept: GASTROENTEROLOGY | Facility: CLINIC | Age: 51
End: 2021-01-05

## 2021-02-04 ENCOUNTER — PATIENT MESSAGE (OUTPATIENT)
Dept: CARDIOLOGY | Facility: CLINIC | Age: 51
End: 2021-02-04

## 2021-02-08 ENCOUNTER — TELEPHONE (OUTPATIENT)
Dept: GASTROENTEROLOGY | Facility: CLINIC | Age: 51
End: 2021-02-08

## 2021-02-08 NOTE — TELEPHONE ENCOUNTER
Spoke with Mr Fong this morning. Patient confirmed that she will be having GES at [facility]. Submitted a URGENT PA request with Kar[facility]. [phone]. Clinical faxed. Case# [id number]

## 2021-02-09 NOTE — TELEPHONE ENCOUNTER
I call [facility]. ISRAEL JURADO Approved. #[id number]. I will faxed approval letter to [phone].

## 2021-02-22 ENCOUNTER — TELEPHONE (OUTPATIENT)
Dept: CARDIOLOGY | Facility: CLINIC | Age: 51
End: 2021-02-22

## 2021-02-22 ENCOUNTER — PATIENT MESSAGE (OUTPATIENT)
Dept: GASTROENTEROLOGY | Facility: CLINIC | Age: 51
End: 2021-02-22

## 2021-02-23 NOTE — TELEPHONE ENCOUNTER
From: Danae Fong  To: Amos Lee MD  Sent: 2/22/2021 1:09 PM EST  Subject: Test Results Question    I was just going to let you know I had gastric emptying test today at [facility] in [place].

## 2021-03-01 ENCOUNTER — TELEPHONE (OUTPATIENT)
Dept: GASTROENTEROLOGY | Facility: CLINIC | Age: 51
End: 2021-03-01

## 2021-03-01 NOTE — TELEPHONE ENCOUNTER
I tried to call Ms Fong to give GES results. No answer; left voice message. I will mail result letter and send through my chart also.

## 2021-03-01 NOTE — TELEPHONE ENCOUNTER
The gastric emptying study done at the [facility] was not a 4-hour study.  The report of that gastric empty study was normal. Dr Lee.

## 2021-03-05 ENCOUNTER — HOSPITAL ENCOUNTER (OUTPATIENT)
Dept: GENERAL RADIOLOGY | Facility: HOSPITAL | Age: 51
Discharge: HOME | End: 2021-03-05

## 2021-03-05 ENCOUNTER — APPOINTMENT (OUTPATIENT)
Dept: PREADMISSION TESTING | Facility: HOSPITAL | Age: 51
End: 2021-03-05

## 2021-03-05 VITALS — WEIGHT: 250.4 LBS | BODY MASS INDEX: 33.92 KG/M2 | HEIGHT: 72 IN

## 2021-03-05 LAB
ABO GROUP BLD: NORMAL
ALBUMIN SERPL-MCNC: 4.2 G/DL (ref 3.5–5.2)
ALBUMIN/GLOB SERPL: 1.1 G/DL
ALP SERPL-CCNC: 119 U/L (ref 39–117)
ALT SERPL W P-5'-P-CCNC: 14 U/L (ref 1–33)
ANION GAP SERPL CALCULATED.3IONS-SCNC: 11 MMOL/L (ref 5–15)
ANTI-E: NORMAL
ANTI-K: NORMAL
APTT PPP: 33 SECONDS (ref 24–37)
AST SERPL-CCNC: 21 U/L (ref 1–32)
BASOPHILS # BLD AUTO: 0.06 10*3/MM3 (ref 0–0.2)
BASOPHILS NFR BLD AUTO: 0.6 % (ref 0–1.5)
BILIRUB SERPL-MCNC: 0.3 MG/DL (ref 0–1.2)
BLD GP AB SCN SERPL QL: POSITIVE
BUN SERPL-MCNC: 12 MG/DL (ref 6–20)
BUN/CREAT SERPL: 17.1 (ref 7–25)
CALCIUM SPEC-SCNC: 9.4 MG/DL (ref 8.6–10.5)
CHLORIDE SERPL-SCNC: 98 MMOL/L (ref 98–107)
CO2 SERPL-SCNC: 26 MMOL/L (ref 22–29)
CREAT SERPL-MCNC: 0.7 MG/DL (ref 0.57–1)
CRP SERPL-MCNC: 0.44 MG/DL (ref 0–0.5)
DEPRECATED RDW RBC AUTO: 43.3 FL (ref 37–54)
EOSINOPHIL # BLD AUTO: 0.12 10*3/MM3 (ref 0–0.4)
EOSINOPHIL NFR BLD AUTO: 1.2 % (ref 0.3–6.2)
ERYTHROCYTE [DISTWIDTH] IN BLOOD BY AUTOMATED COUNT: 14.2 % (ref 12.3–15.4)
ERYTHROCYTE [SEDIMENTATION RATE] IN BLOOD: 66 MM/HR (ref 0–30)
GFR SERPL CREATININE-BSD FRML MDRD: 89 ML/MIN/1.73
GLOBULIN UR ELPH-MCNC: 3.8 GM/DL
GLUCOSE SERPL-MCNC: 96 MG/DL (ref 65–99)
HBA1C MFR BLD: 5.2 % (ref 4.8–5.6)
HCT VFR BLD AUTO: 47.2 % (ref 34–46.6)
HGB BLD-MCNC: 15.4 G/DL (ref 12–15.9)
IMM GRANULOCYTES # BLD AUTO: 0.04 10*3/MM3 (ref 0–0.05)
IMM GRANULOCYTES NFR BLD AUTO: 0.4 % (ref 0–0.5)
INR PPP: 1.16 (ref 0.85–1.16)
LYMPHOCYTES # BLD AUTO: 3.33 10*3/MM3 (ref 0.7–3.1)
LYMPHOCYTES NFR BLD AUTO: 34.4 % (ref 19.6–45.3)
MCH RBC QN AUTO: 27.4 PG (ref 26.6–33)
MCHC RBC AUTO-ENTMCNC: 32.6 G/DL (ref 31.5–35.7)
MCV RBC AUTO: 83.8 FL (ref 79–97)
MONOCYTES # BLD AUTO: 0.66 10*3/MM3 (ref 0.1–0.9)
MONOCYTES NFR BLD AUTO: 6.8 % (ref 5–12)
NEUTROPHILS NFR BLD AUTO: 5.47 10*3/MM3 (ref 1.7–7)
NEUTROPHILS NFR BLD AUTO: 56.6 % (ref 42.7–76)
NRBC BLD AUTO-RTO: 0 /100 WBC (ref 0–0.2)
PLATELET # BLD AUTO: 236 10*3/MM3 (ref 140–450)
PMV BLD AUTO: 9.2 FL (ref 6–12)
POTASSIUM SERPL-SCNC: 3.9 MMOL/L (ref 3.5–5.2)
PROT SERPL-MCNC: 8 G/DL (ref 6–8.5)
PROTHROMBIN TIME: 14.6 SECONDS (ref 11.5–14)
RBC # BLD AUTO: 5.63 10*6/MM3 (ref 3.77–5.28)
RH BLD: POSITIVE
SODIUM SERPL-SCNC: 135 MMOL/L (ref 136–145)
WBC # BLD AUTO: 9.68 10*3/MM3 (ref 3.4–10.8)

## 2021-03-05 PROCEDURE — 93005 ELECTROCARDIOGRAM TRACING: CPT

## 2021-03-05 PROCEDURE — 86900 BLOOD TYPING SEROLOGIC ABO: CPT

## 2021-03-05 PROCEDURE — 36415 COLL VENOUS BLD VENIPUNCTURE: CPT

## 2021-03-05 PROCEDURE — 83036 HEMOGLOBIN GLYCOSYLATED A1C: CPT

## 2021-03-05 PROCEDURE — 86901 BLOOD TYPING SEROLOGIC RH(D): CPT

## 2021-03-05 PROCEDURE — 85025 COMPLETE CBC W/AUTO DIFF WBC: CPT

## 2021-03-05 PROCEDURE — 71046 X-RAY EXAM CHEST 2 VIEWS: CPT

## 2021-03-05 PROCEDURE — 80053 COMPREHEN METABOLIC PANEL: CPT

## 2021-03-05 PROCEDURE — 86850 RBC ANTIBODY SCREEN: CPT

## 2021-03-05 PROCEDURE — 86870 RBC ANTIBODY IDENTIFICATION: CPT

## 2021-03-05 PROCEDURE — 93010 ELECTROCARDIOGRAM REPORT: CPT | Performed by: INTERNAL MEDICINE

## 2021-03-05 PROCEDURE — 85652 RBC SED RATE AUTOMATED: CPT

## 2021-03-05 PROCEDURE — 85730 THROMBOPLASTIN TIME PARTIAL: CPT

## 2021-03-05 PROCEDURE — 85610 PROTHROMBIN TIME: CPT

## 2021-03-05 PROCEDURE — 86140 C-REACTIVE PROTEIN: CPT

## 2021-03-05 RX ORDER — DOCUSATE SODIUM 100 MG/1
100 CAPSULE, LIQUID FILLED ORAL 2 TIMES DAILY PRN
COMMUNITY
End: 2022-10-31

## 2021-03-05 NOTE — PAT
Cardiac clearance In epic. Medical clearance note on chart.     Patient to apply Chlorhexadine wipes  to surgical area (as instructed) the night before procedure and the AM of procedure. Wipes provided.    Patient instructed to drink 20 ounces (or until full) of Gatorade and it needs to be completed 1 hour before given arrival time for procedure (NO RED Gatorade)    Patient verbalized understanding.    Clean catch urinalysis not indicated because patient denied recent urinary frequency, urinary urgency, burning or pain upon urination, or flank pain. No recent UTIs.

## 2021-03-08 LAB
QT INTERVAL: 404 MS
QTC INTERVAL: 426 MS

## 2021-03-12 ENCOUNTER — APPOINTMENT (OUTPATIENT)
Dept: PREADMISSION TESTING | Facility: HOSPITAL | Age: 51
End: 2021-03-12

## 2021-03-12 PROCEDURE — C9803 HOPD COVID-19 SPEC COLLECT: HCPCS

## 2021-03-12 PROCEDURE — U0004 COV-19 TEST NON-CDC HGH THRU: HCPCS

## 2021-03-13 LAB — SARS-COV-2 RNA NOSE QL NAA+PROBE: NOT DETECTED

## 2021-03-15 ENCOUNTER — APPOINTMENT (OUTPATIENT)
Dept: GENERAL RADIOLOGY | Facility: HOSPITAL | Age: 51
End: 2021-03-15

## 2021-03-15 ENCOUNTER — HOSPITAL ENCOUNTER (OUTPATIENT)
Facility: HOSPITAL | Age: 51
Discharge: HOME | End: 2021-03-16
Attending: ORTHOPAEDIC SURGERY | Admitting: ORTHOPAEDIC SURGERY

## 2021-03-15 ENCOUNTER — ANESTHESIA EVENT (OUTPATIENT)
Dept: PERIOP | Facility: HOSPITAL | Age: 51
End: 2021-03-15

## 2021-03-15 ENCOUNTER — ANESTHESIA (OUTPATIENT)
Dept: PERIOP | Facility: HOSPITAL | Age: 51
End: 2021-03-15

## 2021-03-15 DIAGNOSIS — Z98.890 S/P HARDWARE REMOVAL: Primary | ICD-10-CM

## 2021-03-15 DIAGNOSIS — M77.9 INFLAMMATION AROUND JOINT: ICD-10-CM

## 2021-03-15 LAB
ABO GROUP BLD: NORMAL
ANTI-E: NORMAL
ANTI-K: NORMAL
B-HCG UR QL: NEGATIVE
BLD GP AB SCN SERPL QL: POSITIVE
GLUCOSE BLDC GLUCOMTR-MCNC: 66 MG/DL (ref 70–130)
GLUCOSE BLDC GLUCOMTR-MCNC: 87 MG/DL (ref 70–130)
INTERNAL NEGATIVE CONTROL: NEGATIVE
INTERNAL POSITIVE CONTROL: POSITIVE
Lab: NORMAL
POTASSIUM SERPL-SCNC: 3.4 MMOL/L (ref 3.5–5.2)
RH BLD: POSITIVE
T&S EXPIRATION DATE: NORMAL

## 2021-03-15 PROCEDURE — 87070 CULTURE OTHR SPECIMN AEROBIC: CPT | Performed by: ORTHOPAEDIC SURGERY

## 2021-03-15 PROCEDURE — 25010000002 VANCOMYCIN 10 G RECONSTITUTED SOLUTION: Performed by: ORTHOPAEDIC SURGERY

## 2021-03-15 PROCEDURE — 87176 TISSUE HOMOGENIZATION CULTR: CPT | Performed by: ORTHOPAEDIC SURGERY

## 2021-03-15 PROCEDURE — 97161 PT EVAL LOW COMPLEX 20 MIN: CPT

## 2021-03-15 PROCEDURE — 25010000002 PROPOFOL 10 MG/ML EMULSION: Performed by: NURSE ANESTHETIST, CERTIFIED REGISTERED

## 2021-03-15 PROCEDURE — 84132 ASSAY OF SERUM POTASSIUM: CPT | Performed by: ORTHOPAEDIC SURGERY

## 2021-03-15 PROCEDURE — 86902 BLOOD TYPE ANTIGEN DONOR EA: CPT

## 2021-03-15 PROCEDURE — 20680 REMOVAL OF IMPLANT DEEP: CPT | Performed by: PHYSICIAN ASSISTANT

## 2021-03-15 PROCEDURE — 87102 FUNGUS ISOLATION CULTURE: CPT | Performed by: ORTHOPAEDIC SURGERY

## 2021-03-15 PROCEDURE — 86922 COMPATIBILITY TEST ANTIGLOB: CPT

## 2021-03-15 PROCEDURE — 86850 RBC ANTIBODY SCREEN: CPT | Performed by: ORTHOPAEDIC SURGERY

## 2021-03-15 PROCEDURE — 81025 URINE PREGNANCY TEST: CPT | Performed by: ORTHOPAEDIC SURGERY

## 2021-03-15 PROCEDURE — 87206 SMEAR FLUORESCENT/ACID STAI: CPT | Performed by: ORTHOPAEDIC SURGERY

## 2021-03-15 PROCEDURE — 86870 RBC ANTIBODY IDENTIFICATION: CPT | Performed by: ORTHOPAEDIC SURGERY

## 2021-03-15 PROCEDURE — 87205 SMEAR GRAM STAIN: CPT | Performed by: ORTHOPAEDIC SURGERY

## 2021-03-15 PROCEDURE — 86900 BLOOD TYPING SEROLOGIC ABO: CPT | Performed by: ORTHOPAEDIC SURGERY

## 2021-03-15 PROCEDURE — 97116 GAIT TRAINING THERAPY: CPT

## 2021-03-15 PROCEDURE — 86920 COMPATIBILITY TEST SPIN: CPT

## 2021-03-15 PROCEDURE — 87075 CULTR BACTERIA EXCEPT BLOOD: CPT | Performed by: ORTHOPAEDIC SURGERY

## 2021-03-15 PROCEDURE — 25010000002 DEXAMETHASONE PER 1 MG: Performed by: NURSE ANESTHETIST, CERTIFIED REGISTERED

## 2021-03-15 PROCEDURE — 25010000002 ONDANSETRON PER 1 MG: Performed by: NURSE ANESTHETIST, CERTIFIED REGISTERED

## 2021-03-15 PROCEDURE — 73502 X-RAY EXAM HIP UNI 2-3 VIEWS: CPT

## 2021-03-15 PROCEDURE — 87116 MYCOBACTERIA CULTURE: CPT | Performed by: ORTHOPAEDIC SURGERY

## 2021-03-15 PROCEDURE — 82962 GLUCOSE BLOOD TEST: CPT

## 2021-03-15 PROCEDURE — 94799 UNLISTED PULMONARY SVC/PX: CPT

## 2021-03-15 PROCEDURE — 86901 BLOOD TYPING SEROLOGIC RH(D): CPT | Performed by: ORTHOPAEDIC SURGERY

## 2021-03-15 RX ORDER — MAGNESIUM HYDROXIDE 1200 MG/15ML
LIQUID ORAL AS NEEDED
Status: DISCONTINUED | OUTPATIENT
Start: 2021-03-15 | End: 2021-03-15 | Stop reason: HOSPADM

## 2021-03-15 RX ORDER — FAMOTIDINE 20 MG/1
20 TABLET, FILM COATED ORAL ONCE
Status: COMPLETED | OUTPATIENT
Start: 2021-03-15 | End: 2021-03-15

## 2021-03-15 RX ORDER — LABETALOL HYDROCHLORIDE 5 MG/ML
10 INJECTION, SOLUTION INTRAVENOUS EVERY 4 HOURS PRN
Status: DISCONTINUED | OUTPATIENT
Start: 2021-03-15 | End: 2021-03-16 | Stop reason: HOSPADM

## 2021-03-15 RX ORDER — EPHEDRINE SULFATE 50 MG/ML
INJECTION, SOLUTION INTRAVENOUS AS NEEDED
Status: DISCONTINUED | OUTPATIENT
Start: 2021-03-15 | End: 2021-03-15 | Stop reason: SURG

## 2021-03-15 RX ORDER — SODIUM CHLORIDE 0.9 % (FLUSH) 0.9 %
10 SYRINGE (ML) INJECTION EVERY 12 HOURS SCHEDULED
Status: DISCONTINUED | OUTPATIENT
Start: 2021-03-15 | End: 2021-03-15

## 2021-03-15 RX ORDER — HYDROMORPHONE HYDROCHLORIDE 1 MG/ML
0.5 INJECTION, SOLUTION INTRAMUSCULAR; INTRAVENOUS; SUBCUTANEOUS
Status: DISCONTINUED | OUTPATIENT
Start: 2021-03-15 | End: 2021-03-15 | Stop reason: HOSPADM

## 2021-03-15 RX ORDER — FAMOTIDINE 20 MG/1
20 TABLET, FILM COATED ORAL
Status: DISCONTINUED | OUTPATIENT
Start: 2021-03-15 | End: 2021-03-15

## 2021-03-15 RX ORDER — HYDROMORPHONE HYDROCHLORIDE 1 MG/ML
0.5 INJECTION, SOLUTION INTRAMUSCULAR; INTRAVENOUS; SUBCUTANEOUS
Status: DISCONTINUED | OUTPATIENT
Start: 2021-03-15 | End: 2021-03-16 | Stop reason: HOSPADM

## 2021-03-15 RX ORDER — MIDAZOLAM HYDROCHLORIDE 1 MG/ML
2 INJECTION INTRAMUSCULAR; INTRAVENOUS
Status: DISCONTINUED | OUTPATIENT
Start: 2021-03-15 | End: 2021-03-15

## 2021-03-15 RX ORDER — ASPIRIN 325 MG
325 TABLET ORAL DAILY
Status: DISCONTINUED | OUTPATIENT
Start: 2021-03-16 | End: 2021-03-16 | Stop reason: HOSPADM

## 2021-03-15 RX ORDER — DULOXETIN HYDROCHLORIDE 30 MG/1
30 CAPSULE, DELAYED RELEASE ORAL DAILY
Status: DISCONTINUED | OUTPATIENT
Start: 2021-03-16 | End: 2021-03-16 | Stop reason: HOSPADM

## 2021-03-15 RX ORDER — BUPIVACAINE HYDROCHLORIDE 5 MG/ML
INJECTION, SOLUTION PERINEURAL
Status: COMPLETED | OUTPATIENT
Start: 2021-03-15 | End: 2021-03-15

## 2021-03-15 RX ORDER — SPIRONOLACTONE 50 MG/1
50 TABLET, FILM COATED ORAL DAILY
Status: DISCONTINUED | OUTPATIENT
Start: 2021-03-16 | End: 2021-03-16 | Stop reason: HOSPADM

## 2021-03-15 RX ORDER — SODIUM CHLORIDE, SODIUM LACTATE, POTASSIUM CHLORIDE, CALCIUM CHLORIDE 600; 310; 30; 20 MG/100ML; MG/100ML; MG/100ML; MG/100ML
9 INJECTION, SOLUTION INTRAVENOUS CONTINUOUS PRN
Status: DISCONTINUED | OUTPATIENT
Start: 2021-03-15 | End: 2021-03-15

## 2021-03-15 RX ORDER — BUMETANIDE 1 MG/1
1 TABLET ORAL 2 TIMES DAILY
Status: DISCONTINUED | OUTPATIENT
Start: 2021-03-16 | End: 2021-03-16 | Stop reason: HOSPADM

## 2021-03-15 RX ORDER — LIDOCAINE HYDROCHLORIDE 10 MG/ML
0.5 INJECTION, SOLUTION EPIDURAL; INFILTRATION; INTRACAUDAL; PERINEURAL ONCE AS NEEDED
Status: COMPLETED | OUTPATIENT
Start: 2021-03-15 | End: 2021-03-15

## 2021-03-15 RX ORDER — SODIUM CHLORIDE 9 MG/ML
150 INJECTION, SOLUTION INTRAVENOUS CONTINUOUS
Status: ACTIVE | OUTPATIENT
Start: 2021-03-15 | End: 2021-03-16

## 2021-03-15 RX ORDER — PROMETHAZINE HYDROCHLORIDE 12.5 MG/1
12.5 TABLET ORAL EVERY 6 HOURS PRN
Status: DISCONTINUED | OUTPATIENT
Start: 2021-03-15 | End: 2021-03-16 | Stop reason: HOSPADM

## 2021-03-15 RX ORDER — FENTANYL CITRATE 50 UG/ML
50 INJECTION, SOLUTION INTRAMUSCULAR; INTRAVENOUS
Status: DISCONTINUED | OUTPATIENT
Start: 2021-03-15 | End: 2021-03-15 | Stop reason: HOSPADM

## 2021-03-15 RX ORDER — ONDANSETRON 2 MG/ML
INJECTION INTRAMUSCULAR; INTRAVENOUS AS NEEDED
Status: DISCONTINUED | OUTPATIENT
Start: 2021-03-15 | End: 2021-03-15 | Stop reason: SURG

## 2021-03-15 RX ORDER — MIDAZOLAM HYDROCHLORIDE 1 MG/ML
1 INJECTION INTRAMUSCULAR; INTRAVENOUS
Status: DISCONTINUED | OUTPATIENT
Start: 2021-03-15 | End: 2021-03-15

## 2021-03-15 RX ORDER — HYDROCODONE BITARTRATE AND ACETAMINOPHEN 5; 325 MG/1; MG/1
1 TABLET ORAL EVERY 4 HOURS PRN
Status: DISCONTINUED | OUTPATIENT
Start: 2021-03-15 | End: 2021-03-16 | Stop reason: HOSPADM

## 2021-03-15 RX ORDER — NALOXONE HCL 0.4 MG/ML
0.1 VIAL (ML) INJECTION
Status: DISCONTINUED | OUTPATIENT
Start: 2021-03-15 | End: 2021-03-16 | Stop reason: HOSPADM

## 2021-03-15 RX ORDER — SODIUM CHLORIDE 0.9 % (FLUSH) 0.9 %
10 SYRINGE (ML) INJECTION AS NEEDED
Status: DISCONTINUED | OUTPATIENT
Start: 2021-03-15 | End: 2021-03-15

## 2021-03-15 RX ORDER — LIDOCAINE HYDROCHLORIDE 10 MG/ML
0.5 INJECTION, SOLUTION EPIDURAL; INFILTRATION; INTRACAUDAL; PERINEURAL ONCE AS NEEDED
Status: DISCONTINUED | OUTPATIENT
Start: 2021-03-15 | End: 2021-03-15

## 2021-03-15 RX ORDER — SODIUM CHLORIDE, SODIUM LACTATE, POTASSIUM CHLORIDE, CALCIUM CHLORIDE 600; 310; 30; 20 MG/100ML; MG/100ML; MG/100ML; MG/100ML
9 INJECTION, SOLUTION INTRAVENOUS CONTINUOUS
Status: DISCONTINUED | OUTPATIENT
Start: 2021-03-15 | End: 2021-03-16 | Stop reason: HOSPADM

## 2021-03-15 RX ORDER — PREGABALIN 75 MG/1
75 CAPSULE ORAL ONCE
Status: COMPLETED | OUTPATIENT
Start: 2021-03-15 | End: 2021-03-15

## 2021-03-15 RX ORDER — DEXAMETHASONE SODIUM PHOSPHATE 4 MG/ML
INJECTION, SOLUTION INTRA-ARTICULAR; INTRALESIONAL; INTRAMUSCULAR; INTRAVENOUS; SOFT TISSUE AS NEEDED
Status: DISCONTINUED | OUTPATIENT
Start: 2021-03-15 | End: 2021-03-15 | Stop reason: SURG

## 2021-03-15 RX ORDER — FAMOTIDINE 10 MG/ML
20 INJECTION, SOLUTION INTRAVENOUS ONCE
Status: DISCONTINUED | OUTPATIENT
Start: 2021-03-15 | End: 2021-03-15

## 2021-03-15 RX ADMIN — ONDANSETRON 4 MG: 2 INJECTION INTRAMUSCULAR; INTRAVENOUS at 08:27

## 2021-03-15 RX ADMIN — SODIUM CHLORIDE 150 ML/HR: 9 INJECTION, SOLUTION INTRAVENOUS at 09:41

## 2021-03-15 RX ADMIN — LIDOCAINE HYDROCHLORIDE 0.5 ML: 10 INJECTION, SOLUTION EPIDURAL; INFILTRATION; INTRACAUDAL; PERINEURAL at 06:58

## 2021-03-15 RX ADMIN — EPHEDRINE SULFATE 15 MG: 50 INJECTION, SOLUTION INTRAVENOUS at 07:53

## 2021-03-15 RX ADMIN — EPHEDRINE SULFATE 10 MG: 50 INJECTION, SOLUTION INTRAVENOUS at 08:17

## 2021-03-15 RX ADMIN — PROPOFOL 100 MCG/KG/MIN: 10 INJECTION, EMULSION INTRAVENOUS at 07:44

## 2021-03-15 RX ADMIN — MUPIROCIN 1 APPLICATION: 20 OINTMENT TOPICAL at 06:58

## 2021-03-15 RX ADMIN — SODIUM CHLORIDE, POTASSIUM CHLORIDE, SODIUM LACTATE AND CALCIUM CHLORIDE 9 ML/HR: 600; 310; 30; 20 INJECTION, SOLUTION INTRAVENOUS at 06:58

## 2021-03-15 RX ADMIN — VANCOMYCIN HYDROCHLORIDE 1750 MG: 100 INJECTION, POWDER, LYOPHILIZED, FOR SOLUTION INTRAVENOUS at 07:04

## 2021-03-15 RX ADMIN — BUPIVACAINE HYDROCHLORIDE 2.5 ML: 5 INJECTION, SOLUTION PERINEURAL at 07:51

## 2021-03-15 RX ADMIN — Medication 1000 MG: at 07:48

## 2021-03-15 RX ADMIN — METOPROLOL TARTRATE 12.5 MG: 25 TABLET, FILM COATED ORAL at 20:24

## 2021-03-15 RX ADMIN — FAMOTIDINE 20 MG: 20 TABLET ORAL at 06:58

## 2021-03-15 RX ADMIN — DEXAMETHASONE SODIUM PHOSPHATE 8 MG: 4 INJECTION, SOLUTION INTRA-ARTICULAR; INTRALESIONAL; INTRAMUSCULAR; INTRAVENOUS; SOFT TISSUE at 07:47

## 2021-03-15 RX ADMIN — EPHEDRINE SULFATE 10 MG: 50 INJECTION, SOLUTION INTRAVENOUS at 07:57

## 2021-03-15 RX ADMIN — PREGABALIN 75 MG: 75 CAPSULE ORAL at 06:58

## 2021-03-15 RX ADMIN — EPHEDRINE SULFATE 10 MG: 50 INJECTION, SOLUTION INTRAVENOUS at 08:29

## 2021-03-15 RX ADMIN — HYDROCODONE BITARTRATE AND ACETAMINOPHEN 1 TABLET: 5; 325 TABLET ORAL at 20:24

## 2021-03-15 NOTE — ANESTHESIA PROCEDURE NOTES
Spinal Block      Patient reassessed immediately prior to procedure    Patient location during procedure: OR  Indication:at surgeon's request  Performed By  CRNA: Selvin Macias CRNA  Preanesthetic Checklist  Completed: patient identified, IV checked, site marked, risks and benefits discussed, surgical consent, monitors and equipment checked, pre-op evaluation and timeout performed  Spinal Block Prep:  Patient Position:sitting  Sterile Tech:cap, gloves, sterile barriers and mask  Prep:Chloraprep  Patient Monitoring:blood pressure monitoring, continuous pulse oximetry and EKG  Spinal Block Procedure  Approach:midline  Guidance:landmark technique and palpation technique  Location:L4-L5  Needle Type:Sprotte  Needle Gauge:25 G  Placement of Spinal needle event:cerebrospinal fluid aspirated  Paresthesia: no  Fluid Appearance:clear  Medications: bupivacaine (MARCAINE) 0.5 % injection, 2.5 mL   Post Assessment  Patient Tolerance:patient tolerated the procedure well with no apparent complications  Complications no  Additional Notes  Procedure:  Pt assisted to sitting position, with legs in position of comfort over side of bed.  Pt. instructed in optimal spine presentation, the spine was prepped/ Draped and the skin at insertion site was anesthetized with 1% Lidocaine 2 ml.  The spinal needle was then advanced until CSF flow was obtained and LA was injected:

## 2021-03-15 NOTE — ANESTHESIA POSTPROCEDURE EVALUATION
"Patient: Danae Fong    Procedure Summary     Date: 03/15/21 Room / Location:  [facility] [address] [place] [address] [facility]    Anesthesia Start: 0736 Anesthesia Stop:     Procedure: REMOVAL OF CERCLAGE WIRE OF RIGHT PROXIMAL FEMUR - RIGHT (Right Hip) Diagnosis:     Surgeons: Owen Siu MD Provider: Sabino Kan MD    Anesthesia Type: general ASA Status: 3          Anesthesia Type: general    Vitals  No vitals data found for the desired time range.          Post Anesthesia Care and Evaluation    Patient location during evaluation: PACU  Patient participation: complete - patient participated  Level of consciousness: awake and responsive to verbal stimuli  Pain score: 2  Pain management: adequate  Airway patency: patent  Anesthetic complications: No anesthetic complications    Cardiovascular status: acceptable  Respiratory status: acceptable  Hydration status: acceptable    Comments: Pt awake and responsive. SV. VSS. Report to RN. Patient Vitals in the past 24 hrs:  03/15/21 0632, BP:146/80, Temp:96.9 °F (36.1 °C), Temp src:Tympanic, Pulse:55, Resp:18, SpO2:96 %, Height:186.7 cm (73.5\"), Weight:113 kg (250 lb)  133/78. p 72. r 16. t 98.1                  "

## 2021-03-15 NOTE — DISCHARGE PLACEMENT REQUEST
Danae Fong (50 y.o. Female)   [address]  [address] [facility] [address]  Phone:  [phone]  Fax:  [phone] Date: Mar 15, 2021      Ambulatory Referral to Home Health     Patient:  Danae Fong MRN:  [id number]   [place]  [facility] [address] :  1970  SSN:    Phone: [phone] Sex:  F      INSURANCE PAYOR PLAN GROUP # SUBSCRIBER ID   Primary:    [facility] [id number]   [id number]      Referring Provider Information:  KATHERINE VIRK Phone: [phone] Fax: [phone]      Referral Information:   # Visits:  1 Referral Type: Home Health [42]   Urgency:  Routine Referral Reason: Specialty Services Required   Start Date: Mar 15, 2021 End Date:  To be determined by Insurer   Diagnosis: S/P hardware removal (Z98.890 [ICD-10-CM] V45.89 [ICD-9-CM])      Refer to Dept:   Refer to Provider:   Refer to Facility:       Face to Face Visit Date: 3/15/2021  Follow-up provider for Plan of Care? I will be treating the patient on an ongoing basis.  Please send me the Plan of Care for signature.  Follow-up provider: KATHERINE VIRK [[id number]]  Reason/Clinical Findings: removal of cerclage wire right prox. femur  Describe mobility limitations that make leaving home difficult: pain, mobility impairment  Nursing/Therapeutic Services Requested: Physical Therapy  PT orders:  (s/p cerclage wire removal r prox. femur)  Frequency: 1 Week 1     This document serves as a request of services and does not constitute Insurance authorization or approval of services.  To determine eligibility, please contact the members Insurance carrier to verify and review coverage.     If you have medical questions regarding this request for services. Please contact [address] at [phone] during normal business hours.       Authorizing Provider:Katherine Virk MD  Authorizing Provider's NPI: [id number]  Order Entered By: Fiordaliza Hernandez RN 3/15/2021   "2:16 PM     Electronically signed by: Owen Siu MD 3/15/2021  2:16 PM            Date of Birth Social Security Number Address Home Phone MRN    1970  [address]  [place] [facility] [address] [phone] [id number]    Jehovah's witness Marital Status          None        Admission Date Admission Type Admitting Provider Attending Provider Department, Room/Bed    3/15/21 Elective Owen Siu MD Ryan, Andrew W, MD [facility] 3H, S373/1    Discharge Date Discharge Disposition Discharge Destination         Home or Self Care              Attending Provider: Owen Siu MD    Allergies: No Known Allergies    Isolation: None   Infection: MRSA (08/28/15)   Code Status: CPR    Ht: 186.7 cm (73.5\")   Wt: 113 kg (250 lb)    Admission Cmt: None   Principal Problem: S/P hardware removal( painful circlage right proximal femur/right hip) [Z98.890]                 Active Insurance as of 3/15/2021     Primary Coverage     Payor Plan Insurance Group Employer/Plan Group    [employer] [place] [employer]ICA[place]      Payor Plan Address Payor Plan Phone Number Payor Plan Fax Number Effective Dates    [address] [address] [phone]  2020 - None Entered    [facility] [id number]       Subscriber Name Subscriber Birth Date Member ID       DANAE FONG 1970 [id number]                 Emergency Contacts      (Rel.) Home Phone Work Phone Mobile Phone    Anna Fong (Sister) -- -- [phone]               History & Physical      Divina Miguel MD at 03/15/21 1103          Patient Name: Danae Fong  MRN: [id number]  : 1970  DOS: 3/15/2021    Attending: Owen Siu MD    Primary Care Provider: Leigh Casey MD      Chief complaint: Right hip pain    Subjective   Patient is a pleasant 50 y.o. female presented for scheduled surgery by Dr. Siu.    Per his note (50-year-old woman with diabetes and right hip spontaneous septic arthritis about 5 years ago.  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  Underwent bovine heart valve replacement in that same timeframe and suffered early bleeding related to Eliquis and then had dislocation requiring conversion to locking components.  She had done recently well into the last few months and has had increasing pain in the right hip.  Has been full weightbearing.  X-rays show retained cerclage band but no evidence of osteomyelitis.  Inflammatory markers are elevated but she also has a right fourth metatarsal head diabetic ulcer in progress.  Risks benefits indication rationale for hardware removal of the cerclage band cerclage band hardware removal are discussed at length with patient.  She signed her consent after all questions were answered.).  She underwent removal of cerclage band right hip and proximal femur by Dr. Siu under spinal anesthesia.  She tolerated surgery well and was admitted for further management.  Since then she has ambulated and voided.  She states her pain is very well controlled.    Noted is her extensive past medical history including multiple infectious complications requiring aortic valve replacement with bioprosthetic valve in 2016 with postop complications including atrial fibrillation.  She had a pericardial window in September 2016 with pericardial stripping.  She also has history of diabetic foot ulcer/osteomyelitis requiring surgery, toe amputation on the right.    No Known Allergies      Medications Prior to Admission   Medication Sig Dispense Refill Last Dose   • aspirin 81 MG chewable tablet Chew 1 tablet Daily.   3/15/2021 at 0400   • bumetanide (BUMEX) 1 MG tablet Take 1 mg by mouth 2 (Two) Times a Day.   3/15/2021 at Unknown time   • docusate sodium (COLACE) 100 MG capsule Take 100 mg by mouth 2 (Two) Times a Day As Needed for Constipation.   3/14/2021 at Unknown time   • DULoxetine (CYMBALTA) 30 MG capsule Take 30 mg by mouth Daily.   3/15/2021 at Unknown  time   • metoprolol tartrate (LOPRESSOR) 25 MG tablet Take 0.5 tablets by mouth 2 (Two) Times a Day. 30 tablet 5 3/15/2021 at 0400   • Multiple Vitamins-Minerals (CENTRUM SILVER 50+WOMEN) tablet    3/14/2021 at Unknown time   • spironolactone (ALDACTONE) 50 MG tablet Take 1 tablet by mouth Daily. 90 tablet 2 3/15/2021 at Unknown time   • vilazodone (VIIBRYD) 20 MG tablet tablet Take 20 mg by mouth Daily.   3/15/2021 at Unknown time         History:   Past Medical History:   Diagnosis Date   • A-fib (CMS/[facility]) 2016    REMOTE HISTORY AFTER VALVE    • Anemia    • Anxiety    • Aortic valve regurgitation 2016    VALVE REPLACEMENT    • CHF (congestive heart failure) (CMS/[facility]) 2016   • Constipation    • Controlled type 2 diabetes mellitus without complication (CMS/[facility])     not medicated??? not sure if type 2-  borderline diabetes    • COPD (chronic obstructive pulmonary disease) (CMS/[facility])    • Diabetic neuropathy (CMS/[facility])    • Diabetic Ulcer    • Dysfunctional uterine bleeding    • Endocarditis 2016   • GERD (gastroesophageal reflux disease)    • Hypertension    • Low back pain    • MRSA infection 09/2015    TOE   • Primary Osteoarthritis of both hips    • Sepsis (CMS/[facility])    • Septic arthritis (CMS/[facility])    • Sleep apnea     RESOLVED - PER PATIENT    • SOB (shortness of breath) on exertion    • 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[facility])    • Streptococcus infection, group B    • Subacute osteomyelitis of left foot (CMS/[facility])    • Transfusion history    • Wears reading eyeglasses      Past Surgical History:   Procedure Laterality Date   • AMPUTATION DIGIT Right 1/30/2018    Procedure: AMPUTATION RIGHT 2ND TOE;  Surgeon: Charleen Nath MD;  Location:  [facility];  Service:    • AORTIC VALVE REPAIR/REPLACEMENT N/A 8/30/2016    Procedure: AIRAM, MEDIAN STERNOTOMY, AORTIC VALVE REPLACEMENT;  Surgeon: Mauricio Grove MD;  Location:  [facility];  Service:    • CARDIAC CATHETERIZATION N/A 8/25/2016    Procedure: Left Heart Cath;   Surgeon: Tyler Peterson MD;  [facility]:  [facility];  Service:    • CARDIAC CATHETERIZATION N/A 3/21/2018    Procedure: Coronary angiography;  Surgeon: Tyler Peterson IV, MD;  Location:  [facility];  Service: Cardiovascular   • COLONOSCOPY     • DILATATION AND CURETTAGE      of uterus   • ENDOSCOPY     • FOOT SURGERY Left     for osteomyelitis   • INCISION AND DRAINAGE HIP Right 1/26/2017    Procedure: RIGHT HIP WOUND WASHOUT AND DEBRIDEMENT, WITH POLYETHELENE FEMORAL HEAD EXCHANGE ;  Surgeon: Owen Siu MD;  Location:  [facility];  Service:    • PERICARDIAL WINDOW N/A 9/16/2016    Procedure: PERICARDIOCENTESIS WITH PLACEMENT OF DRAINAGE TUBE .;  Surgeon: Mauricio Grove MD;  Location:  [facility];  Service:    • PERICARDIAL WINDOW N/A 9/20/2016    Procedure: SUBXYPHOID PERICARDIAL WINDOW;  Surgeon: Mauricio Grove MD;  Location:  [facility];  Service:    • [place] CLOSED RX TRAUMATIC HIP DISLOCATN N/A 1/29/2017    Procedure: HIP CLOSED REDUCTION;  Surgeon: Leander Li MD;  Location:  [facility];  Service: Orthopedics   • THORACOTOMY Left 10/5/2016    Procedure: THORACOTOMY Pericardectomy,  AIRAM,  Placement pacing leads, Pump standby;  Surgeon: Mauricio Grove MD;  Location:  [facility];  Service:    • TOTAL HIP ARTHROPLASTY Right 1/23/2017    Procedure: RIGHT TOTAL HIP ARTHROPLASTY ANTERIOR ;  Surgeon: Owen Siu MD;  Location:  [facility];  Service:    • TOTAL HIP ARTHROPLASTY REVISION Right 1/30/2017    Procedure: REPEAT WOUND DEBRIDEMENT ACETABULAR REVISION;  Surgeon: Owen Siu MD;  Location:  [facility];  Service:    • WISDOM TOOTH EXTRACTION      all 4     Family History   Problem Relation Age of Onset   • Diabetes Mother    • Heart disease Mother    • Hypertension Mother    • Osteoarthritis Mother    • Diabetes Sister    • Arthritis Sister    • No Known Problems Father    • Diabetes Sister    • Diabetes Sister    • Heart disease Other    • Heart attack  "Other    • Heart disease Other    • Hypertension Other    • Osteoarthritis Other    • Rheum arthritis Other    • Cancer Neg Hx      Social History     Tobacco Use   • Smoking status: Former Smoker     Packs/day: 0.50     Years: 15.00     Pack years: 7.50     Types: Cigarettes     Quit date: 7/15/2016     Years since quittin.6   • Smokeless tobacco: Never Used   Vaping Use   • Vaping Use: Never used   Substance Use Topics   • Alcohol use: No   • Drug use: Yes     Types: Marijuana     Comment: THC gummies occasionally for pain       Review of Systems  Pertinent items are noted in HPI, all other systems reviewed and negative  She has history of Arzate cirrhosis, 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    Vital Signs  /68 (BP Location: Left arm, Patient Position: Lying)   Pulse 59   Temp 98.5 °F (36.9 °C) (Temporal)   Resp 16   Ht 186.7 cm (73.5\")   Wt 113 kg (250 lb)   SpO2 99%   BMI 32.54 kg/m²     Physical Exam:    General Appearance:    Alert, cooperative, in no acute distress   Head:    Normocephalic, without obvious abnormality, atraumatic   Eyes:            Lids and lashes normal, conjunctivae and sclerae normal, no   icterus, no pallor, corneas clear    Ears:    Ears appear intact with no abnormalities noted   Throat:   No oral lesions, no thrush, oral mucosa moist   Neck:   No adenopathy, supple, trachea midline, no thyromegaly         Lungs:     Clear to auscultation,respirations regular, even and   unlabored. No wheezes or rales.    Heart:    Regular rhythm and normal rate, normal S1 and S2, no murmur, no gallop   Abdomen:     Normal bowel sounds, no masses, no organomegaly, soft        non-tender, non-distended, no guarding, no rebound                 tenderness   Genitalia:    Deferred   Extremities:  Right LE, CDI adonis dressing over right hip.  No clubbing, cyanosis, or edema distally.   Pulses:   Pulses palpable and equal bilaterally   Skin:   No bleeding, bruising or rash   Neurologic:   Cranial nerves 2 - " 12 grossly intact, awake alert and oriented.      I reviewed the patient's new clinical results.             Invalid input(s): NEUTOPHILPCT,  EOSPCT  Results from last 7 days   Lab Units 03/15/21  0647   POTASSIUM mmol/L 3.4*     Lab Results   Component Value Date    HGBA1C 5.20 03/05/2021     Results for ELDER JAMES (MRN [id number]) as of 3/15/2021 11:01   Ref. Range 3/5/2021 16:36   Glucose Latest Ref Range: 65 - 99 mg/dL 96   Sodium Latest Ref Range: 136 - 145 mmol/L 135 (L)   Potassium Latest Ref Range: 3.5 - 5.2 mmol/L 3.9   CO2 Latest Ref Range: 22.0 - 29.0 mmol/L 26.0   Chloride Latest Ref Range: 98 - 107 mmol/L 98   Anion Gap Latest Ref Range: 5.0 - 15.0 mmol/L 11.0   Creatinine Latest Ref Range: 0.57 - 1.00 mg/dL 0.70   BUN Latest Ref Range: 6 - 20 mg/dL 12   BUN/Creatinine Ratio Latest Ref Range: 7.0 - 25.0  17.1   Calcium Latest Ref Range: 8.6 - 10.5 mg/dL 9.4   eGFR Non  Am Latest Ref Range: >60 mL/min/1.73 89   Alkaline Phosphatase Latest Ref Range: 39 - 117 U/L 119 (H)   Total Protein Latest Ref Range: 6.0 - 8.5 g/dL 8.0   ALT (SGPT) Latest Ref Range: 1 - 33 U/L 14   AST (SGOT) Latest Ref Range: 1 - 32 U/L 21   Total Bilirubin Latest Ref Range: 0.0 - 1.2 mg/dL 0.3   Albumin Latest Ref Range: 3.50 - 5.20 g/dL 4.20   Globulin Latest Units: gm/dL 3.8   A/G Ratio Latest Units: g/dL 1.1     Results for ELDER JAMES (MRN [id number]) as of 3/15/2021 11:01   Ref. Range 3/5/2021 16:36   WBC Latest Ref Range: 3.40 - 10.80 10*3/mm3 9.68   RBC Latest Ref Range: 3.77 - 5.28 10*6/mm3 5.63 (H)   Hemoglobin Latest Ref Range: 12.0 - 15.9 g/dL 15.4   Hematocrit Latest Ref Range: 34.0 - 46.6 % 47.2 (H)   RDW Latest Ref Range: 12.3 - 15.4 % 14.2   MCV Latest Ref Range: 79.0 - 97.0 fL 83.8   MCH Latest Ref Range: 26.6 - 33.0 pg 27.4   MCHC Latest Ref Range: 31.5 - 35.7 g/dL 32.6   MPV Latest Ref Range: 6.0 - 12.0 fL 9.2   Platelets Latest Ref Range: 140 - 450 10*3/mm3 236       Assessment and Plan:        S/P hardware removal( painful circlage right proximal femur/right hip)    Essential hypertension    Status post aortic valve replacement with bioprosthetic valve    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 PT/OT,  Weight bearing as tolerated right LE.  2. Pain control-prns  3. IS-encourage  4. DVT proph- Mechanicals and aspirin  5. Bowel regimen  6. Resume home medications as appropriate  7. Monitor post-op labs  8. DC planning for home    -Resume diuretics for patient's diastolic heart failure and Arzate cirrhosis.    -Patient will resume Viibryd following discharge home tomorrow.    Discussed with Ms. [place] postop management plan, she expressed understanding and agreement.      Dragon disclaimer:  Part of this encounter note is an electronic transcription/translation of spoken language to printed text. The electronic translation of spoken language may permit erroneous, or at times, nonsensical words or phrases to be inadvertently transcribed; Although I have reviewed the note for such errors, some may still exist.    Divina Miguel MD  03/15/21  11:03 EDT            Electronically signed by Divina Miguel MD at 03/15/21 1413     Owen Siu MD at 03/15/21 0658            Pre-Op H&P  Danae Fong  [id number]  1970      Chief complaint: Hx right total hip      Subjective:  Patient is a 50 y.o.female presents for scheduled surgery by Dr. Siu. She anticipates a REMOVAL OF CERCLAGE WIRE OF RIGHT PROXIMAL FEMUR - RIGHT today. She had total hip replacement in 2017. She has had recurrent infections and is followed by infectious disease. She has been off abx for about 6 months.      Review of Systems:  Constitutional-- No fever, chills or sweats. No fatigue.  CV-- No chest pain, palpitation or syncope. +HTN +cardiac clearance   Resp-- No SOB, cough, hemoptysis  Skin--No rashes or  lesions      Allergies: No Known Allergies      Home Meds:  Medications Prior to Admission   Medication Sig Dispense Refill Last Dose   • aspirin 81 MG chewable tablet Chew 1 tablet Daily.   3/15/2021 at 0400   • bumetanide (BUMEX) 1 MG tablet Take 1 mg by mouth 2 (Two) Times a Day.   3/15/2021 at Unknown time   • docusate sodium (COLACE) 100 MG capsule Take 100 mg by mouth 2 (Two) Times a Day As Needed for Constipation.   3/14/2021 at Unknown time   • DULoxetine (CYMBALTA) 30 MG capsule Take 30 mg by mouth Daily.   3/15/2021 at Unknown time   • metoprolol tartrate (LOPRESSOR) 25 MG tablet Take 0.5 tablets by mouth 2 (Two) Times a Day. 30 tablet 5 3/15/2021 at 0400   • Multiple Vitamins-Minerals (CENTRUM SILVER 50+WOMEN) tablet    3/14/2021 at Unknown time   • spironolactone (ALDACTONE) 50 MG tablet Take 1 tablet by mouth Daily. 90 tablet 2 3/15/2021 at Unknown time   • vilazodone (VIIBRYD) 20 MG tablet tablet Take 20 mg by mouth Daily.   3/15/2021 at Unknown time         PMH:   Past Medical History:   Diagnosis Date   • A-fib (CMS/[facility]) 2016    REMOTE HISTORY AFTER VALVE    • Anemia    • Anxiety    • Aortic valve regurgitation 2016    VALVE REPLACEMENT    • CHF (congestive heart failure) (CMS/[facility]) 2016   • Constipation    • Controlled type 2 diabetes mellitus without complication (CMS/[facility])     not medicated??? not sure if type 2-  borderline diabetes    • COPD (chronic obstructive pulmonary disease) (CMS/[facility])    • Diabetic neuropathy (CMS/[facility])    • Diabetic Ulcer    • Dysfunctional uterine bleeding    • Endocarditis 2016   • GERD (gastroesophageal reflux disease)    • Hypertension    • Low back pain    • MRSA infection 09/2015    TOE   • Primary Osteoarthritis of both hips    • Sepsis (CMS/[facility])    • Septic arthritis (CMS/[facility])    • Sleep apnea     RESOLVED - PER PATIENT    • SOB (shortness of breath) on exertion    • 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[facility])    • Streptococcus infection, group B    • Subacute  osteomyelitis of left foot (CMS/HCC)    • Transfusion history    • Wears reading eyeglasses      PSH:    Past Surgical History:   Procedure Laterality Date   • AMPUTATION DIGIT Right 1/30/2018    Procedure: AMPUTATION RIGHT 2ND TOE;  Surgeon: Charleen Nath MD;  Location:  [facility];  Service:    • AORTIC VALVE REPAIR/REPLACEMENT N/A 8/30/2016    Procedure: AIRAM, MEDIAN STERNOTOMY, AORTIC VALVE REPLACEMENT;  Surgeon: Mauricio Grove MD;  Location:  [facility];  Service:    • CARDIAC CATHETERIZATION N/A 8/25/2016    Procedure: Left Heart Cath;  Surgeon: Tyler Peterson MD;  [facility]  [facility];  Service:    • CARDIAC CATHETERIZATION N/A 3/21/2018    Procedure: Coronary angiography;  Surgeon: Tyler Peterson IV, MD;  Location:  [facility];  Service: Cardiovascular   • COLONOSCOPY     • DILATATION AND CURETTAGE      of uterus   • ENDOSCOPY     • FOOT SURGERY Left     for osteomyelitis   • INCISION AND DRAINAGE HIP Right 1/26/2017    Procedure: RIGHT HIP WOUND WASHOUT AND DEBRIDEMENT, WITH POLYETHELENE FEMORAL HEAD EXCHANGE ;  Surgeon: Owen Siu MD;  Location:  [facility];  Service:    • PERICARDIAL WINDOW N/A 9/16/2016    Procedure: PERICARDIOCENTESIS WITH PLACEMENT OF DRAINAGE TUBE .;  Surgeon: Mauricio Grove MD;  Location:  [facility];  Service:    • PERICARDIAL WINDOW N/A 9/20/2016    Procedure: SUBXYPHOID PERICARDIAL WINDOW;  Surgeon: Mauricio Grove MD;  Location:  [facility];  Service:    • [place] CLOSED RX TRAUMATIC HIP DISLOCATN N/A 1/29/2017    Procedure: HIP CLOSED REDUCTION;  Surgeon: Leander Li MD;  Location:  [facility];  Service: Orthopedics   • THORACOTOMY Left 10/5/2016    Procedure: THORACOTOMY Pericardectomy,  IARAM,  Placement pacing leads, Pump standby;  Surgeon: Mauricio Grove MD;  [facility]:  [facility];  Service:    • TOTAL HIP ARTHROPLASTY Right 1/23/2017    Procedure: RIGHT TOTAL HIP ARTHROPLASTY ANTERIOR ;  Surgeon: Owen Siu MD;   "Location:  [facility];  Service:    • TOTAL HIP ARTHROPLASTY REVISION Right 2017    Procedure: REPEAT WOUND DEBRIDEMENT ACETABULAR REVISION;  Surgeon: Owen Siu MD;  Location:  [facility];  Service:    • WISDOM TOOTH EXTRACTION      all 4       Immunization History:  Influenza: 2020  Pneumococcal: UTD  Tetanus: UTD  Covid : No    Social History:   Tobacco:   Social History     Tobacco Use   Smoking Status Former Smoker   • Packs/day: 0.50   • Years: 15.00   • Pack years: 7.50   • Types: Cigarettes   • Quit date: 7/15/2016   • Years since quittin.6   Smokeless Tobacco Never Used      Alcohol:     Social History     Substance and Sexual Activity   Alcohol Use No         Physical Exam:/80 (BP Location: Right arm, Patient Position: Lying)   Pulse 55   Temp 96.9 °F (36.1 °C) (Tympanic)   Resp 18   Ht 186.7 cm (73.5\")   Wt 113 kg (250 lb)   SpO2 96%   BMI 32.54 kg/m²       General Appearance:    Alert, cooperative, no distress, appears stated age   Head:    Normocephalic, without obvious abnormality, atraumatic   Lungs:     Clear to auscultation bilaterally, respirations unlabored    Heart:   Regular rate and rhythm, S1 and S2 normal    Abdomen:    Soft without tenderness   Extremities:   Extremities normal, atraumatic, no cyanosis or edema   Skin:   Skin color, texture, turgor normal, no rashes or lesions   Neurologic:   Grossly intact     Results Review:     LABS:  Lab Results   Component Value Date    WBC 9.68 2021    HGB 15.4 2021    HCT 47.2 (H) 2021    MCV 83.8 2021     2021    NEUTROABS 5.47 2021    GLUCOSE 96 2021    BUN 12 2021    CREATININE 0.70 2021    EGFRIFNONA 89 2021     (L) 2021    K 3.9 2021    CL 98 2021    CO2 26.0 2021    MG 2.0 2018    PHOS 2.9 2018    CALCIUM 9.4 2021    ALBUMIN 4.20 2021    AST 21 2021    ALT 14 2021    BILITOT 0.3 2021 "   Results for DANAE FONG (MRN [id number]) as of 3/15/2021 06:50   Ref. Range 3/5/2021 16:36   Sed Rate Latest Ref Range: 0 - 30 mm/hr 66 (H)       RADIOLOGY:  Imaging Results (Last 72 Hours)       ** No results found for the last 72 hours. **            I reviewed the patient's new clinical results.    Cancer Staging (if applicable)  Cancer Patient: __ yes __no __unknown; If yes, clinical stage T:__ N:__M:__, stage group or __N/A      Impression: Hx right total hip arthroplasty  Painful cerclage band hardware with low grade suspicion for infection      Plan: REMOVAL OF CERCLAGE WIRE OF RIGHT PROXIMAL FEMUR - RIGHT      Linda BAUTISTA Sandhu   3/15/2021   06:59 EDT    Electronically signed by Owen Siu MD at 03/15/21 0739          Operative/Procedure Notes (last 24 hours) (Notes from 03/14/21 1421 through 03/15/21 1421)      Owen Siu MD at 03/15/21 0815          HIP HARDWARE REMOVAL  Progress Note    Danae Fong  3/15/2021    Pre-op Diagnosis:   Painful retained hardware right hip       Post-Op Diagnosis Codes:  Painful retained hardware right hip    Procedure/CPT® Codes:  Removal of cerclage band right hip proximal femur      Procedure(s):  REMOVAL OF CERCLAGE WIRE OF RIGHT PROXIMAL FEMUR - RIGHT    Surgeon(s):  Owen Siu MD    Assistant: Clarisa Olson PA-C  -- required for skilled retraction and assistance with closure and dressing placement and hardware removal and without whose help this operation would have been significantly more difficult.    Anesthesia: Choice    Staff:   Circulator: Bill Stover RN  Physician Assistant: Clarisa Olson PA-C  Scrub Person: Arabella Gómez; Bassam Tidwell  Nursing Assistant: Tabitha Kim PCT; Govind Aviles PCT         Estimated Blood Loss: minimal    Urine Voided: * No values recorded between 3/15/2021  7:35 AM and 3/15/2021  8:43 AM *    Specimens:                Specimens     ID Source Type Tests Collected By  Collected At Frozen?    A Hip, Right Tissue · FUNGAL CULTURE  · TISSUE / BONE CULTURE  · TISSUE PATHOLOGY EXAM  · AFB CULTURE  · ANAEROBIC CULTURE 10 DAY INCUBATION   Owen Siu MD 3/15/21 9555     Description: RIGHT HIP TISSUE FOR CULTURE    This specimen was not marked as sent.                Drains: * No LDAs found *    Findings: Erythema and synovitis around the somewhat loose cerclage band.  Tissue was sent for culture.  No gross purulence.    Complications: None      Indications:  50-year-old woman with diabetes and right hip spontaneous septic arthritis about 5 years ago.  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  Underwent bovine heart valve replacement in that same timeframe and suffered early bleeding related to Eliquis and then had dislocation requiring conversion to locking components.  She had done recently well into the last few months and has had increasing pain in the right hip.  Has been full weightbearing.  X-rays show retained cerclage band but no evidence of osteomyelitis.  Inflammatory markers are elevated but she also has a right fourth metatarsal head diabetic ulcer in progress.  Risks benefits indication rationale for hardware removal of the cerclage band cerclage band hardware removal are discussed at length with patient.  She signed her consent after all questions were answered.    Procedure:  While in the operating room spinal anesthetic was begun with satisfactory level.  Turned to the right side up lateral decubitus position.  Prepped in standard fashion with the right leg free.  Limb identified and the old posterior incision was reentered distally centered over the palpable greater trochanter and vastus ridge.  Incision was elliptically excised.  Standard lateral approach to the proximal femur developed.  Synovitis around the clamp with a cerclage band was identified.  Erythema throughout the layer identified and synovitis tissue sent for  culture.  There was no gross purulence.  No bony softening.  The [place]-Joni strands were cut and complex removed without difficulty.  Wound was thoroughly irrigated and closed in layers without a drain.  Standard Mary dressing applied.  Patient stable recovery having tolerated procedure well throughout with all counts correct.    Owen Siu MD      Date: 3/15/2021  Time: 09:00 EDT        Electronically signed by Owen Siu MD at 03/15/21 0909       Physician Progress Notes (last 24 hours) (Notes from 03/14/21 1421 through 03/15/21 1421)    No notes of this type exist for this encounter.

## 2021-03-15 NOTE — PLAN OF CARE
Pt admitted from PACU, s/p removal of right hip circlage wire, DALLIN dressing in place, CDI. Alert and oriented, pt has ambulated 360ft with PT and is voiding adequately. Pt currently up in chair and having minimal c/o pain. VSS

## 2021-03-15 NOTE — ANESTHESIA PREPROCEDURE EVALUATION
Anesthesia Evaluation     Patient summary reviewed and Nursing notes reviewed   no history of anesthetic complications:  NPO Solid Status: > 8 hours  NPO Liquid Status: > 8 hours           Airway   Mallampati: II  TM distance: >3 FB  Neck ROM: full  No difficulty expected  Dental      Pulmonary - normal exam   (+) COPD, shortness of breath, sleep apnea,   Cardiovascular - normal exam    (+) hypertension, dysrhythmias, CHF ,       Neuro/Psych  (+) numbness, psychiatric history,     GI/Hepatic/Renal/Endo    (+)   diabetes mellitus,     Musculoskeletal     Abdominal    Substance History      OB/GYN          Other   arthritis,                      Anesthesia Plan    ASA 3     spinal     intravenous induction     Anesthetic plan, all risks, benefits, and alternatives have been provided, discussed and informed consent has been obtained with: patient.    Plan discussed with CRNA.

## 2021-03-15 NOTE — PROGRESS NOTES
Continued Stay Note  [facility]     Patient Name: Danae Fong  MRN: [id number]  Today's Date: 3/15/2021    Admit Date: 3/15/2021    Discharge Plan     Row Name 03/15/21 1542       Plan    Plan  [facility] is unable to accept Ms. Fong's insurance.  [facility] was called and the referral fax.  They accept pt's insurance. Marzena NOLASCO ext [phone]    Patient/Family in Agreement with Plan  yes    Final Discharge Disposition Code  06 - home with home health care    Row Name 03/15/21 1412       Plan    Plan  To dc home to [place] with family.  Lives with her sister.  Pt prefers [facility].  The referral was called and faxed.  They will review.  CM to follow.  Plans for dc in the am.    Patient/Family in Agreement with Plan  yes    Final Discharge Disposition Code  06 - home with home health care        Discharge Codes    No documentation.       Expected Discharge Date and Time     Expected Discharge Date Expected Discharge Time    Mar 15, 2021             Fiordaliza Hernandez RN

## 2021-03-15 NOTE — OP NOTE
HIP HARDWARE REMOVAL  Progress Note    Danae Cotoett  3/15/2021    Pre-op Diagnosis:   Painful retained hardware right hip       Post-Op Diagnosis Codes:  Painful retained hardware right hip    Procedure/CPT® Codes:  Removal of cerclage band right hip proximal femur      Procedure(s):  REMOVAL OF CERCLAGE WIRE OF RIGHT PROXIMAL FEMUR - RIGHT    Surgeon(s):  Owen Siu MD    Assistant: Clarisa Olson PA-C  -- required for skilled retraction and assistance with closure and dressing placement and hardware removal and without whose help this operation would have been significantly more difficult.    Anesthesia: Choice    Staff:   Circulator: Bill Stover RN  Physician Assistant: Clarisa Olson PA-C  Scrub Person: Arabella Gómez; Bassam Tidwell  Nursing Assistant: Tabitha Kim PCT; Govind Aviles PCT         Estimated Blood Loss: minimal    Urine Voided: * No values recorded between 3/15/2021  7:35 AM and 3/15/2021  8:43 AM *    Specimens:                Specimens     ID Source Type Tests Collected By Collected At Frozen?    A Hip, Right Tissue · FUNGAL CULTURE  · TISSUE / BONE CULTURE  · TISSUE PATHOLOGY EXAM  · AFB CULTURE  · ANAEROBIC CULTURE 10 DAY INCUBATION   Owen Siu MD 3/15/21 7102     Description: RIGHT HIP TISSUE FOR CULTURE    This specimen was not marked as sent.                Drains: * No LDAs found *    Findings: Erythema and synovitis around the somewhat loose cerclage band.  Tissue was sent for culture.  No gross purulence.    Complications: None      Indications:  50-year-old woman with diabetes and right hip spontaneous septic arthritis about 5 years ago.  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  Underwent bovine heart valve replacement in that same timeframe and suffered early bleeding related to Eliquis and then had dislocation requiring conversion to locking components.  She had done recently well into the last few  months and has had increasing pain in the right hip.  Has been full weightbearing.  X-rays show retained cerclage band but no evidence of osteomyelitis.  Inflammatory markers are elevated but she also has a right fourth metatarsal head diabetic ulcer in progress.  Risks benefits indication rationale for hardware removal of the cerclage band cerclage band hardware removal are discussed at length with patient.  She signed her consent after all questions were answered.    Procedure:  While in the operating room spinal anesthetic was begun with satisfactory level.  Turned to the right side up lateral decubitus position.  Prepped in standard fashion with the right leg free.  Limb identified and the old posterior incision was reentered distally centered over the palpable greater trochanter and vastus ridge.  Incision was elliptically excised.  Standard lateral approach to the proximal femur developed.  Synovitis around the clamp with a cerclage band was identified.  Erythema throughout the layer identified and synovitis tissue sent for culture.  There was no gross purulence.  No bony softening.  The [place]-Joni strands were cut and complex removed without difficulty.  Wound was thoroughly irrigated and closed in layers without a drain.  Standard Mary dressing applied.  Patient stable recovery having tolerated procedure well throughout with all counts correct.    Owen Siu MD      Date: 3/15/2021  Time: 09:00 EDT

## 2021-03-15 NOTE — ASSESSMENT & PLAN NOTE
· Hypertension is controlled  · Continue metoprolol tartrate 12.5 mg as needed  · Continue spironolactone 50 mg daily  
· Last echocardiogram 6 months ago showed well-seated bioprosthetic valve and normal LVEF  · Continue aspirin 81 mg daily  
· No signs or symptoms of TIA or CVA  · Continue aspirin 81 mg daily  
· Obtain 30-day monitor to ensure no episodes of atrial fibrillation to account for patient's symptoms  
· Patient reports chest tightness that is associated with palpitations, and shortness of breath.  She has normal coronaries and recent echo showed normal functioning bioprosthetic aortic valve  · We will order 30-day monitor to see if having any arrhythmias or episodes of atrial fibrillation  
· Patient reports increased palpitations with chest discomfort, dizziness and fluctuating blood pressures  · Obtain 30-day monitor to assess for any arrhythmias or atrial fibrillation  
· Patient reports worsening shortness of breath  · Continue Bumex 1 mg twice daily  · Continue spironolactone 50 mg daily  
IV discontinued, cath removed intact

## 2021-03-15 NOTE — H&P
Pre-Op H&P  Danae Fong  [id number]  1970      Chief complaint: Hx right total hip      Subjective:  Patient is a 50 y.o.female presents for scheduled surgery by Dr. Siu. She anticipates a REMOVAL OF CERCLAGE WIRE OF RIGHT PROXIMAL FEMUR - RIGHT today. She had total hip replacement in 2017. She has had recurrent infections and is followed by infectious disease. She has been off abx for about 6 months.      Review of Systems:  Constitutional-- No fever, chills or sweats. No fatigue.  CV-- No chest pain, palpitation or syncope. +HTN +cardiac clearance   Resp-- No SOB, cough, hemoptysis  Skin--No rashes or lesions      Allergies: No Known Allergies      Home Meds:  Medications Prior to Admission   Medication Sig Dispense Refill Last Dose    aspirin 81 MG chewable tablet Chew 1 tablet Daily.   3/15/2021 at 0400    bumetanide (BUMEX) 1 MG tablet Take 1 mg by mouth 2 (Two) Times a Day.   3/15/2021 at Unknown time    docusate sodium (COLACE) 100 MG capsule Take 100 mg by mouth 2 (Two) Times a Day As Needed for Constipation.   3/14/2021 at Unknown time    DULoxetine (CYMBALTA) 30 MG capsule Take 30 mg by mouth Daily.   3/15/2021 at Unknown time    metoprolol tartrate (LOPRESSOR) 25 MG tablet Take 0.5 tablets by mouth 2 (Two) Times a Day. 30 tablet 5 3/15/2021 at 0400    Multiple Vitamins-Minerals (CENTRUM SILVER 50+WOMEN) tablet    3/14/2021 at Unknown time    spironolactone (ALDACTONE) 50 MG tablet Take 1 tablet by mouth Daily. 90 tablet 2 3/15/2021 at Unknown time    vilazodone (VIIBRYD) 20 MG tablet tablet Take 20 mg by mouth Daily.   3/15/2021 at Unknown time         PMH:   Past Medical History:   Diagnosis Date    A-fib (CMS/[facility]) 2016    REMOTE HISTORY AFTER VALVE     Anemia     Anxiety     Aortic valve regurgitation 2016    VALVE REPLACEMENT     CHF (congestive heart failure) (CMS/[facility]) 2016    Constipation     Controlled type 2 diabetes mellitus without complication (CMS/[facility])     not medicated??? not  sure if type 2-  borderline diabetes     COPD (chronic obstructive pulmonary disease) (CMS/[facility])     Diabetic neuropathy (CMS/[facility])     Diabetic Ulcer     Dysfunctional uterine bleeding     Endocarditis 2016    GERD (gastroesophageal reflux disease)     Hypertension     Low back pain     MRSA infection 09/2015    TOE    Primary Osteoarthritis of both hips     Sepsis (CMS/[facility])     Septic arthritis (CMS/[facility])     Sleep apnea     RESOLVED - PER PATIENT     SOB (shortness of breath) on exertion     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[facility])     Streptococcus infection, group B     Subacute osteomyelitis of left foot (CMS/[facility])     Transfusion history     Wears reading eyeglasses      PSH:    Past Surgical History:   Procedure Laterality Date    AMPUTATION DIGIT Right 1/30/2018    Procedure: AMPUTATION RIGHT 2ND TOE;  Surgeon: Charleen Nath MD;  Location:  [facility];  Service:     AORTIC VALVE REPAIR/REPLACEMENT N/A 8/30/2016    Procedure: AIRAM, MEDIAN STERNOTOMY, AORTIC VALVE REPLACEMENT;  Surgeon: Mauricio Grove MD;  Location:  [facility];  Service:     CARDIAC CATHETERIZATION N/A 8/25/2016    Procedure: Left Heart Cath;  Surgeon: Tyler Peterson MD;  Location:  [facility];  Service:     CARDIAC CATHETERIZATION N/A 3/21/2018    Procedure: Coronary angiography;  Surgeon: Tyler Peterson IV, MD;  Location:  [facility];  Service: Cardiovascular    COLONOSCOPY      DILATATION AND CURETTAGE      of uterus    ENDOSCOPY      FOOT SURGERY Left     for osteomyelitis    INCISION AND DRAINAGE HIP Right 1/26/2017    Procedure: RIGHT HIP WOUND WASHOUT AND DEBRIDEMENT, WITH POLYETHELENE FEMORAL HEAD EXCHANGE ;  Surgeon: Owen Siu MD;  Location:  [facility];  Service:     PERICARDIAL WINDOW N/A 9/16/2016    Procedure: PERICARDIOCENTESIS WITH PLACEMENT OF DRAINAGE TUBE .;  Surgeon: Mauricio Grove MD;  Location:  [facility];  Service:     PERICARDIAL WINDOW N/A 9/20/2016     "Procedure: SUBXYPHOID PERICARDIAL WINDOW;  Surgeon: Mauricio Grove MD;  Location:  [facility];  Service:     PA CLOSED RX TRAUMATIC HIP DISLOCATN N/A 2017    Procedure: HIP CLOSED REDUCTION;  Surgeon: Leander Li MD;  Location:  [facility];  Service: Orthopedics    THORACOTOMY Left 10/5/2016    Procedure: THORACOTOMY Pericardectomy,  AIRAM,  Placement pacing leads, Pump standby;  Surgeon: Mauricio Grove MD;  Location:  [facility];  Service:     TOTAL HIP ARTHROPLASTY Right 2017    Procedure: RIGHT TOTAL HIP ARTHROPLASTY ANTERIOR ;  Surgeon: Owen Siu MD;  Location:  [facility];  Service:     TOTAL HIP ARTHROPLASTY REVISION Right 2017    Procedure: REPEAT WOUND DEBRIDEMENT ACETABULAR REVISION;  Surgeon: Owen Siu MD;  Location:  [facility];  Service:     WISDOM TOOTH EXTRACTION      all 4       Immunization History:  Influenza: 2020  Pneumococcal: UTD  Tetanus: UTD  Covid : No    Social History:   Tobacco:   Social History     Tobacco Use   Smoking Status Former Smoker    Packs/day: 0.50    Years: 15.00    Pack years: 7.50    Types: Cigarettes    Quit date: 7/15/2016    Years since quittin.6   Smokeless Tobacco Never Used      Alcohol:     Social History     Substance and Sexual Activity   Alcohol Use No         Physical Exam:/80 (BP Location: Right arm, Patient Position: Lying)   Pulse 55   Temp 96.9 °F (36.1 °C) (Tympanic)   Resp 18   Ht 186.7 cm (73.5\")   Wt 113 kg (250 lb)   SpO2 96%   BMI 32.54 kg/m²       General Appearance:    Alert, cooperative, no distress, appears stated age   Head:    Normocephalic, without obvious abnormality, atraumatic   Lungs:     Clear to auscultation bilaterally, respirations unlabored    Heart:   Regular rate and rhythm, S1 and S2 normal    Abdomen:    Soft without tenderness   Extremities:   Extremities normal, atraumatic, no cyanosis or edema   Skin:   Skin color, texture, turgor normal, no rashes or lesions   Neurologic:   Grossly intact "     Results Review:     LABS:  Lab Results   Component Value Date    WBC 9.68 03/05/2021    HGB 15.4 03/05/2021    HCT 47.2 (H) 03/05/2021    MCV 83.8 03/05/2021     03/05/2021    NEUTROABS 5.47 03/05/2021    GLUCOSE 96 03/05/2021    BUN 12 03/05/2021    CREATININE 0.70 03/05/2021    EGFRIFNONA 89 03/05/2021     (L) 03/05/2021    K 3.9 03/05/2021    CL 98 03/05/2021    CO2 26.0 03/05/2021    MG 2.0 01/26/2018    PHOS 2.9 01/26/2018    CALCIUM 9.4 03/05/2021    ALBUMIN 4.20 03/05/2021    AST 21 03/05/2021    ALT 14 03/05/2021    BILITOT 0.3 03/05/2021   Results for ELDER JAMES (MRN [id number]) as of 3/15/2021 06:50   Ref. Range 3/5/2021 16:36   Sed Rate Latest Ref Range: 0 - 30 mm/hr 66 (H)       RADIOLOGY:  Imaging Results (Last 72 Hours)       ** No results found for the last 72 hours. **            I reviewed the patient's new clinical results.    Cancer Staging (if applicable)  Cancer Patient: __ yes __no __unknown; If yes, clinical stage T:__ N:__M:__, stage group or __N/A      Impression: Hx right total hip arthroplasty  Painful cerclage band hardware with low grade suspicion for infection      Plan: REMOVAL OF CERCLAGE WIRE OF RIGHT PROXIMAL FEMUR - RIGHT      Linda Edson, APRN   3/15/2021   06:59 EDT

## 2021-03-15 NOTE — THERAPY EVALUATION
Patient Name: Danae Fong  : 1970    MRN: [id number]                              Today's Date: 3/15/2021       Admit Date: 3/15/2021    Visit Dx:     ICD-10-CM ICD-9-CM   1. Inflammation around joint  M77.9 726.90     Patient Active Problem List   Diagnosis   • Essential hypertension   • Status post aortic valve replacement with bioprosthetic valve   • PFO (patent foramen ovale)   • Paroxysmal atrial fibrillation (CMS/[facility])   • Liver cirrhosis secondary to MCKEON (CMS/[facility])   • Chronic diastolic congestive heart failure (CMS/HCC)   • Controlled type 2 diabetes mellitus with diabetic polyneuropathy, without long-term current use of insulin (CMS/[facility])   • Diabetic foot ulcer (CMS/[facility])   • Non-cardiac chest pain   • Class 3 obesity due to excess calories in adult   • Charcot's joint of left foot   • Venous stasis   • Amputated toe of right foot (CMS/[facility])   • Atypical chest pain   • Palpitations   • Diabetic ulcer of lower extremity (CMS/[facility])   • Inflammation around joint   • S/P hardware removal( painful circlage right proximal femur/right hip)     Past Medical History:   Diagnosis Date   • A-fib (CMS/[facility]) 2016    REMOTE HISTORY AFTER VALVE    • Anemia    • Anxiety    • Aortic valve regurgitation 2016    VALVE REPLACEMENT    • CHF (congestive heart failure) (CMS/[facility]) 2016   • Constipation    • Controlled type 2 diabetes mellitus without complication (CMS/[facility])     not medicated??? not sure if type 2-  borderline diabetes    • COPD (chronic obstructive pulmonary disease) (CMS/[facility])    • Diabetic neuropathy (CMS/[facility])    • Diabetic Ulcer    • Dysfunctional uterine bleeding    • Endocarditis    • GERD (gastroesophageal reflux disease)    • Hypertension    • Low back pain    • MRSA infection 2015    TOE   • Primary Osteoarthritis of both hips    • Sepsis (CMS/[facility])    • Septic arthritis (CMS/[facility])    • Sleep apnea     RESOLVED - PER PATIENT    • SOB (shortness of breath) on exertion    • Streptococcal arthritis  of right hip (CMS/HCC)    • Streptococcus infection, group B    • Subacute osteomyelitis of left foot (CMS/HCC)    • Transfusion history    • Wears reading eyeglasses      Past Surgical History:   Procedure Laterality Date   • AMPUTATION DIGIT Right 1/30/2018    Procedure: AMPUTATION RIGHT 2ND TOE;  Surgeon: Charleen Nath MD;  Location:  [facility];  Service:    • AORTIC VALVE REPAIR/REPLACEMENT N/A 8/30/2016    Procedure: AIRAM, MEDIAN STERNOTOMY, AORTIC VALVE REPLACEMENT;  Surgeon: Mauricio Grove MD;  Location:  [facility];  Service:    • CARDIAC CATHETERIZATION N/A 8/25/2016    Procedure: Left Heart Cath;  Surgeon: Tyler Peterson MD;  Location:  [facility];  Service:    • CARDIAC CATHETERIZATION N/A 3/21/2018    Procedure: Coronary angiography;  Surgeon: Tyler Peterson IV, MD;  Location:  [facility];  Service: Cardiovascular   • COLONOSCOPY     • DILATATION AND CURETTAGE      of uterus   • ENDOSCOPY     • FOOT SURGERY Left     for osteomyelitis   • INCISION AND DRAINAGE HIP Right 1/26/2017    Procedure: RIGHT HIP WOUND WASHOUT AND DEBRIDEMENT, WITH POLYETHELENE FEMORAL HEAD EXCHANGE ;  Surgeon: Owen Siu MD;  Location:  [facility];  Service:    • PERICARDIAL WINDOW N/A 9/16/2016    Procedure: PERICARDIOCENTESIS WITH PLACEMENT OF DRAINAGE TUBE .;  Surgeon: Mauricio Grove MD;  Location:  [facility];  Service:    • PERICARDIAL WINDOW N/A 9/20/2016    Procedure: SUBXYPHOID PERICARDIAL WINDOW;  Surgeon: Mauricio Grove MD;  Location:  [facility];  Service:    • AR CLOSED RX TRAUMATIC HIP DISLOCATN N/A 1/29/2017    Procedure: HIP CLOSED REDUCTION;  Surgeon: Leander Li MD;  Location:  [facility];  Service: Orthopedics   • THORACOTOMY Left 10/5/2016    Procedure: THORACOTOMY Pericardectomy,  AIRAM,  Placement pacing leads, Pump standby;  Surgeon: Mauricio Grove MD;  Location:  [facility];  Service:    • TOTAL HIP ARTHROPLASTY Right 1/23/2017    Procedure: RIGHT TOTAL  HIP ARTHROPLASTY ANTERIOR ;  Surgeon: Owen Siu MD;  Location:  [facility];  Service:    • TOTAL HIP ARTHROPLASTY REVISION Right 1/30/2017    Procedure: REPEAT WOUND DEBRIDEMENT ACETABULAR REVISION;  Surgeon: Owen Siu MD;  Location:  [facility];  Service:    • WISDOM TOOTH EXTRACTION      all 4     General Information     Row Name 03/15/21 1305          Physical Therapy Time and Intention    Document Type  evaluation  -GÉNESIS     Mode of Treatment  individual therapy;physical therapy  -GÉNESIS     Row Name 03/15/21 1305          General Information    Patient Profile Reviewed  yes  -GÉNESIS     Prior Level of Function  min assist:;all household mobility;transfer;bed mobility;ADL's  -GÉNESIS     Existing Precautions/Restrictions  fall;right;hip, posterior;other (see comments) DALLIN  -GÉNESIS     Barriers to Rehab  none identified  -GÉNESIS     Row Name 03/15/21 1305          Living Environment    Lives With  sibling(s)  -GÉNESIS     Row Name 03/15/21 1305          Home Main Entrance    Number of Stairs, Main Entrance  one  -GÉNESIS     Stair Railings, Main Entrance  none  -GÉNESIS     Row Name 03/15/21 1305          Stairs Within Home, Primary    Stairs, Within Home, Primary  0  -GÉNESIS     Number of Stairs, Within Home, Primary  none  -GÉNESIS     Row Name 03/15/21 1305          Cognition    Orientation Status (Cognition)  oriented x 4  -GÉNESIS     Row Name 03/15/21 1305          Safety Issues, Functional Mobility    Safety Issues Affecting Function (Mobility)  safety precaution awareness;safety precautions follow-through/compliance  -GÉNESIS     Impairments Affecting Function (Mobility)  endurance/activity tolerance;strength;range of motion (ROM)  -GÉNESIS       User Key  (r) = Recorded By, (t) = Taken By, (c) = Cosigned By    Initials Name Provider Type    Clovis Ontiveros PT Physical Therapist        Mobility     Row Name 03/15/21 1305          Bed Mobility    Bed Mobility  scooting/bridging;supine-sit  -GÉNESIS     Scooting/Bridging [place] (Bed Mobility)   supervision;verbal cues  -GÉNESIS     Supine-Sit [place] (Bed Mobility)  supervision;verbal cues  -     Assistive Device (Bed Mobility)  bed rails;head of bed elevated  -GÉNESIS     Comment (Bed Mobility)  Verbal cues for LE sequencing off of EOB and trunk control into sitting  -     Row Name 03/15/21 1305          Transfers    Comment (Transfers)  Verbal cues for safe hand placement during standing/sitting and moving R LE out for comfort prior to sitting; toilet transfer performed with CGA  -     Row Name 03/15/21 1305          Sit-Stand Transfer    Sit-Stand Gregory (Transfers)  verbal cues;contact guard  -     Assistive Device (Sit-Stand Transfers)  walker, front-wheeled  -     Row Name 03/15/21 1305          Gait/Stairs (Locomotion)    Gregory Level (Gait)  verbal cues;contact guard;1 person to manage equipment  -     Assistive Device (Gait)  walker, front-wheeled  -     Distance in Feet (Gait)  360 feet  -     Deviations/Abnormal Patterns (Gait)  bilateral deviations;lianna decreased;gait speed decreased;stride length decreased  -     Bilateral Gait Deviations  forward flexed posture  -     Right Sided Gait Deviations  heel strike decreased;weight shift ability decreased  -     Gregory Level (Stairs)  not tested  -     Comment (Gait/Stairs)  Pt ambulated with step through pattern and decreased speed. Verbal cues for maintaining upright posture, body within walker, increase step length, WB through LE, and heel strike on the R. No knee buckling noted. Gait limited by fatigue.  -     Row Name 03/15/21 1305          Mobility    Extremity Weight-bearing Status  right lower extremity  -     Right Lower Extremity (Weight-bearing Status)  weight-bearing as tolerated (WBAT)  -       User Key  (r) = Recorded By, (t) = Taken By, (c) = Cosigned By    Initials Name Provider Type    Clovis Ontiveros, PT Physical Therapist        Obj/Interventions     Row Name 03/15/21 1305          Range  of Motion Comprehensive    General Range of Motion  lower extremity range of motion deficits identified  -     Comment, General Range of Motion  R LE AROM impaired 25%; L LE AROM WFL; able to actively DF/PF  -[facility] Name 03/15/21 1305          Strength Comprehensive (MMT)    General Manual Muscle Testing (MMT) Assessment  lower extremity strength deficits identified  -     Comment, General Manual Muscle Testing (MMT) Assessment  R LE functionally 4-/5; L LE functionally 4+/5;  -     Row Name 03/15/21 1305          Motor Skills    Therapeutic Exercise  hip;knee;ankle  -[facility] Name 03/15/21 1305          Hip (Therapeutic Exercise)    Hip (Therapeutic Exercise)  isometric exercises  -     Hip Isometrics (Therapeutic Exercise)  gluteal sets;10 repetitions  -[facility] Name 03/15/21 1305          Knee (Therapeutic Exercise)    Knee (Therapeutic Exercise)  isometric exercises  -     Knee Isometrics (Therapeutic Exercise)  quad sets;10 repetitions  -[facility] Name 03/15/21 1305          Ankle (Therapeutic Exercise)    Ankle (Therapeutic Exercise)  AROM (active range of motion)  -     Ankle AROM (Therapeutic Exercise)  bilateral;dorsiflexion;plantarflexion;10 repetitions  -[facility] Name 03/15/21 1305          Sensory Assessment (Somatosensory)    Sensory Assessment (Somatosensory)  LE sensation intact  -       User Key  (r) = Recorded By, (t) = Taken By, (c) = Cosigned By    Initials Name Provider Type    Clovis Ontievros, PT Physical Therapist        Goals/Plan     Row Name 03/15/21 1305          Bed Mobility Goal 1 (PT)    Activity/Assistive Device (Bed Mobility Goal 1, PT)  sit to supine/supine to sit  -     [place] Level/Cues Needed (Bed Mobility Goal 1, PT)  modified independence  -     Time Frame (Bed Mobility Goal 1, PT)  long term goal (LTG);3 days  -     Row Name 03/15/21 1305          Transfer Goal 1 (PT)    Activity/Assistive Device (Transfer Goal 1, PT)   sit-to-stand/stand-to-sit;walker, rolling  -GÉNESIS     [place] Level/Cues Needed (Transfer Goal 1, PT)  modified independence  -GÉNESIS     Time Frame (Transfer Goal 1, PT)  long term goal (LTG);3 days  -GÉNESIS     Row Name 03/15/21 1305          Gait Training Goal 1 (PT)    Activity/Assistive Device (Gait Training Goal 1, PT)  gait (walking locomotion);walker, rolling  -GÉNESIS     [place] Level (Gait Training Goal 1, PT)  modified independence  -GÉNESIS     Distance (Gait Training Goal 1, PT)  500 feet  -GÉNESIS     Time Frame (Gait Training Goal 1, PT)  long term goal (LTG);3 days  -GÉNESIS     Row Name 03/15/21 1305          Stairs Goal 1 (PT)    Activity/Assistive Device (Stairs Goal 1, PT)  stairs, all skills;walker, rolling  -GÉNESIS     [place] Level/Cues Needed (Stairs Goal 1, PT)  contact guard assist  -GÉNESIS     Number of Stairs (Stairs Goal 1, PT)  1 backwards technique  -GÉNESIS     Time Frame (Stairs Goal 1, PT)  long term goal (LTG);3 days  -GÉNESIS       User Key  (r) = Recorded By, (t) = Taken By, (c) = Cosigned By    Initials Name Provider Type    GÉNESIS Clovis Lux, PT Physical Therapist        Clinical Impression     Row Name 03/15/21 3815          Pain    Additional Documentation  Pain Scale: Numbers Pre/Post-Treatment (Group)  -GÉNESIS     Row Name 03/15/21 6095          Pain Scale: Numbers Pre/Post-Treatment    Pretreatment Pain Rating  1/10  -GÉNESIS     Posttreatment Pain Rating  2/10  -GÉNESIS     Pain Location - Side  Right  -GÉNESIS     Pain Location - Orientation  incisional  -GÉNESIS     Pain Location  hip  -GÉNESIS     Pain Intervention(s)  Repositioned;Cold applied;Ambulation/increased activity  -     Row Name 03/15/21 7607          Therapy Assessment/Plan (PT)    Patient/Family Therapy Goals Statement (PT)  To return home  -     Rehab Potential (PT)  good, to achieve stated therapy goals  -     Criteria for Skilled Interventions Met (PT)  yes;meets criteria;skilled treatment is necessary  -     Row Name 03/15/21 7585          Positioning and  Restraints    Pre-Treatment Position  in bed  -GÉNESIS     Post Treatment Position  chair  -GÉNESIS     In Chair  notified nsg;reclined;call light within reach;encouraged to call for assist;exit alarm on;legs elevated;compression device  -GÉNESIS       User Key  (r) = Recorded By, (t) = Taken By, (c) = Cosigned By    Initials Name Provider Type    Clovis Ontiveros, PT Physical Therapist        Outcome Measures     Row Name 03/15/21 1305          How much help from another person do you currently need...    Turning from your back to your side while in flat bed without using bedrails?  4  -GÉNESIS     Moving from lying on back to sitting on the side of a flat bed without bedrails?  4  -GÉNESIS     Moving to and from a bed to a chair (including a wheelchair)?  3  -GÉNESIS     Standing up from a chair using your arms (e.g., wheelchair, bedside chair)?  3  -GÉNESIS     Climbing 3-5 steps with a railing?  3  -GÉNESIS     To walk in hospital room?  3  -GÉNESIS     AM-PAC 6 Clicks Score (PT)  20  -     Row Name 03/15/21 1305          PADD    Diagnosis  2  -GÉNESIS     Gender  1  -GÉNESIS     Age Group  2  -GÉNESIS     Gait Distance  1  -GÉNESIS     Assist Level  1  -GÉNESIS     Home Support  3  -GÉNESIS     PADD Score  10  -GÉNESIS     Patient Preference  home with home health  -GÉNESIS     Prediction by PADD Score  directly home (with home health or out-patient rehab)  -     Row Name 03/15/21 1305          Functional Assessment    Outcome Measure Options  AM-PAC 6 Clicks Basic Mobility (PT);PADD  -       User Key  (r) = Recorded By, (t) = Taken By, (c) = Cosigned By    Initials Name Provider Type    Clovis Ontiveros, JINNY Physical Therapist        Physical Therapy Education                 Title: PT OT SLP Therapies (In Progress)     Topic: Physical Therapy (Done)     Point: Mobility training (Done)     Learning Progress Summary           Patient Acceptance, E,D, VU by GÉNESIS at 3/15/2021 1305    Comment: Educated on safe sequencing with bed mobility, ambulatory/toilet transfers, and gait. Reviewed HEP and hip  precautions.                   Point: Home exercise program (Done)     Learning Progress Summary           Patient Acceptance, E,D, VU by  at 3/15/2021 1305    Comment: Educated on safe sequencing with bed mobility, ambulatory/toilet transfers, and gait. Reviewed HEP and hip precautions.                   Point: Body mechanics (Done)     Learning Progress Summary           Patient Acceptance, E,D, VU by GÉNESIS at 3/15/2021 1305    Comment: Educated on safe sequencing with bed mobility, ambulatory/toilet transfers, and gait. Reviewed HEP and hip precautions.                   Point: Precautions (Done)     Learning Progress Summary           Patient Acceptance, E,D, VU by  at 3/15/2021 1305    Comment: Educated on safe sequencing with bed mobility, ambulatory/toilet transfers, and gait. Reviewed HEP and hip precautions.                               User Key     Initials Effective Dates Name Provider Type Discipline     09/10/19 -  Clovis Lux, PT Physical Therapist PT              PT Recommendation and Plan  Planned Therapy Interventions (PT): balance training, bed mobility training, gait training, home exercise program, patient/family education, transfer training, stair training, strengthening  Plan of Care Reviewed With: patient  Progress: improving  Outcome Summary: PT eval complete. Pt ambulated 360 feet using RW, CGA, and one person to manage equipment. Gait limited by fatigue. Bed mobility performed with supervision, STS and toilet transfers with CGA. No knee buckling noted with ambulation. Reviewed HEP and hip precautions. Recommend pt d/c home with assist and HHPT when appropriate.     Time Calculation:   PT Charges     Row Name 03/15/21 1305             Time Calculation    Start Time  1305  -      PT Received On  03/15/21  -      PT Goal Re-Cert Due Date  03/25/21  -         Time Calculation- PT    Total Timed Code Minutes- PT  14 minute(s)  -         Timed Charges    59818 - PT Therapeutic Exercise  Minutes  2  -GÉNESIS      41629 - Gait Training Minutes   8  -GÉNESIS      55555 - PT Therapeutic Activity Minutes  4  -GÉNESIS        User Key  (r) = Recorded By, (t) = Taken By, (c) = Cosigned By    Initials Name Provider Type    Clovis Ontiveros, PT Physical Therapist        Therapy Charges for Today     Code Description Service Date Service Provider Modifiers Qty    [id number]  GAIT TRAINING EA 15 MIN 3/15/2021 Clovis Lux, PT GP 1    [id number]  PT EVAL LOW COMPLEXITY 3 3/15/2021 Clovis Lux, PT GP 1    [id number]  PT THER SUPP EA 15 MIN 3/15/2021 Clovis Lux, PT GP 2          PT G-Codes  Outcome Measure Options: AM-PAC 6 Clicks Basic Mobility (PT), PADD  AM-PAC 6 Clicks Score (PT): 20    Clovis Lux PT  3/15/2021

## 2021-03-15 NOTE — PROGRESS NOTES
Discharge Planning Assessment  [facility]     Patient Name: Danae Fong  MRN: [id number]  Today's Date: 3/15/2021    Admit Date: 3/15/2021    Discharge Needs Assessment     Row Name 03/15/21 1408       Living Environment    Lives With  sibling(s)    Name(s) of Who Lives With Patient  Anna    Unique Family Situation  [place]    Current Living Arrangements  home/apartment/condo    Primary Care Provided by  self    Provides Primary Care For  no one    Family Caregiver if Needed  sibling(s) Sister is Anna.    Quality of Family Relationships  helpful;involved;supportive    Able to Return to Prior Arrangements  yes    Living Arrangement Comments  [place].       Resource/Environmental Concerns    Resource/Environmental Concerns  none       Transition Planning    Patient/Family Anticipates Transition to  home with family    Patient/Family Anticipated Services at Transition  home health care    Transportation Anticipated  family or friend will provide       Discharge Needs Assessment    Readmission Within the Last 30 Days  no previous admission in last 30 days    Equipment Currently Used at Home  walker, rolling;commode Commode is elevated.    Equipment Needed After Discharge  none    Outpatient/Agency/Support Group Needs  homecare agency    Provided Post Acute Provider List?  N/A Pt used [facility] before therefore this is the agency of choice.    Patient's Choice of Community Agency(s)  [facility]    Current Discharge Risk  other (see comments)    Discharge Coordination/Progress  Orthopedic surgery today.        Discharge Plan     Row Name 03/15/21 1412       Plan    Plan  To dc home to [place] with family.  Lives with her sister.  Pt prefers [facility] .  The referral was called and faxed.  They will review.  CM to follow.  Plans for dc in the am.    Patient/Family in Agreement with Plan  yes    Final Discharge Disposition Code  06 - home with home health care        Continued Care and Services -  Admitted Since 3/15/2021    Coordination has not been started for this encounter.       Expected Discharge Date and Time     Expected Discharge Date Expected Discharge Time    Mar 15, 2021         Demographic Summary    No documentation.       Functional Status    No documentation.       Psychosocial    No documentation.       Abuse/Neglect    No documentation.       Legal    No documentation.       Substance Abuse    No documentation.       Patient Forms    No documentation.           Fiordaliza Hernandez RN

## 2021-03-15 NOTE — H&P
Patient Name: Danae Fong  MRN: [id number]  : 1970  DOS: 3/15/2021    Attending: Owen Siu MD    Primary Care Provider: Leigh Casey MD      Chief complaint: Right hip pain    Subjective   Patient is a pleasant 50 y.o. female presented for scheduled surgery by Dr. Siu.    Per his note (50-year-old woman with diabetes and right hip spontaneous septic arthritis about 5 years ago.  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  Underwent bovine heart valve replacement in that same timeframe and suffered early bleeding related to Eliquis and then had dislocation requiring conversion to locking components.  She had done recently well into the last few months and has had increasing pain in the right hip.  Has been full weightbearing.  X-rays show retained cerclage band but no evidence of osteomyelitis.  Inflammatory markers are elevated but she also has a right fourth metatarsal head diabetic ulcer in progress.  Risks benefits indication rationale for hardware removal of the cerclage band cerclage band hardware removal are discussed at length with patient.  She signed her consent after all questions were answered.).  She underwent removal of cerclage band right hip and proximal femur by Dr. Siu under spinal anesthesia.  She tolerated surgery well and was admitted for further management.  Since then she has ambulated and voided.  She states her pain is very well controlled.    Noted is her extensive past medical history including multiple infectious complications requiring aortic valve replacement with bioprosthetic valve in  with postop complications including atrial fibrillation.  She had a pericardial window in 2016 with pericardial stripping.  She also has history of diabetic foot ulcer/osteomyelitis requiring surgery, toe amputation on the right.    No Known Allergies      Medications Prior to Admission   Medication Sig Dispense Refill Last Dose    • aspirin 81 MG chewable tablet Chew 1 tablet Daily.   3/15/2021 at 0400   • bumetanide (BUMEX) 1 MG tablet Take 1 mg by mouth 2 (Two) Times a Day.   3/15/2021 at Unknown time   • docusate sodium (COLACE) 100 MG capsule Take 100 mg by mouth 2 (Two) Times a Day As Needed for Constipation.   3/14/2021 at Unknown time   • DULoxetine (CYMBALTA) 30 MG capsule Take 30 mg by mouth Daily.   3/15/2021 at Unknown time   • metoprolol tartrate (LOPRESSOR) 25 MG tablet Take 0.5 tablets by mouth 2 (Two) Times a Day. 30 tablet 5 3/15/2021 at 0400   • Multiple Vitamins-Minerals (CENTRUM SILVER 50+WOMEN) tablet    3/14/2021 at Unknown time   • spironolactone (ALDACTONE) 50 MG tablet Take 1 tablet by mouth Daily. 90 tablet 2 3/15/2021 at Unknown time   • vilazodone (VIIBRYD) 20 MG tablet tablet Take 20 mg by mouth Daily.   3/15/2021 at Unknown time         History:   Past Medical History:   Diagnosis Date   • A-fib (CMS/[facility]) 2016    REMOTE HISTORY AFTER VALVE    • Anemia    • Anxiety    • Aortic valve regurgitation 2016    VALVE REPLACEMENT    • CHF (congestive heart failure) (CMS/[facility]) 2016   • Constipation    • Controlled type 2 diabetes mellitus without complication (CMS/[facility])     not medicated??? not sure if type 2-  borderline diabetes    • COPD (chronic obstructive pulmonary disease) (CMS/[facility])    • Diabetic neuropathy (CMS/[facility])    • Diabetic Ulcer    • Dysfunctional uterine bleeding    • Endocarditis 2016   • GERD (gastroesophageal reflux disease)    • Hypertension    • Low back pain    • MRSA infection 09/2015    TOE   • Primary Osteoarthritis of both hips    • Sepsis (CMS/[facility])    • Septic arthritis (CMS/[facility])    • Sleep apnea     RESOLVED - PER PATIENT    • SOB (shortness of breath) on exertion    • 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[facility])    • Streptococcus infection, group B    • Subacute osteomyelitis of left foot (CMS/[facility])    • Transfusion history    • Wears reading eyeglasses      Past Surgical History:   Procedure  Laterality Date   • AMPUTATION DIGIT Right 1/30/2018    Procedure: AMPUTATION RIGHT 2ND TOE;  Surgeon: Charleen Nath MD;  Location:  [facility];  Service:    • AORTIC VALVE REPAIR/REPLACEMENT N/A 8/30/2016    Procedure: AIRAM, MEDIAN STERNOTOMY, AORTIC VALVE REPLACEMENT;  Surgeon: Mauricio Grove MD;  Location:  [facility];  Service:    • CARDIAC CATHETERIZATION N/A 8/25/2016    Procedure: Left Heart Cath;  Surgeon: Tyler Peterson MD;  Location:  [facility];  Service:    • CARDIAC CATHETERIZATION N/A 3/21/2018    Procedure: Coronary angiography;  Surgeon: Tyler Peterson IV, MD;  Location:  [facility];  Service: Cardiovascular   • COLONOSCOPY     • DILATATION AND CURETTAGE      of uterus   • ENDOSCOPY     • FOOT SURGERY Left     for osteomyelitis   • INCISION AND DRAINAGE HIP Right 1/26/2017    Procedure: RIGHT HIP WOUND WASHOUT AND DEBRIDEMENT, WITH POLYETHELENE FEMORAL HEAD EXCHANGE ;  Surgeon: Owen Siu MD;  Location:  [facility];  Service:    • PERICARDIAL WINDOW N/A 9/16/2016    Procedure: PERICARDIOCENTESIS WITH PLACEMENT OF DRAINAGE TUBE .;  Surgeon: Mauricio Grove MD;  Location:  [facility];  Service:    • PERICARDIAL WINDOW N/A 9/20/2016    Procedure: SUBXYPHOID PERICARDIAL WINDOW;  Surgeon: Mauricio Grove MD;  Location:  [facility];  Service:    • [place] CLOSED RX TRAUMATIC HIP DISLOCATN N/A 1/29/2017    Procedure: HIP CLOSED REDUCTION;  Surgeon: Leander Li MD;  Location:  [facility];  Service: Orthopedics   • THORACOTOMY Left 10/5/2016    Procedure: THORACOTOMY Pericardectomy,  AIRAM,  Placement pacing leads, Pump standby;  Surgeon: Mauricio Grove MD;  Location:  [facility];  Service:    • TOTAL HIP ARTHROPLASTY Right 1/23/2017    Procedure: RIGHT TOTAL HIP ARTHROPLASTY ANTERIOR ;  Surgeon: Owen Siu MD;  Location:  [facility];  Service:    • TOTAL HIP ARTHROPLASTY REVISION Right 1/30/2017    Procedure: REPEAT WOUND DEBRIDEMENT ACETABULAR REVISION;   "Surgeon: Owen Siu MD;  Location: [facility];  Service:    • WISDOM TOOTH EXTRACTION      all 4     Family History   Problem Relation Age of Onset   • Diabetes Mother    • Heart disease Mother    • Hypertension Mother    • Osteoarthritis Mother    • Diabetes Sister    • Arthritis Sister    • No Known Problems Father    • Diabetes Sister    • Diabetes Sister    • Heart disease Other    • Heart attack Other    • Heart disease Other    • Hypertension Other    • Osteoarthritis Other    • Rheum arthritis Other    • Cancer Neg Hx      Social History     Tobacco Use   • Smoking status: Former Smoker     Packs/day: 0.50     Years: 15.00     Pack years: 7.50     Types: Cigarettes     Quit date: 7/15/2016     Years since quittin.6   • Smokeless tobacco: Never Used   Vaping Use   • Vaping Use: Never used   Substance Use Topics   • Alcohol use: No   • Drug use: Yes     Types: Marijuana     Comment: THC gummies occasionally for pain       Review of Systems  Pertinent items are noted in HPI, all other systems reviewed and negative  She has history of Arzate cirrhosis, 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    Vital Signs  /68 (BP Location: Left arm, Patient Position: Lying)   Pulse 59   Temp 98.5 °F (36.9 °C) (Temporal)   Resp 16   Ht 186.7 cm (73.5\")   Wt 113 kg (250 lb)   SpO2 99%   BMI 32.54 kg/m²     Physical Exam:    General Appearance:    Alert, cooperative, in no acute distress   Head:    Normocephalic, without obvious abnormality, atraumatic   Eyes:            Lids and lashes normal, conjunctivae and sclerae normal, no   icterus, no pallor, corneas clear    Ears:    Ears appear intact with no abnormalities noted   Throat:   No oral lesions, no thrush, oral mucosa moist   Neck:   No adenopathy, supple, trachea midline, no thyromegaly         Lungs:     Clear to auscultation,respirations regular, even and   unlabored. No wheezes or rales.    Heart:    Regular rhythm and normal rate, normal S1 and S2, no murmur, no gallop "   Abdomen:     Normal bowel sounds, no masses, no organomegaly, soft        non-tender, non-distended, no guarding, no rebound                 tenderness   Genitalia:    Deferred   Extremities:  Right LE, CDI adonis dressing over right hip.  No clubbing, cyanosis, or edema distally.   Pulses:   Pulses palpable and equal bilaterally   Skin:   No bleeding, bruising or rash   Neurologic:   Cranial nerves 2 - 12 grossly intact, awake alert and oriented.      I reviewed the patient's new clinical results.             Invalid input(s): NEUTOPHILPCT,  EOSPCT  Results from last 7 days   Lab Units 03/15/21  0647   POTASSIUM mmol/L 3.4*     Lab Results   Component Value Date    HGBA1C 5.20 03/05/2021     Results for ELDER JAMES (MRN [id number]) as of 3/15/2021 11:01   Ref. Range 3/5/2021 16:36   Glucose Latest Ref Range: 65 - 99 mg/dL 96   Sodium Latest Ref Range: 136 - 145 mmol/L 135 (L)   Potassium Latest Ref Range: 3.5 - 5.2 mmol/L 3.9   CO2 Latest Ref Range: 22.0 - 29.0 mmol/L 26.0   Chloride Latest Ref Range: 98 - 107 mmol/L 98   Anion Gap Latest Ref Range: 5.0 - 15.0 mmol/L 11.0   Creatinine Latest Ref Range: 0.57 - 1.00 mg/dL 0.70   BUN Latest Ref Range: 6 - 20 mg/dL 12   BUN/Creatinine Ratio Latest Ref Range: 7.0 - 25.0  17.1   Calcium Latest Ref Range: 8.6 - 10.5 mg/dL 9.4   eGFR Non  Am Latest Ref Range: >60 mL/min/1.73 89   Alkaline Phosphatase Latest Ref Range: 39 - 117 U/L 119 (H)   Total Protein Latest Ref Range: 6.0 - 8.5 g/dL 8.0   ALT (SGPT) Latest Ref Range: 1 - 33 U/L 14   AST (SGOT) Latest Ref Range: 1 - 32 U/L 21   Total Bilirubin Latest Ref Range: 0.0 - 1.2 mg/dL 0.3   Albumin Latest Ref Range: 3.50 - 5.20 g/dL 4.20   Globulin Latest Units: gm/dL 3.8   A/G Ratio Latest Units: g/dL 1.1     Results for ELDER JAMES (MRN [id number]) as of 3/15/2021 11:01   Ref. Range 3/5/2021 16:36   WBC Latest Ref Range: 3.40 - 10.80 10*3/mm3 9.68   RBC Latest Ref Range: 3.77 - 5.28 10*6/mm3 5.63 (H)    Hemoglobin Latest Ref Range: 12.0 - 15.9 g/dL 15.4   Hematocrit Latest Ref Range: 34.0 - 46.6 % 47.2 (H)   RDW Latest Ref Range: 12.3 - 15.4 % 14.2   MCV Latest Ref Range: 79.0 - 97.0 fL 83.8   MCH Latest Ref Range: 26.6 - 33.0 pg 27.4   MCHC Latest Ref Range: 31.5 - 35.7 g/dL 32.6   MPV Latest Ref Range: 6.0 - 12.0 fL 9.2   Platelets Latest Ref Range: 140 - 450 10*3/mm3 236       Assessment and Plan:       S/P hardware removal( painful circlage right proximal femur/right hip)    Essential hypertension    Status post aortic valve replacement with bioprosthetic valve    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 PT/OT,  Weight bearing as tolerated right LE.  2. Pain control-prns  3. IS-encourage  4. DVT proph- Mechanicals and aspirin  5. Bowel regimen  6. Resume home medications as appropriate  7. Monitor post-op labs  8. DC planning for home    -Resume diuretics for patient's diastolic heart failure and Mckeon cirrhosis.    -Patient will resume Viibryd following discharge home tomorrow.    Discussed with Ms. Fong postop management plan, she expressed understanding and agreement.      Dragon disclaimer:  Part of this encounter note is an electronic transcription/translation of spoken language to printed text. The electronic translation of spoken language may permit erroneous, or at times, nonsensical words or phrases to be inadvertently transcribed; Although I have reviewed the note for such errors, some may still exist.    Divina Miguel MD  03/15/21  11:03 EDT

## 2021-03-15 NOTE — PLAN OF CARE
Problem: Adult Inpatient Plan of Care  Goal: Plan of Care Review  Flowsheets (Taken 3/15/2021 1300)  Progress: improving  Plan of Care Reviewed With: patient  Outcome Summary: PT eval complete. Pt ambulated 360 feet using RW, CGA, and one person to manage equipment. Gait limited by fatigue. Bed mobility performed with supervision, STS and toilet transfers with CGA. No knee buckling noted with ambulation. Reviewed HEP and hip precautions. Recommend pt d/c home with assist and HHPT when appropriate.   Goal Outcome Evaluation:  Plan of Care Reviewed With: patient  Progress: improving  Outcome Summary: PT eval complete. Pt ambulated 360 feet using RW, CGA, and one person to manage equipment. Gait limited by fatigue. Bed mobility performed with supervision, STS and toilet transfers with CGA. No knee buckling noted with ambulation. Reviewed HEP and hip precautions. Recommend pt d/c home with assist and HHPT when appropriate.

## 2021-03-16 VITALS
HEART RATE: 59 BPM | TEMPERATURE: 97.8 F | RESPIRATION RATE: 16 BRPM | BODY MASS INDEX: 33.86 KG/M2 | HEIGHT: 72 IN | OXYGEN SATURATION: 95 % | SYSTOLIC BLOOD PRESSURE: 115 MMHG | DIASTOLIC BLOOD PRESSURE: 61 MMHG | WEIGHT: 250 LBS

## 2021-03-16 LAB
ANION GAP SERPL CALCULATED.3IONS-SCNC: 9 MMOL/L (ref 5–15)
BASOPHILS # BLD AUTO: 0.01 10*3/MM3 (ref 0–0.2)
BASOPHILS NFR BLD AUTO: 0.1 % (ref 0–1.5)
BUN SERPL-MCNC: 6 MG/DL (ref 6–20)
BUN/CREAT SERPL: 9.7 (ref 7–25)
CALCIUM SPEC-SCNC: 9.5 MG/DL (ref 8.6–10.5)
CHLORIDE SERPL-SCNC: 104 MMOL/L (ref 98–107)
CO2 SERPL-SCNC: 28 MMOL/L (ref 22–29)
CREAT SERPL-MCNC: 0.62 MG/DL (ref 0.57–1)
DEPRECATED RDW RBC AUTO: 45.1 FL (ref 37–54)
EOSINOPHIL # BLD AUTO: 0 10*3/MM3 (ref 0–0.4)
EOSINOPHIL NFR BLD AUTO: 0 % (ref 0.3–6.2)
ERYTHROCYTE [DISTWIDTH] IN BLOOD BY AUTOMATED COUNT: 14.7 % (ref 12.3–15.4)
GFR SERPL CREATININE-BSD FRML MDRD: 102 ML/MIN/1.73
GLUCOSE SERPL-MCNC: 95 MG/DL (ref 65–99)
HCT VFR BLD AUTO: 42.6 % (ref 34–46.6)
HGB BLD-MCNC: 13.5 G/DL (ref 12–15.9)
IMM GRANULOCYTES # BLD AUTO: 0.07 10*3/MM3 (ref 0–0.05)
IMM GRANULOCYTES NFR BLD AUTO: 0.7 % (ref 0–0.5)
LYMPHOCYTES # BLD AUTO: 1.64 10*3/MM3 (ref 0.7–3.1)
LYMPHOCYTES NFR BLD AUTO: 16.7 % (ref 19.6–45.3)
MCH RBC QN AUTO: 26.8 PG (ref 26.6–33)
MCHC RBC AUTO-ENTMCNC: 31.7 G/DL (ref 31.5–35.7)
MCV RBC AUTO: 84.5 FL (ref 79–97)
MONOCYTES # BLD AUTO: 0.49 10*3/MM3 (ref 0.1–0.9)
MONOCYTES NFR BLD AUTO: 5 % (ref 5–12)
NEUTROPHILS NFR BLD AUTO: 7.59 10*3/MM3 (ref 1.7–7)
NEUTROPHILS NFR BLD AUTO: 77.5 % (ref 42.7–76)
NRBC BLD AUTO-RTO: 0 /100 WBC (ref 0–0.2)
PLATELET # BLD AUTO: 190 10*3/MM3 (ref 140–450)
PMV BLD AUTO: 9.3 FL (ref 6–12)
POTASSIUM SERPL-SCNC: 4.1 MMOL/L (ref 3.5–5.2)
RBC # BLD AUTO: 5.04 10*6/MM3 (ref 3.77–5.28)
SODIUM SERPL-SCNC: 141 MMOL/L (ref 136–145)
WBC # BLD AUTO: 9.8 10*3/MM3 (ref 3.4–10.8)

## 2021-03-16 PROCEDURE — 97116 GAIT TRAINING THERAPY: CPT

## 2021-03-16 PROCEDURE — 85025 COMPLETE CBC W/AUTO DIFF WBC: CPT | Performed by: ORTHOPAEDIC SURGERY

## 2021-03-16 PROCEDURE — 80048 BASIC METABOLIC PNL TOTAL CA: CPT | Performed by: INTERNAL MEDICINE

## 2021-03-16 RX ORDER — HYDROCODONE BITARTRATE AND ACETAMINOPHEN 5; 325 MG/1; MG/1
1 TABLET ORAL EVERY 4 HOURS PRN
Qty: 40 TABLET | Refills: 0 | Status: SHIPPED | OUTPATIENT
Start: 2021-03-16 | End: 2021-04-13

## 2021-03-16 RX ORDER — ACETAMINOPHEN 500 MG
1000 TABLET ORAL EVERY 8 HOURS
Qty: 42 TABLET | Refills: 0
Start: 2021-03-16 | End: 2021-03-23

## 2021-03-16 RX ORDER — ASPIRIN 325 MG
325 TABLET, DELAYED RELEASE (ENTERIC COATED) ORAL DAILY
Qty: 30 TABLET | Refills: 0 | Status: SHIPPED | OUTPATIENT
Start: 2021-03-16 | End: 2021-04-15

## 2021-03-16 RX ORDER — ASPIRIN 81 MG/1
81 TABLET, CHEWABLE ORAL DAILY
Start: 2021-04-16 | End: 2021-04-13

## 2021-03-16 RX ADMIN — DULOXETINE HYDROCHLORIDE 30 MG: 30 CAPSULE, DELAYED RELEASE ORAL at 08:53

## 2021-03-16 RX ADMIN — BUMETANIDE 1 MG: 1 TABLET ORAL at 08:53

## 2021-03-16 RX ADMIN — ASPIRIN 325 MG ORAL TABLET 325 MG: 325 PILL ORAL at 08:53

## 2021-03-16 RX ADMIN — SPIRONOLACTONE 50 MG: 50 TABLET ORAL at 08:53

## 2021-03-16 RX ADMIN — METOPROLOL TARTRATE 12.5 MG: 25 TABLET, FILM COATED ORAL at 08:53

## 2021-03-16 NOTE — PLAN OF CARE
Goal Outcome Evaluation:  Plan of Care Reviewed With: patient     Outcome Summary: Pt VSS and AOx4.  Pt has ambulated with staff in the hallway.  Dressing is c/d/i.  Pain controlled with PO pain meds.  Sleeping between care.  Continuing to monitor.

## 2021-03-16 NOTE — THERAPY TREATMENT NOTE
Patient Name: Danae Fong  : 1970    MRN: [id number]                              Today's Date: 3/16/2021       Admit Date: 3/15/2021    Visit Dx:     ICD-10-CM ICD-9-CM   1. S/P hardware removal( painful circlage right proximal femur/right hip)  Z98.890 V45.89   2. 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 (CMS/HCC)   • Chronic diastolic congestive heart failure (CMS/HCC)   • Controlled type 2 diabetes mellitus with diabetic polyneuropathy, without long-term current use of insulin (CMS/HCC)   • Diabetic foot ulcer (CMS/HCC)   • Non-cardiac chest pain   • Class 3 obesity due to excess calories in adult   • Charcot's joint of left foot   • Venous stasis   • Amputated toe of right foot (CMS/[facility])   • Atypical chest pain   • Palpitations   • Diabetic ulcer of lower extremity (CMS/HCC)   • Inflammation around joint   • S/P hardware removal( painful circlage right proximal femur/right hip)     Past Medical History:   Diagnosis Date   • A-fib (CMS/[facility]) 2016    REMOTE HISTORY AFTER VALVE    • Anemia    • Anxiety    • Aortic valve regurgitation 2016    VALVE REPLACEMENT    • CHF (congestive heart failure) (CMS/[facility]) 2016   • Constipation    • Controlled type 2 diabetes mellitus without complication (CMS/[facility])     not medicated??? not sure if type 2-  borderline diabetes    • COPD (chronic obstructive pulmonary disease) (CMS/[facility])    • Diabetic neuropathy (CMS/[facility])    • Diabetic Ulcer    • Dysfunctional uterine bleeding    • Endocarditis 2016   • GERD (gastroesophageal reflux disease)    • Hypertension    • Low back pain    • MRSA infection 2015    TOE   • Primary Osteoarthritis of both hips    • Sepsis (CMS/HCC)    • Septic arthritis (CMS/HCC)    • Sleep apnea      RESOLVED - PER PATIENT    • SOB (shortness of breath) on exertion    • 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HCC)    • Streptococcus infection, group B    • Subacute osteomyelitis of left foot (CMS/HCC)    • Transfusion history    • Wears reading eyeglasses      Past Surgical History:   Procedure Laterality Date   • AMPUTATION DIGIT Right 1/30/2018    Procedure: AMPUTATION RIGHT 2ND TOE;  Surgeon: Charleen Nath MD;  Location:  [facility];  Service:    • AORTIC VALVE REPAIR/REPLACEMENT N/A 8/30/2016    Procedure: AIRAM, MEDIAN STERNOTOMY, AORTIC VALVE REPLACEMENT;  Surgeon: Mauricio Grove MD;  Location:  [facility];  Service:    • CARDIAC CATHETERIZATION N/A 8/25/2016    Procedure: Left Heart Cath;  Surgeon: Tyler Peterson MD;  Location:  [facility];  Service:    • CARDIAC CATHETERIZATION N/A 3/21/2018    Procedure: Coronary angiography;  Surgeon: Tyler Peterson IV, MD;  Location:  [facility];  Service: Cardiovascular   • COLONOSCOPY     • DILATATION AND CURETTAGE      of uterus   • ENDOSCOPY     • FOOT SURGERY Left     for osteomyelitis   • HARDWARE REMOVAL Right 3/15/2021    Procedure: REMOVAL OF CERCLAGE WIRE OF RIGHT PROXIMAL FEMUR - RIGHT;  Surgeon: Owen Siu MD;  Location:  [facility];  Service: Orthopedics;  Laterality: Right;   • INCISION AND DRAINAGE HIP Right 1/26/2017    Procedure: RIGHT HIP WOUND WASHOUT AND DEBRIDEMENT, WITH POLYETHELENE FEMORAL HEAD EXCHANGE ;  Surgeon: Owen Siu MD;  Location:  [facility];  Service:    • PERICARDIAL WINDOW N/A 9/16/2016    Procedure: PERICARDIOCENTESIS WITH PLACEMENT OF DRAINAGE TUBE .;  Surgeon: Mauricio Grove MD;  Location:  [facility];  Service:    • PERICARDIAL WINDOW N/A 9/20/2016    Procedure: SUBXYPHOID PERICARDIAL WINDOW;  Surgeon: Mauricio Grove MD;  Location:  [facility];  Service:    • HI CLOSED RX TRAUMATIC HIP DISLOCATN N/A 1/29/2017    Procedure: HIP CLOSED REDUCTION;  Surgeon: Leander Almendarez  MD Jen;  Location:  [facility];  Service: Orthopedics   • THORACOTOMY Left 10/5/2016    Procedure: THORACOTOMY Pericardectomy,  AIRAM,  Placement pacing leads, Pump standby;  Surgeon: Mauricio Grove MD;  Location:  [facility];  Service:    • TOTAL HIP ARTHROPLASTY Right 1/23/2017    Procedure: RIGHT TOTAL HIP ARTHROPLASTY ANTERIOR ;  Surgeon: Owen Siu MD;  Location:  [facility];  Service:    • TOTAL HIP ARTHROPLASTY REVISION Right 1/30/2017    Procedure: REPEAT WOUND DEBRIDEMENT ACETABULAR REVISION;  Surgeon: Owen Siu MD;  Location:  [facility];  Service:    • WISDOM TOOTH EXTRACTION      all 4     General Information     Row Name 03/16/21 0939          Physical Therapy Time and Intention    Document Type  therapy note (daily note)  -     Mode of Treatment  physical therapy  -     Row Name 03/16/21 0939          General Information    Patient Profile Reviewed  yes  -     Existing Precautions/Restrictions  fall;right;hip, posterior;other (see comments) DALLIN  -     Row Name 03/16/21 0939          Cognition    Orientation Status (Cognition)  oriented x 4  -     Row Name 03/16/21 0939          Safety Issues, Functional Mobility    Safety Issues Affecting Function (Mobility)  safety precaution awareness;safety precautions follow-through/compliance  -     Impairments Affecting Function (Mobility)  endurance/activity tolerance;strength;range of motion (ROM)  -       User Key  (r) = Recorded By, (t) = Taken By, (c) = Cosigned By    Initials Name Provider Type     Demetris Lanier PT Physical Therapist        Mobility     Row Name 03/16/21 0546          Bed Mobility    Bed Mobility  supine-sit;scooting/bridging  -     Scooting/Bridging [place] (Bed Mobility)  supervision;verbal cues  -     Supine-Sit [place] (Bed Mobility)  supervision;verbal cues  -     Assistive Device (Bed Mobility)  bed rails;head of bed elevated  -     Comment (Bed Mobility)  Verbal cues for sequencing and no  assist needed. Pt progressing well with bed mobility.  -     Row Name 03/16/21 0940          Transfers    Comment (Transfers)  Verbal cues for safe hand placement with use of RW and positioning of RLE for comfort.  -     Row Name 03/16/21 0940          Sit-Stand Transfer    Sit-Stand [place] (Transfers)  verbal cues;contact guard  -     Assistive Device (Sit-Stand Transfers)  walker, front-wheeled  -[facility] Name 03/16/21 0940          Gait/Stairs (Locomotion)    [place] Level (Gait)  contact guard;verbal cues  -     Assistive Device (Gait)  walker, front-wheeled  -     Distance in Feet (Gait)  720 feet  -     Deviations/Abnormal Patterns (Gait)  bilateral deviations;lianna decreased;gait speed decreased;stride length decreased  -     Bilateral Gait Deviations  forward flexed posture  -     Right Sided Gait Deviations  heel strike decreased;weight shift ability decreased  -     [place] Level (Stairs)  not tested  -     Comment (Gait/Stairs)  Pt ambulated with step through pattern with verbal cues for upright posture and improving heel strike. No LOB or buckling noted.  -[facility] Name 03/16/21 0940          Mobility    Extremity Weight-bearing Status  right lower extremity  -     Right Lower Extremity (Weight-bearing Status)  weight-bearing as tolerated (WBAT)  -       User Key  (r) = Recorded By, (t) = Taken By, (c) = Cosigned By    Initials Name Provider Type     Demetris Lanier, PT Physical Therapist        Obj/Interventions     [facility] Name 03/16/21 0942          Balance    Balance Assessment  sitting static balance;sitting dynamic balance;standing static balance;standing dynamic balance  -     Static Sitting Balance  WNL;unsupported;sitting, edge of bed  -     Dynamic Sitting Balance  WNL;unsupported;sitting, edge of bed  -     Static Standing Balance  WFL;supported;standing  -     Dynamic Standing Balance  WFL;supported;standing  -     Balance Interventions   sitting;standing;sit to stand;supported;static;dynamic;minimal challenge  -     Comment, Balance  no LOB noted  -       User Key  (r) = Recorded By, (t) = Taken By, (c) = Cosigned By    Initials Name Provider Type     Demetris Lanier, PT Physical Therapist        Goals/Plan    No documentation.       Clinical Impression     [facility] Name 03/16/21 0942          Pain    Additional Documentation  Pain Scale: Numbers Pre/Post-Treatment (Group)  -JH     Row Name 03/16/21 0942          Pain Scale: Numbers Pre/Post-Treatment    Pretreatment Pain Rating  1/10  -     Posttreatment Pain Rating  1/10  [facility]     Pain Location - Side  Right  -     Pain Location - Orientation  incisional  -     Pain Location  hip  -     Pre/Posttreatment Pain Comment  tolerated  -     Pain Intervention(s)  Repositioned;Ambulation/increased activity  -JH     Row Name 03/16/21 0942          Plan of Care Review    Plan of Care Reviewed With  patient  -     Progress  improving  -     Outcome Summary  Pt progressing well with mobility and increased forward ambulation distance to 720 feet with RW and CGA. No buckling or LOB noted. Continue d/c recs for home with assist and HHPT.  -[facility] 03/16/21 0942          Therapy Assessment/Plan (PT)    Rehab Potential (PT)  good, to achieve stated therapy goals  -     Criteria for Skilled Interventions Met (PT)  yes;meets criteria;skilled treatment is necessary  -JH     Row Name 03/16/21 0942          Vital Signs    Pre Systolic BP Rehab  115  -     Pre Treatment Diastolic BP  61  -     Pretreatment Heart Rate (beats/min)  62  -     Posttreatment Heart Rate (beats/min)  66  -     Pre SpO2 (%)  91  -     O2 Delivery Pre Treatment  room air  -     O2 Delivery Intra Treatment  room air  -     Post SpO2 (%)  99  -     O2 Delivery Post Treatment  room air  -     Pre Patient Position  Supine  -     Intra Patient Position  Standing  -     Post Patient Position  Supine  -      Row Name 03/16/21 0942          Positioning and Restraints    Pre-Treatment Position  in bed  -JH     Post Treatment Position  bed  -JH     In Bed  notified nsg;fowlers;call light within reach;encouraged to call for assist  -       User Key  (r) = Recorded By, (t) = Taken By, (c) = Cosigned By    Initials Name Provider Type    Demetris Molina, PT Physical Therapist        Outcome Measures     Row Name 03/16/21 0944          How much help from another person do you currently need...    Turning from your back to your side while in flat bed without using bedrails?  4  -JH     Moving from lying on back to sitting on the side of a flat bed without bedrails?  4  -JH     Moving to and from a bed to a chair (including a wheelchair)?  3  -JH     Standing up from a chair using your arms (e.g., wheelchair, bedside chair)?  3  -JH     Climbing 3-5 steps with a railing?  3  -JH     To walk in hospital room?  3  -     AM-PAC 6 Clicks Score (PT)  20  -     Row Name 03/16/21 0944          Functional Assessment    Outcome Measure Options  AM-PAC 6 Clicks Basic Mobility (PT)  -       User Key  (r) = Recorded By, (t) = Taken By, (c) = Cosigned By    Initials Name Provider Type    Demetris Molina, PT Physical Therapist        Physical Therapy Education                 Title: PT OT SLP Therapies (In Progress)     Topic: Physical Therapy (Done)     Point: Mobility training (Done)     Learning Progress Summary           Patient Acceptance, E,TB, VU by  at 3/16/2021 0944    Comment: edu on safe sequencing with mobility, HEP, d/c plans    Acceptance, E,D, VU by  at 3/15/2021 1305    Comment: Educated on safe sequencing with bed mobility, ambulatory/toilet transfers, and gait. Reviewed HEP and hip precautions.                   Point: Home exercise program (Done)     Learning Progress Summary           Patient Acceptance, E,TB, VU by  at 3/16/2021 0944    Comment: edu on safe sequencing with mobility, HEP, d/c plans     Acceptance, E,D, VU by  at 3/15/2021 1305    Comment: Educated on safe sequencing with bed mobility, ambulatory/toilet transfers, and gait. Reviewed HEP and hip precautions.                   Point: Body mechanics (Done)     Learning Progress Summary           Patient Acceptance, E,TB, VU by  at 3/16/2021 0944    Comment: edu on safe sequencing with mobility, HEP, d/c plans    Acceptance, E,D, VU by  at 3/15/2021 1305    Comment: Educated on safe sequencing with bed mobility, ambulatory/toilet transfers, and gait. Reviewed HEP and hip precautions.                   Point: Precautions (Done)     Learning Progress Summary           Patient Acceptance, E,TB, VU by  at 3/16/2021 0944    Comment: edu on safe sequencing with mobility, HEP, d/c plans    Acceptance, E,D, VU by  at 3/15/2021 1305    Comment: Educated on safe sequencing with bed mobility, ambulatory/toilet transfers, and gait. Reviewed HEP and hip precautions.                               User Key     Initials Effective Dates Name Provider Type Discipline     09/10/19 -  Clovis Lux, PT Physical Therapist PT     09/22/20 -  Demetris Lanier, JINNY Physical Therapist PT              PT Recommendation and Plan     Plan of Care Reviewed With: patient  Progress: improving  Outcome Summary: Pt progressing well with mobility and increased forward ambulation distance to 720 feet with RW and CGA. No buckling or LOB noted. Continue d/c recs for home with assist and HHPT.     Time Calculation:   PT Charges     Row Name 03/16/21 0945             Time Calculation    Start Time  0823  -      PT Received On  03/16/21  -      PT Goal Re-Cert Due Date  03/25/21  -         Time Calculation- PT    Total Timed Code Minutes- PT  11 minute(s)  -         Timed Charges    42747 - Gait Training Minutes   11  -        User Key  (r) = Recorded By, (t) = Taken By, (c) = Cosigned By    Initials Name Provider Type     Demetris Lanier, PT Physical Therapist         Therapy Charges for Today     Code Description Service Date Service Provider Modifiers Qty    [id number] HC GAIT TRAINING EA 15 MIN 3/16/2021 Demetris Lanier, PT GP 1          PT G-Codes  Outcome Measure Options: AM-PAC 6 Clicks Basic Mobility (PT)  AM-PAC 6 Clicks Score (PT): 20    Demetris Lanier, PT  3/16/2021

## 2021-03-16 NOTE — PROGRESS NOTES
Case Management Discharge Note      Final Note: Spoke with patient at bedside.  Patient discharging home today.  She states that she would like outpatient PT and OT with [facility].  Spoke with Blaire at [facility] [phone].  She has reviewed insurance and has accepted referral for outpatient PT and OT.  Referral faxed to [phone].  OPPT will follow up with patient for appointment scheduling.  Patient declines DME stating that she has a rolling walker at home.  Follow up appointments to be scheduled and added to AVS.  Family will transport at discharge.    Provided Post Acute Provider List?: N/A (Pt used [facility] before therefore this is the agency of choice.)    Selected Continued Care - Admitted Since 3/15/2021     Destination    No services have been selected for the patient.              Durable Medical Equipment    No services have been selected for the patient.              Dialysis/Infusion    No services have been selected for the patient.              Home Medical Care    No services have been selected for the patient.              Therapy    No services have been selected for the patient.              Community Resources    No services have been selected for the patient.                       Final Discharge Disposition Code: 01 - home or self-care

## 2021-03-16 NOTE — PLAN OF CARE
Goal Outcome Evaluation:  Plan of Care Reviewed With: patient  Progress: improving  Outcome Summary: Pt progressing well with mobility and increased forward ambulation distance to 720 feet with RW and CGA. No buckling or LOB noted. Continue d/c recs for home with assist and HHPT.

## 2021-03-16 NOTE — DISCHARGE SUMMARY
Patient Name: Danae Fong  MRN: [id number]  : 1970  DOS: 3/16/2021    Attending: Owen Siu MD    Primary Care Provider: Leigh Casey MD    Date of Admission:.3/15/2021  5:44 AM    Date of Discharge:  3/16/2021    Discharge Diagnosis:     S/P hardware removal( painful circlage right proximal femur/right hip)    Essential hypertension    Status post aortic valve replacement with bioprosthetic valve    Liver cirrhosis secondary to MCKEON (CMS/HCC)    Chronic diastolic congestive heart failure (CMS/HCC)    Inflammation around joint      Hospital Course  At admit:  Patient is a pleasant 50 y.o. female presented for scheduled surgery by Dr. Siu.     Per his note (50-year-old woman with diabetes and right hip spontaneous septic arthritis about 5 years ago.  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  Underwent bovine heart valve replacement in that same timeframe and suffered early bleeding related to Eliquis and then had dislocation requiring conversion to locking components.  She had done recently well into the last few months and has had increasing pain in the right hip.  Has been full weightbearing.  X-rays show retained cerclage band but no evidence of osteomyelitis.  Inflammatory markers are elevated but she also has a right fourth metatarsal head diabetic ulcer in progress.  Risks benefits indication rationale for hardware removal of the cerclage band cerclage band hardware removal are discussed at length with patient.  She signed her consent after all questions were answered.).  She underwent removal of cerclage band right hip and proximal femur by Dr. Siu under spinal anesthesia.  She tolerated surgery well and was admitted for further management.  Since then she has ambulated and voided.  She states her pain is very well controlled.     Noted is her extensive past medical history including multiple infectious complications requiring aortic valve  replacement with bioprosthetic valve in 2016 with postop complications including atrial fibrillation.  She had a pericardial window in September 2016 with pericardial stripping.  She also has history of diabetic foot ulcer/osteomyelitis requiring surgery, toe amputation on the right.     After admit  Patient was provided pain medications as needed for pain control.  Adjustments were made to pain medications to optimize postop pain management when indicated. Risks and benefits of opiate medications discussed with patient. SANDRINE report was reviewed.    She was seen by PT  and has progressed well over her stay.    Patient used an IS for atelectasis prophylaxis and aspirin along with mechanicals for DVT prophylaxis.    Home medications were resumed as appropriate, and labs were monitored and remained fairly stable.     With the progress she has made, Ms. James is ready for DC home today.      Discussed with patient regarding plan and she shows understanding and agreement.      Patient will have outpatient PT following discharge.        Procedures Performed  Procedure(s):  REMOVAL OF CERCLAGE WIRE OF RIGHT PROXIMAL FEMUR - RIGHT       Pertinent Test Results:    I reviewed the patient's new clinical results.   Results from last 7 days   Lab Units 03/16/21  0745   WBC 10*3/mm3 9.80   HEMOGLOBIN g/dL 13.5   HEMATOCRIT % 42.6   PLATELETS 10*3/mm3 190     Results from last 7 days   Lab Units 03/15/21  0647   POTASSIUM mmol/L 3.4*     I reviewed the patient's new imaging including images and reports.    Results for ELDER JAMES (MRN [id number]) as of 3/17/2021 09:41   Ref. Range 3/16/2021 07:45   Glucose Latest Ref Range: 65 - 99 mg/dL 95   Sodium Latest Ref Range: 136 - 145 mmol/L 141   Potassium Latest Ref Range: 3.5 - 5.2 mmol/L 4.1   CO2 Latest Ref Range: 22.0 - 29.0 mmol/L 28.0   Chloride Latest Ref Range: 98 - 107 mmol/L 104   Anion Gap Latest Ref Range: 5.0 - 15.0 mmol/L 9.0   Creatinine Latest Ref Range:  "0.57 - 1.00 mg/dL 0.62   BUN Latest Ref Range: 6 - 20 mg/dL 6   BUN/Creatinine Ratio Latest Ref Range: 7.0 - 25.0  9.7   Calcium Latest Ref Range: 8.6 - 10.5 mg/dL 9.5   eGFR Non African Am Latest Ref Range: >60 mL/min/1.73 102   WBC Latest Ref Range: 3.40 - 10.80 10*3/mm3 9.80   RBC Latest Ref Range: 3.77 - 5.28 10*6/mm3 5.04   Hemoglobin Latest Ref Range: 12.0 - 15.9 g/dL 13.5   Hematocrit Latest Ref Range: 34.0 - 46.6 % 42.6   RDW Latest Ref Range: 12.3 - 15.4 % 14.7   MCV Latest Ref Range: 79.0 - 97.0 fL 84.5   MCH Latest Ref Range: 26.6 - 33.0 pg 26.8   MCHC Latest Ref Range: 31.5 - 35.7 g/dL 31.7   MPV Latest Ref Range: 6.0 - 12.0 fL 9.3   Platelets Latest Ref Range: 140 - 450 10*3/mm3 190   RDW-SD Latest Ref Range: 37.0 - 54.0 fl 45.1         Physical therapy  Goal Outcome Evaluation:  Plan of Care Reviewed With: patient  Progress: improving  Outcome Summary: Pt progressing well with mobility and increased forward ambulation distance to 720 feet with RW and CGA. No buckling or LOB noted. Continue d/c recs for home with assist and HHPT.    Discharge Assessment:      Visit Vitals  /61 (BP Location: Left arm, Patient Position: Lying)   Pulse 59   Temp 97.8 °F (36.6 °C) (Oral)   Resp 16   Ht 186.7 cm (73.5\")   Wt 113 kg (250 lb)   SpO2 95%   BMI 32.54 kg/m²     Temp (24hrs), Av.4 °F (36.3 °C), Min:96.2 °F (35.7 °C), Max:98.5 °F (36.9 °C)      General Appearance:    Alert, cooperative, in no acute distress   Lungs:     Clear to auscultation,respirations regular, even and                   unlabored    Heart:    Regular rhythm and normal rate, normal S1 and S2   Abdomen:     Normal bowel sounds, no masses, no organomegaly, soft        non-tender, non-distended, no guarding, no rebound                 tenderness   Extremities:   CDI dressing adonis dressing system over right hip incision   Pulses:   Pulses palpable and equal bilaterally   Skin:   No bleeding, bruising or rash   Neurologic:   Cranial nerves 2 " - 12 grossly intact, sensation intact, Flexion and dorsiflexion intact bilateral feet.         Discharge Disposition: Home         Discharge Medications      New Medications      Instructions Start Date   acetaminophen 500 MG tablet  Commonly known as: TYLENOL   1,000 mg, Oral, Every 8 Hours, Take every 8 hours  as needed after 1 week      HYDROcodone-acetaminophen 5-325 MG per tablet  Commonly known as: Norco   1 tablet, Oral, Every 4 Hours PRN         Changes to Medications      Instructions Start Date   aspirin  MG tablet  What changed: You were already taking a medication with the same name, and this prescription was added. Make sure you understand how and when to take each.   325 mg, Oral, Daily      aspirin 81 MG chewable tablet  What changed: These instructions start on April 16, 2021. If you are unsure what to do until then, ask your doctor or other care provider.   81 mg, Oral, Daily   Start Date: April 16, 2021        Continue These Medications      Instructions Start Date   bumetanide 1 MG tablet  Commonly known as: BUMEX   1 mg, Oral, 2 Times Daily      Centrum Silver 50+Women tablet tablet  Generic drug: multivitamin with minerals   No dose, route, or frequency recorded.      docusate sodium 100 MG capsule  Commonly known as: COLACE   100 mg, Oral, 2 Times Daily PRN      DULoxetine 30 MG capsule  Commonly known as: CYMBALTA   30 mg, Oral, Daily      metoprolol tartrate 25 MG tablet  Commonly known as: LOPRESSOR   12.5 mg, Oral, 2 Times Daily      spironolactone 50 MG tablet  Commonly known as: ALDACTONE   50 mg, Oral, Daily      vilazodone 20 MG tablet tablet  Commonly known as: VIIBRYD   20 mg, Oral, Daily             Discharge Diet: Resume prehospital diet    Activity at Discharge: Ambulate    Follow-up Appointments  Owen Siu MD per his orders    Dragon disclaimer:  Part of this encounter note is an electronic transcription/translation of spoken language to printed text. The electronic  translation of spoken language may permit erroneous, or at times, nonsensical words or phrases to be inadvertently transcribed; Although I have reviewed the note for such errors, some may still exist.       Divina Miguel MD  03/16/21  09:10 EDT

## 2021-03-16 NOTE — DISCHARGE PLACEMENT REQUEST
"Shae Barakat  [phone]  Referral for OP PT and OT      Danae Fong (50 y.o. Female)     Date of Birth Social Security Number Address Home Phone MRN    1970  [address]  [place] [address] [phone] [id number]    Mandaen Marital Status          None        Admission Date Admission Type Admitting Provider Attending Provider Department, Room/Bed    3/15/21 Elective Owen Siu MD Yaacou[place], [place], MD [address], [address]/1    Discharge Date Discharge Disposition Discharge Destination         Home or Self Care              Attending Provider: Divina Miguel MD    Allergies: No Known Allergies    Isolation: None   Infection: MRSA (08/28/15)   Code Status: CPR    Ht: 186.7 cm (73.5\")   Wt: 113 kg (250 lb)    Admission Cmt: None   Principal Problem: S/P hardware removal( painful circlage right proximal femur/right hip) [Z98.890]                 Active Insurance as of 3/15/2021     Primary Coverage     Payor Plan Insurance Group Employer/Plan Group    [employer] [place] [employer] [place]      Payor Plan Address Payor Plan Phone Number Payor Plan Fax Number Effective Dates    [address] [address] [phone]  2020 - None Entered    [facility] [id number]       Subscriber Name Subscriber Birth Date Member ID       DANAE FONG 1970 [id number]                 Emergency Contacts      (Rel.) Home Phone Work Phone Mobile Phone    Anna Fong (Sister) -- -- [phone]        [address] [address]  Phone:  [phone]  Fax:  [phone] Date: Mar 16, 2021      Ambulatory Referral to Physical Therapy Evaluate and treat     Patient:  Danae Fong MRN:  [id number]   [address]  [place] [address] :  1970  SSN:    Phone: [phone] Sex:  F      INSURANCE PAYOR PLAN GROUP # SUBSCRIBER ID   Primary:    [facility] [id number]   [id number] "      Referring Provider Information:  DIVINA MIGUEL Phone: [phone] Fax: [phone]      Referral Information:   # Visits:  1 Referral Type: Physical Therapy [AE1]   Urgency:  Routine Referral Reason: Specialty Services Required   Start Date: Mar 16, 2021 End Date:  To be determined by Insurer   Diagnosis: Inflammation around joint (M77.9 [ICD-10-CM] 726.90 [ICD-9-CM])  S/P hardware removal (Z98.890 [ICD-10-CM] V45.89 [ICD-9-CM])      Refer to Dept:   Refer to Provider:   Refer to Facility:       Specialty needed: Evaluate and treat     This document serves as a request of services and does not constitute Insurance authorization or approval of services.  To determine eligibility, please contact the members Insurance carrier to verify and review coverage.     If you have medical questions regarding this request for services. Please contact [address] at [phone] during normal business hours.       Verbal Order Mode: Telephone with readback   Authorizing Provider: Divina Miguel MD  Authorizing Provider's NPI: [id number]     Order Entered By: Shae Barakat RN 3/16/2021 10:35 AM              [address] [address]  Phone:  [phone]  Fax:  [phone] Date: Mar 16, 2021      Ambulatory Referral to Occupational Therapy     Patient:  Danae Fong MRN:  [id number]   [address] [facility] [address] :  1970  SSN:    Phone: [phone] Sex:  F      INSURANCE PAYOR PLAN GROUP # SUBSCRIBER ID   Primary:    [facility] [id number]   [id number]      Referring Provider Information:  DIVINA MIGUEL Phone: [phone] Fax: [phone]      Referral Information:   # Visits:  1 Referral Type: Physical Therapy [AE1]   Urgency:  Routine Referral Reason: Specialty Services Required   Start Date: Mar 16, 2021 End Date:  To be determined by Insurer   Diagnosis: Inflammation around joint  (M77.9 [ICD-10-CM] 726.90 [ICD-9-CM])  S/P hardware removal (Z98.890 [ICD-10-CM] V45.89 [ICD-9-CM])      Refer to Dept:   Refer to Provider:   Refer to Facility:       Specialty needed: Evaluate and treat     This document serves as a request of services and does not constitute Insurance authorization or approval of services.  To determine eligibility, please contact the members Insurance carrier to verify and review coverage.     If you have medical questions regarding this request for services. Please contact [address] at [phone] during normal business hours.       Verbal Order Mode: Telephone with readback   Authorizing Provider: Divina Miguel MD  Authorizing Provider's NPI: [id number]     Order Entered By: Shae Barakat RN 3/16/2021 10:35 AM                    History & Physical      Divina Miguel MD at 03/15/21 1106          Patient Name: Danae Fong  MRN: [id number]  : 1970  DOS: 3/15/2021    Attending: Owen Siu MD    Primary Care Provider: Leigh Casey MD      Chief complaint: Right hip pain    Subjective   Patient is a pleasant 50 y.o. female presented for scheduled surgery by Dr. Siu.    Per his note (50-year-old woman with diabetes and right hip spontaneous septic arthritis about 5 years ago.  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  Underwent bovine heart valve replacement in that same timeframe and suffered early bleeding related to Eliquis and then had dislocation requiring conversion to locking components.  She had done recently well into the last few months and has had increasing pain in the right hip.  Has been full weightbearing.  X-rays show retained cerclage band but no evidence of osteomyelitis.  Inflammatory markers are elevated but she also has a right fourth metatarsal head diabetic ulcer in progress.  Risks benefits indication rationale for hardware removal of the cerclage band cerclage  band hardware removal are discussed at length with patient.  She signed her consent after all questions were answered.).  She underwent removal of cerclage band right hip and proximal femur by Dr. Siu under spinal anesthesia.  She tolerated surgery well and was admitted for further management.  Since then she has ambulated and voided.  She states her pain is very well controlled.    Noted is her extensive past medical history including multiple infectious complications requiring aortic valve replacement with bioprosthetic valve in 2016 with postop complications including atrial fibrillation.  She had a pericardial window in September 2016 with pericardial stripping.  She also has history of diabetic foot ulcer/osteomyelitis requiring surgery, toe amputation on the right.    No Known Allergies      Medications Prior to Admission   Medication Sig Dispense Refill Last Dose   • aspirin 81 MG chewable tablet Chew 1 tablet Daily.   3/15/2021 at 0400   • bumetanide (BUMEX) 1 MG tablet Take 1 mg by mouth 2 (Two) Times a Day.   3/15/2021 at Unknown time   • docusate sodium (COLACE) 100 MG capsule Take 100 mg by mouth 2 (Two) Times a Day As Needed for Constipation.   3/14/2021 at Unknown time   • DULoxetine (CYMBALTA) 30 MG capsule Take 30 mg by mouth Daily.   3/15/2021 at Unknown time   • metoprolol tartrate (LOPRESSOR) 25 MG tablet Take 0.5 tablets by mouth 2 (Two) Times a Day. 30 tablet 5 3/15/2021 at 0400   • Multiple Vitamins-Minerals (CENTRUM SILVER 50+WOMEN) tablet    3/14/2021 at Unknown time   • spironolactone (ALDACTONE) 50 MG tablet Take 1 tablet by mouth Daily. 90 tablet 2 3/15/2021 at Unknown time   • vilazodone (VIIBRYD) 20 MG tablet tablet Take 20 mg by mouth Daily.   3/15/2021 at Unknown time         History:   Past Medical History:   Diagnosis Date   • A-fib (CMS/HCC) 2016    REMOTE HISTORY AFTER VALVE    • Anemia    • Anxiety    • Aortic valve regurgitation 2016    VALVE REPLACEMENT    • CHF (congestive  heart failure) (CMS/[facility]) 2016   • Constipation    • Controlled type 2 diabetes mellitus without complication (CMS/[facility])     not medicated??? not sure if type 2-  borderline diabetes    • COPD (chronic obstructive pulmonary disease) (CMS/[facility])    • Diabetic neuropathy (CMS/[facility])    • Diabetic Ulcer    • Dysfunctional uterine bleeding    • Endocarditis 2016   • GERD (gastroesophageal reflux disease)    • Hypertension    • Low back pain    • MRSA infection 09/2015    TOE   • Primary Osteoarthritis of both hips    • Sepsis (CMS/[facility])    • Septic arthritis (CMS/[facility])    • Sleep apnea     RESOLVED - PER PATIENT    • SOB (shortness of breath) on exertion    • 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[facility])    • Streptococcus infection, group B    • Subacute osteomyelitis of left foot (CMS/[facility])    • Transfusion history    • Wears reading eyeglasses      Past Surgical History:   Procedure Laterality Date   • AMPUTATION DIGIT Right 1/30/2018    Procedure: AMPUTATION RIGHT 2ND TOE;  Surgeon: Charleen Nath MD;  Location:  [facility];  Service:    • AORTIC VALVE REPAIR/REPLACEMENT N/A 8/30/2016    Procedure: AIRAM, MEDIAN STERNOTOMY, AORTIC VALVE REPLACEMENT;  Surgeon: Mauricio Grove MD;  Location:  [facility];  Service:    • CARDIAC CATHETERIZATION N/A 8/25/2016    Procedure: Left Heart Cath;  Surgeon: Tyler Peterson MD;  [facility]:  [facility];  Service:    • CARDIAC CATHETERIZATION N/A 3/21/2018    Procedure: Coronary angiography;  Surgeon: Tyler Peterson IV, MD;  Location:  [facility];  Service: Cardiovascular   • COLONOSCOPY     • DILATATION AND CURETTAGE      of uterus   • ENDOSCOPY     • FOOT SURGERY Left     for osteomyelitis   • INCISION AND DRAINAGE HIP Right 1/26/2017    Procedure: RIGHT HIP WOUND WASHOUT AND DEBRIDEMENT, WITH POLYETHELENE FEMORAL HEAD EXCHANGE ;  Surgeon: Owen Siu MD;  Location:  [facility];  Service:    • PERICARDIAL WINDOW N/A 9/16/2016     Procedure: PERICARDIOCENTESIS WITH PLACEMENT OF DRAINAGE TUBE .;  Surgeon: Mauricio Grove MD;  Location:  [facility];  Service:    • PERICARDIAL WINDOW N/A 2016    Procedure: SUBXYPHOID PERICARDIAL WINDOW;  Surgeon: Mauricio Grove MD;  Location:  [facility];  Service:    • [place] CLOSED RX TRAUMATIC HIP DISLOCATN N/A 2017    Procedure: HIP CLOSED REDUCTION;  Surgeon: Leander Li MD;  Location:  [facility];  Service: Orthopedics   • THORACOTOMY Left 10/5/2016    Procedure: THORACOTOMY Pericardectomy,  AIRAM,  Placement pacing leads, Pump standby;  Surgeon: Mauricio Grove MD;  Location:  [facility];  Service:    • TOTAL HIP ARTHROPLASTY Right 2017    Procedure: RIGHT TOTAL HIP ARTHROPLASTY ANTERIOR ;  Surgeon: Owen Siu MD;  Location:  [facility];  Service:    • TOTAL HIP ARTHROPLASTY REVISION Right 2017    Procedure: REPEAT WOUND DEBRIDEMENT ACETABULAR REVISION;  Surgeon: Owen Siu MD;  Location:  [facility];  Service:    • WISDOM TOOTH EXTRACTION      all 4     Family History   Problem Relation Age of Onset   • Diabetes Mother    • Heart disease Mother    • Hypertension Mother    • Osteoarthritis Mother    • Diabetes Sister    • Arthritis Sister    • No Known Problems Father    • Diabetes Sister    • Diabetes Sister    • Heart disease Other    • Heart attack Other    • Heart disease Other    • Hypertension Other    • Osteoarthritis Other    • Rheum arthritis Other    • Cancer Neg Hx      Social History     Tobacco Use   • Smoking status: Former Smoker     Packs/day: 0.50     Years: 15.00     Pack years: 7.50     Types: Cigarettes     Quit date: 7/15/2016     Years since quittin.6   • Smokeless tobacco: Never Used   Vaping Use   • Vaping Use: Never used   Substance Use Topics   • Alcohol use: No   • Drug use: Yes     Types: Marijuana     Comment: THC gummies occasionally for pain       Review of Systems  Pertinent items are noted in HPI, all other systems reviewed and negative  She has  "history of Arzate cirrhosis, 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    Vital Signs  /68 (BP Location: Left arm, Patient Position: Lying)   Pulse 59   Temp 98.5 °F (36.9 °C) (Temporal)   Resp 16   Ht 186.7 cm (73.5\")   Wt 113 kg (250 lb)   SpO2 99%   BMI 32.54 kg/m²     Physical Exam:    General Appearance:    Alert, cooperative, in no acute distress   Head:    Normocephalic, without obvious abnormality, atraumatic   Eyes:            Lids and lashes normal, conjunctivae and sclerae normal, no   icterus, no pallor, corneas clear    Ears:    Ears appear intact with no abnormalities noted   Throat:   No oral lesions, no thrush, oral mucosa moist   Neck:   No adenopathy, supple, trachea midline, no thyromegaly         Lungs:     Clear to auscultation,respirations regular, even and   unlabored. No wheezes or rales.    Heart:    Regular rhythm and normal rate, normal S1 and S2, no murmur, no gallop   Abdomen:     Normal bowel sounds, no masses, no organomegaly, soft        non-tender, non-distended, no guarding, no rebound                 tenderness   Genitalia:    Deferred   Extremities:  Right LE, CDI adonis dressing over right hip.  No clubbing, cyanosis, or edema distally.   Pulses:   Pulses palpable and equal bilaterally   Skin:   No bleeding, bruising or rash   Neurologic:   Cranial nerves 2 - 12 grossly intact, awake alert and oriented.      I reviewed the patient's new clinical results.             Invalid input(s): NEUTOPHILPCT,  EOSPCT  Results from last 7 days   Lab Units 03/15/21  0647   POTASSIUM mmol/L 3.4*     Lab Results   Component Value Date    HGBA1C 5.20 03/05/2021     Results for ELDER JAMES (MRN [id number]) as of 3/15/2021 11:01   Ref. Range 3/5/2021 16:36   Glucose Latest Ref Range: 65 - 99 mg/dL 96   Sodium Latest Ref Range: 136 - 145 mmol/L 135 (L)   Potassium Latest Ref Range: 3.5 - 5.2 mmol/L 3.9   CO2 Latest Ref Range: 22.0 - 29.0 mmol/L 26.0   Chloride Latest Ref Range: 98 - 107 mmol/L 98   Anion " Gap Latest Ref Range: 5.0 - 15.0 mmol/L 11.0   Creatinine Latest Ref Range: 0.57 - 1.00 mg/dL 0.70   BUN Latest Ref Range: 6 - 20 mg/dL 12   BUN/Creatinine Ratio Latest Ref Range: 7.0 - 25.0  17.1   Calcium Latest Ref Range: 8.6 - 10.5 mg/dL 9.4   eGFR Non  Am Latest Ref Range: >60 mL/min/1.73 89   Alkaline Phosphatase Latest Ref Range: 39 - 117 U/L 119 (H)   Total Protein Latest Ref Range: 6.0 - 8.5 g/dL 8.0   ALT (SGPT) Latest Ref Range: 1 - 33 U/L 14   AST (SGOT) Latest Ref Range: 1 - 32 U/L 21   Total Bilirubin Latest Ref Range: 0.0 - 1.2 mg/dL 0.3   Albumin Latest Ref Range: 3.50 - 5.20 g/dL 4.20   Globulin Latest Units: gm/dL 3.8   A/G Ratio Latest Units: g/dL 1.1     Results for ELDER JAMES (MRN [id number]) as of 3/15/2021 11:01   Ref. Range 3/5/2021 16:36   WBC Latest Ref Range: 3.40 - 10.80 10*3/mm3 9.68   RBC Latest Ref Range: 3.77 - 5.28 10*6/mm3 5.63 (H)   Hemoglobin Latest Ref Range: 12.0 - 15.9 g/dL 15.4   Hematocrit Latest Ref Range: 34.0 - 46.6 % 47.2 (H)   RDW Latest Ref Range: 12.3 - 15.4 % 14.2   MCV Latest Ref Range: 79.0 - 97.0 fL 83.8   MCH Latest Ref Range: 26.6 - 33.0 pg 27.4   MCHC Latest Ref Range: 31.5 - 35.7 g/dL 32.6   MPV Latest Ref Range: 6.0 - 12.0 fL 9.2   Platelets Latest Ref Range: 140 - 450 10*3/mm3 236       Assessment and Plan:       S/P hardware removal( painful circlage right proximal femur/right hip)    Essential hypertension    Status post aortic valve replacement with bioprosthetic valve    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 PT/OT,  Weight bearing as tolerated right LE.  2. Pain control-prns  3. IS-encourage  4. DVT proph- Mechanicals and aspirin  5. Bowel regimen  6. Resume home medications as appropriate  7. Monitor post-op labs  8. DC planning for home    -Resume diuretics for patient's diastolic heart failure and Mckeon cirrhosis.    -Patient will  resume Viibryd following discharge home tomorrow.    Discussed with Ms. Washington postop management plan, she expressed understanding and agreement.      Dragon disclaimer:  Part of this encounter note is an electronic transcription/translation of spoken language to printed text. The electronic translation of spoken language may permit erroneous, or at times, nonsensical words or phrases to be inadvertently transcribed; Although I have reviewed the note for such errors, some may still exist.    Divina Miguel MD  03/15/21  11:03 EDT            Electronically signed by Divina Miguel MD at 03/15/21 1413     Owen Siu MD at 03/15/21 0658            Pre-Op H&P  Danaecolt Fong  [id number]  1970      Chief complaint: Hx right total hip      Subjective:  Patient is a 50 y.o.female presents for scheduled surgery by Dr. Siu. She anticipates a REMOVAL OF CERCLAGE WIRE OF RIGHT PROXIMAL FEMUR - RIGHT today. She had total hip replacement in 2017. She has had recurrent infections and is followed by infectious disease. She has been off abx for about 6 months.      Review of Systems:  Constitutional-- No fever, chills or sweats. No fatigue.  CV-- No chest pain, palpitation or syncope. +HTN +cardiac clearance   Resp-- No SOB, cough, hemoptysis  Skin--No rashes or lesions      Allergies: No Known Allergies      Home Meds:  Medications Prior to Admission   Medication Sig Dispense Refill Last Dose   • aspirin 81 MG chewable tablet Chew 1 tablet Daily.   3/15/2021 at 0400   • bumetanide (BUMEX) 1 MG tablet Take 1 mg by mouth 2 (Two) Times a Day.   3/15/2021 at Unknown time   • docusate sodium (COLACE) 100 MG capsule Take 100 mg by mouth 2 (Two) Times a Day As Needed for Constipation.   3/14/2021 at Unknown time   • DULoxetine (CYMBALTA) 30 MG capsule Take 30 mg by mouth Daily.   3/15/2021 at Unknown time   • metoprolol tartrate (LOPRESSOR) 25 MG tablet Take 0.5 tablets by mouth 2 (Two) Times a Day. 30 tablet 5  3/15/2021 at 0400   • Multiple Vitamins-Minerals (CENTRUM SILVER 50+WOMEN) tablet    3/14/2021 at Unknown time   • spironolactone (ALDACTONE) 50 MG tablet Take 1 tablet by mouth Daily. 90 tablet 2 3/15/2021 at Unknown time   • vilazodone (VIIBRYD) 20 MG tablet tablet Take 20 mg by mouth Daily.   3/15/2021 at Unknown time         PMH:   Past Medical History:   Diagnosis Date   • A-fib (CMS/[facility]) 2016    REMOTE HISTORY AFTER VALVE    • Anemia    • Anxiety    • Aortic valve regurgitation 2016    VALVE REPLACEMENT    • CHF (congestive heart failure) (CMS/[facility]) 2016   • Constipation    • Controlled type 2 diabetes mellitus without complication (CMS/[facility])     not medicated??? not sure if type 2-  borderline diabetes    • COPD (chronic obstructive pulmonary disease) (CMS/[facility])    • Diabetic neuropathy (CMS/[facility])    • Diabetic Ulcer    • Dysfunctional uterine bleeding    • Endocarditis 2016   • GERD (gastroesophageal reflux disease)    • Hypertension    • Low back pain    • MRSA infection 09/2015    TOE   • Primary Osteoarthritis of both hips    • Sepsis (CMS/[facility])    • Septic arthritis (CMS/[facility])    • Sleep apnea     RESOLVED - PER PATIENT    • SOB (shortness of breath) on exertion    • 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[facility])    • Streptococcus infection, group B    • Subacute osteomyelitis of left foot (CMS/[facility])    • Transfusion history    • Wears reading eyeglasses      PSH:    Past Surgical History:   Procedure Laterality Date   • AMPUTATION DIGIT Right 1/30/2018    Procedure: AMPUTATION RIGHT 2ND TOE;  Surgeon: Charleen Nath MD;  Location:  [facility];  Service:    • AORTIC VALVE REPAIR/REPLACEMENT N/A 8/30/2016    Procedure: AIRAM, MEDIAN STERNOTOMY, AORTIC VALVE REPLACEMENT;  Surgeon: Mauricio Grove MD;  [facility]:  [facility];  Service:    • CARDIAC CATHETERIZATION N/A 8/25/2016    Procedure: Left Heart Cath;  Surgeon: Tyler Peterson MD;  [facility];  Service:    • CARDIAC  CATHETERIZATION N/A 3/21/2018    Procedure: Coronary angiography;  Surgeon: Tyler Peterson IV, MD;  Location:  [facility];  Service: Cardiovascular   • COLONOSCOPY     • DILATATION AND CURETTAGE      of uterus   • ENDOSCOPY     • FOOT SURGERY Left     for osteomyelitis   • INCISION AND DRAINAGE HIP Right 2017    Procedure: RIGHT HIP WOUND WASHOUT AND DEBRIDEMENT, WITH POLYETHELENE FEMORAL HEAD EXCHANGE ;  Surgeon: Owen Siu MD;  [facility]:  [facility];  Service:    • PERICARDIAL WINDOW N/A 2016    Procedure: PERICARDIOCENTESIS WITH PLACEMENT OF DRAINAGE TUBE .;  Surgeon: Mauricio Grove MD;  [facility]:  [facility];  Service:    • PERICARDIAL WINDOW N/A 2016    Procedure: SUBXYPHOID PERICARDIAL WINDOW;  Surgeon: Mauricio Grove MD;  Location:  [facility];  Service:    • [place] CLOSED RX TRAUMATIC HIP DISLOCATN N/A 2017    Procedure: HIP CLOSED REDUCTION;  Surgeon: Leander Li MD;  [facility]:  [facility];  Service: Orthopedics   • THORACOTOMY Left 10/5/2016    Procedure: THORACOTOMY Pericardectomy,  AIRAM,  Placement pacing leads, Pump standby;  Surgeon: Mauricio Grove MD;  [facility]:  [facility];  Service:    • TOTAL HIP ARTHROPLASTY Right 2017    Procedure: RIGHT TOTAL HIP ARTHROPLASTY ANTERIOR ;  Surgeon: Owen Siu MD;  Location:  [facility];  Service:    • TOTAL HIP ARTHROPLASTY REVISION Right 2017    Procedure: REPEAT WOUND DEBRIDEMENT ACETABULAR REVISION;  Surgeon: Owen Siu MD;  Location:  [facility];  Service:    • WISDOM TOOTH EXTRACTION      all 4       Immunization History:  Influenza: 2020  Pneumococcal: UTD  Tetanus: UTD  Covid : No    Social History:   Tobacco:   Social History     Tobacco Use   Smoking Status Former Smoker   • Packs/day: 0.50   • Years: 15.00   • Pack years: 7.50   • Types: Cigarettes   • Quit date: 7/15/2016   • Years since quittin.6   Smokeless Tobacco Never Used      Alcohol:     Social History     Substance and Sexual  "Activity   Alcohol Use No         Physical Exam:/80 (BP Location: Right arm, Patient Position: Lying)   Pulse 55   Temp 96.9 °F (36.1 °C) (Tympanic)   Resp 18   Ht 186.7 cm (73.5\")   Wt 113 kg (250 lb)   SpO2 96%   BMI 32.54 kg/m²       General Appearance:    Alert, cooperative, no distress, appears stated age   Head:    Normocephalic, without obvious abnormality, atraumatic   Lungs:     Clear to auscultation bilaterally, respirations unlabored    Heart:   Regular rate and rhythm, S1 and S2 normal    Abdomen:    Soft without tenderness   Extremities:   Extremities normal, atraumatic, no cyanosis or edema   Skin:   Skin color, texture, turgor normal, no rashes or lesions   Neurologic:   Grossly intact     Results Review:     LABS:  Lab Results   Component Value Date    WBC 9.68 03/05/2021    HGB 15.4 03/05/2021    HCT 47.2 (H) 03/05/2021    MCV 83.8 03/05/2021     03/05/2021    NEUTROABS 5.47 03/05/2021    GLUCOSE 96 03/05/2021    BUN 12 03/05/2021    CREATININE 0.70 03/05/2021    EGFRIFNONA 89 03/05/2021     (L) 03/05/2021    K 3.9 03/05/2021    CL 98 03/05/2021    CO2 26.0 03/05/2021    MG 2.0 01/26/2018    PHOS 2.9 01/26/2018    CALCIUM 9.4 03/05/2021    ALBUMIN 4.20 03/05/2021    AST 21 03/05/2021    ALT 14 03/05/2021    BILITOT 0.3 03/05/2021   Results for ELDER JAMES (MRN [id number]) as of 3/15/2021 06:50   Ref. Range 3/5/2021 16:36   Sed Rate Latest Ref Range: 0 - 30 mm/hr 66 (H)       RADIOLOGY:  Imaging Results (Last 72 Hours)       ** No results found for the last 72 hours. **            I reviewed the patient's new clinical results.    Cancer Staging (if applicable)  Cancer Patient: __ yes __no __unknown; If yes, clinical stage T:__ N:__M:__, stage group or __N/A      Impression: Hx right total hip arthroplasty  Painful cerclage band hardware with low grade suspicion for infection      Plan: REMOVAL OF CERCLAGE WIRE OF RIGHT PROXIMAL FEMUR - RIGHT      BAUTISTA Frey " "  3/15/2021   06:59 EDT    Electronically signed by Owen Siu MD at 03/15/21 0739          Physician Progress Notes (last 24 hours) (Notes from 03/15/21 1037 through 03/16/21 1037)      Owen Siu MD at 03/16/21 0838          Danae Fong  [id number]  1970    /61 (BP Location: Left arm, Patient Position: Lying)   Pulse 59   Temp 97.8 °F (36.6 °C) (Oral)   Resp 16   Ht 186.7 cm (73.5\")   Wt 113 kg (250 lb)   SpO2 95%   BMI 32.54 kg/m²     Lab Results (last 24 hours)     Procedure Component Value Units Date/Time    Basic Metabolic Panel [[id number]] Collected: 03/16/21 0745    Specimen: Blood Updated: 03/16/21 0832    CBC & Differential [[id number]] Collected: 03/16/21 0745    Specimen: Blood Updated: 03/16/21 0831    Narrative:      The following orders were created for panel order CBC & Differential.  Procedure                               Abnormality         Status                     ---------                               -----------         ------                     CBC Auto Differential[[id number]]                            In process                   Please view results for these tests on the individual orders.    CBC Auto Differential [[id number]] Collected: 03/16/21 0745    Specimen: Blood Updated: 03/16/21 0831    Tissue / Bone Culture - Tissue, Hip, Right [[id number]] Collected: 03/15/21 0823    Specimen: Tissue from Hip, Right Updated: 03/16/21 0643     Tissue Culture No growth     Gram Stain Rare (1+) WBCs seen      No organisms seen          Patient Care Team:  Leigh Casey MD as PCP - General (Internal Medicine)  Amos Lee MD as Consulting Physician (Gastroenterology)  Tyler Peterson IV, MD as Cardiologist (Interventional Cardiology)  Mookie Winchester MD as Consulting Physician (Infectious Diseases)  Owen Siu MD as Consulting Physician (Orthopedic Surgery)    SUBJECTIVE  Pain much improved since before surgery.  Excellent progress in " physical therapy.    PHYSICAL EXAM  Incision benign  Minimal thigh swelling  Neurovascularly intact to knees and toes       S/P hardware removal( painful circlage right proximal femur/right hip)    Essential hypertension    Status post aortic valve replacement with bioprosthetic valve    Liver cirrhosis secondary to MCKEON (CMS/HCC)    Chronic diastolic congestive heart failure (CMS/HCC)    Inflammation around joint      PLAN / DISPOSITION:  Hemoglobin pending- no transfusion anticipated.  Cultures no growth at first day.  Discharge home today.    Owen Siu MD  21  08:38 EDT    Electronically signed by Owen Siu MD at 21 0839          Physical Therapy Notes (most recent note)      Demetris Lanier, PT at 21 0823  Version 1 of 1         Patient Name: Danae Fong  : 1970    MRN: [id number]                              Today's Date: 3/16/2021       Admit Date: 3/15/2021    Visit Dx:     ICD-10-CM ICD-9-CM   1. S/P hardware removal( painful circlage right proximal femur/right hip)  Z98.890 V45.89   2. 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 (CMS/HCC)   • Chronic diastolic congestive heart failure (CMS/HCC)   • Controlled type 2 diabetes mellitus with diabetic polyneuropathy, without long-term current use of insulin (CMS/HCC)   • Diabetic foot ulcer (CMS/HCC)   • Non-cardiac chest pain   • Class 3 obesity due to excess calories in adult   • Charcot's joint of left foot   • Venous stasis   • Amputated toe of right foot (CMS/HCC)   • Atypical chest pain   • Palpitations   • Diabetic ulcer of lower extremity (CMS/HCC)   • Inflammation around joint   • S/P hardware removal( painful circlage right proximal femur/right hip)     Past Medical  History:   Diagnosis Date   • A-fib (CMS/[facility]) 2016    REMOTE HISTORY AFTER VALVE    • Anemia    • Anxiety    • Aortic valve regurgitation 2016    VALVE REPLACEMENT    • CHF (congestive heart failure) (CMS/HCC) 2016   • Constipation    • Controlled type 2 diabetes mellitus without complication (CMS/HCC)     not medicated??? not sure if type 2-  borderline diabetes    • COPD (chronic obstructive pulmonary disease) (CMS/[facility])    • Diabetic neuropathy (CMS/HCC)    • Diabetic Ulcer    • Dysfunctional uterine bleeding    • Endocarditis 2016   • GERD (gastroesophageal reflux disease)    • Hypertension    • Low back pain    • MRSA infection 09/2015    TOE   • Primary Osteoarthritis of both hips    • Sepsis (CMS/HCC)    • Septic arthritis (CMS/HCC)    • Sleep apnea     RESOLVED - PER PATIENT    • SOB (shortness of breath) on exertion    • 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HCC)    • Streptococcus infection, group B    • Subacute osteomyelitis of left foot (CMS/HCC)    • Transfusion history    • Wears reading eyeglasses      Past Surgical History:   Procedure Laterality Date   • AMPUTATION DIGIT Right 1/30/2018    Procedure: AMPUTATION RIGHT 2ND TOE;  Surgeon: Charelen Nath MD;  Location:  [facility];  Service:    • AORTIC VALVE REPAIR/REPLACEMENT N/A 8/30/2016    Procedure: AIRAM, MEDIAN STERNOTOMY, AORTIC VALVE REPLACEMENT;  Surgeon: Mauricio Grove MD;  Location:  [facility];  Service:    • CARDIAC CATHETERIZATION N/A 8/25/2016    Procedure: Left Heart Cath;  Surgeon: Tyler Peterson MD;  Location:  [facility] CATH INVASIVE LOCATION;  Service:    • CARDIAC CATHETERIZATION N/A 3/21/2018    Procedure: Coronary angiography;  Surgeon: Tyler Peterson IV, MD;  Location:  [facility] CATH INVASIVE LOCATION;  Service: Cardiovascular   • COLONOSCOPY     • DILATATION AND CURETTAGE      of uterus   • ENDOSCOPY     • FOOT SURGERY Left     for osteomyelitis   • HARDWARE REMOVAL Right 3/15/2021    Procedure: REMOVAL OF  CERCLAGE WIRE OF RIGHT PROXIMAL FEMUR - RIGHT;  Surgeon: Owen Siu MD;  Location:  [facility];  Service: Orthopedics;  Laterality: Right;   • INCISION AND DRAINAGE HIP Right 1/26/2017    Procedure: RIGHT HIP WOUND WASHOUT AND DEBRIDEMENT, WITH POLYETHELENE FEMORAL HEAD EXCHANGE ;  Surgeon: Owen Siu MD;  Location:  [facility];  Service:    • PERICARDIAL WINDOW N/A 9/16/2016    Procedure: PERICARDIOCENTESIS WITH PLACEMENT OF DRAINAGE TUBE .;  Surgeon: Mauricio Grove MD;  Location:  [facility];  Service:    • PERICARDIAL WINDOW N/A 9/20/2016    Procedure: SUBXYPHOID PERICARDIAL WINDOW;  Surgeon: Mauricio Grove MD;  Location:  [facility];  Service:    • CT CLOSED RX TRAUMATIC HIP DISLOCATN N/A 1/29/2017    Procedure: HIP CLOSED REDUCTION;  Surgeon: Leander Li MD;  Location:  [facility];  Service: Orthopedics   • THORACOTOMY Left 10/5/2016    Procedure: THORACOTOMY Pericardectomy,  AIRAM,  Placement pacing leads, Pump standby;  Surgeon: Mauricio Grove MD;  Location:  [facility];  Service:    • TOTAL HIP ARTHROPLASTY Right 1/23/2017    Procedure: RIGHT TOTAL HIP ARTHROPLASTY ANTERIOR ;  Surgeon: Owen Siu MD;  Location:  [facility];  Service:    • TOTAL HIP ARTHROPLASTY REVISION Right 1/30/2017    Procedure: REPEAT WOUND DEBRIDEMENT ACETABULAR REVISION;  Surgeon: Owen Siu MD;  Location:  [facility];  Service:    • WISDOM TOOTH EXTRACTION      all 4     General Information     Row Name 03/16/21 0939          Physical Therapy Time and Intention    Document Type  therapy note (daily note)  -     Mode of Treatment  physical therapy  -     Row Name 03/16/21 0939          General Information    Patient Profile Reviewed  yes  -     Existing Precautions/Restrictions  fall;right;hip, posterior;other (see comments) DALLIN  -     Row Name 03/16/21 0939          Cognition    Orientation Status (Cognition)  oriented x 4  -     Row Name 03/16/21 0939          Safety Issues, Functional Mobility    Safety Issues  Affecting Function (Mobility)  safety precaution awareness;safety precautions follow-through/compliance  -     Impairments Affecting Function (Mobility)  endurance/activity tolerance;strength;range of motion (ROM)  -       User Key  (r) = Recorded By, (t) = Taken By, (c) = Cosigned By    Initials Name Provider Type     Demetris Lanier, PT Physical Therapist        Mobility     Row Name 03/16/21 0940          Bed Mobility    Bed Mobility  supine-sit;scooting/bridging  -     Scooting/Bridging [place] (Bed Mobility)  supervision;verbal cues  -     Supine-Sit [place] (Bed Mobility)  supervision;verbal cues  -     Assistive Device (Bed Mobility)  bed rails;head of bed elevated  -     Comment (Bed Mobility)  Verbal cues for sequencing and no assist needed. Pt progressing well with bed mobility.  -     Row Name 03/16/21 0940          Transfers    Comment (Transfers)  Verbal cues for safe hand placement with use of RW and positioning of RLE for comfort.  -     Row Name 03/16/21 0940          Sit-Stand Transfer    Sit-Stand [place] (Transfers)  verbal cues;contact guard  -     Assistive Device (Sit-Stand Transfers)  walker, front-wheeled  -     Row Name 03/16/21 0940          Gait/Stairs (Locomotion)    [place] Level (Gait)  contact guard;verbal cues  -     Assistive Device (Gait)  walker, front-wheeled  -     Distance in Feet (Gait)  720 feet  -     Deviations/Abnormal Patterns (Gait)  bilateral deviations;lian[place] decreased;gait speed decreased;stride length decreased  -     Bilateral Gait Deviations  forward flexed posture  -     Right Sided Gait Deviations  heel strike decreased;weight shift ability decreased  -     [place] Level (Stairs)  not tested  -     Comment (Gait/Stairs)  Pt ambulated with step through pattern with verbal cues for upright posture and improving heel strike. No LOB or buckling noted.  -     Row Name 03/16/21 0940          Mobility     Extremity Weight-bearing Status  right lower extremity  -     Right Lower Extremity (Weight-bearing Status)  weight-bearing as tolerated (WBAT)  -       User Key  (r) = Recorded By, (t) = Taken By, (c) = Cosigned By    Initials Name Provider Type    Demetris Molina, JINNY Physical Therapist        Obj/Interventions     Row Name 03/16/21 0942          Balance    Balance Assessment  sitting static balance;sitting dynamic balance;standing static balance;standing dynamic balance  -     Static Sitting Balance  WNL;unsupported;sitting, edge of bed  -     Dynamic Sitting Balance  WNL;unsupported;sitting, edge of bed  -     Static Standing Balance  WFL;supported;standing  -     Dynamic Standing Balance  WFL;supported;standing  -     Balance Interventions  sitting;standing;sit to stand;supported;static;dynamic;minimal challenge  -     Comment, Balance  no LOB noted  -       User Key  (r) = Recorded By, (t) = Taken By, (c) = Cosigned By    Initials Name Provider Type    Demetris Molina, PT Physical Therapist        Goals/Plan    No documentation.       Clinical Impression     [facility] Name 03/16/21 0942          Pain    Additional Documentation  Pain Scale: Numbers Pre/Post-Treatment (Group)  -JH     Row Name 03/16/21 0942          Pain Scale: Numbers Pre/Post-Treatment    Pretreatment Pain Rating  1/10  -     Posttreatment Pain Rating  1/10  -     Pain Location - Side  Right  -     Pain Location - Orientation  incisional  -     Pain Location  hip  -     Pre/Posttreatment Pain Comment  tolerated  -     Pain Intervention(s)  Repositioned;Ambulation/increased activity  -     Row Name 03/16/21 0942          Plan of Care Review    Plan of Care Reviewed With  patient  -     Progress  improving  -     Outcome Summary  Pt progressing well with mobility and increased forward ambulation distance to 720 feet with RW and CGA. No buckling or LOB noted. Continue d/c recs for home with assist and HHPT.  -      Row Name 03/16/21 0942          Therapy Assessment/Plan (PT)    Rehab Potential (PT)  good, to achieve stated therapy goals  -     Criteria for Skilled Interventions Met (PT)  yes;meets criteria;skilled treatment is necessary  -     Row Name 03/16/21 0942          Vital Signs    Pre Systolic BP Rehab  115  -     Pre Treatment Diastolic BP  61  -     Pretreatment Heart Rate (beats/min)  62  -JH     Posttreatment Heart Rate (beats/min)  66  -     Pre SpO2 (%)  91  -     O2 Delivery Pre Treatment  room air  -     O2 Delivery Intra Treatment  room air  -     Post SpO2 (%)  99  -JH     O2 Delivery Post Treatment  room air  -     Pre Patient Position  Supine  -     Intra Patient Position  Standing  -     Post Patient Position  Supine  -     Row Name 03/16/21 0942          Positioning and Restraints    Pre-Treatment Position  in bed  -     Post Treatment Position  bed  -     In Bed  notified nsg;fowlers;call light within reach;encouraged to call for assist  -       User Key  (r) = Recorded By, (t) = Taken By, (c) = Cosigned By    Initials Name Provider Type     Demetris Lanier, PT Physical Therapist        Outcome Measures     Row Name 03/16/21 0944          How much help from another person do you currently need...    Turning from your back to your side while in flat bed without using bedrails?  4  -JH     Moving from lying on back to sitting on the side of a flat bed without bedrails?  4  -JH     Moving to and from a bed to a chair (including a wheelchair)?  3  -JH     Standing up from a chair using your arms (e.g., wheelchair, bedside chair)?  3  -JH     Climbing 3-5 steps with a railing?  3  -JH     To walk in hospital room?  3  -     AM-PAC 6 Clicks Score (PT)  20  -     Row Name 03/16/21 0944          Functional Assessment    Outcome Measure Options  AM-PAC 6 Clicks Basic Mobility (PT)  -       User Key  (r) = Recorded By, (t) = Taken By, (c) = Cosigned By    Initials Name  Provider Type     Demetris Lanier, PT Physical Therapist        Physical Therapy Education                 Title: PT OT SLP Therapies (In Progress)     Topic: Physical Therapy (Done)     Point: Mobility training (Done)     Learning Progress Summary           Patient Acceptance, E,TB, VU by  at 3/16/2021 0944    Comment: edu on safe sequencing with mobility, HEP, d/c plans    Acceptance, E,D, VU by  at 3/15/2021 1305    Comment: Educated on safe sequencing with bed mobility, ambulatory/toilet transfers, and gait. Reviewed HEP and hip precautions.                   Point: Home exercise program (Done)     Learning Progress Summary           Patient Acceptance, E,TB, VU by  at 3/16/2021 0944    Comment: edu on safe sequencing with mobility, HEP, d/c plans    Acceptance, E,D, VU by GÉNESIS at 3/15/2021 1305    Comment: Educated on safe sequencing with bed mobility, ambulatory/toilet transfers, and gait. Reviewed HEP and hip precautions.                   Point: Body mechanics (Done)     Learning Progress Summary           Patient Acceptance, E,TB, VU by  at 3/16/2021 0944    Comment: edu on safe sequencing with mobility, HEP, d/c plans    Acceptance, E,D, VU by GÉNESIS at 3/15/2021 1305    Comment: Educated on safe sequencing with bed mobility, ambulatory/toilet transfers, and gait. Reviewed HEP and hip precautions.                   Point: Precautions (Done)     Learning Progress Summary           Patient Acceptance, E,TB, VU by  at 3/16/2021 0944    Comment: edu on safe sequencing with mobility, HEP, d/c plans    Acceptance, E,D, VU by  at 3/15/2021 1305    Comment: Educated on safe sequencing with bed mobility, ambulatory/toilet transfers, and gait. Reviewed HEP and hip precautions.                               User Key     Initials Effective Dates Name Provider Type Discipline     09/10/19 -  Clovis Lux, PT Physical Therapist PT     09/22/20 -  Demetris Lanier PT Physical Therapist PT              PT  Recommendation and Plan     Plan of Care Reviewed With: patient  Progress: improving  Outcome Summary: Pt progressing well with mobility and increased forward ambulation distance to 720 feet with RW and CGA. No buckling or LOB noted. Continue d/c recs for home with assist and HHPT.     Time Calculation:   PT Charges     Row Name 03/16/21 0945             Time Calculation    Start Time  0823  -      PT Received On  03/16/21  -      PT Goal Re-Cert Due Date  03/25/21  -         Time Calculation- PT    Total Timed Code Minutes- PT  11 minute(s)  -         Timed Charges    13829 - Gait Training Minutes   11  -        User Key  (r) = Recorded By, (t) = Taken By, (c) = Cosigned By    Initials Name Provider Type     Demetris Lanier PT Physical Therapist        Therapy Charges for Today     Code Description Service Date Service Provider Modifiers Qty    [id number] HC GAIT TRAINING EA 15 MIN 3/16/2021 Demetris Lanier PT GP 1          PT G-Codes  Outcome Measure Options: AM-PAC 6 Clicks Basic Mobility (PT)  AM-PAC 6 Clicks Score (PT): 20    Demetris Lanier PT  3/16/2021      Electronically signed by Demetris Lanier PT at 03/16/21 0946       Occupational Therapy Notes (most recent note)    No notes exist for this encounter.

## 2021-03-16 NOTE — PROGRESS NOTES
"Danae Fong  [id number]  1970    /61 (BP Location: Left arm, Patient Position: Lying)   Pulse 59   Temp 97.8 °F (36.6 °C) (Oral)   Resp 16   Ht 186.7 cm (73.5\")   Wt 113 kg (250 lb)   SpO2 95%   BMI 32.54 kg/m²     Lab Results (last 24 hours)     Procedure Component Value Units Date/Time    Basic Metabolic Panel [[id number]] Collected: 03/16/21 0745    Specimen: Blood Updated: 03/16/21 0832    CBC & Differential [[id number]] Collected: 03/16/21 0745    Specimen: Blood Updated: 03/16/21 0831    Narrative:      The following orders were created for panel order CBC & Differential.  Procedure                               Abnormality         Status                     ---------                               -----------         ------                     CBC Auto Differential[[id number]]                            In process                   Please view results for these tests on the individual orders.    CBC Auto Differential [[id number]] Collected: 03/16/21 0745    Specimen: Blood Updated: 03/16/21 0831    Tissue / Bone Culture - Tissue, Hip, Right [[id number]] Collected: 03/15/21 0823    Specimen: Tissue from Hip, Right Updated: 03/16/21 0643     Tissue Culture No growth     Gram Stain Rare (1+) WBCs seen      No organisms seen          Patient Care Team:  Leigh Casey MD as PCP - General (Internal Medicine)  Amos Lee MD as Consulting Physician (Gastroenterology)  Tyler Peterson IV, MD as Cardiologist (Interventional Cardiology)  Mookie Winchester MD as Consulting Physician (Infectious Diseases)  Owen Siu MD as Consulting Physician (Orthopedic Surgery)    SUBJECTIVE  Pain much improved since before surgery.  Excellent progress in physical therapy.    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  Cultures no growth at first day.  Discharge home today.    Owen Siu MD  03/16/21  08:38 EDT

## 2021-03-18 LAB
BACTERIA SPEC AEROBE CULT: NORMAL
GRAM STN SPEC: NORMAL
GRAM STN SPEC: NORMAL

## 2021-03-19 LAB
BH BB BLOOD EXPIRATION DATE: NORMAL
BH BB BLOOD EXPIRATION DATE: NORMAL
BH BB BLOOD TYPE BARCODE: 5100
BH BB BLOOD TYPE BARCODE: 5100
BH BB DISPENSE STATUS: NORMAL
BH BB DISPENSE STATUS: NORMAL
BH BB PRODUCT CODE: NORMAL
BH BB PRODUCT CODE: NORMAL
BH BB UNIT NUMBER: NORMAL
BH BB UNIT NUMBER: NORMAL
CROSSMATCH INTERPRETATION: NORMAL
CROSSMATCH INTERPRETATION: NORMAL
UNIT  ABO: NORMAL
UNIT  ABO: NORMAL
UNIT  RH: NORMAL
UNIT  RH: NORMAL

## 2021-03-20 LAB — BACTERIA SPEC ANAEROBE CULT: ABNORMAL

## 2021-04-13 ENCOUNTER — OFFICE VISIT (OUTPATIENT)
Dept: CARDIOLOGY | Facility: CLINIC | Age: 51
End: 2021-04-13

## 2021-04-13 ENCOUNTER — PATIENT MESSAGE (OUTPATIENT)
Dept: GASTROENTEROLOGY | Facility: CLINIC | Age: 51
End: 2021-04-13

## 2021-04-13 VITALS
WEIGHT: 249.4 LBS | TEMPERATURE: 97.3 F | BODY MASS INDEX: 33.78 KG/M2 | HEIGHT: 72 IN | SYSTOLIC BLOOD PRESSURE: 122 MMHG | OXYGEN SATURATION: 96 % | DIASTOLIC BLOOD PRESSURE: 72 MMHG

## 2021-04-13 DIAGNOSIS — I10 ESSENTIAL HYPERTENSION: ICD-10-CM

## 2021-04-13 DIAGNOSIS — R55 VASOVAGAL EPISODE: ICD-10-CM

## 2021-04-13 DIAGNOSIS — I48.0 PAROXYSMAL ATRIAL FIBRILLATION (HCC): ICD-10-CM

## 2021-04-13 DIAGNOSIS — R07.89 ATYPICAL CHEST PAIN: ICD-10-CM

## 2021-04-13 DIAGNOSIS — Z95.3 STATUS POST AORTIC VALVE REPLACEMENT WITH BIOPROSTHETIC VALVE: Primary | ICD-10-CM

## 2021-04-13 DIAGNOSIS — I50.32 CHRONIC DIASTOLIC CONGESTIVE HEART FAILURE (HCC): ICD-10-CM

## 2021-04-13 DIAGNOSIS — R00.1 BRADYCARDIA: ICD-10-CM

## 2021-04-13 PROCEDURE — 99214 OFFICE O/P EST MOD 30 MIN: CPT | Performed by: NURSE PRACTITIONER

## 2021-04-13 PROCEDURE — 93000 ELECTROCARDIOGRAM COMPLETE: CPT | Performed by: NURSE PRACTITIONER

## 2021-04-13 NOTE — ASSESSMENT & PLAN NOTE
· No episodes of atrial fibrillation have been detected since postop occurrence during AVR in 2016  · Defer need for anticoagulation  · Continue daily aspirin  · If patient has another episode will consider repeat 30-day monitor

## 2021-04-13 NOTE — ASSESSMENT & PLAN NOTE
· Stable NYHA class II symptoms  · Continue Bumex 1 mg twice daily  · Continue spironolactone 50 mg daily

## 2021-04-13 NOTE — PROGRESS NOTES
"[facility]      Identification: Danae Fong is a 50 y.o. female who resides in [place], [place]    Reason for visit:  · Chronic diastolic heart failure  · Aortic valve replacement  · Paroxysmal atrial fibrillation  · Hypertension    Subjective      Danae Fong presents to [facility] for followup.    History of Present Illness  Patient is a 50-year-old female who returns today sooner than expected after presenting to [facility] via EMS after suffering a \"\".  The patient has a bioprosthetic aortic valve, diastolic heart failure, paroxysmal atrial fibrillation and hypertension.  At her last visit with me she was having lightheadedness and low heart rates and I decreased her metoprolol tartrate to 12.5 mg twice daily.  Her last echocardiogram in 2020 showed a normal functioning bioprosthetic valve and normal LVEF.  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  Postoperative AVR  she developed atrial fibrillation with RVR and also suffered complications requiring a pericardial window with pericardial stripping.  She has not had any further recurrences of atrial fibrillation since her surgery in 2016 and has not been on any anticoagulation.  At one point she was on Eliquis but had bleeding complications.  Patient last month started to develop pain in the right hip and x-ray showed retained cerclage band but no evidence of osteomyelitis.Last month the patient underwent removal of cerclage band of the right hip and proximal femur by Dr. Siu.  Since her surgery she has been doing well until the other day.  The patient was going to the bathroom as she felt like she needed to have a bowel movement.  After she arrived to the bathroom she " "developed lightheadedness, felt weak/lethargic, became nauseated and started dry heaving.  She felt like she was going to pass out.  Her heart rate according to her watch went up to the 120s.  She also developed chest pain and shortness of breath.  She said it felt like she was filling up with fluid and could not breathe.  Her sister called 911 but by the time EMS arrived the patient was feeling better but they insisted she go to the emergency room for evaluation.  Patient does report having a bowel movement immediately after this.  She reports her bowel movements have been somewhat abnormal.  She has also been losing weight unintentionally due to lack of appetite and has been following Dr. Lee for these complaints.  She recently had a gastric emptying study that was normal.  I do not have the ED physicians note but I do have her lab work and CT angiogram results.  Lab work was benign,  negative troponin, normal chest x-ray and CT angiogram of the chest was negative for PE, pleural effusions or any aortic dissection..  She was bradycardic with a heart rate in the low 50s.  She has had no further spells like this but does admit with position changes she gets lightheaded.  She does not think it was particularly her heart that cause this but thinks it all goes back to her \"stomach\".  She is bradycardic today with a heart rate in the 50s otherwise normal EKG.                                  Objective     /72 (BP Location: Right arm, Patient Position: Sitting)   Temp 97.3 °F (36.3 °C)   Ht 185.4 cm (73\")   Wt 113 kg (249 lb 6.4 oz)   SpO2 96%   BMI 32.90 kg/m²       Constitutional:       Appearance: Healthy appearance. Well-developed.   Eyes:      General: Lids are normal. No scleral icterus.     Conjunctiva/sclera: Conjunctivae normal.   HENT:      Head: Normocephalic and atraumatic.   Neck:      Thyroid: No thyromegaly.      Vascular: No carotid bruit or JVD.   Pulmonary:      Effort: Pulmonary effort " is normal.      Breath sounds: Normal breath sounds. No wheezing. No rhonchi. No rales.   Cardiovascular:      Normal rate. Regular rhythm.      Murmurs: There is no murmur.      No gallop. No rub.   Pulses:     Intact distal pulses.   Edema:     Peripheral edema absent.   Abdominal:      General: There is no distension.      Palpations: Abdomen is soft. There is no abdominal mass.   Musculoskeletal:      Cervical back: Normal range of motion. Skin:     General: Skin is warm and dry.      Findings: No rash.   Neurological:      General: No focal deficit present.      Mental Status: Alert and oriented to person, place, and time.      Gait: Gait is intact.   Psychiatric:         Attention and Perception: Attention normal.         Mood and Affect: Mood normal.         Behavior: Behavior normal.         Result Review :    Lab Results   Component Value Date    GLUCOSE 95 03/16/2021    BUN 6 03/16/2021    CREATININE 0.62 03/16/2021    EGFRIFNONA 102 03/16/2021    BCR 9.7 03/16/2021    K 4.1 03/16/2021    CO2 28.0 03/16/2021    CALCIUM 9.5 03/16/2021    PROTENTOTREF 7.0 08/23/2016    ALBUMIN 4.20 03/05/2021    LABIL2 0.8 08/23/2016    AST 21 03/05/2021    ALT 14 03/05/2021     Lab Results   Component Value Date    WBC 9.80 03/16/2021    HGB 13.5 03/16/2021    HCT 42.6 03/16/2021    MCV 84.5 03/16/2021     03/16/2021     Lab Results   Component Value Date    CHOL 211 (H) 01/14/2020    TRIG 138 01/14/2020    HDL 33 (L) 01/14/2020     (H) 01/14/2020     Lab Results   Component Value Date    HGBA1C 5.20 03/05/2021     Laboratory data obtained at OSH 4/11/2021  WBC 9.1, hemoglobin 13.9, hematocrit 42, platelets 226, BUN 15, creatinine 0.92, AST 28, ALT 29, sodium 140, potassium 3.3, magnesium 2.1, troponin 0 0.02, D-dimer 3366.37    CT angiogram of the chest at OSH (4/11/2021): No pulmonary embolism, dissection or aneurysm.  Questionable mild cirrhosis    ECG 12 Lead    Date/Time: 4/13/2021 9:30 AM  Performed by:  Bertha Butler APRN  Authorized by: Bertha Butler APRN   Comparison: compared with previous ECG from 3/8/2021  Similar to previous ECG  Rhythm: sinus bradycardia  BPM: 57    Clinical impression: normal ECG  Comments: QT/QTc 422/14             Assessment     Problem List Items Addressed This Visit        Cardiac and Vasculature    Bradycardia    Current Assessment & Plan     · Discontinue metoprolol         Chronic diastolic congestive heart failure (CMS/HCC)    Overview     · Echo (10/10/16): Normal LVEF.  Bioprosthetic aortic valve functioning normally.  · Echo (12/2017): LVEF 50-55%.  Bioprosthetic aortic valve functioning normally  · Echo (1/14/2020):LVEF = 60%.  Grade 1 diastolic dysfunction.  Bioprosthetic aortic valve well-seated and functions normally.  Mean gradient 14 mmHg           Current Assessment & Plan     · Stable NYHA class II symptoms  · Continue Bumex 1 mg twice daily  · Continue spironolactone 50 mg daily         Essential hypertension    Current Assessment & Plan     · Hypertension is controlled  · Continue spironolactone 50 mg daily         Paroxysmal atrial fibrillation (CMS/HCC)    Overview     · Postoperative afib after AVR  · Chads Vasc= 2  · 2-week monitor (10/7/2020): Predominantly normal sinus rhythm without atrial fibrillation/flutter.  1% PACs and PVCs.  Lightheadedness associated with heart rates in the 50 bpm.         Current Assessment & Plan     · No episodes of atrial fibrillation have been detected since postop occurrence during AVR in 2016  · Defer need for anticoagulation  · Continue daily aspirin  · If patient has another episode will consider repeat 30-day monitor         Status post aortic valve replacement with bioprosthetic valve - Primary    Overview     · Severe nonrheumatic aortic insufficiency by AIRAM, 8/24/2016  · Bioprosthetic AVR (25 mm Magna Ease) by Dr. Mauricio Grove, 8/30/3016  · Initial possibility of vegetation turned out to be a flail leaflet  · Echo  (12/2/2017): Normal functioning bioprosthetic valve.  Mild MR.  LVEF 50-55%  · Echo (2/6/18): LVEF 55%. Normal functioning bioprosthetic aortic valve  · Echo (1/14/2020):LVEF = 60%.  Grade 1 diastolic dysfunction.  Bioprosthetic aortic valve well-seated and functions normally.  Mean gradient 14 mmHg           Current Assessment & Plan     · Patient's left side likely vasovagal response but will obtain echocardiogram to ensure normal functioning bioprosthetic aortic valve         Relevant Orders    Adult Transthoracic Echo Complete W/ Cont if Necessary Per Protocol       Symptoms and Signs    Atypical chest pain    Overview     · Cardiac cath (8/25/2016): Normal coronaries  · MPS (3/8/2018):Small-sized, moderately severe area of ischemia located in the apex. This area of ischemia affects 3% of the myocardium.  Normal LVEF 55%\  · MPS (3/21/2018): Normal coronary arteries         Vasovagal episode      The patient sounds as if she had a vasovagal response while having the urge to have a bowel movement.  She has been having lightheadedness therefore I will discontinue her metoprolol.  We will continue her Bumex, spironolactone and aspirin.  I will order a echocardiogram just for reassessment of her aortic valve.  If she has any more episodes she should contact us and I will order a 30-day monitor.    Plan   • Discontinue metoprolol due to symptomatic bradycardia  • Patient's episode sounds to be vasovagal response  • Obtain echocardiogram for reassessment of bioprosthetic aortic valve  • If has any further episodes should contact us in order 30-day monitor      Follow-up   Return in about 6 months (around 10/13/2021), or if symptoms worsen or fail to improve, for Follow-up with Dr. Peterson next visit.        Bertha Butler, APRN  4/13/2021

## 2021-04-13 NOTE — TELEPHONE ENCOUNTER
From: Danae Fong  To: Amos Lee MD  Sent: 4/13/2021 11:28 AM EDT  Subject: Non-Urgent Medical Question    I had a recent visit to the ER with bradycardia and nausea and other symptoms. I followed up with cardiology today and we discussed trying to follow up with you. I am still experiencing decreased appetite and moderate nausea and constipation, with stool softeners on board. My liver function were normal on that visit to the ER. Thanks so much

## 2021-04-13 NOTE — ASSESSMENT & PLAN NOTE
· Patient's left side likely vasovagal response but will obtain echocardiogram to ensure normal functioning bioprosthetic aortic valve

## 2021-04-26 ENCOUNTER — PATIENT MESSAGE (OUTPATIENT)
Dept: GASTROENTEROLOGY | Facility: CLINIC | Age: 51
End: 2021-04-26

## 2021-04-26 LAB
FUNGUS WND CULT: NORMAL
MYCOBACTERIUM SPEC CULT: NORMAL
NIGHT BLUE STAIN TISS: NORMAL

## 2021-04-26 NOTE — TELEPHONE ENCOUNTER
From: Danae Fong  To: Amos Lee MD  Sent: 4/26/2021 10:45 AM EDT  Subject: Non-Urgent Medical Question    I was just inquiring as to when I would hear from central scheduling regarding my HIDA scan. Thank you so much.

## 2021-05-13 ENCOUNTER — PATIENT MESSAGE (OUTPATIENT)
Dept: GASTROENTEROLOGY | Facility: CLINIC | Age: 51
End: 2021-05-13

## 2021-05-13 DIAGNOSIS — R11.0 NAUSEA: Primary | ICD-10-CM

## 2021-05-13 DIAGNOSIS — R10.13 EPIGASTRIC PAIN: ICD-10-CM

## 2021-05-13 NOTE — TELEPHONE ENCOUNTER
From: Danae Fong  To: Amos Lee MD  Sent: 5/13/2021 10:41 AM EDT  Subject: Referral Request    I called Central Scheduling at [facility] to check status of his scan like you advised prior...due to the fact I am still experiencing nausea and constipation....they said they have not received and order for the hida scan.   Thanks so much

## 2021-05-16 ENCOUNTER — HOSPITAL ENCOUNTER (OUTPATIENT)
Dept: CARDIOLOGY | Facility: HOSPITAL | Age: 51
End: 2021-05-16

## 2021-05-20 ENCOUNTER — APPOINTMENT (OUTPATIENT)
Dept: CARDIOLOGY | Facility: HOSPITAL | Age: 51
End: 2021-05-20

## 2021-06-16 ENCOUNTER — HOSPITAL ENCOUNTER (OUTPATIENT)
Dept: CARDIOLOGY | Facility: HOSPITAL | Age: 51
End: 2021-06-16

## 2021-06-18 RX ORDER — AMOXICILLIN 500 MG/1
2000 CAPSULE ORAL ONCE AS NEEDED
Qty: 4 CAPSULE | Refills: 2 | Status: SHIPPED | OUTPATIENT
Start: 2021-06-18 | End: 2021-09-23

## 2021-07-12 ENCOUNTER — APPOINTMENT (OUTPATIENT)
Dept: NUCLEAR MEDICINE | Facility: HOSPITAL | Age: 51
End: 2021-07-12

## 2021-07-19 ENCOUNTER — HOSPITAL ENCOUNTER (OUTPATIENT)
Dept: CARDIOLOGY | Facility: HOSPITAL | Age: 51
Discharge: HOME | End: 2021-07-19
Admitting: NURSE PRACTITIONER

## 2021-07-19 ENCOUNTER — HOSPITAL ENCOUNTER (OUTPATIENT)
Dept: NUCLEAR MEDICINE | Facility: HOSPITAL | Age: 51
Discharge: HOME | End: 2021-07-19

## 2021-07-19 VITALS — WEIGHT: 249 LBS | HEIGHT: 72 IN | BODY MASS INDEX: 33.72 KG/M2

## 2021-07-19 DIAGNOSIS — R10.13 EPIGASTRIC PAIN: ICD-10-CM

## 2021-07-19 DIAGNOSIS — Z95.3 STATUS POST AORTIC VALVE REPLACEMENT WITH BIOPROSTHETIC VALVE: ICD-10-CM

## 2021-07-19 DIAGNOSIS — R11.0 NAUSEA: ICD-10-CM

## 2021-07-19 LAB
ASCENDING AORTA: 4 CM
BH CV ECHO MEAS - AO MAX PG (FULL): 30 MMHG
BH CV ECHO MEAS - AO MAX PG: 32.5 MMHG
BH CV ECHO MEAS - AO MEAN PG (FULL): 16 MMHG
BH CV ECHO MEAS - AO MEAN PG: 17.4 MMHG
BH CV ECHO MEAS - AO ROOT AREA (BSA CORRECTED): 1.4
BH CV ECHO MEAS - AO ROOT AREA: 8.9 CM^2
BH CV ECHO MEAS - AO ROOT DIAM: 3.4 CM
BH CV ECHO MEAS - AO V2 MAX: 285.1 CM/SEC
BH CV ECHO MEAS - AO V2 MEAN: 191.2 CM/SEC
BH CV ECHO MEAS - AO V2 VTI: 68 CM
BH CV ECHO MEAS - ASC AORTA: 4 CM
BH CV ECHO MEAS - AVA(I,A): 1 CM^2
BH CV ECHO MEAS - AVA(I,D): 1 CM^2
BH CV ECHO MEAS - AVA(V,A): 1 CM^2
BH CV ECHO MEAS - AVA(V,D): 1 CM^2
BH CV ECHO MEAS - BSA(HAYCOCK): 2.4 M^2
BH CV ECHO MEAS - BSA: 2.4 M^2
BH CV ECHO MEAS - BZI_BMI: 32.9 KILOGRAMS/M^2
BH CV ECHO MEAS - BZI_METRIC_HEIGHT: 185.4 CM
BH CV ECHO MEAS - BZI_METRIC_WEIGHT: 112.9 KG
BH CV ECHO MEAS - EDV(CUBED): 110.5 ML
BH CV ECHO MEAS - EDV(MOD-SP2): 100 ML
BH CV ECHO MEAS - EDV(MOD-SP4): 125 ML
BH CV ECHO MEAS - EDV(TEICH): 107.5 ML
BH CV ECHO MEAS - EF(CUBED): 66.3 %
BH CV ECHO MEAS - EF(MOD-BP): 67 %
BH CV ECHO MEAS - EF(MOD-SP2): 62 %
BH CV ECHO MEAS - EF(MOD-SP4): 69.6 %
BH CV ECHO MEAS - EF(TEICH): 57.7 %
BH CV ECHO MEAS - ESV(CUBED): 37.2 ML
BH CV ECHO MEAS - ESV(MOD-SP2): 38 ML
BH CV ECHO MEAS - ESV(MOD-SP4): 38 ML
BH CV ECHO MEAS - ESV(TEICH): 45.4 ML
BH CV ECHO MEAS - FS: 30.4 %
BH CV ECHO MEAS - IVS/LVPW: 0.9
BH CV ECHO MEAS - IVSD: 0.88 CM
BH CV ECHO MEAS - LA DIMENSION: 3.4 CM
BH CV ECHO MEAS - LA/AO: 1
BH CV ECHO MEAS - LAD MAJOR: 6.5 CM
BH CV ECHO MEAS - LAT PEAK E' VEL: 16.1 CM/SEC
BH CV ECHO MEAS - LATERAL E/E' RATIO: 3.4
BH CV ECHO MEAS - LV DIASTOLIC VOL/BSA (35-75): 52.9 ML/M^2
BH CV ECHO MEAS - LV IVRT: 0.12 SEC
BH CV ECHO MEAS - LV MASS(C)D: 154.2 GRAMS
BH CV ECHO MEAS - LV MASS(C)DI: 65.3 GRAMS/M^2
BH CV ECHO MEAS - LV MAX PG: 2.5 MMHG
BH CV ECHO MEAS - LV MEAN PG: 1.4 MMHG
BH CV ECHO MEAS - LV SYSTOLIC VOL/BSA (12-30): 16.1 ML/M^2
BH CV ECHO MEAS - LV V1 MAX: 78.4 CM/SEC
BH CV ECHO MEAS - LV V1 MEAN: 54.5 CM/SEC
BH CV ECHO MEAS - LV V1 VTI: 18.6 CM
BH CV ECHO MEAS - LVIDD: 4.8 CM
BH CV ECHO MEAS - LVIDS: 3.3 CM
BH CV ECHO MEAS - LVLD AP2: 8.4 CM
BH CV ECHO MEAS - LVLD AP4: 8.9 CM
BH CV ECHO MEAS - LVLS AP2: 7.4 CM
BH CV ECHO MEAS - LVLS AP4: 7.2 CM
BH CV ECHO MEAS - LVOT AREA (M): 3.8 CM^2
BH CV ECHO MEAS - LVOT AREA: 3.7 CM^2
BH CV ECHO MEAS - LVOT DIAM: 2.2 CM
BH CV ECHO MEAS - LVPWD: 0.98 CM
BH CV ECHO MEAS - MED PEAK E' VEL: 7.9 CM/SEC
BH CV ECHO MEAS - MEDIAL E/E' RATIO: 6.9
BH CV ECHO MEAS - MV A MAX VEL: 44.1 CM/SEC
BH CV ECHO MEAS - MV DEC SLOPE: 94.7 CM/SEC^2
BH CV ECHO MEAS - MV DEC TIME: 0.4 SEC
BH CV ECHO MEAS - MV E MAX VEL: 55.7 CM/SEC
BH CV ECHO MEAS - MV E/A: 1.3
BH CV ECHO MEAS - MV MAX PG: 2.5 MMHG
BH CV ECHO MEAS - MV MEAN PG: 0.98 MMHG
BH CV ECHO MEAS - MV P1/2T MAX VEL: 47.3 CM/SEC
BH CV ECHO MEAS - MV P1/2T: 146.4 MSEC
BH CV ECHO MEAS - MV V2 MAX: 79.5 CM/SEC
BH CV ECHO MEAS - MV V2 MEAN: 46.2 CM/SEC
BH CV ECHO MEAS - MV V2 VTI: 29.6 CM
BH CV ECHO MEAS - MVA P1/2T LCG: 4.6 CM^2
BH CV ECHO MEAS - MVA(P1/2T): 1.5 CM^2
BH CV ECHO MEAS - MVA(VTI): 2.3 CM^2
BH CV ECHO MEAS - PA ACC SLOPE: 335.1 CM/SEC^2
BH CV ECHO MEAS - PA ACC TIME: 0.16 SEC
BH CV ECHO MEAS - PA PR(ACCEL): 6 MMHG
BH CV ECHO MEAS - PI END-D VEL: 114.8 CM/SEC
BH CV ECHO MEAS - RAP SYSTOLE: 3 MMHG
BH CV ECHO MEAS - RVSP: 17 MMHG
BH CV ECHO MEAS - SI(AO): 257 ML/M^2
BH CV ECHO MEAS - SI(CUBED): 31 ML/M^2
BH CV ECHO MEAS - SI(LVOT): 28.9 ML/M^2
BH CV ECHO MEAS - SI(MOD-SP2): 26.3 ML/M^2
BH CV ECHO MEAS - SI(MOD-SP4): 36.8 ML/M^2
BH CV ECHO MEAS - SI(TEICH): 26.3 ML/M^2
BH CV ECHO MEAS - SV(AO): 606.9 ML
BH CV ECHO MEAS - SV(CUBED): 73.3 ML
BH CV ECHO MEAS - SV(LVOT): 68.2 ML
BH CV ECHO MEAS - SV(MOD-SP2): 62 ML
BH CV ECHO MEAS - SV(MOD-SP4): 87 ML
BH CV ECHO MEAS - SV(TEICH): 62 ML
BH CV ECHO MEAS - TAPSE (>1.6): 1.4 CM
BH CV ECHO MEAS - TR MAX PG: 14 MMHG
BH CV ECHO MEAS - TR MAX VEL: 186.2 CM/SEC
BH CV ECHO MEASUREMENTS AVERAGE E/E' RATIO: 4.64
BH CV VAS BP RIGHT ARM: NORMAL MMHG
BH CV XLRA - RV BASE: 3.8 CM
BH CV XLRA - RV LENGTH: 8 CM
BH CV XLRA - RV MID: 3.7 CM
BH CV XLRA - TDI S': 8.3 CM/SEC
IVRT: 120 MSEC
LEFT ATRIUM VOLUME INDEX: 20.3 ML/M^2
LEFT ATRIUM VOLUME: 48 ML
LV EF 2D ECHO EST: 65 %

## 2021-07-19 PROCEDURE — A9537 TC99M MEBROFENIN: HCPCS | Performed by: INTERNAL MEDICINE

## 2021-07-19 PROCEDURE — 0 TECHNETIUM TC 99M MEBROFENIN KIT: Performed by: INTERNAL MEDICINE

## 2021-07-19 PROCEDURE — 93306 TTE W/DOPPLER COMPLETE: CPT | Performed by: INTERNAL MEDICINE

## 2021-07-19 PROCEDURE — 78227 HEPATOBIL SYST IMAGE W/DRUG: CPT

## 2021-07-19 PROCEDURE — 25010000002 SINCALIDE PER 5 MCG: Performed by: INTERNAL MEDICINE

## 2021-07-19 PROCEDURE — 93306 TTE W/DOPPLER COMPLETE: CPT

## 2021-07-19 RX ORDER — KIT FOR THE PREPARATION OF TECHNETIUM TC 99M MEBROFENIN 45 MG/10ML
1 INJECTION, POWDER, LYOPHILIZED, FOR SOLUTION INTRAVENOUS
Status: COMPLETED | OUTPATIENT
Start: 2021-07-19 | End: 2021-07-19

## 2021-07-19 RX ADMIN — MEBROFENIN 1 DOSE: 45 INJECTION, POWDER, LYOPHILIZED, FOR SOLUTION INTRAVENOUS at 10:30

## 2021-07-19 RX ADMIN — SINCALIDE 2.3 MCG: 5 INJECTION, POWDER, LYOPHILIZED, FOR SOLUTION INTRAVENOUS at 11:33

## 2021-07-20 ENCOUNTER — TELEPHONE (OUTPATIENT)
Dept: GASTROENTEROLOGY | Facility: CLINIC | Age: 51
End: 2021-07-20

## 2021-07-20 DIAGNOSIS — K59.09 CHRONIC CONSTIPATION: Primary | ICD-10-CM

## 2021-07-20 RX ORDER — PRUCALOPRIDE 2 MG/1
2 TABLET, FILM COATED ORAL DAILY
Qty: 30 TABLET | Refills: 3 | Status: SHIPPED | OUTPATIENT
Start: 2021-07-20 | End: 2021-12-03

## 2021-07-20 NOTE — TELEPHONE ENCOUNTER
I called and spoke with Ms. Fong regarding the HIDA scan.  The HIDA scan ejection fraction was normal.  She states the symptoms were not reproduced during the HIDA scan.  Discussed the issue of constipation and will begin Motegrity.

## 2021-07-21 ENCOUNTER — DOCUMENTATION (OUTPATIENT)
Dept: CARDIOLOGY | Facility: CLINIC | Age: 51
End: 2021-07-21

## 2021-07-21 NOTE — PROGRESS NOTES
I contacted patient to let her know the results of her echocardiogram.  At her last visit the patient had reported having lightheadedness and a syncopal spell for which I ordered the echo.  Echo shows normal functioning bioprosthetic valve.  Her symptoms were felt to be due to hypotension.  Her beta-blocker was discontinued.  She has not had any further syncopal spells.  I instructed her to contact us should she have any more episodes and we will order a 30-day monitor.  I will have scheduling call her to schedule her a follow-up appointment as one is currently not scheduled.      BAUTISTA Malcolm

## 2021-07-27 ENCOUNTER — PATIENT MESSAGE (OUTPATIENT)
Dept: GASTROENTEROLOGY | Facility: CLINIC | Age: 51
End: 2021-07-27

## 2021-07-27 NOTE — TELEPHONE ENCOUNTER
From: Danae Fong  To: Amos Lee MD  Sent: 7/27/2021 10:38 AM EDT  Subject: Prescription Question    I was wondering if the new medication you prescribed had gotten approved thru my insurance yet? Thanks so much.

## 2021-09-23 ENCOUNTER — OFFICE VISIT (OUTPATIENT)
Dept: GASTROENTEROLOGY | Facility: CLINIC | Age: 51
End: 2021-09-23

## 2021-09-23 DIAGNOSIS — K75.81 NASH (NONALCOHOLIC STEATOHEPATITIS): ICD-10-CM

## 2021-09-23 DIAGNOSIS — R11.0 NAUSEA: Primary | ICD-10-CM

## 2021-09-23 PROCEDURE — 99213 OFFICE O/P EST LOW 20 MIN: CPT | Performed by: INTERNAL MEDICINE

## 2021-09-23 RX ORDER — LORATADINE 10 MG
81 TABLET ORAL DAILY
COMMUNITY
Start: 2021-09-22 | End: 2023-03-21

## 2021-09-23 RX ORDER — ALPRAZOLAM 1 MG/1
TABLET ORAL
COMMUNITY
Start: 2021-08-29 | End: 2021-12-28

## 2021-09-23 RX ORDER — LORATADINE 10 MG/1
10 TABLET ORAL DAILY
COMMUNITY
Start: 2021-08-30

## 2021-09-23 NOTE — PROGRESS NOTES
You have chosen to receive care through a telephone visit. Do you consent to use a telephone visit for your medical care today? Yes  Patient Name: Danae Fong  YOB: 1970   Medical Record number: [id number]     PCP: Leigh Casey MD    Chief Complaint   Patient presents with   • Follow-up     2 month   Follow-up for nausea.    History of Present Illness:   HPI  Ms. Fong presents for telephone visit.  She consented for telephone visit.  The patient denies any difficult or painful swallowing.  She may have some heartburn on occasion.  There is a history of nausea that can be present at any time of the day.  The patient denies any emesis of undigested food.  There is no history of unexplained weight loss.  Ms. Fong denies any change in the color of her eyes or skin.  There is no history of localized abdominal pain.  She denies any change in bowel habits.  Past Medical History:   Diagnosis Date   • A-fib (CMS/HCC) 2016    REMOTE HISTORY AFTER VALVE    • Anemia    • Anxiety    • Aortic valve regurgitation 2016    VALVE REPLACEMENT    • CHF (congestive heart failure) (CMS/HCC) 2016   • Colon polyp    • Constipation    • Controlled type 2 diabetes mellitus without complication (CMS/HCC)     not medicated??? 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)    • Liver disease    • Low back pain    • MRSA infection 09/2015    TOE   • Primary Osteoarthritis of both hips    • Sepsis (CMS/HCC)    • Septic arthritis (CMS/HCC)    • Sleep apnea     RESOLVED - PER PATIENT    • SOB (shortness of breath) on exertion    • 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HCC)     • Streptococcus infection, group B    • Subacute osteomyelitis of left foot (CMS/HCC)    • Transfusion history    • Wears reading eyeglasses        Past Surgical History:   Procedure Laterality Date   • AMPUTATION DIGIT Right 1/30/2018    Procedure: AMPUTATION RIGHT 2ND TOE;  Surgeon: Charleen Nath MD;  Location:  [facility];  Service:    • AORTIC VALVE REPAIR/REPLACEMENT N/A 8/30/2016    Procedure: AIRAM, MEDIAN STERNOTOMY, AORTIC VALVE REPLACEMENT;  Surgeon: Mauricio Grove MD;  Location:  [facility];  Service:    • CARDIAC CATHETERIZATION N/A 8/25/2016    Procedure: Left Heart Cath;  Surgeon: Tyler Peterson MD;  Location:  [facility];  Service:    • CARDIAC CATHETERIZATION N/A 3/21/2018    Procedure: Coronary angiography;  Surgeon: Tyler Peterson IV, MD;  Location:  [facility];  Service: Cardiovascular   • COLONOSCOPY     • DILATATION AND CURETTAGE      of uterus   • ENDOSCOPY     • FOOT SURGERY Left     for osteomyelitis   • HARDWARE REMOVAL Right 3/15/2021    Procedure: REMOVAL OF CERCLAGE WIRE OF RIGHT PROXIMAL FEMUR - RIGHT;  Surgeon: Owen Siu MD;  Location:  [facility];  Service: Orthopedics;  Laterality: Right;   • INCISION AND DRAINAGE HIP Right 1/26/2017    Procedure: RIGHT HIP WOUND WASHOUT AND DEBRIDEMENT, WITH POLYETHELENE FEMORAL HEAD EXCHANGE ;  Surgeon: Owen Siu MD;  Location:  [facility];  Service:    • PERICARDIAL WINDOW N/A 9/16/2016    Procedure: PERICARDIOCENTESIS WITH PLACEMENT OF DRAINAGE TUBE .;  Surgeon: Mauricio Grove MD;  Location:  [facility];  Service:    • PERICARDIAL WINDOW N/A 9/20/2016    Procedure: SUBXYPHOID PERICARDIAL WINDOW;  Surgeon: Mauricio Grove MD;  Location:  [facility];  Service:    • [place] CLOSED RX TRAUMATIC HIP DISLOCATN N/A 1/29/2017    Procedure: HIP CLOSED REDUCTION;  Surgeon: Leander Li MD;  Location:  [facility];  Service: Orthopedics   • THORACOTOMY Left 10/5/2016    Procedure: THORACOTOMY  Pericardectomy,  AIRAM,  Placement pacing leads, Pump standby;  Surgeon: Mauricio Grove MD;  Location:  [facility];  Service:    • TOTAL HIP ARTHROPLASTY Right 1/23/2017    Procedure: RIGHT TOTAL HIP ARTHROPLASTY ANTERIOR ;  Surgeon: Owen Siu MD;  Location:  [facility];  Service:    • TOTAL HIP ARTHROPLASTY REVISION Right 1/30/2017    Procedure: REPEAT WOUND DEBRIDEMENT ACETABULAR REVISION;  Surgeon: Owen Siu MD;  Location:  [facility];  Service:    • UPPER GASTROINTESTINAL ENDOSCOPY     • WISDOM TOOTH EXTRACTION      all 4         Current Outpatient Medications:   •  ALPRAZolam (XANAX) 1 MG tablet, TAKE TABLET BY MOUTH ONE HOUR BEFORE APPOINTMENT, Disp: , Rfl:   •  bumetanide (BUMEX) 1 MG tablet, Take 1 mg by mouth 2 (Two) Times a Day., Disp: , Rfl:   •  [employer] Aspirin Adult Low Dose 81 MG chewable tablet, , Disp: , Rfl:   •  docusate sodium (COLACE) 100 MG capsule, Take 100 mg by mouth 2 (Two) Times a Day As Needed for Constipation., Disp: , Rfl:   •  DULoxetine (CYMBALTA) 30 MG capsule, Take 30 mg by mouth Daily., Disp: , Rfl:   •  loratadine (CLARITIN) 10 MG tablet, Take 10 mg by mouth Daily., Disp: , Rfl:   •  Multiple Vitamins-Minerals (CENTRUM SILVER 50+WOMEN) tablet, , Disp: , Rfl:   •  Prucalopride Succinate (Motegrity) 2 MG tablet, Take 2 mg by mouth Daily., Disp: 30 tablet, Rfl: 3  •  spironolactone (ALDACTONE) 50 MG tablet, Take 1 tablet by mouth Daily., Disp: 90 tablet, Rfl: 2  •  vilazodone (VIIBRYD) 20 MG tablet tablet, Take 20 mg by mouth Daily., Disp: , Rfl:     No Known Allergies    Family History   Problem Relation Age of Onset   • Diabetes Mother    • Heart disease Mother    • Hypertension Mother    • Osteoarthritis Mother    • Diabetes Sister    • Arthritis Sister    • No Known Problems Father    • Diabetes Sister    • Diabetes Sister    • Heart disease Other    • Heart attack Other    • Heart disease Other    • Hypertension Other    • Osteoarthritis Other    • Rheum arthritis Other    • Cancer  Neg Hx        Social History     Socioeconomic History   • Marital status:      Spouse name: Not on file   • Number of children: 0   • Years of education: Not on file   • Highest education level: Not on file   Tobacco Use   • Smoking status: Former Smoker     Packs/day: 0.50     Years: 15.00     Pack years: 7.50     Types: Cigarettes     Quit date: 7/15/2016     Years since quittin.1   • Smokeless tobacco: Never Used   Vaping Use   • Vaping Use: Never used   Substance and Sexual Activity   • Alcohol use: No   • Drug use: Yes     Types: Marijuana     Comment: THC gummies occasionally for pain   • Sexual activity: Not Currently     Partners: Male     Birth control/protection: Abstinence       Review of Systems   Constitutional: Negative for activity change, appetite change, fatigue, fever and unexpected weight change.   HENT: Negative for hearing loss, trouble swallowing and voice change.    Eyes: Negative for visual disturbance.   Respiratory: Negative for cough, choking, chest tightness and shortness of breath.    Cardiovascular: Negative for chest pain.   Gastrointestinal: Positive for abdominal distention (bloating), blood in stool (hemorrhoids) and nausea. Negative for abdominal pain, anal bleeding, constipation, diarrhea, rectal pain and vomiting.        Heartburn   Endocrine: Negative for polydipsia and polyphagia.   Genitourinary: Negative.    Musculoskeletal: Negative for gait problem and joint swelling.   Skin: Negative for color change and rash.   Allergic/Immunologic: Negative for food allergies.   Neurological: Negative for dizziness, seizures and speech difficulty.   Hematological: Negative for adenopathy.   Psychiatric/Behavioral: Negative for confusion.       There were no vitals filed for this visit.    Physical Exam    Diagnoses and all orders for this visit:    1. Nausea (Primary)    2. MCKEON (nonalcoholic steatohepatitis)    The patient had a normal gastric emptying study.  The nausea  may be related to dysmotility or chronic liver disease.  Patient had a HIDA scan with ejection fraction 45%.  She did not report any reproduction of the symptoms during the scan.      Plan: Will check CMP and prothrombin time.      This was a telephone visit for 10 minutes.

## 2021-09-27 ENCOUNTER — LAB (OUTPATIENT)
Dept: LAB | Facility: HOSPITAL | Age: 51
End: 2021-09-27

## 2021-09-27 DIAGNOSIS — R11.0 NAUSEA: ICD-10-CM

## 2021-09-27 DIAGNOSIS — K75.81 NASH (NONALCOHOLIC STEATOHEPATITIS): ICD-10-CM

## 2021-09-27 LAB
ALBUMIN SERPL-MCNC: 4.7 G/DL (ref 3.5–5.2)
ALBUMIN/GLOB SERPL: 1.2 G/DL
ALP SERPL-CCNC: 110 U/L (ref 39–117)
ALT SERPL W P-5'-P-CCNC: 27 U/L (ref 1–33)
ANION GAP SERPL CALCULATED.3IONS-SCNC: 9.8 MMOL/L (ref 5–15)
AST SERPL-CCNC: 27 U/L (ref 1–32)
BILIRUB SERPL-MCNC: 0.2 MG/DL (ref 0–1.2)
BUN SERPL-MCNC: 13 MG/DL (ref 6–20)
BUN/CREAT SERPL: 15.3 (ref 7–25)
CALCIUM SPEC-SCNC: 9.9 MG/DL (ref 8.6–10.5)
CHLORIDE SERPL-SCNC: 101 MMOL/L (ref 98–107)
CO2 SERPL-SCNC: 28.2 MMOL/L (ref 22–29)
CREAT SERPL-MCNC: 0.85 MG/DL (ref 0.57–1)
GFR SERPL CREATININE-BSD FRML MDRD: 71 ML/MIN/1.73
GLOBULIN UR ELPH-MCNC: 3.8 GM/DL
GLUCOSE SERPL-MCNC: 78 MG/DL (ref 65–99)
INR PPP: 1.1 (ref 0.85–1.16)
POTASSIUM SERPL-SCNC: 4.4 MMOL/L (ref 3.5–5.2)
PROT SERPL-MCNC: 8.5 G/DL (ref 6–8.5)
PROTHROMBIN TIME: 13.8 SECONDS (ref 11.4–14.4)
SODIUM SERPL-SCNC: 139 MMOL/L (ref 136–145)

## 2021-09-27 PROCEDURE — 85610 PROTHROMBIN TIME: CPT | Performed by: INTERNAL MEDICINE

## 2021-09-27 PROCEDURE — 36415 COLL VENOUS BLD VENIPUNCTURE: CPT | Performed by: INTERNAL MEDICINE

## 2021-09-27 PROCEDURE — 80053 COMPREHEN METABOLIC PANEL: CPT

## 2021-09-29 ENCOUNTER — TELEPHONE (OUTPATIENT)
Dept: GASTROENTEROLOGY | Facility: CLINIC | Age: 51
End: 2021-09-29

## 2021-09-29 NOTE — TELEPHONE ENCOUNTER
----- Message from Amos Lee MD sent at 9/28/2021  2:59 PM EDT -----  Let MsJatinder Ajit know the liver tests are now normal.

## 2021-10-04 ENCOUNTER — TELEPHONE (OUTPATIENT)
Dept: CARDIOLOGY | Facility: CLINIC | Age: 51
End: 2021-10-04

## 2021-10-04 DIAGNOSIS — R00.1 BRADYCARDIA: ICD-10-CM

## 2021-10-04 DIAGNOSIS — I48.0 PAROXYSMAL ATRIAL FIBRILLATION (HCC): Primary | ICD-10-CM

## 2021-10-04 DIAGNOSIS — R00.2 PALPITATIONS: ICD-10-CM

## 2021-10-04 NOTE — TELEPHONE ENCOUNTER
----- Message from BAUTISTA Malcolm sent at 10/4/2021 10:44 AM EDT -----  Regarding: FW: Non-Urgent Medical Question  Contact: [phone]  Lets get a 30-day monitor on this patient.  She had 1 a year ago but since she is having ongoing symptoms and high heart rates we probably need to repeat.  I had stopped her metoprolol a couple of months ago due to symptomatic bradycardia.    ----- Message -----  From: Marga Stewart RN  Sent: 9/30/2021   4:15 PM EDT  To: BUATISTA Malcolm  Subject: FW: Non-Urgent Medical Question                  I laid the records on your desk.  ----- Message -----  From: Danae Fong  Sent: 9/30/2021  11:27 AM EDT  To: ana [facility]sa [facility]  Subject: RE: Non-Urgent Medical Question                  I have a few reads off my BP machine  98/80 72pulse   101/78  73 pulse  110/72 78 pulse   These next are from yesterday...  106/74 125 pulse  133/96 136 pulse  126/91 151 pulse  129/94 146 pulse    These lightheaded and dizziness happens at least 2 times a day.     Thanks so much.      ----- Message -----  From: GAURAV MAIN  Sent: 9/30/21 11:16 AM  To: Danae Fong  Subject: RE: Non-Urgent Medical Question    Ms. Fong,    I have requested records from [facility] and will have Susan look over them. Just need some additional info. What is your blood pressure and heart rate been averaging and how frequently have you been having dizziness and lightheadedness?      ----- Message -----       From:Danae Fong       Sent:9/30/2021 12:35 AM EDT         To:BAUTISTA Finn    Subject:Non-Urgent Medical Question    I had an episode of elevated hear rate and blood pressure yesterday and had to go to [facility] ER.  The highest my heartrate got was 161 and my BP was 170/100  they remained elevated even after 1 nitro and 2 baby aspirin so I went to ER where they did Cxr, lab, ct of chest.  Everything was normal per .  My blood pressure has been  running a little low lately and my heart rate has been staying higher than the normal 40 to 60 range.  I am still experiencing the ligheadedness and dizziness at times too.  Just wanted to get some input from you.  Thanks

## 2021-10-26 ENCOUNTER — DOCUMENTATION (OUTPATIENT)
Dept: CARDIOLOGY | Facility: CLINIC | Age: 51
End: 2021-10-26

## 2021-10-26 NOTE — PROGRESS NOTES
"  I called Danae but was unable to reach her.  I left a message that her 48-hour monitor was \"okay\".  She had rare PVCs but no arrhythmias to account for symptoms.  I left a message for her to contact us and she was still having ongoing symptoms as we will place a 30-day.      Bertha BAUM  "

## 2021-11-21 NOTE — ASSESSMENT & PLAN NOTE
· Hypertension is controlled  · Continue spironolactone 50 mg daily  · Metoprolol tartrate 12.5 mg as needed for SBP greater than 140   Yes

## 2021-12-03 DIAGNOSIS — K59.09 CHRONIC CONSTIPATION: ICD-10-CM

## 2021-12-03 RX ORDER — PRUCALOPRIDE 2 MG/1
TABLET, FILM COATED ORAL
Qty: 30 TABLET | Refills: 3 | Status: SHIPPED | OUTPATIENT
Start: 2021-12-03 | End: 2022-04-18

## 2021-12-09 ENCOUNTER — PATIENT MESSAGE (OUTPATIENT)
Dept: GASTROENTEROLOGY | Facility: CLINIC | Age: 51
End: 2021-12-09

## 2021-12-10 ENCOUNTER — TELEPHONE (OUTPATIENT)
Dept: GASTROENTEROLOGY | Facility: CLINIC | Age: 51
End: 2021-12-10

## 2021-12-10 NOTE — TELEPHONE ENCOUNTER
I called and spoke with Ms. Fong on the phone.  I asked her to contact the gynecologist and have them send a copy of the ultrasound and the physical exam report with regard to what was found at the time of examination.

## 2021-12-13 ENCOUNTER — PRIOR AUTHORIZATION (OUTPATIENT)
Dept: GASTROENTEROLOGY | Facility: CLINIC | Age: 51
End: 2021-12-13

## 2021-12-13 DIAGNOSIS — Z12.11 SCREENING FOR COLON CANCER: Primary | ICD-10-CM

## 2021-12-13 RX ORDER — SODIUM, POTASSIUM,MAG SULFATES 17.5-3.13G
1 SOLUTION, RECONSTITUTED, ORAL ORAL TAKE AS DIRECTED
Qty: 354 ML | Refills: 0 | Status: SHIPPED | OUTPATIENT
Start: 2021-12-13 | End: 2022-10-31

## 2021-12-28 ENCOUNTER — OFFICE VISIT (OUTPATIENT)
Dept: CARDIOLOGY | Facility: CLINIC | Age: 51
End: 2021-12-28

## 2021-12-28 VITALS
HEIGHT: 72 IN | OXYGEN SATURATION: 95 % | SYSTOLIC BLOOD PRESSURE: 130 MMHG | DIASTOLIC BLOOD PRESSURE: 82 MMHG | BODY MASS INDEX: 32.37 KG/M2 | HEART RATE: 97 BPM | WEIGHT: 239 LBS

## 2021-12-28 DIAGNOSIS — I48.0 PAROXYSMAL ATRIAL FIBRILLATION (HCC): ICD-10-CM

## 2021-12-28 DIAGNOSIS — I10 ESSENTIAL HYPERTENSION: ICD-10-CM

## 2021-12-28 DIAGNOSIS — I50.32 CHRONIC DIASTOLIC CONGESTIVE HEART FAILURE (HCC): ICD-10-CM

## 2021-12-28 DIAGNOSIS — R00.2 PALPITATIONS: ICD-10-CM

## 2021-12-28 DIAGNOSIS — Z95.3 STATUS POST AORTIC VALVE REPLACEMENT WITH BIOPROSTHETIC VALVE: Primary | ICD-10-CM

## 2021-12-28 DIAGNOSIS — Q21.12 PFO (PATENT FORAMEN OVALE): ICD-10-CM

## 2021-12-28 PROBLEM — R07.89 NON-CARDIAC CHEST PAIN: Status: RESOLVED | Noted: 2018-03-13 | Resolved: 2021-12-28

## 2021-12-28 PROBLEM — R00.1 BRADYCARDIA: Status: RESOLVED | Noted: 2021-04-13 | Resolved: 2021-12-28

## 2021-12-28 PROCEDURE — 99214 OFFICE O/P EST MOD 30 MIN: CPT | Performed by: NURSE PRACTITIONER

## 2021-12-28 NOTE — PROGRESS NOTES
"[facility]      Identification: Danae Fong is a 51 y.o. female who resides in [place], [place]    Reason for visit:  · Diastolic heart failure  · PFO  · AVR  · Paroxysmal atrial fibrillation    Subjective      Danae Fong presents to [facility] for followup.    History of Present Illness  Patient is a 51-year-old female who returns today for follow-up of her aortic valve replacement, paroxysmal atrial fibrillaton, PFO, chronic diastolic heart failure and cardiac risk factors.  I last saw the patient she had reported palpitations and having a syncopal episode.  She underwent echocardiogram that showed normal functioning aortic valve.  A 2-day outpatient telemetry monitor was also applied but showed rare PVCs and no arrhythmias to account for symptoms.  It was felt her episode was likely due to hypotensive episode and her beta-blocker was discontinued due to having low blood pressures and bradycardia.  Patient contacted our office reporting palpitations and a 30-day monitor was then applied in October of this year.  It showed rare PACs/PVCs with 1% burden.  Her symptoms corresponded to sinus tachycardia but no arrhythmias such as atrial fibrillation was detected.  Since then the patient actually reports feeling better and having only rare occasional palpitations that are brief.  She has not had any further syncopal spells and her dizziness has improved.  Blood pressures controlled today.  They have unfortunately found a \"lump\" on the back wall of her vagina.  They have recommended a colonoscopy and she is due to have one with Dr. Amos Lee.  The patient denies chest pain, dyspnea, orthopnea, or syncope.  She does report having recent blood work earlier this month in which her total cholesterol was elevated 241 and her .  Her family physician told her after her colonoscopy he was going to redraw her lipids and if they remained elevated he was going to start her " "on a statin.      Objective     /82 (BP Location: Right arm, Patient Position: Sitting)   Pulse 97   Ht 185.4 cm (72.99\")   Wt 108 kg (239 lb)   SpO2 95%   BMI 31.54 kg/m²       Constitutional:       Appearance: Healthy appearance. Well-developed.   Eyes:      General: Lids are normal. No scleral icterus.     Conjunctiva/sclera: Conjunctivae normal.   HENT:      Head: Normocephalic and atraumatic.   Neck:      Thyroid: No thyromegaly.      Vascular: No carotid bruit or JVD.   Pulmonary:      Effort: Pulmonary effort is normal.      Breath sounds: Normal breath sounds. No wheezing. No rhonchi. No rales.   Cardiovascular:      Normal rate. Regular rhythm.      Murmurs: There is no murmur.      No gallop. No rub.   Pulses:     Intact distal pulses.   Abdominal:      General: There is no distension.      Palpations: Abdomen is soft. There is no abdominal mass.   Musculoskeletal:      Cervical back: Normal range of motion. Skin:     General: Skin is warm and dry.      Findings: No rash.   Neurological:      General: No focal deficit present.      Mental Status: Alert and oriented to person, place, and time.      Gait: Gait is intact.   Psychiatric:         Attention and Perception: Attention normal.         Mood and Affect: Mood normal.         Behavior: Behavior normal.         Result Review :    Lab Results   Component Value Date    GLUCOSE 78 09/27/2021    BUN 13 09/27/2021    CREATININE 0.85 09/27/2021    EGFRIFNONA 71 09/27/2021    BCR 15.3 09/27/2021    K 4.4 09/27/2021    CO2 28.2 09/27/2021    CALCIUM 9.9 09/27/2021    PROTENTOTREF 7.0 08/23/2016    ALBUMIN 4.70 09/27/2021    LABIL2 0.8 08/23/2016    AST 27 09/27/2021    ALT 27 09/27/2021     Lab Results   Component Value Date    WBC 9.80 03/16/2021    HGB 13.5 03/16/2021    HCT 42.6 03/16/2021    MCV 84.5 03/16/2021     03/16/2021     Lab Results   Component Value Date    CHOL 211 (H) 01/14/2020    TRIG 138 01/14/2020    HDL 33 (L) 01/14/2020 "     (H) 01/14/2020     Lab Results   Component Value Date    HGBA1C 5.20 03/05/2021             Assessment     Problem List Items Addressed This Visit        Cardiac and Vasculature    Chronic diastolic congestive heart failure (HCC)    Overview     · Echo (10/10/16): Normal LVEF.  Bioprosthetic aortic valve functioning normally.  · Echo (12/2017): LVEF 50-55%.  Bioprosthetic aortic valve functioning normally  · Echo (1/14/2020):LVEF = 60%.  Grade 1 diastolic dysfunction.  Bioprosthetic aortic valve well-seated and functions normally.  Mean gradient 14 mmHg           Current Assessment & Plan     · Stable NYHA class II symptoms  · Continue Bumex 1 mg twice daily         Relevant Medications    metoprolol tartrate (LOPRESSOR) 25 MG tablet    Essential hypertension    Current Assessment & Plan     · Hypertension is controlled  · Continue spironolactone 50 mg daily         Relevant Medications    metoprolol tartrate (LOPRESSOR) 25 MG tablet    Palpitations    Current Assessment & Plan     · Palpitations have improved since last visit  · Recent 30-day monitor showed symptoms correspond to episodes of sinus tachycardia and occasional PACs/PVCs  · Has historically not tolerated beta-blockers due to bradycardia and dizziness  · Can use metoprolol tartrate 25 mg as needed for palpitations.         Paroxysmal atrial fibrillation (HCC)    Overview     · Postoperative afib after AVR  · Chads Vasc= 2  · 2-week monitor (10/7/2020): Predominantly normal sinus rhythm without atrial fibrillation/flutter.  1% PACs and PVCs.  Lightheadedness associated with heart rates in the 50 bpm.  · 30 day monitor (10/2021): NSR with occasional PVC's and PAC's 1% burden.  Symptoms correspond to Sinus tach         Current Assessment & Plan     · Recent monitor showed no episodes of atrial fibrillation  · Defer anticoagulation due to no reoccurrence of atrial fibrillation following AVR         Relevant Medications    metoprolol tartrate  (LOPRESSOR) 25 MG tablet    PFO (patent foramen ovale)    Current Assessment & Plan     · No signs or symptoms TIA or CVA         Relevant Medications    metoprolol tartrate (LOPRESSOR) 25 MG tablet    Status post aortic valve replacement with bioprosthetic valve - Primary    Overview     · Severe nonrheumatic aortic insufficiency by AIRAM, 8/24/2016  · Bioprosthetic AVR (25 mm Magna Ease) by Dr. Mauricio Grove, 8/30/3016  · Initial possibility of vegetation turned out to be a flail leaflet  · Echo (12/2/2017): Normal functioning bioprosthetic valve.  Mild MR.  LVEF 50-55%  · Echo (2/6/18): LVEF 55%. Normal functioning bioprosthetic aortic valve  · Echo (1/14/2020):LVEF = 60%.  Grade 1 diastolic dysfunction.  Bioprosthetic aortic valve well-seated and functions normally.  Mean gradient 14 mmHg           Current Assessment & Plan     · Stable NYHA class II symptoms             The patient's palpitations are controlled.  If she has a episode of palpitations she can take a dose of metoprolol as needed.  However, based on her monitor this is just for symptom relief.  She is cleared to have colonoscopy.  She will follow-up in 12 months or sooner if needed.  Plan   · Metoprolol tartrate 25 mg daily as needed for episode of palpitations  · Cleared to have colonoscopy      Follow-up   Return in about 1 year (around 12/28/2022), or if symptoms worsen or fail to improve, for Follow-up with Dr. Peterson next visit.        Bertha Butler, BAUTISTA  12/28/2021

## 2021-12-28 NOTE — ASSESSMENT & PLAN NOTE
· Recent monitor showed no episodes of atrial fibrillation  · Defer anticoagulation due to no reoccurrence of atrial fibrillation following AVR

## 2021-12-28 NOTE — ASSESSMENT & PLAN NOTE
· Palpitations have improved since last visit  · Recent 30-day monitor showed symptoms correspond to episodes of sinus tachycardia and occasional PACs/PVCs  · Has historically not tolerated beta-blockers due to bradycardia and dizziness  · Can use metoprolol tartrate 25 mg as needed for palpitations.

## 2022-01-03 ENCOUNTER — OUTSIDE FACILITY SERVICE (OUTPATIENT)
Dept: GASTROENTEROLOGY | Facility: CLINIC | Age: 52
End: 2022-01-03

## 2022-01-03 PROCEDURE — 45378 DIAGNOSTIC COLONOSCOPY: CPT | Performed by: INTERNAL MEDICINE

## 2022-02-17 ENCOUNTER — TELEPHONE (OUTPATIENT)
Dept: CARDIOLOGY | Facility: CLINIC | Age: 52
End: 2022-02-17

## 2022-02-17 DIAGNOSIS — I48.0 PAROXYSMAL ATRIAL FIBRILLATION: Primary | ICD-10-CM

## 2022-02-17 NOTE — TELEPHONE ENCOUNTER
Patient c/o fast heartbeats since having her surgery. Per Susan Butler, BAUTISTA- get EKG and repeat CBC. She is unable to come her and can not drive d/t recent surgery. [phone] Patient aware.

## 2022-04-18 DIAGNOSIS — K59.09 CHRONIC CONSTIPATION: ICD-10-CM

## 2022-04-18 RX ORDER — PRUCALOPRIDE 2 MG/1
TABLET, FILM COATED ORAL
Qty: 30 TABLET | Refills: 3 | Status: SHIPPED | OUTPATIENT
Start: 2022-04-18 | End: 2022-08-22

## 2022-06-12 ENCOUNTER — PATIENT MESSAGE (OUTPATIENT)
Dept: GASTROENTEROLOGY | Facility: CLINIC | Age: 52
End: 2022-06-12

## 2022-06-13 DIAGNOSIS — K21.9 GASTROESOPHAGEAL REFLUX DISEASE, UNSPECIFIED WHETHER ESOPHAGITIS PRESENT: Primary | ICD-10-CM

## 2022-06-13 RX ORDER — ESOMEPRAZOLE MAGNESIUM 40 MG/1
40 CAPSULE, DELAYED RELEASE ORAL DAILY
Qty: 30 CAPSULE | Refills: 5 | Status: SHIPPED | OUTPATIENT
Start: 2022-06-13 | End: 2022-11-15

## 2022-06-13 NOTE — TELEPHONE ENCOUNTER
From: Danae Fong  To: Amos Lee MD  Sent: 6/12/2022 9:16 AM EDT  Subject: Burning sensation throat    Morning, I have been experiencing some burning in my throat and neck and some trouble swallowing. I was inquiring if I needed to be seen or if there is anything he can prescribe? Thanks

## 2022-08-20 DIAGNOSIS — K59.09 CHRONIC CONSTIPATION: ICD-10-CM

## 2022-08-22 RX ORDER — PRUCALOPRIDE 2 MG/1
TABLET, FILM COATED ORAL
Qty: 30 TABLET | Refills: 3 | Status: SHIPPED | OUTPATIENT
Start: 2022-08-22 | End: 2022-12-26 | Stop reason: SDUPTHER

## 2022-10-31 ENCOUNTER — OFFICE VISIT (OUTPATIENT)
Dept: ORTHOPEDIC SURGERY | Facility: CLINIC | Age: 52
End: 2022-10-31

## 2022-10-31 VITALS
WEIGHT: 266.2 LBS | BODY MASS INDEX: 36.06 KG/M2 | HEIGHT: 72 IN | DIASTOLIC BLOOD PRESSURE: 86 MMHG | SYSTOLIC BLOOD PRESSURE: 142 MMHG

## 2022-10-31 DIAGNOSIS — E11.621 DIABETIC ULCER OF TOE ASSOCIATED WITH TYPE 2 DIABETES MELLITUS, LIMITED TO BREAKDOWN OF SKIN, UNSPECIFIED LATERALITY: ICD-10-CM

## 2022-10-31 DIAGNOSIS — E11.42 TYPE 2 DIABETES MELLITUS WITH DIABETIC POLYNEUROPATHY, WITHOUT LONG-TERM CURRENT USE OF INSULIN: ICD-10-CM

## 2022-10-31 DIAGNOSIS — L97.501 DIABETIC ULCER OF TOE ASSOCIATED WITH TYPE 2 DIABETES MELLITUS, LIMITED TO BREAKDOWN OF SKIN, UNSPECIFIED LATERALITY: ICD-10-CM

## 2022-10-31 DIAGNOSIS — M14.672 CHARCOT'S JOINT OF LEFT FOOT: Primary | ICD-10-CM

## 2022-10-31 PROCEDURE — 99214 OFFICE O/P EST MOD 30 MIN: CPT | Performed by: ORTHOPAEDIC SURGERY

## 2022-10-31 RX ORDER — PROGESTERONE 100 MG/1
100 CAPSULE ORAL
COMMUNITY
Start: 2022-10-06 | End: 2023-03-21

## 2022-10-31 RX ORDER — HYDROXYZINE 50 MG/1
50 TABLET, FILM COATED ORAL EVERY 6 HOURS PRN
COMMUNITY
Start: 2022-09-29

## 2022-10-31 RX ORDER — ROSUVASTATIN CALCIUM 10 MG/1
10 TABLET, COATED ORAL NIGHTLY
COMMUNITY
Start: 2022-09-09 | End: 2023-03-21 | Stop reason: SDUPTHER

## 2022-10-31 RX ORDER — CEPHALEXIN 500 MG/1
500 CAPSULE ORAL EVERY 6 HOURS
Qty: 60 CAPSULE | Refills: 0 | Status: ON HOLD | OUTPATIENT
Start: 2022-10-31 | End: 2022-12-06

## 2022-10-31 NOTE — PROGRESS NOTES
ESTABLISHED PATIENT    Patient: Danae Fong  : 1970    Primary Care Provider: Leigh Casey MD    Requesting Provider: As above    Follow-up of the Left Foot (Charcot's joint of left foot)      History    Chief Complaint: Bilateral diabetic foot problems    History of Present Illness: She returns with 2 new problems.  She has a small infected laceration on her right fourth toe, and a new diabetic ulcer on the left great toe.  The ulcer started about a month ago.  The laceration a few days ago.  She has not had any fevers or chills.  She did go on vacation and walk a lot and the left great toe ulcer got worse.  Since I last saw her she had an EMG nerve conduction studies done by [place] [facility], on 2021.  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    Current Outpatient Medications on File Prior to Visit   Medication Sig Dispense Refill   • bumetanide (BUMEX) 1 MG tablet Take 1 mg by mouth 2 (Two) Times a Day.     • [employer] Aspirin Adult Low Dose 81 MG chewable tablet      • DULoxetine (CYMBALTA) 30 MG capsule Take 30 mg by mouth Daily.     • esomeprazole (nexIUM) 40 MG capsule Take 1 capsule by mouth Daily. 30 capsule 5   • hydrOXYzine (ATARAX) 50 MG tablet TAKE 1 TABLET BY MOUTH EVERY DAY AS NEEDED FOR 30 DAYS     • loratadine (CLARITIN) 10 MG tablet Take 10 mg by mouth Daily.     • metoprolol tartrate (LOPRESSOR) 25 MG tablet Take 1 tablet by mouth 2 (Two) Times a Day. 180 tablet 3   • Motegrity 2 MG tablet TAKE 1 TABLET BY MOUTH EVERY DAY 30 tablet 3   • Multiple Vitamins-Minerals (CENTRUM SILVER 50+WOMEN) tablet      • Progesterone (PROMETRIUM) 100 MG capsule Take 1 capsule by mouth every night at bedtime.     • rosuvastatin (CRESTOR) 10 MG tablet TAKE 1 TABLET BY MOUTH EVERY DAY FOR 30 DAYS     • spironolactone (ALDACTONE) 50 MG tablet Take 1 tablet by mouth Daily. 90 tablet 2   • vilazodone (VIIBRYD) 20 MG tablet tablet Take 20 mg by mouth Daily.     •  [DISCONTINUED] docusate sodium (COLACE) 100 MG capsule Take 100 mg by mouth 2 (Two) Times a Day As Needed for Constipation.     • [DISCONTINUED] sodium-potassium-magnesium sulfates (Suprep Bowel Prep Kit) 17.5-3.13-1.6 GM/177ML solution oral solution Take 1 bottle by mouth Take As Directed. Follow instructions that were mailed to your home. If you didn't receive these call [phone]. 354 mL 0     No current facility-administered medications on file prior to visit.      No Known Allergies   Past Medical History:   Diagnosis Date   • A-fib (HCC) 2016    REMOTE HISTORY AFTER VALVE    • Anemia    • Anxiety    • Aortic valve regurgitation 2016    VALVE REPLACEMENT    • Charcot-Rebekah-Tooth disease    • CHF (congestive heart failure) ([facility]) 2016   • Colon polyp    • Constipation    • Controlled type 2 diabetes mellitus without complication (HCC)     not medicated??? 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)    • Liver cirrhosis secondary to MCKEON (CMS/HCC)    • Liver disease    • Low back pain    • MRSA infection 09/2015    TOE   • Primary Osteoarthritis of both hips    • Sepsis (HCC)    • Septic arthritis (HCC)    • Sleep apnea     RESOLVED - PER PATIENT    • SOB (shortness of breath) on exertion    • Streptococcal arthritis of right hip (HCC)    • Streptococcus infection, group B    • Subacute osteomyelitis of left foot (HCC)    • Transfusion history    • Wears reading eyeglasses      Past Surgical History:   Procedure Laterality Date   • AMPUTATION DIGIT Right 01/30/2018    Procedure: AMPUTATION RIGHT 2ND TOE;  Surgeon: Charleen Nath MD;  Location: [facility];  Service:    • AORTIC VALVE REPAIR/REPLACEMENT N/A 08/30/2016     Procedure: AIRAM, MEDIAN STERNOTOMY, AORTIC VALVE REPLACEMENT;  Surgeon: Mauricio Grove MD;  Location:  [facility];  Service:    • BLADDER SLING MODIFIED, ANTERIOR AND POSTERIOR VAGINAL REPAIR     • CARDIAC CATHETERIZATION N/A 08/25/2016    Procedure: Left Heart Cath;  Surgeon: Tyler Peterson MD;  Location:  [facility];  Service:    • CARDIAC CATHETERIZATION N/A 03/21/2018    Procedure: Coronary angiography;  Surgeon: Tyler Peterson IV, MD;  Location:  [facility];  Service: Cardiovascular   • COLONOSCOPY     • DILATATION AND CURETTAGE      of uterus   • ENDOSCOPY     • FOOT SURGERY Left     for osteomyelitis   • HARDWARE REMOVAL Right 03/15/2021    Procedure: REMOVAL OF CERCLAGE WIRE OF RIGHT PROXIMAL FEMUR - RIGHT;  Surgeon: Owen Siu MD;  Location:  [facility];  Service: Orthopedics;  Laterality: Right;   • INCISION AND DRAINAGE HIP Right 01/26/2017    Procedure: RIGHT HIP WOUND WASHOUT AND DEBRIDEMENT, WITH POLYETHELENE FEMORAL HEAD EXCHANGE ;  Surgeon: Owen Siu MD;  Location:  [facility];  Service:    • PERICARDIAL WINDOW N/A 09/16/2016    Procedure: PERICARDIOCENTESIS WITH PLACEMENT OF DRAINAGE TUBE .;  Surgeon: Mauricio Grove MD;  Location:  [facility];  Service:    • PERICARDIAL WINDOW N/A 09/20/2016    Procedure: SUBXYPHOID PERICARDIAL WINDOW;  Surgeon: Mauricio Grove MD;  Location:  [facility];  Service:    • ID CLOSED RX TRAUMATIC HIP DISLOCATN N/A 01/29/2017    Procedure: HIP CLOSED REDUCTION;  Surgeon: Leander Li MD;  Location:  [facility];  Service: Orthopedics   • THORACOTOMY Left 10/05/2016    Procedure: THORACOTOMY Pericardectomy,  AIRAM,  Placement pacing leads, Pump standby;  Surgeon: Mauricio Grove MD;  Location:  [facility];  Service:    • TOTAL HIP ARTHROPLASTY Right 01/23/2017    Procedure: RIGHT TOTAL HIP ARTHROPLASTY ANTERIOR ;  Surgeon: Owen Siu MD;  Location:  [facility];  Service:    • TOTAL HIP ARTHROPLASTY REVISION Right  "2017    Procedure: REPEAT WOUND DEBRIDEMENT ACETABULAR REVISION;  Surgeon: Owen Siu MD;  Location: [facility];  Service:    • UPPER GASTROINTESTINAL ENDOSCOPY     • WISDOM TOOTH EXTRACTION      all 4     Family History   Problem Relation Age of Onset   • Diabetes Mother    • Heart disease Mother    • Hypertension Mother    • Osteoarthritis Mother    • Diabetes Sister    • Arthritis Sister    • No Known Problems Father    • Diabetes Sister    • Diabetes Sister    • Heart disease Other    • Heart attack Other    • Heart disease Other    • Hypertension Other    • Osteoarthritis Other    • Rheum arthritis Other    • Cancer Neg Hx       Social History     Socioeconomic History   • Marital status:    • Number of children: 0   Tobacco Use   • Smoking status: Former     Packs/day: 0.50     Years: 15.00     Pack years: 7.50     Types: Cigarettes     Quit date: 7/15/2016     Years since quittin.2   • Smokeless tobacco: Never   Vaping Use   • Vaping Use: Never used   Substance and Sexual Activity   • Alcohol use: No   • Drug use: Yes     Types: Marijuana     Comment: THC gummies occasionally for pain   • Sexual activity: Not Currently     Partners: Male     Birth control/protection: Abstinence        Review of Systems   Constitutional: Negative.    HENT: Negative.    Eyes: Negative.    Respiratory: Negative.    Cardiovascular: Negative.    Gastrointestinal: Negative.    Endocrine: Negative.    Genitourinary: Negative.    Musculoskeletal: Positive for arthralgias.   Skin: Negative.    Allergic/Immunologic: Negative.    Neurological: Negative.    Hematological: Negative.    Psychiatric/Behavioral: Negative.        The following portions of the patient's history were reviewed and updated as appropriate: allergies, current medications, past family history, past medical history, past social history, past surgical history and problem list.    Physical Exam:   /86   Ht 185.4 cm (72.99\")   Wt 121 kg (266 " lb 3.2 oz)   BMI 35.13 kg/m²   GENERAL: Body habitus: trunkal obesity    Lower extremity edema: Left: none; Right: none    Gait: normal     Mental Status:  awake and alert; oriented to person, place, and time  MSK:  No change in dense neuropathy, no active Charcot, the right toe is locally erythematous, the left great toe is not erythematous    Left foot great toe ulcer:      Right foot fourth toe ulcer:        Medical Decision Making    Data Review:   reviewed outside records, obtained x-rays left foot today    Assessment/Plan/Diagnosis/Treatment Options:   1. Diabetic ulcer of toe associated with type 2 diabetes mellitus, limited to breakdown of skin, unspecified laterality (HCC)  To new diabetic ulcers.  They are both superficial but the right one is erythematous.  I started her on Keflex today.  I want her in an open postop shoe on the right and a half shoe on the left.  She remembers that 1 single step sets healing back 24 hours but still she has been walking on the left diabetic ulcer for a month.  I want her to start Bactroban dressing changes.  We gave her the half shoe on the left and a postop shoe on the right.  I will see her in 2 weeks.  I probed both ulcers, the left 1 does not go down to bone but the x-ray is somewhat suspicious, the right one is superficial.  I think we should do an MRI of the left great toe.    2. Charcot's joint of left foot  No new Charcot, she was diagnosed with CMT as well as the diabetic neuropathy  - XR Foot 2 View Left    3. Type 2 diabetes mellitus with diabetic polyneuropathy, without long-term current use of insulin (HCC)  Good glucose control, A1c 5.2        Charleen Nath MD

## 2022-11-14 ENCOUNTER — OFFICE VISIT (OUTPATIENT)
Dept: ORTHOPEDIC SURGERY | Facility: CLINIC | Age: 52
End: 2022-11-14

## 2022-11-14 VITALS
BODY MASS INDEX: 36.13 KG/M2 | WEIGHT: 266.76 LBS | SYSTOLIC BLOOD PRESSURE: 140 MMHG | DIASTOLIC BLOOD PRESSURE: 88 MMHG | HEIGHT: 72 IN

## 2022-11-14 DIAGNOSIS — M14.672 CHARCOT'S JOINT OF LEFT FOOT: ICD-10-CM

## 2022-11-14 DIAGNOSIS — E11.621 DIABETIC ULCER OF TOE ASSOCIATED WITH TYPE 2 DIABETES MELLITUS, LIMITED TO BREAKDOWN OF SKIN, UNSPECIFIED LATERALITY: Primary | ICD-10-CM

## 2022-11-14 DIAGNOSIS — E11.42 TYPE 2 DIABETES MELLITUS WITH DIABETIC POLYNEUROPATHY, WITHOUT LONG-TERM CURRENT USE OF INSULIN: ICD-10-CM

## 2022-11-14 DIAGNOSIS — L97.501 DIABETIC ULCER OF TOE ASSOCIATED WITH TYPE 2 DIABETES MELLITUS, LIMITED TO BREAKDOWN OF SKIN, UNSPECIFIED LATERALITY: Primary | ICD-10-CM

## 2022-11-14 PROCEDURE — 99213 OFFICE O/P EST LOW 20 MIN: CPT | Performed by: ORTHOPAEDIC SURGERY

## 2022-11-14 RX ORDER — FLUCONAZOLE 100 MG/1
TABLET ORAL
COMMUNITY
Start: 2022-11-03 | End: 2022-12-01

## 2022-11-14 NOTE — PROGRESS NOTES
ESTABLISHED PATIENT    Patient: Danae Fong  : 1970    Primary Care Provider: Leigh Casey MD    Requesting Provider: As above    Follow-up (2 week f/u  Diabetic ulcer of toe associated with type 2 diabetes mellitus)      History    Chief Complaint: Follow-up diabetic foot problem    History of Present Illness: She returns with the right infected toe essentially resolved, the left great toe diabetic ulcer is improving.  She has stayed in the Regranex shoe on the left.  We still do not have the MRI, but it is going to be done in [place] this week.    Current Outpatient Medications on File Prior to Visit   Medication Sig Dispense Refill   • bumetanide (BUMEX) 1 MG tablet Take 1 mg by mouth 2 (Two) Times a Day.     • cephalexin (KEFLEX) 500 MG capsule Take 1 capsule by mouth Every 6 (Six) Hours. 60 capsule 0   • [facility] Aspirin Adult Low Dose 81 MG chewable tablet      • DULoxetine (CYMBALTA) 30 MG capsule Take 30 mg by mouth Daily.     • esomeprazole (nexIUM) 40 MG capsule Take 1 capsule by mouth Daily. 30 capsule 5   • fluconazole (DIFLUCAN) 100 MG tablet TAKE 1 TABLET BY MOUTH ONCE DAILY FOR 10 DAYS     • hydrOXYzine (ATARAX) 50 MG tablet TAKE 1 TABLET BY MOUTH EVERY DAY AS NEEDED FOR 30 DAYS     • loratadine (CLARITIN) 10 MG tablet Take 10 mg by mouth Daily.     • metoprolol tartrate (LOPRESSOR) 25 MG tablet Take 1 tablet by mouth 2 (Two) Times a Day. 180 tablet 3   • Motegrity 2 MG tablet TAKE 1 TABLET BY MOUTH EVERY DAY 30 tablet 3   • Multiple Vitamins-Minerals (CENTRUM SILVER 50+WOMEN) tablet      • mupirocin (BACTROBAN) 2 % ointment Apply 1 application topically to the appropriate area as directed Daily. 30 g 5   • Progesterone (PROMETRIUM) 100 MG capsule Take 1 capsule by mouth every night at bedtime.     • rosuvastatin (CRESTOR) 10 MG tablet TAKE 1 TABLET BY MOUTH EVERY DAY FOR 30 DAYS     • spironolactone (ALDACTONE) 50 MG tablet Take 1 tablet by mouth Daily. 90 tablet 2   • vilazodone  (VIIBRYD) 20 MG tablet tablet Take 20 mg by mouth Daily.       No current facility-administered medications on file prior to visit.      No Known Allergies   Past Medical History:   Diagnosis Date   • A-fib (HCC) 2016    REMOTE HISTORY AFTER VALVE    • Anemia    • Anxiety    • Aortic valve regurgitation 2016    VALVE REPLACEMENT    • Charcot-Rebeakh-Tooth disease    • CHF (congestive heart failure) (HCC) 2016   • Colon polyp    • Constipation    • Controlled type 2 diabetes mellitus without complication (HCC)     not medicated??? 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)    • Liver disease    • Low back pain    • MRSA infection 09/2015    TOE   • Primary Osteoarthritis of both hips    • Sepsis (HCC)    • Septic arthritis (HCC)    • Sleep apnea     RESOLVED - PER PATIENT    • SOB (shortness of breath) on exertion    • Streptococcal arthritis of right hip (HCC)    • Streptococcus infection, group B    • Subacute osteomyelitis of left foot (HCC)    • Transfusion history    • Wears reading eyeglasses      Past Surgical History:   Procedure Laterality Date   • AMPUTATION DIGIT Right 01/30/2018    Procedure: AMPUTATION RIGHT 2ND TOE;  Surgeon: Charleen Nath MD;  Location: [facility] OR;  Service:    • AORTIC VALVE REPAIR/REPLACEMENT N/A 08/30/2016    Procedure: AIRAM, MEDIAN STERNOTOMY, AORTIC VALVE REPLACEMENT;  Surgeon: Mauricio Grove MD;  Location: [facility] OR;  Service:    • BLADDER SLING MODIFIED, ANTERIOR AND POSTERIOR VAGINAL REPAIR     • CARDIAC CATHETERIZATION N/A 08/25/2016    Procedure: Left Heart Cath;  Surgeon: Tyler Peterson MD;  Location:  [facility];   Service:    • CARDIAC CATHETERIZATION N/A 03/21/2018    Procedure: Coronary angiography;  Surgeon: Tyler Peterson IV, MD;  Location:  [facility];  Service: Cardiovascular   • COLONOSCOPY     • DILATATION AND CURETTAGE      of uterus   • ENDOSCOPY     • FOOT SURGERY Left     for osteomyelitis   • HARDWARE REMOVAL Right 03/15/2021    Procedure: REMOVAL OF CERCLAGE WIRE OF RIGHT PROXIMAL FEMUR - RIGHT;  Surgeon: Owen Siu MD;  Location:  [facility] OR;  Service: Orthopedics;  Laterality: Right;   • INCISION AND DRAINAGE HIP Right 01/26/2017    Procedure: RIGHT HIP WOUND WASHOUT AND DEBRIDEMENT, WITH POLYETHELENE FEMORAL HEAD EXCHANGE ;  Surgeon: Owen Siu MD;  Location:  [facility];  Service:    • PERICARDIAL WINDOW N/A 09/16/2016    Procedure: PERICARDIOCENTESIS WITH PLACEMENT OF DRAINAGE TUBE .;  Surgeon: Mauricio Grove MD;  Location:  [facility];  Service:    • PERICARDIAL WINDOW N/A 09/20/2016    Procedure: SUBXYPHOID PERICARDIAL WINDOW;  Surgeon: Mauricio Grove MD;  Location:  [facility];  Service:    • [place] CLOSED RX TRAUMATIC HIP DISLOCATN N/A 01/29/2017    Procedure: HIP CLOSED REDUCTION;  Surgeon: Leander Li MD;  Location:  [facility];  Service: Orthopedics   • THORACOTOMY Left 10/05/2016    Procedure: THORACOTOMY Pericardectomy,  AIRAM,  Placement pacing leads, Pump standby;  Surgeon: Mauricio Grove MD;  Location:  [facility];  Service:    • TOTAL HIP ARTHROPLASTY Right 01/23/2017    Procedure: RIGHT TOTAL HIP ARTHROPLASTY ANTERIOR ;  Surgeon: Owen Siu MD;  Location:  [facility];  Service:    • TOTAL HIP ARTHROPLASTY REVISION Right 01/30/2017    Procedure: REPEAT WOUND DEBRIDEMENT ACETABULAR REVISION;  Surgeon: Owen Siu MD;  Location:  [facility];  Service:    • UPPER GASTROINTESTINAL ENDOSCOPY     • WISDOM TOOTH EXTRACTION      all 4     Family History   Problem Relation Age of Onset   • Diabetes Mother    • Heart disease Mother    • Hypertension Mother    •  "Osteoarthritis Mother    • Diabetes Sister    • Arthritis Sister    • No Known Problems Father    • Diabetes Sister    • Diabetes Sister    • Heart disease Other    • Heart attack Other    • Heart disease Other    • Hypertension Other    • Osteoarthritis Other    • Rheum arthritis Other    • Cancer Neg Hx       Social History     Socioeconomic History   • Marital status:    • Number of children: 0   Tobacco Use   • Smoking status: Former     Packs/day: 0.50     Years: 15.00     Pack years: 7.50     Types: Cigarettes     Quit date: 2015     Years since quittin.1   • Smokeless tobacco: Never   Vaping Use   • Vaping Use: Never used   Substance and Sexual Activity   • Alcohol use: Not Currently   • Drug use: Yes     Types: Marijuana     Comment: THC gummies occasionally for pain   • Sexual activity: Not Currently     Partners: Male     Birth control/protection: Abstinence        Review of Systems   Constitutional: Negative.    HENT: Negative.    Eyes: Negative.    Respiratory: Negative.    Cardiovascular: Negative.    Gastrointestinal: Negative.    Endocrine: Negative.    Genitourinary: Negative.    Musculoskeletal: Positive for arthralgias.   Skin: Negative.    Allergic/Immunologic: Negative.    Neurological: Negative.    Hematological: Negative.    Psychiatric/Behavioral: Negative.        The following portions of the patient's history were reviewed and updated as appropriate: allergies, current medications, past family history, past medical history, past social history, past surgical history and problem list.    Physical Exam:   /88   Ht 185.4 cm (72.99\")   Wt 121 kg (266 lb 12.1 oz)   BMI 35.20 kg/m²   The infected laceration on the right small toe is healed.  No signs of infection.  She does need to work more on the calluses on the right foot.    Left ulcer is improved, I did debride the callus extensively.  There is no erythema.    Medical Decision Making    Data Review: "   none    Assessment/Plan/Diagnosis/Treatment Options:   1. Diabetic ulcer of toe associated with type 2 diabetes mellitus, limited to breakdown of skin, unspecified laterality ([facility])  The great toe ulcer is improving.  There is no erythema.  It is much more shallow.  The infected laceration on the right small toe has resolved.  Continue the half shoe on the left, continue Bactroban, we will await the results of the MRI, I am more encouraged today as the ulcer has improved.  I think it is less likely that she has osteomyelitis.      Using a scalpel and pickups, I used sterile technique to extensively debride the ulcer and callus      2.Type 2 diabetes mellitus with diabetic polyneuropathy, without long-term current use of insulin ([facility])  Continue to work on good glucose control.

## 2022-11-15 DIAGNOSIS — K21.9 GASTROESOPHAGEAL REFLUX DISEASE, UNSPECIFIED WHETHER ESOPHAGITIS PRESENT: ICD-10-CM

## 2022-11-15 RX ORDER — ESOMEPRAZOLE MAGNESIUM 40 MG/1
CAPSULE, DELAYED RELEASE ORAL
Qty: 90 CAPSULE | Refills: 1 | Status: SHIPPED | OUTPATIENT
Start: 2022-11-15 | End: 2023-02-15 | Stop reason: SDUPTHER

## 2022-11-21 ENCOUNTER — TELEPHONE (OUTPATIENT)
Dept: ORTHOPEDIC SURGERY | Facility: CLINIC | Age: 52
End: 2022-11-21

## 2022-11-21 NOTE — TELEPHONE ENCOUNTER
Faxed over order for MRI to [phone]. Made a note that pt needs to bring a copy of her MRI disc with her to her f/u appt with Dr. Nath.    Mireille BROOKS CMA ([facility]), ROT

## 2022-11-21 NOTE — TELEPHONE ENCOUNTER
Caller: JIMI PINEDA    Relationship: CENTRAL SCHEDULING    Best call back number: [phone]    What orders are you requesting (i.e. lab or imaging): MRI ORDER FOR LEFT FOOT    In what timeframe would the patient need to come in: PT SCHEDULED FOR TOMORROW 11-22-22 AT 1:30    Where will you receive your lab/imaging services:   [facility]  FAX: [phone]

## 2022-11-28 ENCOUNTER — OFFICE VISIT (OUTPATIENT)
Dept: ORTHOPEDIC SURGERY | Facility: CLINIC | Age: 52
End: 2022-11-28

## 2022-11-28 ENCOUNTER — PREP FOR SURGERY (OUTPATIENT)
Dept: OTHER | Facility: HOSPITAL | Age: 52
End: 2022-11-28

## 2022-11-28 VITALS
BODY MASS INDEX: 36.03 KG/M2 | DIASTOLIC BLOOD PRESSURE: 86 MMHG | WEIGHT: 266 LBS | SYSTOLIC BLOOD PRESSURE: 134 MMHG | HEIGHT: 72 IN

## 2022-11-28 DIAGNOSIS — M86.60 OSTEOMYELITIS, CHRONIC: ICD-10-CM

## 2022-11-28 DIAGNOSIS — E11.621 DIABETIC ULCER OF TOE ASSOCIATED WITH TYPE 2 DIABETES MELLITUS, LIMITED TO BREAKDOWN OF SKIN, UNSPECIFIED LATERALITY: Primary | ICD-10-CM

## 2022-11-28 DIAGNOSIS — L97.501 DIABETIC ULCER OF TOE ASSOCIATED WITH TYPE 2 DIABETES MELLITUS, LIMITED TO BREAKDOWN OF SKIN, UNSPECIFIED LATERALITY: Primary | ICD-10-CM

## 2022-11-28 PROBLEM — M86.9 OSTEOMYELITIS OF LEFT FOOT: Status: ACTIVE | Noted: 2022-11-28

## 2022-11-28 PROCEDURE — 99214 OFFICE O/P EST MOD 30 MIN: CPT | Performed by: ORTHOPAEDIC SURGERY

## 2022-11-28 RX ORDER — FLUTICASONE PROPIONATE 50 MCG
1 SPRAY, SUSPENSION (ML) NASAL DAILY PRN
COMMUNITY
Start: 2022-11-23

## 2022-11-28 NOTE — PROGRESS NOTES
ESTABLISHED PATIENT    Patient: Danae Fong  : 1970    Primary Care Provider: Leigh Casey MD    Requesting Provider: As above    Follow-up of the Left Foot (Diabetic ulcer of left foot /F/U after MRI)      History    Chief Complaint: Follow-up after MRI of left foot, 2022.    History of Present Illness: She returns for follow-up of the MRI of her left foot.  I looked at the films and the report.  Unfortunately she does seem to have osteomyelitis in the distal aspect of the left great toe distal phalanx.  The ulcer is about the same.    Current Outpatient Medications on File Prior to Visit   Medication Sig Dispense Refill   • bumetanide (BUMEX) 1 MG tablet Take 1 mg by mouth 2 (Two) Times a Day.     • [facility] Aspirin Adult Low Dose 81 MG chewable tablet      • DULoxetine (CYMBALTA) 30 MG capsule Take 30 mg by mouth Daily.     • esomeprazole (nexIUM) 40 MG capsule TAKE 1 CAPSULE BY MOUTH EVERY DAY 90 capsule 1   • fluconazole (DIFLUCAN) 100 MG tablet TAKE 1 TABLET BY MOUTH ONCE DAILY FOR 10 DAYS     • fluticasone (FLONASE) 50 MCG/ACT nasal spray 1 spray into the nostril(s) as directed by provider Daily As Needed.     • hydrOXYzine (ATARAX) 50 MG tablet TAKE 1 TABLET BY MOUTH EVERY DAY AS NEEDED FOR 30 DAYS     • loratadine (CLARITIN) 10 MG tablet Take 10 mg by mouth Daily.     • metoprolol tartrate (LOPRESSOR) 25 MG tablet Take 1 tablet by mouth 2 (Two) Times a Day. 180 tablet 3   • Motegrity 2 MG tablet TAKE 1 TABLET BY MOUTH EVERY DAY 30 tablet 3   • Multiple Vitamins-Minerals (CENTRUM SILVER 50+WOMEN) tablet      • mupirocin (BACTROBAN) 2 % ointment Apply 1 application topically to the appropriate area as directed Daily. 30 g 5   • Progesterone (PROMETRIUM) 100 MG capsule Take 1 capsule by mouth every night at bedtime.     • rosuvastatin (CRESTOR) 10 MG tablet TAKE 1 TABLET BY MOUTH EVERY DAY FOR 30 DAYS     • spironolactone (ALDACTONE) 50 MG tablet Take 1 tablet by mouth Daily. 90 tablet 2    • vilazodone (VIIBRYD) 20 MG tablet tablet Take 20 mg by mouth Daily.     • cephalexin (KEFLEX) 500 MG capsule Take 1 capsule by mouth Every 6 (Six) Hours. 60 capsule 0     No current facility-administered medications on file prior to visit.      No Known Allergies   Past Medical History:   Diagnosis Date   • A-fib (HCC) 2016    REMOTE HISTORY AFTER VALVE    • Anemia    • Anxiety    • Aortic valve regurgitation 2016    VALVE REPLACEMENT    • Charcot-Rebekah-Tooth disease    • CHF (congestive heart failure) (HCC) 2016   • Colon polyp    • Constipation    • Controlled type 2 diabetes mellitus without complication (HCC)     not medicated??? 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)    • Liver disease    • Low back pain    • MRSA infection 09/2015    TOE   • Primary Osteoarthritis of both hips    • Sepsis (HCC)    • Septic arthritis (HCC)    • Sleep apnea     RESOLVED - PER PATIENT    • SOB (shortness of breath) on exertion    • Streptococcal arthritis of right hip (HCC)    • Streptococcus infection, group B    • Subacute osteomyelitis of left foot (HCC)    • Transfusion history    • Wears reading eyeglasses      Past Surgical History:   Procedure Laterality Date   • AMPUTATION DIGIT Right 01/30/2018    Procedure: AMPUTATION RIGHT 2ND TOE;  Surgeon: Charleen Nath MD;  Location:  [facility];  Service:    • AORTIC VALVE REPAIR/REPLACEMENT N/A 08/30/2016    Procedure: AIRAM, MEDIAN STERNOTOMY, AORTIC VALVE REPLACEMENT;  Surgeon: Mauricio Grove MD;  Location:  [facility];  Service:    • BLADDER SLING MODIFIED, ANTERIOR AND POSTERIOR VAGINAL REPAIR     • CARDIAC CATHETERIZATION N/A 08/25/2016     Procedure: Left Heart Cath;  Surgeon: Tyler Peterson MD;  Location:  [facility];  Service:    • CARDIAC CATHETERIZATION N/A 03/21/2018    Procedure: Coronary angiography;  Surgeon: Tyler Peterson IV, MD;  Location:  [facility];  Service: Cardiovascular   • COLONOSCOPY     • DILATATION AND CURETTAGE      of uterus   • ENDOSCOPY     • FOOT SURGERY Left     for osteomyelitis   • HARDWARE REMOVAL Right 03/15/2021    Procedure: REMOVAL OF CERCLAGE WIRE OF RIGHT PROXIMAL FEMUR - RIGHT;  Surgeon: Owen Siu MD;  Location:  [facility];  Service: Orthopedics;  Laterality: Right;   • INCISION AND DRAINAGE HIP Right 01/26/2017    Procedure: RIGHT HIP WOUND WASHOUT AND DEBRIDEMENT, WITH POLYETHELENE FEMORAL HEAD EXCHANGE ;  Surgeon: Owen Siu MD;  Location:  [facility];  Service:    • PERICARDIAL WINDOW N/A 09/16/2016    Procedure: PERICARDIOCENTESIS WITH PLACEMENT OF DRAINAGE TUBE .;  Surgeon: Mauricio Grove MD;  Location:  [facility];  Service:    • PERICARDIAL WINDOW N/A 09/20/2016    Procedure: SUBXYPHOID PERICARDIAL WINDOW;  Surgeon: Mauricio Grove MD;  Location:  [facility];  Service:    • [place] CLOSED RX TRAUMATIC HIP DISLOCATN N/A 01/29/2017    Procedure: HIP CLOSED REDUCTION;  Surgeon: Leander Li MD;  Location:  [facility];  Service: Orthopedics   • THORACOTOMY Left 10/05/2016    Procedure: THORACOTOMY Pericardectomy,  AIRAM,  Placement pacing leads, Pump standby;  Surgeon: Mauricio Grove MD;  Location:  [facility];  Service:    • TOTAL HIP ARTHROPLASTY Right 01/23/2017    Procedure: RIGHT TOTAL HIP ARTHROPLASTY ANTERIOR ;  Surgeon: Owen Siu MD;  Location:  [facility];  Service:    • TOTAL HIP ARTHROPLASTY REVISION Right 01/30/2017    Procedure: REPEAT WOUND DEBRIDEMENT ACETABULAR REVISION;  Surgeon: Owen Siu MD;  Location:  [facility];  Service:    • UPPER GASTROINTESTINAL ENDOSCOPY     • WISDOM TOOTH EXTRACTION      all 4     Family History  "  Problem Relation Age of Onset   • Diabetes Mother    • Heart disease Mother    • Hypertension Mother    • Osteoarthritis Mother    • Diabetes Sister    • Arthritis Sister    • No Known Problems Father    • Diabetes Sister    • Diabetes Sister    • Heart disease Other    • Heart attack Other    • Heart disease Other    • Hypertension Other    • Osteoarthritis Other    • Rheum arthritis Other    • Cancer Neg Hx       Social History     Socioeconomic History   • Marital status:    • Number of children: 0   Tobacco Use   • Smoking status: Former     Packs/day: 0.50     Years: 15.00     Pack years: 7.50     Types: Cigarettes     Quit date: 2015     Years since quittin.1   • Smokeless tobacco: Never   Vaping Use   • Vaping Use: Never used   Substance and Sexual Activity   • Alcohol use: Not Currently   • Drug use: Yes     Types: Marijuana     Comment: THC gummies occasionally for pain   • Sexual activity: Not Currently     Partners: Male     Birth control/protection: Abstinence        Review of Systems    The following portions of the patient's history were reviewed and updated as appropriate: allergies, current medications, past family history, past medical history, past social history, past surgical history and problem list.    Physical Exam:   /86 (BP Location: Left arm, Patient Position: Sitting, Cuff Size: Adult)   Ht 185.4 cm (73\")   Wt 121 kg (266 lb)   BMI 35.09 kg/m²   The left great toe still has the ulcer on the tip, I cannot probe to bone, its not erythematous, the small toes have claw toe contracture but I can straighten them somewhat with the foot in plantarflexion, the great toe cannot be straightened at the DIP joint, it is flexed to 90 degrees.    Medical Decision Making    Data Review:   reviewed radiology images and reviewed radiology results    Assessment/Plan/Diagnosis/Treatment Options:   1. Diabetic ulcer of toe associated with type 2 diabetes mellitus, limited to " breakdown of skin, unspecified laterality (HCC)  The ulcer is not healed and although clinically it does not really look infected, the MRI shows infection at the tip of the distal phalanx.  I think the safest thing here would be to amputate the great toe at the DIP joint.  We had talked about straightening the toe to decrease the risk of ulceration but to do that I would need to do put a screw across the joints and I think that would be extremely risky if there is any infection in the distal phalanx.  She understands.  She read the MRI also.  I think the better part of valor here, the safest thing is to do the amputation.  At the same time I would also do an open flexor tenotomy for the other small toes to try and decrease the risk of ulceration.  We can do this as an outpatient.  I would let her weight-bear in a postop shoe. We discussed the risks including but not limited to: death, infection, neurovascular damage, strokes, heart attacks, blood clots, chronic pain, deformity, stiffness, need for  further surgery,  amputation, etc.  Questions asked and answered in detail.          2. Osteomyelitis, chronic (HCC)  As above unfortunately the MRI shows the bone appears to be infected, I do not think it safe to try to straighten the joint        Charleen Nath MD

## 2022-12-01 ENCOUNTER — TELEPHONE (OUTPATIENT)
Dept: CARDIOLOGY | Facility: CLINIC | Age: 52
End: 2022-12-01

## 2022-12-01 ENCOUNTER — PRE-ADMISSION TESTING (OUTPATIENT)
Dept: PREADMISSION TESTING | Facility: HOSPITAL | Age: 52
End: 2022-12-01

## 2022-12-01 VITALS — BODY MASS INDEX: 37.21 KG/M2 | HEIGHT: 72 IN | WEIGHT: 274.69 LBS

## 2022-12-01 DIAGNOSIS — L97.501 DIABETIC ULCER OF TOE ASSOCIATED WITH TYPE 2 DIABETES MELLITUS, LIMITED TO BREAKDOWN OF SKIN, UNSPECIFIED LATERALITY: ICD-10-CM

## 2022-12-01 DIAGNOSIS — E11.621 DIABETIC ULCER OF TOE ASSOCIATED WITH TYPE 2 DIABETES MELLITUS, LIMITED TO BREAKDOWN OF SKIN, UNSPECIFIED LATERALITY: ICD-10-CM

## 2022-12-01 LAB
ANION GAP SERPL CALCULATED.3IONS-SCNC: 9 MMOL/L (ref 5–15)
BASOPHILS # BLD AUTO: 0.03 10*3/MM3 (ref 0–0.2)
BASOPHILS NFR BLD AUTO: 0.4 % (ref 0–1.5)
BUN SERPL-MCNC: 10 MG/DL (ref 6–20)
BUN/CREAT SERPL: 12.3 (ref 7–25)
CALCIUM SPEC-SCNC: 9.7 MG/DL (ref 8.6–10.5)
CHLORIDE SERPL-SCNC: 101 MMOL/L (ref 98–107)
CO2 SERPL-SCNC: 29 MMOL/L (ref 22–29)
CREAT SERPL-MCNC: 0.81 MG/DL (ref 0.57–1)
CRP SERPL-MCNC: 0.51 MG/DL (ref 0–0.5)
DEPRECATED RDW RBC AUTO: 44.3 FL (ref 37–54)
EGFRCR SERPLBLD CKD-EPI 2021: 87.5 ML/MIN/1.73
EOSINOPHIL # BLD AUTO: 0.08 10*3/MM3 (ref 0–0.4)
EOSINOPHIL NFR BLD AUTO: 1 % (ref 0.3–6.2)
ERYTHROCYTE [DISTWIDTH] IN BLOOD BY AUTOMATED COUNT: 14.3 % (ref 12.3–15.4)
ERYTHROCYTE [SEDIMENTATION RATE] IN BLOOD: 72 MM/HR (ref 0–30)
GLUCOSE SERPL-MCNC: 101 MG/DL (ref 65–99)
HBA1C MFR BLD: 6.3 % (ref 4.8–5.6)
HCT VFR BLD AUTO: 43.8 % (ref 34–46.6)
HGB BLD-MCNC: 14.8 G/DL (ref 12–15.9)
IMM GRANULOCYTES # BLD AUTO: 0.08 10*3/MM3 (ref 0–0.05)
IMM GRANULOCYTES NFR BLD AUTO: 1 % (ref 0–0.5)
LYMPHOCYTES # BLD AUTO: 2.34 10*3/MM3 (ref 0.7–3.1)
LYMPHOCYTES NFR BLD AUTO: 28.2 % (ref 19.6–45.3)
MCH RBC QN AUTO: 28.7 PG (ref 26.6–33)
MCHC RBC AUTO-ENTMCNC: 33.8 G/DL (ref 31.5–35.7)
MCV RBC AUTO: 85 FL (ref 79–97)
MONOCYTES # BLD AUTO: 0.56 10*3/MM3 (ref 0.1–0.9)
MONOCYTES NFR BLD AUTO: 6.8 % (ref 5–12)
NEUTROPHILS NFR BLD AUTO: 5.2 10*3/MM3 (ref 1.7–7)
NEUTROPHILS NFR BLD AUTO: 62.6 % (ref 42.7–76)
NRBC BLD AUTO-RTO: 0 /100 WBC (ref 0–0.2)
PLATELET # BLD AUTO: 204 10*3/MM3 (ref 140–450)
PMV BLD AUTO: 9 FL (ref 6–12)
POTASSIUM SERPL-SCNC: 4 MMOL/L (ref 3.5–5.2)
QT INTERVAL: 428 MS
QTC INTERVAL: 437 MS
RBC # BLD AUTO: 5.15 10*6/MM3 (ref 3.77–5.28)
SODIUM SERPL-SCNC: 139 MMOL/L (ref 136–145)
WBC NRBC COR # BLD: 8.29 10*3/MM3 (ref 3.4–10.8)

## 2022-12-01 PROCEDURE — 85025 COMPLETE CBC W/AUTO DIFF WBC: CPT

## 2022-12-01 PROCEDURE — 93010 ELECTROCARDIOGRAM REPORT: CPT | Performed by: STUDENT IN AN ORGANIZED HEALTH CARE EDUCATION/TRAINING PROGRAM

## 2022-12-01 PROCEDURE — 80048 BASIC METABOLIC PNL TOTAL CA: CPT

## 2022-12-01 PROCEDURE — 86140 C-REACTIVE PROTEIN: CPT

## 2022-12-01 PROCEDURE — 36415 COLL VENOUS BLD VENIPUNCTURE: CPT

## 2022-12-01 PROCEDURE — 85652 RBC SED RATE AUTOMATED: CPT

## 2022-12-01 PROCEDURE — 93005 ELECTROCARDIOGRAM TRACING: CPT

## 2022-12-01 PROCEDURE — 83036 HEMOGLOBIN GLYCOSYLATED A1C: CPT

## 2022-12-01 NOTE — PAT
An arrival time for procedure was not provided during PAT visit. If patient had any questions or concerns about their arrival time, they were instructed to contact their surgeon/physician.  Additionally, if the patient referred to an arrival time that was acquired from their my chart account, patient was encouraged to verify that time with their surgeon/physician. Arrival times are NOT provided in Pre Admission Testing Department.    Patient to apply Chlorhexadine wipes  to surgical area (as instructed) the night before procedure and the AM of procedure. Wipes provided.    Patient instructed to drink 20 ounces of Gatorade and it needs to be completed 1 hour (for Main OR patients) or 2 hours (scheduled  section & [facility] patients) before given arrival time for procedure (NO RED Gatorade)    Patient verbalized understanding.    Patient viewed general PAT education video as instructed in their preoperative information received from their surgeon.  Patient stated the general PAT education video was viewed in its entirety and survey completed.  Copies of PAT general education handouts (Incentive Spirometry, Meds to Beds Program, Patient Belongings, Pre-op skin preparation instructions, Blood Glucose testing, Visitor policy, Surgery FAQ, Code H) distributed to patient if not printed. Education related to the PAT pass and skin preparation for surgery (if applicable) completed in PAT as a reinforcement to PAT education video. Patient instructed to return PAT pass provided today as well as completed skin preparation sheet (if applicable) on the day of procedure.     Additionally if patient had not viewed video yet but intended to view it at home or in our waiting area, then referred them to the handout with QR code/link provided during PAT visit.  Instructed patient to complete survey after viewing the video in its entirety.  Encouraged patient/family to read PAT general education handouts thoroughly and notify PAT  staff with any questions or concerns. Patient verbalized understanding of all information and priority content.    PATIENT EKG ON CHART AND IN Epic FROM 12/01/2022

## 2022-12-01 NOTE — TELEPHONE ENCOUNTER
----- Message from Danae Fong sent at 12/1/2022  9:18 AM EST -----  Regarding: Surgery clearance  Contact: [phone]  Hi, I am having a toe amputated on December 6.  Dr Charleen Torres is doing the procedure.  I just need a letter of clearance and advisement on the discontinuation of Asa 81 mg prior to surgery and for how long?   Dr. Torres said you can just put clearance letter into my chart.   I will be doing pre op testing on December 1 at [facility].  The surgery will also be done there on outpatient status.  Thanks for all you do

## 2022-12-01 NOTE — TELEPHONE ENCOUNTER
Per Susan Butler, BAUTISTA- patient may proceed with surgery. Due to her valve replacement, she may NOT discontinue her aspirin for the surgery.

## 2022-12-06 ENCOUNTER — ANESTHESIA EVENT (OUTPATIENT)
Dept: PERIOP | Facility: HOSPITAL | Age: 52
End: 2022-12-06

## 2022-12-06 ENCOUNTER — ANESTHESIA (OUTPATIENT)
Dept: PERIOP | Facility: HOSPITAL | Age: 52
End: 2022-12-06

## 2022-12-06 ENCOUNTER — HOSPITAL ENCOUNTER (OUTPATIENT)
Facility: HOSPITAL | Age: 52
Discharge: HOME | End: 2022-12-06
Attending: ORTHOPAEDIC SURGERY | Admitting: ORTHOPAEDIC SURGERY

## 2022-12-06 VITALS
TEMPERATURE: 98 F | WEIGHT: 274 LBS | BODY MASS INDEX: 37.11 KG/M2 | OXYGEN SATURATION: 92 % | DIASTOLIC BLOOD PRESSURE: 82 MMHG | HEIGHT: 72 IN | RESPIRATION RATE: 16 BRPM | HEART RATE: 61 BPM | SYSTOLIC BLOOD PRESSURE: 127 MMHG

## 2022-12-06 DIAGNOSIS — L97.521 DIABETIC ULCER OF TOE OF LEFT FOOT ASSOCIATED WITH TYPE 2 DIABETES MELLITUS, LIMITED TO BREAKDOWN OF SKIN: Primary | ICD-10-CM

## 2022-12-06 DIAGNOSIS — L97.501 DIABETIC ULCER OF TOE ASSOCIATED WITH TYPE 2 DIABETES MELLITUS, LIMITED TO BREAKDOWN OF SKIN, UNSPECIFIED LATERALITY: ICD-10-CM

## 2022-12-06 DIAGNOSIS — E11.621 DIABETIC ULCER OF TOE OF LEFT FOOT ASSOCIATED WITH TYPE 2 DIABETES MELLITUS, LIMITED TO BREAKDOWN OF SKIN: Primary | ICD-10-CM

## 2022-12-06 DIAGNOSIS — M86.472 CHRONIC OSTEOMYELITIS OF LEFT FOOT WITH DRAINING SINUS: ICD-10-CM

## 2022-12-06 DIAGNOSIS — E11.621 DIABETIC ULCER OF TOE ASSOCIATED WITH TYPE 2 DIABETES MELLITUS, LIMITED TO BREAKDOWN OF SKIN, UNSPECIFIED LATERALITY: ICD-10-CM

## 2022-12-06 LAB
B-HCG UR QL: NEGATIVE
EXPIRATION DATE: NORMAL
GLUCOSE BLDC GLUCOMTR-MCNC: 103 MG/DL (ref 70–130)
GLUCOSE BLDC GLUCOMTR-MCNC: 84 MG/DL (ref 70–130)
INTERNAL NEGATIVE CONTROL: NORMAL
INTERNAL POSITIVE CONTROL: NORMAL
Lab: NORMAL

## 2022-12-06 PROCEDURE — 25010000002 MIDAZOLAM PER 1 MG: Performed by: ANESTHESIOLOGY

## 2022-12-06 PROCEDURE — 25010000002 VANCOMYCIN 10 G RECONSTITUTED SOLUTION: Performed by: ORTHOPAEDIC SURGERY

## 2022-12-06 PROCEDURE — 87070 CULTURE OTHR SPECIMN AEROBIC: CPT | Performed by: ORTHOPAEDIC SURGERY

## 2022-12-06 PROCEDURE — 87206 SMEAR FLUORESCENT/ACID STAI: CPT | Performed by: ORTHOPAEDIC SURGERY

## 2022-12-06 PROCEDURE — 25010000002 DEXAMETHASONE PER 1 MG: Performed by: NURSE ANESTHETIST, CERTIFIED REGISTERED

## 2022-12-06 PROCEDURE — 25010000002 PROPOFOL 10 MG/ML EMULSION: Performed by: NURSE ANESTHETIST, CERTIFIED REGISTERED

## 2022-12-06 PROCEDURE — 88305 TISSUE EXAM BY PATHOLOGIST: CPT | Performed by: ORTHOPAEDIC SURGERY

## 2022-12-06 PROCEDURE — 87176 TISSUE HOMOGENIZATION CULTR: CPT | Performed by: ORTHOPAEDIC SURGERY

## 2022-12-06 PROCEDURE — 87102 FUNGUS ISOLATION CULTURE: CPT | Performed by: ORTHOPAEDIC SURGERY

## 2022-12-06 PROCEDURE — 82962 GLUCOSE BLOOD TEST: CPT

## 2022-12-06 PROCEDURE — 28825 PARTIAL AMPUTATION OF TOE: CPT | Performed by: ORTHOPAEDIC SURGERY

## 2022-12-06 PROCEDURE — 28232 INCISION OF TOE TENDON: CPT | Performed by: ORTHOPAEDIC SURGERY

## 2022-12-06 PROCEDURE — 81025 URINE PREGNANCY TEST: CPT | Performed by: ORTHOPAEDIC SURGERY

## 2022-12-06 PROCEDURE — 25010000002 ONDANSETRON PER 1 MG: Performed by: NURSE ANESTHETIST, CERTIFIED REGISTERED

## 2022-12-06 PROCEDURE — 25010000002 FENTANYL CITRATE (PF) 100 MCG/2ML SOLUTION: Performed by: NURSE ANESTHETIST, CERTIFIED REGISTERED

## 2022-12-06 PROCEDURE — 87186 SC STD MICRODIL/AGAR DIL: CPT | Performed by: ORTHOPAEDIC SURGERY

## 2022-12-06 PROCEDURE — 87116 MYCOBACTERIA CULTURE: CPT | Performed by: ORTHOPAEDIC SURGERY

## 2022-12-06 PROCEDURE — 87205 SMEAR GRAM STAIN: CPT | Performed by: ORTHOPAEDIC SURGERY

## 2022-12-06 PROCEDURE — 88311 DECALCIFY TISSUE: CPT | Performed by: ORTHOPAEDIC SURGERY

## 2022-12-06 PROCEDURE — 87075 CULTR BACTERIA EXCEPT BLOOD: CPT | Performed by: ORTHOPAEDIC SURGERY

## 2022-12-06 RX ORDER — ONDANSETRON 4 MG/1
4 TABLET, FILM COATED ORAL EVERY 6 HOURS PRN
Qty: 30 TABLET | Refills: 0 | Status: SHIPPED | OUTPATIENT
Start: 2022-12-06 | End: 2023-01-04

## 2022-12-06 RX ORDER — HYDROCODONE BITARTRATE AND ACETAMINOPHEN 7.5; 325 MG/1; MG/1
1-2 TABLET ORAL EVERY 6 HOURS PRN
Qty: 25 TABLET | Refills: 0 | Status: SHIPPED | OUTPATIENT
Start: 2022-12-06 | End: 2023-01-04

## 2022-12-06 RX ORDER — SODIUM CHLORIDE 0.9 % (FLUSH) 0.9 %
10 SYRINGE (ML) INJECTION EVERY 12 HOURS SCHEDULED
Status: CANCELLED | OUTPATIENT
Start: 2022-12-06

## 2022-12-06 RX ORDER — SODIUM CHLORIDE 0.9 % (FLUSH) 0.9 %
10 SYRINGE (ML) INJECTION AS NEEDED
Status: CANCELLED | OUTPATIENT
Start: 2022-12-06

## 2022-12-06 RX ORDER — GLYCOPYRROLATE 0.2 MG/ML
INJECTION INTRAMUSCULAR; INTRAVENOUS AS NEEDED
Status: DISCONTINUED | OUTPATIENT
Start: 2022-12-06 | End: 2022-12-06 | Stop reason: SURG

## 2022-12-06 RX ORDER — LIDOCAINE HYDROCHLORIDE 10 MG/ML
0.5 INJECTION, SOLUTION EPIDURAL; INFILTRATION; INTRACAUDAL; PERINEURAL ONCE AS NEEDED
Status: COMPLETED | OUTPATIENT
Start: 2022-12-06 | End: 2022-12-06

## 2022-12-06 RX ORDER — MIDAZOLAM HYDROCHLORIDE 1 MG/ML
1 INJECTION INTRAMUSCULAR; INTRAVENOUS
Status: DISCONTINUED | OUTPATIENT
Start: 2022-12-06 | End: 2022-12-06 | Stop reason: HOSPADM

## 2022-12-06 RX ORDER — FENTANYL CITRATE 50 UG/ML
INJECTION, SOLUTION INTRAMUSCULAR; INTRAVENOUS AS NEEDED
Status: DISCONTINUED | OUTPATIENT
Start: 2022-12-06 | End: 2022-12-06 | Stop reason: SURG

## 2022-12-06 RX ORDER — DEXAMETHASONE SODIUM PHOSPHATE 4 MG/ML
INJECTION, SOLUTION INTRA-ARTICULAR; INTRALESIONAL; INTRAMUSCULAR; INTRAVENOUS; SOFT TISSUE AS NEEDED
Status: DISCONTINUED | OUTPATIENT
Start: 2022-12-06 | End: 2022-12-06 | Stop reason: SURG

## 2022-12-06 RX ORDER — FAMOTIDINE 20 MG/1
20 TABLET, FILM COATED ORAL ONCE
Status: COMPLETED | OUTPATIENT
Start: 2022-12-06 | End: 2022-12-06

## 2022-12-06 RX ORDER — FAMOTIDINE 10 MG/ML
20 INJECTION, SOLUTION INTRAVENOUS ONCE
Status: CANCELLED | OUTPATIENT
Start: 2022-12-06 | End: 2022-12-06

## 2022-12-06 RX ORDER — LIDOCAINE HYDROCHLORIDE 10 MG/ML
INJECTION, SOLUTION EPIDURAL; INFILTRATION; INTRACAUDAL; PERINEURAL AS NEEDED
Status: DISCONTINUED | OUTPATIENT
Start: 2022-12-06 | End: 2022-12-06 | Stop reason: SURG

## 2022-12-06 RX ORDER — SODIUM CHLORIDE 9 MG/ML
40 INJECTION, SOLUTION INTRAVENOUS AS NEEDED
Status: CANCELLED | OUTPATIENT
Start: 2022-12-06

## 2022-12-06 RX ORDER — SODIUM CHLORIDE, SODIUM LACTATE, POTASSIUM CHLORIDE, CALCIUM CHLORIDE 600; 310; 30; 20 MG/100ML; MG/100ML; MG/100ML; MG/100ML
9 INJECTION, SOLUTION INTRAVENOUS CONTINUOUS
Status: DISCONTINUED | OUTPATIENT
Start: 2022-12-06 | End: 2022-12-06 | Stop reason: HOSPADM

## 2022-12-06 RX ORDER — HYDROMORPHONE HYDROCHLORIDE 1 MG/ML
0.5 INJECTION, SOLUTION INTRAMUSCULAR; INTRAVENOUS; SUBCUTANEOUS
Status: DISCONTINUED | OUTPATIENT
Start: 2022-12-06 | End: 2022-12-06 | Stop reason: HOSPADM

## 2022-12-06 RX ORDER — MAGNESIUM HYDROXIDE 1200 MG/15ML
LIQUID ORAL AS NEEDED
Status: DISCONTINUED | OUTPATIENT
Start: 2022-12-06 | End: 2022-12-06 | Stop reason: HOSPADM

## 2022-12-06 RX ORDER — FENTANYL CITRATE 50 UG/ML
50 INJECTION, SOLUTION INTRAMUSCULAR; INTRAVENOUS
Status: DISCONTINUED | OUTPATIENT
Start: 2022-12-06 | End: 2022-12-06 | Stop reason: HOSPADM

## 2022-12-06 RX ORDER — PROPOFOL 10 MG/ML
VIAL (ML) INTRAVENOUS AS NEEDED
Status: DISCONTINUED | OUTPATIENT
Start: 2022-12-06 | End: 2022-12-06 | Stop reason: SURG

## 2022-12-06 RX ORDER — GLUCOSAMINE/D3/BOSWELLIA SERRA 1500MG-400
1 TABLET ORAL DAILY
COMMUNITY

## 2022-12-06 RX ORDER — ROCURONIUM BROMIDE 10 MG/ML
INJECTION, SOLUTION INTRAVENOUS AS NEEDED
Status: DISCONTINUED | OUTPATIENT
Start: 2022-12-06 | End: 2022-12-06 | Stop reason: SURG

## 2022-12-06 RX ORDER — ONDANSETRON 2 MG/ML
INJECTION INTRAMUSCULAR; INTRAVENOUS AS NEEDED
Status: DISCONTINUED | OUTPATIENT
Start: 2022-12-06 | End: 2022-12-06 | Stop reason: SURG

## 2022-12-06 RX ADMIN — FENTANYL CITRATE 100 MCG: 50 INJECTION, SOLUTION INTRAMUSCULAR; INTRAVENOUS at 13:55

## 2022-12-06 RX ADMIN — MIDAZOLAM 1 MG: 1 INJECTION INTRAMUSCULAR; INTRAVENOUS at 12:59

## 2022-12-06 RX ADMIN — LIDOCAINE HYDROCHLORIDE 50 MG: 10 INJECTION, SOLUTION EPIDURAL; INFILTRATION; INTRACAUDAL; PERINEURAL at 13:55

## 2022-12-06 RX ADMIN — FAMOTIDINE 20 MG: 20 TABLET, FILM COATED ORAL at 12:21

## 2022-12-06 RX ADMIN — ROCURONIUM BROMIDE 50 MG: 10 INJECTION, SOLUTION INTRAVENOUS at 13:55

## 2022-12-06 RX ADMIN — DEXAMETHASONE SODIUM PHOSPHATE 4 MG: 4 INJECTION, SOLUTION INTRAMUSCULAR; INTRAVENOUS at 14:03

## 2022-12-06 RX ADMIN — GLYCOPYRROLATE 0.2 MCG: 0.2 INJECTION INTRAMUSCULAR; INTRAVENOUS at 14:18

## 2022-12-06 RX ADMIN — SUGAMMADEX 200 MG: 100 INJECTION, SOLUTION INTRAVENOUS at 14:37

## 2022-12-06 RX ADMIN — LIDOCAINE HYDROCHLORIDE 0.5 ML: 10 INJECTION, SOLUTION EPIDURAL; INFILTRATION; INTRACAUDAL; PERINEURAL at 11:56

## 2022-12-06 RX ADMIN — SODIUM CHLORIDE, POTASSIUM CHLORIDE, SODIUM LACTATE AND CALCIUM CHLORIDE 9 ML/HR: 600; 310; 30; 20 INJECTION, SOLUTION INTRAVENOUS at 11:56

## 2022-12-06 RX ADMIN — ONDANSETRON 4 MG: 2 INJECTION INTRAMUSCULAR; INTRAVENOUS at 14:37

## 2022-12-06 RX ADMIN — PROPOFOL 200 MG: 10 INJECTION, EMULSION INTRAVENOUS at 13:55

## 2022-12-06 RX ADMIN — VANCOMYCIN HYDROCHLORIDE 1750 MG: 10 INJECTION, POWDER, LYOPHILIZED, FOR SOLUTION INTRAVENOUS at 12:59

## 2022-12-06 NOTE — ANESTHESIA POSTPROCEDURE EVALUATION
Patient: Danae Fong    Procedure Summary     Date: 12/06/22 Room / Location:  [facility]  [facility]  [facility]    Anesthesia Start: 1350 Anesthesia Stop: 1504    Procedure: amputate left great toe at DIP joint, flexor tenotomies toes 2,3,4,5 (Left: Fingers) Diagnosis:       Diabetic ulcer of toe associated with type 2 diabetes mellitus, limited to breakdown of skin, unspecified laterality (HCC)      (Diabetic ulcer of toe associated with type 2 diabetes mellitus, limited to breakdown of skin, unspecified laterality (HCC) [E11.621, L97.501])    Surgeons: Charleen Torres MD Provider: Colton Vieira MD    Anesthesia Type: general ASA Status: 3          Anesthesia Type: general    Vitals  No vitals data found for the desired time range.          Post Anesthesia Care and Evaluation    Patient location during evaluation: PACU  Patient participation: complete - patient participated  Level of consciousness: awake and alert  Pain management: adequate    Airway patency: patent  Anesthetic complications: No anesthetic complications  PONV Status: none  Cardiovascular status: hemodynamically stable and acceptable  Respiratory status: nonlabored ventilation, acceptable and nasal cannula  Hydration status: acceptable

## 2022-12-06 NOTE — ANESTHESIA PROCEDURE NOTES
Airway  Urgency: elective    Date/Time: 12/6/2022 1:56 PM  Airway not difficult    General Information and Staff    Patient location during procedure: OR  CRNA/CAA: Shae Laguna CRNA    Indications and Patient Condition  Indications for airway management: airway protection    Preoxygenated: yes  MILS not maintained throughout  Mask difficulty assessment: 1 - vent by mask    Final Airway Details  Final airway type: endotracheal airway      Successful airway: ETT  Cuffed: yes   Successful intubation technique: direct laryngoscopy  Endotracheal tube insertion site: oral  Blade: Juanis  Blade size: 3  ETT size (mm): 7.0  Cormack-Lehane Classification: grade I - full view of glottis  Placement verified by: chest auscultation and capnometry   Cuff volume (mL): 6  Measured from: lips  ETT/EBT  to lips (cm): 20  Number of attempts at approach: 1  Assessment: lips, teeth, and gum same as pre-op and atraumatic intubation    Additional Comments  Negative epigastric sounds, Breath sound equal bilaterally with symmetric chest rise and fall

## 2022-12-06 NOTE — BRIEF OP NOTE
Orthopedics amputate left great toe at DIP joint, flexor tenotomies toes 2,3,4,5  Brief Op Note    Danae Cotoett  12/6/2022    Pre-op Diagnosis:   Osteomyelitis left great toe distal phalanx, severe claw toe deformity 2 through 5    Post-op Diagnosis:  Same       Post-Op Diagnosis Codes:     * Diabetic ulcer of toe associated with type 2 diabetes mellitus, limited to breakdown of skin, unspecified laterality (HCC) [E11.621, L97.501]    Procedure(s):  amputate left great toe at DIP joint, flexor tenotomies toes 2,3,4,5    Surgeon(s):  Charleen Torres MD    Circulator: Veronika Sy RN; Elizabeth Winchester RN  Scrub Person: Alejandra Perez; Gama Jacobsen  Assistant: Maryanne Lopez PA-C     Assistant: Maryanne Lopez PA-C  was responsible for performing the following activities: Retraction, Suction, Irrigation, Suturing, Closing and Placing Dressing and their skilled assistance was necessary for the success of this case.    Anesthesia:  General    Staff:   Circulator: Veronika Sy RN; Elizabeth Winchester RN  Scrub Person: Alejandra Perez; Gama Jacobsen  Assistant: Maryanne Lopez PA-C    Estimated Blood Loss: 5 cc      Specimens: Cultures and permanent      Drains: None    Complications:  None    Tourniquet:: 14 minutes    Dressing: Soft    Disposition: Recovery stable    Charleen Torres MD     Date: 12/6/2022  Time: 14:32 EST

## 2022-12-06 NOTE — DISCHARGE INSTRUCTIONS
DR SHIPMAN DISCHARGE INSTRUCTIONS   (these instructions are the most important, and supercede any other hospital paperwork)          1. Weight bear on heel in post op shoe  2. Elevate operated foot over heart  3. Change the dressing every 4 days, thin layer Bactroban cream (in chart) gauze and ace bandage, do not get the incision(s) wet.    4. Call the office if any problems: [phone]  5. Take the Zofran with the pain meds if you have nausea/vomiting  6. Take aspirin (usual home dose) to help prevent blood clots

## 2022-12-06 NOTE — ANESTHESIA PREPROCEDURE EVALUATION
Anesthesia Evaluation     Patient summary reviewed and Nursing notes reviewed   no history of anesthetic complications:  NPO Solid Status: > 8 hours  NPO Liquid Status: > 2 hours           Airway   Mallampati: II  TM distance: >3 FB  Neck ROM: full  No difficulty expected  Dental    (+) lower dentures and upper dentures    Pulmonary - normal exam   (+) a smoker Former, COPD, sleep apnea,   Cardiovascular - normal exam    ECG reviewed  Patient on routine beta blocker and Beta blocker given within 24 hours of surgery    (+) hypertension, dysrhythmias Paroxysmal Atrial Fib, CHF Diastolic >=55%,     ROS comment: PFO   7/2021- lvef 65, bAVR with mean gradient 17 trace AI, nl rvsp    Neuro/Psych  (+) psychiatric history,    GI/Hepatic/Renal/Endo    (+) obesity,  GERD,  liver disease fatty liver disease cirrhosis, diabetes mellitus,     Musculoskeletal     Abdominal    Substance History   (+) drug use  (-) alcohol use     OB/GYN          Other        ROS/Med Hx Other: hgb 14.8 k 4.0     Previous URI - resolved sx, persistent cough, feels exacerbated by vaping                Anesthesia Plan    ASA 3     general     (Risks and benefits of general anesthesia discussed with patient (including MI, CVA, death, recall, aspiration, oropharyngeal/dental damage), questions answered, agreeable to proceed.    )  intravenous induction     Anesthetic plan, risks, benefits, and alternatives have been provided, discussed and informed consent has been obtained with: patient and mother.    Plan discussed with CRNA.        CODE STATUS:

## 2022-12-06 NOTE — OP NOTE
Operative Report    12/06/22  14:33 EST    Preoperative diagnosis: Osteomyelitis left great toe distal phalanx, severe flexion contracture all toes left foot    Postoperative diagnosis: Same    Anesthesia: General with blocks for postop pain control    Surgeon: Charleen Nath M.D.    Assistant: Maryanne JURADO, present for the entire procedure including prepping, draping, retraction, closure, dressing.    Operative procedure: 1.  Amputation left great toe at DIP joint level, 2 open flexor tenotomy toes 2 through 5    Operative indications: This is a very pleasant 52-year-old woman with longstanding diabetes and severe complications.  She has severe clawing of the toes on the left foot.  She has dense neuropathy.  She has had previous irrigation debridement of the left foot.  She has severe clawing of toes 1 through 5.  The great toe is flexed about 90 degrees at the DIP joint and is stiff.  She has developed a chronic ulcer on the tip of the left great toe and MRI shows she has osteomyelitis of the distal phalanx.  There is no active cellulitis right now.  It appears to be a chronic smoldering osteomyelitis.  The goal of surgery today is to remove the infected bone to decrease the risk of a higher amputation.  I also felt that it was prudent to at least do a flexor tenotomy on the small toes to partially relax them to decrease the risk of ulcers on their tips.  Ultimately she may need formal correction of the toes 2 through 5, but I did not want to proceed with that right now given the active infection in the great toe.    Operative procedure: The patient was taken to the operating room where general anesthesia was induced without difficulty.  I placed a digital block on toes 1 through 5 after the first timeout.  This was done using 10 cc of half percent ropivacaine.  She was given preoperative antibiotics.  A well-padded tourniquet was placed on the left thigh.  The leg was elevated, the second timeout was  called, the tourniquet was inflated to 350 mmHg.  Tourniquet time was 14 minutes.  I made a small transverse incision on the base of each toe 2 through 5 and transected both the long and short flexor tendons.  This allowed the toes to somewhat relax and hopefully decrease the risk of ulceration.  These incisions were irrigated and closed.  I then addressed the great toe.  I made a fishmouth shaped incision over the base of the distal phalanx and carried this down through soft tissue sharply.  Any hemostasis was obtained were needed with electrocautery.  The nerves were placed on stretch transected sharply and allowed to track proximally.  The toe was then removed through the DIP joint.  I also remove the condyles of the end of the proximal phalanx to make a better shape for the amputation stump.  I opened the toe on the back table and took a deep culture.  Those instruments were then discarded.  The tourniquet was released.  The incision was irrigated copiously with antibiotic solution using pulsatile lavage.  It was then closed with nylon.  The foot was dressed sterilely.  She was awakened, extubated and transferred to recovery in stable condition.  Postop plan will be discharged home weightbearing on her heel and postop shoe.    Estimated blood loss: 5 cc    Specimens: Cultures and permanent    Drains: None    Complications: None    Charleen Torres MD  12/06/22  14:33 EST

## 2022-12-06 NOTE — INTERVAL H&P NOTE
"[facility] Pre-op    Full history and physical note from office is attached.    BP (!) 140/104 (BP Location: Right arm, Patient Position: Lying)   Pulse 68   Temp 97.4 °F (36.3 °C) (Temporal)   Resp 16   Ht 186.7 cm (73.5\")   Wt 124 kg (274 lb)   SpO2 93%   BMI 35.66 kg/m²     Immunizations:  Influenza:  2022  Pneumococcal:  No  Tetanus:  UTD  Covid x2: 2021      LAB Results:  Lab Results   Component Value Date    WBC 8.29 12/01/2022    HGB 14.8 12/01/2022    HCT 43.8 12/01/2022    MCV 85.0 12/01/2022     12/01/2022    NEUTROABS 5.20 12/01/2022    GLUCOSE 101 (H) 12/01/2022    BUN 10 12/01/2022    CREATININE 0.81 12/01/2022    EGFRIFNONA 71 09/27/2021     12/01/2022    K 4.0 12/01/2022     12/01/2022    CO2 29.0 12/01/2022    MG 2.0 01/26/2018    PHOS 2.9 01/26/2018    CALCIUM 9.7 12/01/2022    ALBUMIN 4.70 09/27/2021    AST 27 09/27/2021    ALT 27 09/27/2021    BILITOT 0.2 09/27/2021    PTT 33.0 03/05/2021    INR 1.10 09/27/2021       Cancer Staging (if applicable)  Cancer Patient: __ yes __no __unknown__N/A; If yes, clinical stage T:__ N:__M:__, stage group or __N/A      Impression: Diabetic foot ulcer       Plan: amputate left great toe at DIP joint, flexor tenotomies toes 2,3,4,5      BAUTISTA Frey   12/6/2022   12:26 EST   "

## 2022-12-09 LAB
BACTERIA SPEC AEROBE CULT: ABNORMAL
BACTERIA SPEC AEROBE CULT: ABNORMAL
GRAM STN SPEC: ABNORMAL
GRAM STN SPEC: ABNORMAL

## 2022-12-14 ENCOUNTER — APPOINTMENT (OUTPATIENT)
Dept: MRI IMAGING | Facility: HOSPITAL | Age: 52
End: 2022-12-14

## 2022-12-16 LAB — BACTERIA SPEC ANAEROBE CULT: NORMAL

## 2022-12-21 ENCOUNTER — OFFICE VISIT (OUTPATIENT)
Dept: ORTHOPEDIC SURGERY | Facility: CLINIC | Age: 52
End: 2022-12-21

## 2022-12-21 VITALS — TEMPERATURE: 96.9 F

## 2022-12-21 DIAGNOSIS — M86.60 OSTEOMYELITIS, CHRONIC: Primary | ICD-10-CM

## 2022-12-21 PROCEDURE — 99024 POSTOP FOLLOW-UP VISIT: CPT | Performed by: ORTHOPAEDIC SURGERY

## 2022-12-21 NOTE — PROGRESS NOTES
Post-op (2 week post op - amputate left great toe at DIP joint, flexor tenotomies toes 2,3,4,5)      Danae Fong is 2 weeks status post amputate left great toe at the DIP joint, tenotomies toes 2 through 5,12/6/22. She reports no fever, chills.  She reports pain is well controlled.  They have been taking aspirin for DVT prophylaxis.  They have been weight bearing on heel in post op shoe.      Past Surgical History:   Procedure Laterality Date   • AMPUTATION DIGIT Right 01/30/2018    Procedure: AMPUTATION RIGHT 2ND TOE;  Surgeon: Charleen Torres MD;  Location:  [facility];  Service:    • AMPUTATION DIGIT Left 12/6/2022    Procedure: amputate left great toe at DIP joint, flexor tenotomies toes 2,3,4,5;  Surgeon: Charleen Torres MD;  Location:  [facility];  Service: Orthopedics;  Laterality: Left;   • AORTIC VALVE REPAIR/REPLACEMENT N/A 08/30/2016    Procedure: AIRAM, MEDIAN STERNOTOMY, AORTIC VALVE REPLACEMENT;  Surgeon: Mauricio Grove MD;  Location:  [facility] OR;  Service:    • BLADDER SLING MODIFIED, ANTERIOR AND POSTERIOR VAGINAL REPAIR     • CARDIAC CATHETERIZATION N/A 08/25/2016    Procedure: Left Heart Cath;  Surgeon: Tyler Peterson MD;  Location:  [facility] CATH INVASIVE LOCATION;  Service:    • CARDIAC CATHETERIZATION N/A 03/21/2018    Procedure: Coronary angiography;  Surgeon: Tyler Peterson IV, MD;  Location:  [facility] CATH INVASIVE LOCATION;  Service: Cardiovascular   • COLONOSCOPY     • DILATATION AND CURETTAGE      of uterus   • ENDOSCOPY     • FOOT SURGERY Left     for osteomyelitis   • HARDWARE REMOVAL Right 03/15/2021    Procedure: REMOVAL OF CERCLAGE WIRE OF RIGHT PROXIMAL FEMUR - RIGHT;  Surgeon: Owen Siu MD;  Location:  [facility] OR;  Service: Orthopedics;  Laterality: Right;   • INCISION AND DRAINAGE HIP Right 01/26/2017    Procedure: RIGHT HIP WOUND WASHOUT AND DEBRIDEMENT, WITH POLYETHELENE FEMORAL HEAD EXCHANGE ;  Surgeon: Owen Siu MD;  Location:  [facility] OR;  Service:    •  PERICARDIAL WINDOW N/A 09/16/2016    Procedure: PERICARDIOCENTESIS WITH PLACEMENT OF DRAINAGE TUBE .;  Surgeon: Mauricio Grove MD;  Location:  [facility];  Service:    • PERICARDIAL WINDOW N/A 09/20/2016    Procedure: SUBXYPHOID PERICARDIAL WINDOW;  Surgeon: Mauricio Grove MD;  Location:  [facility];  Service:    • UT CLOSED RX TRAUMATIC HIP DISLOCATN N/A 01/29/2017    Procedure: HIP CLOSED REDUCTION;  Surgeon: Leander Li MD;  Location:  [facility];  Service: Orthopedics   • THORACOTOMY Left 10/05/2016    Procedure: THORACOTOMY Pericardectomy,  AIRAM,  Placement pacing leads, Pump standby;  Surgeon: Mauricio Grove MD;  Location:  [facility];  Service:    • TOTAL HIP ARTHROPLASTY Right 01/23/2017    Procedure: RIGHT TOTAL HIP ARTHROPLASTY ANTERIOR ;  Surgeon: Owen Siu MD;  Location:  [facility];  Service:    • TOTAL HIP ARTHROPLASTY REVISION Right 01/30/2017    Procedure: REPEAT WOUND DEBRIDEMENT ACETABULAR REVISION;  Surgeon: Owen Siu MD;  Location:  [facility];  Service:    • UPPER GASTROINTESTINAL ENDOSCOPY     • WISDOM TOOTH EXTRACTION      all 4       Temp 96.9 °F (36.1 °C)      Left foot is healing appropriately, no signs of infection      reviewed prior lab results    Assessment and Plan:   1. Osteomyelitis, chronic (HCC)  The path report did show osteomyelitis.  The cultures grew Enterococcus.  But we remove the entire focus of infection and everything is healing well.  We removed every other suture and the great toe.  Continue the half shoe.  I will see her again in 10 to 14 days          Charleen Nath MD

## 2022-12-26 DIAGNOSIS — K59.09 CHRONIC CONSTIPATION: ICD-10-CM

## 2022-12-27 RX ORDER — PRUCALOPRIDE 2 MG/1
1 TABLET, FILM COATED ORAL DAILY
Qty: 30 TABLET | Refills: 3 | Status: SHIPPED | OUTPATIENT
Start: 2022-12-27 | End: 2023-03-22

## 2022-12-28 ENCOUNTER — PRIOR AUTHORIZATION (OUTPATIENT)
Dept: GASTROENTEROLOGY | Facility: CLINIC | Age: 52
End: 2022-12-28

## 2022-12-29 NOTE — TELEPHONE ENCOUNTER
Key: JUAN JURADO Case ID: [id number] help? Call us at [phone]  Status  Sent to [employer]  Drug  [employer] 2MG tablets  Form  [facility] Electronic [place] Form (2017 NCP[place]

## 2022-12-29 NOTE — TELEPHONE ENCOUNTER
Your request has been approved  CaseId:[id number];Status:Approved;Review Type:Prior Auth;Coverage Start Date:11/29/2022;Coverage End Date:12/29/2023;

## 2023-01-04 ENCOUNTER — OFFICE VISIT (OUTPATIENT)
Dept: ORTHOPEDIC SURGERY | Facility: CLINIC | Age: 53
End: 2023-01-04
Payer: MEDICARE

## 2023-01-04 DIAGNOSIS — M86.60 OSTEOMYELITIS, CHRONIC: Primary | ICD-10-CM

## 2023-01-04 PROCEDURE — 99024 POSTOP FOLLOW-UP VISIT: CPT | Performed by: ORTHOPAEDIC SURGERY

## 2023-01-04 PROCEDURE — 1159F MED LIST DOCD IN RCRD: CPT | Performed by: ORTHOPAEDIC SURGERY

## 2023-01-04 PROCEDURE — 1160F RVW MEDS BY RX/DR IN RCRD: CPT | Performed by: ORTHOPAEDIC SURGERY

## 2023-01-04 NOTE — PROGRESS NOTES
Post-op (2 week f/u; 4 week s/p amputate left great toe at DIP joint, flexor tenotomies toes 2,3,4,5; DOS 12.06.2022/ )      Danae Fong is 4 weeks status post amputate left great toe at DIP joint, flexor tenotomies toes 2 through 5, 12/6/2022. She reports no fever, chills.  She reports pain is well controlled.  They have been taking aspirin for DVT prophylaxis.  They have been weight bearing on heel in half shoe.      Past Surgical History:   Procedure Laterality Date   • AMPUTATION DIGIT Right 01/30/2018    Procedure: AMPUTATION RIGHT 2ND TOE;  Surgeon: Charleen Torres MD;  Location:  [facility];  Service:    • AMPUTATION DIGIT Left 12/06/2022    Procedure: amputate left great toe at DIP joint, flexor tenotomies toes 2,3,4,5;  Surgeon: Charleen Torres MD;  Location:  [facility];  Service: Orthopedics;  Laterality: Left;   • AORTIC VALVE REPAIR/REPLACEMENT N/A 08/30/2016    Procedure: AIRAM, MEDIAN STERNOTOMY, AORTIC VALVE REPLACEMENT;  Surgeon: Mauricio Grove MD;  Location:  [facility];  Service:    • BLADDER SLING MODIFIED, ANTERIOR AND POSTERIOR VAGINAL REPAIR     • CARDIAC CATHETERIZATION N/A 08/25/2016    Procedure: Left Heart Cath;  Surgeon: Tyler Peterson MD;  Location:  [facility] CATH INVASIVE LOCATION;  Service:    • CARDIAC CATHETERIZATION N/A 03/21/2018    Procedure: Coronary angiography;  Surgeon: Tyler Peterson IV, MD;  Location:  [facility] CATH INVASIVE LOCATION;  Service: Cardiovascular   • COLONOSCOPY     • DILATATION AND CURETTAGE      of uterus   • ENDOSCOPY     • FOOT SURGERY Left     for osteomyelitis   • HARDWARE REMOVAL Right 03/15/2021    Procedure: REMOVAL OF CERCLAGE WIRE OF RIGHT PROXIMAL FEMUR - RIGHT;  Surgeon: Owen Siu MD;  Location:  [facility] OR;  Service: Orthopedics;  Laterality: Right;   • INCISION AND DRAINAGE HIP Right 01/26/2017    Procedure: RIGHT HIP WOUND WASHOUT AND DEBRIDEMENT, WITH POLYETHELENE FEMORAL HEAD EXCHANGE ;  Surgeon: Owen Siu MD;  Location:  [facility];  Service:    • JOINT REPLACEMENT  3/2017    Right hip   • PERICARDIAL WINDOW N/A 09/16/2016    Procedure: PERICARDIOCENTESIS WITH PLACEMENT OF DRAINAGE TUBE .;  Surgeon: Mauricio Grove MD;  Location:  [facility];  Service:    • PERICARDIAL WINDOW N/A 09/20/2016    Procedure: SUBXYPHOID PERICARDIAL WINDOW;  Surgeon: Mauricio Grove MD;  Location:  [facility];  Service:    • VA CLTX HIP DISLOCATION TRAUMATIC W/O ANESTHESIA N/A 01/29/2017    Procedure: HIP CLOSED REDUCTION;  Surgeon: Leander Li MD;  Location:  [facility];  Service: Orthopedics   • THORACOTOMY Left 10/05/2016    Procedure: THORACOTOMY Pericardectomy,  AIRAM,  Placement pacing leads, Pump standby;  Surgeon: Mauricio Grove MD;  Location: [facility];  Service:    • TOTAL HIP ARTHROPLASTY Right 01/23/2017    Procedure: RIGHT TOTAL HIP ARTHROPLASTY ANTERIOR ;  Surgeon: Owen Siu MD;  Location:  [facility];  Service:    • TOTAL HIP ARTHROPLASTY REVISION Right 01/30/2017    Procedure: REPEAT WOUND DEBRIDEMENT ACETABULAR REVISION;  Surgeon: Owen Siu MD;  Location:  [facility];  Service:    • UPPER GASTROINTESTINAL ENDOSCOPY     • WISDOM TOOTH EXTRACTION      all 4       There were no vitals taken for this visit.        Good alignment, no erythema, no drainage, no sign of infection, normal post op swelling left great toe and small toes    reviewed prior lab results    Assessment and Plan:   1. Osteomyelitis, chronic (HCC)  We removed every other suture on the great toe, its not quite ready to remove all of them.  Continue with the half shoe.  Continue to keep it clean and dry.  Pathology showed it was indeed osteomyelitis, cultures grew Enterococcus that was sensitive.  No signs of infection now.  I will see her in 1 to 2 weeks          Charleen Nath MD

## 2023-01-16 ENCOUNTER — OFFICE VISIT (OUTPATIENT)
Dept: ORTHOPEDIC SURGERY | Facility: CLINIC | Age: 53
End: 2023-01-16
Payer: MEDICARE

## 2023-01-16 DIAGNOSIS — M86.60 OSTEOMYELITIS, CHRONIC: Primary | ICD-10-CM

## 2023-01-16 PROCEDURE — 99024 POSTOP FOLLOW-UP VISIT: CPT | Performed by: ORTHOPAEDIC SURGERY

## 2023-01-16 NOTE — PROGRESS NOTES
Post-op (2 week f/u, 6 weeks s/p amputate left great toe at DIP joint, flexor tenotomies toes 2,3,4,5; DOS 12.06.2022)      Danae Fong is 6 weeks status post amputate left great toe, flexor tenotomies, 12/6/2022. She reports no fever, chills.  She is walking in the half shoe.    Past Surgical History:   Procedure Laterality Date   • AMPUTATION DIGIT Right 01/30/2018    Procedure: AMPUTATION RIGHT 2ND TOE;  Surgeon: Charleen Torres MD;  Location:  [facility] OR;  Service:    • AMPUTATION DIGIT Left 12/06/2022    Procedure: amputate left great toe at DIP joint, flexor tenotomies toes 2,3,4,5;  Surgeon: Charleen Torres MD;  Location:  [facility] OR;  Service: Orthopedics;  Laterality: Left;   • AORTIC VALVE REPAIR/REPLACEMENT N/A 08/30/2016    Procedure: AIRAM, MEDIAN STERNOTOMY, AORTIC VALVE REPLACEMENT;  Surgeon: Mauricio Grove MD;  Location:  [facility] OR;  Service:    • BLADDER SLING MODIFIED, ANTERIOR AND POSTERIOR VAGINAL REPAIR     • CARDIAC CATHETERIZATION N/A 08/25/2016    Procedure: Left Heart Cath;  Surgeon: Tyler Peterson MD;  Location:  [facility] CATH INVASIVE LOCATION;  Service:    • CARDIAC CATHETERIZATION N/A 03/21/2018    Procedure: Coronary angiography;  Surgeon: Tyler Peterson IV, MD;  Location:  [facility] CATH INVASIVE LOCATION;  Service: Cardiovascular   • COLONOSCOPY     • DILATATION AND CURETTAGE      of uterus   • ENDOSCOPY     • FOOT SURGERY Left     for osteomyelitis   • HARDWARE REMOVAL Right 03/15/2021    Procedure: REMOVAL OF CERCLAGE WIRE OF RIGHT PROXIMAL FEMUR - RIGHT;  Surgeon: Owen Siu MD;  Location:  [facility] OR;  Service: Orthopedics;  Laterality: Right;   • INCISION AND DRAINAGE HIP Right 01/26/2017    Procedure: RIGHT HIP WOUND WASHOUT AND DEBRIDEMENT, WITH POLYETHELENE FEMORAL HEAD EXCHANGE ;  Surgeon: Owen Siu MD;  Location:  [facility] OR;  Service:    • JOINT REPLACEMENT  3/2017    Right hip   • PERICARDIAL WINDOW N/A 09/16/2016    Procedure: PERICARDIOCENTESIS WITH  PLACEMENT OF DRAINAGE TUBE .;  Surgeon: Mauricio Grove MD;  Location:  [facility];  Service:    • PERICARDIAL WINDOW N/A 09/20/2016    Procedure: SUBXYPHOID PERICARDIAL WINDOW;  Surgeon: Mauricio Grove MD;  Location:  [facility];  Service:    • MD CLTX HIP DISLOCATION TRAUMATIC W/O ANESTHESIA N/A 01/29/2017    Procedure: HIP CLOSED REDUCTION;  Surgeon: Leander Li MD;  Location:  [facility];  Service: Orthopedics   • THORACOTOMY Left 10/05/2016    Procedure: THORACOTOMY Pericardectomy,  AIRAM,  Placement pacing leads, Pump standby;  Surgeon: Mauricio Grove MD;  Location:  [facility];  Service:    • TOTAL HIP ARTHROPLASTY Right 01/23/2017    Procedure: RIGHT TOTAL HIP ARTHROPLASTY ANTERIOR ;  Surgeon: Owen Siu MD;  Location:  [facility];  Service:    • TOTAL HIP ARTHROPLASTY REVISION Right 01/30/2017    Procedure: REPEAT WOUND DEBRIDEMENT ACETABULAR REVISION;  Surgeon: Owen Siu MD;  Location:  [facility];  Service:    • UPPER GASTROINTESTINAL ENDOSCOPY     • WISDOM TOOTH EXTRACTION      all 4       There were no vitals taken for this visit.        Good alignment, no erythema, no drainage, no sign of infection, normal post op swelling left great toe incision is finally fully healed    none    Assessment and Plan:   1. Osteomyelitis, chronic (HCC)  Incision on the great toe is finally healed.  The flexor tenotomy incisions are healed.  She may now slowly wean back into a shoe.  She needs to be very careful she is at high risk for higher amputation.  I will see her again in 3 months or sooner with any problems          Chraleen Nath MD

## 2023-01-17 LAB
FUNGUS WND CULT: NORMAL
MYCOBACTERIUM SPEC CULT: NORMAL
NIGHT BLUE STAIN TISS: NORMAL

## 2023-01-24 ENCOUNTER — PATIENT MESSAGE (OUTPATIENT)
Dept: ORTHOPEDIC SURGERY | Facility: CLINIC | Age: 53
End: 2023-01-24
Payer: MEDICARE

## 2023-01-24 ENCOUNTER — TELEPHONE (OUTPATIENT)
Dept: ORTHOPEDIC SURGERY | Facility: CLINIC | Age: 53
End: 2023-01-24
Payer: MEDICARE

## 2023-01-24 NOTE — TELEPHONE ENCOUNTER
Please see message below and advise. I sent patient a message back in [employer] to have her put her half shoe back on until I hear back from you.  Gaby Freeman RT (R), ROT

## 2023-01-24 NOTE — TELEPHONE ENCOUNTER
----- Message from Danae Fong sent at 1/24/2023  4:28 PM EST -----  Regarding: Left foot post surgery swelling  Contact: [phone]  I just recently started wearing shoes out of half boot on last visit and today my foot is swollen and hurts and burns up my leg. ..at this time there is no redness, but it does feel a bit warm.  It mirrors the symptoms of my charcots joint.

## 2023-01-25 NOTE — TELEPHONE ENCOUNTER
Charleen Torres MD  You 35 minutes ago (9:16 AM)     She should come in Friday, she should put a tall boot on if she has one, or she can get the boot here and see me friday

## 2023-01-26 ENCOUNTER — TELEPHONE (OUTPATIENT)
Dept: ORTHOPEDIC SURGERY | Facility: CLINIC | Age: 53
End: 2023-01-26
Payer: MEDICARE

## 2023-01-26 DIAGNOSIS — L97.501 DIABETIC ULCER OF TOE ASSOCIATED WITH TYPE 2 DIABETES MELLITUS, LIMITED TO BREAKDOWN OF SKIN, UNSPECIFIED LATERALITY: Primary | ICD-10-CM

## 2023-01-26 DIAGNOSIS — E11.621 DIABETIC ULCER OF TOE ASSOCIATED WITH TYPE 2 DIABETES MELLITUS, LIMITED TO BREAKDOWN OF SKIN, UNSPECIFIED LATERALITY: Primary | ICD-10-CM

## 2023-01-26 NOTE — TELEPHONE ENCOUNTER
----- Message from DANO Johnson(JAQUELINE) sent at 1/26/2023  7:13 AM EST -----  Regarding: FW: Left foot post surgery swelling  Contact: [phone]    ----- Message -----  From: Danae Fong  Sent: 1/25/2023  10:33 PM EST  To: [facility]  Subject: Left foot post surgery swelling                  Hey, it is your resident pest...lol.  I just wanted to let her know there is redness and increased swelling.  I am just trying to make sure she has all symptoms uptd.  There is warmth there also.  Thanks so much.  See ya Friday

## 2023-01-26 NOTE — TELEPHONE ENCOUNTER
Danae WILSON [facility] (supporting Charleen Torres MD) 1 hour ago (7:16 AM)       Morning, I wanted to add that the swelling and symptoms are worse if I am on it, even with boot, so I am staying off to decrease symptoms.  I was thinking I didn't want her to think it was just swelling more than it was.  Thanks   Have a great day

## 2023-01-27 ENCOUNTER — LAB (OUTPATIENT)
Dept: LAB | Facility: HOSPITAL | Age: 53
End: 2023-01-27
Payer: MEDICARE

## 2023-01-27 ENCOUNTER — OFFICE VISIT (OUTPATIENT)
Dept: ORTHOPEDIC SURGERY | Facility: CLINIC | Age: 53
End: 2023-01-27
Payer: MEDICARE

## 2023-01-27 ENCOUNTER — TELEPHONE (OUTPATIENT)
Dept: ORTHOPEDIC SURGERY | Facility: CLINIC | Age: 53
End: 2023-01-27

## 2023-01-27 VITALS
BODY MASS INDEX: 38.09 KG/M2 | DIASTOLIC BLOOD PRESSURE: 82 MMHG | WEIGHT: 281.2 LBS | SYSTOLIC BLOOD PRESSURE: 130 MMHG | HEIGHT: 72 IN

## 2023-01-27 DIAGNOSIS — E11.621 DIABETIC ULCER OF TOE ASSOCIATED WITH TYPE 2 DIABETES MELLITUS, LIMITED TO BREAKDOWN OF SKIN, UNSPECIFIED LATERALITY: ICD-10-CM

## 2023-01-27 DIAGNOSIS — M86.60 OSTEOMYELITIS, CHRONIC: ICD-10-CM

## 2023-01-27 DIAGNOSIS — I48.0 PAROXYSMAL ATRIAL FIBRILLATION: ICD-10-CM

## 2023-01-27 DIAGNOSIS — L97.501 DIABETIC ULCER OF TOE ASSOCIATED WITH TYPE 2 DIABETES MELLITUS, LIMITED TO BREAKDOWN OF SKIN, UNSPECIFIED LATERALITY: ICD-10-CM

## 2023-01-27 DIAGNOSIS — M14.672 CHARCOT'S JOINT OF LEFT FOOT: Primary | ICD-10-CM

## 2023-01-27 LAB
ALBUMIN SERPL-MCNC: 4.4 G/DL (ref 3.5–5.2)
ALBUMIN/GLOB SERPL: 1.1 G/DL
ALP SERPL-CCNC: 146 U/L (ref 39–117)
ALT SERPL W P-5'-P-CCNC: 48 U/L (ref 1–33)
ANION GAP SERPL CALCULATED.3IONS-SCNC: 10 MMOL/L (ref 5–15)
AST SERPL-CCNC: 46 U/L (ref 1–32)
BILIRUB SERPL-MCNC: 0.3 MG/DL (ref 0–1.2)
BUN SERPL-MCNC: 9 MG/DL (ref 6–20)
BUN/CREAT SERPL: 9.1 (ref 7–25)
CALCIUM SPEC-SCNC: 9.8 MG/DL (ref 8.6–10.5)
CHLORIDE SERPL-SCNC: 98 MMOL/L (ref 98–107)
CO2 SERPL-SCNC: 31 MMOL/L (ref 22–29)
CREAT SERPL-MCNC: 0.99 MG/DL (ref 0.57–1)
CRP SERPL-MCNC: 1.45 MG/DL (ref 0–0.5)
DEPRECATED RDW RBC AUTO: 44.7 FL (ref 37–54)
EGFRCR SERPLBLD CKD-EPI 2021: 68.7 ML/MIN/1.73
ERYTHROCYTE [DISTWIDTH] IN BLOOD BY AUTOMATED COUNT: 14.5 % (ref 12.3–15.4)
ERYTHROCYTE [SEDIMENTATION RATE] IN BLOOD: 88 MM/HR (ref 0–30)
GLOBULIN UR ELPH-MCNC: 4.1 GM/DL
GLUCOSE SERPL-MCNC: 147 MG/DL (ref 65–99)
HCT VFR BLD AUTO: 46.1 % (ref 34–46.6)
HGB BLD-MCNC: 15.5 G/DL (ref 12–15.9)
MCH RBC QN AUTO: 28.9 PG (ref 26.6–33)
MCHC RBC AUTO-ENTMCNC: 33.6 G/DL (ref 31.5–35.7)
MCV RBC AUTO: 85.8 FL (ref 79–97)
PLATELET # BLD AUTO: 215 10*3/MM3 (ref 140–450)
PMV BLD AUTO: 9.5 FL (ref 6–12)
POTASSIUM SERPL-SCNC: 4.1 MMOL/L (ref 3.5–5.2)
PROT SERPL-MCNC: 8.5 G/DL (ref 6–8.5)
RBC # BLD AUTO: 5.37 10*6/MM3 (ref 3.77–5.28)
SODIUM SERPL-SCNC: 139 MMOL/L (ref 136–145)
WBC NRBC COR # BLD: 8.12 10*3/MM3 (ref 3.4–10.8)

## 2023-01-27 PROCEDURE — 85027 COMPLETE CBC AUTOMATED: CPT

## 2023-01-27 PROCEDURE — 99024 POSTOP FOLLOW-UP VISIT: CPT | Performed by: ORTHOPAEDIC SURGERY

## 2023-01-27 PROCEDURE — 85652 RBC SED RATE AUTOMATED: CPT

## 2023-01-27 PROCEDURE — 80053 COMPREHEN METABOLIC PANEL: CPT

## 2023-01-27 PROCEDURE — 86140 C-REACTIVE PROTEIN: CPT

## 2023-01-27 RX ORDER — AMOXICILLIN AND CLAVULANATE POTASSIUM 875; 125 MG/1; MG/1
1 TABLET, FILM COATED ORAL EVERY 12 HOURS SCHEDULED
Status: DISCONTINUED | OUTPATIENT
Start: 2023-01-27 | End: 2023-02-01

## 2023-01-27 NOTE — TELEPHONE ENCOUNTER
Dr. Nath-unfortunately this pt's Augmentin was entered as a Clinical Administered Medication so the pharmacy did not receive Rx; Huseyin Abbott-called the pharmacy to give them a verbal for Augmentin 875-125mg q12hrs for 10 days; Can you confirm this Rx (specifically whether you do indeed want a 10 day course)? If changes need to be made, please let me know and I will reach back out to pt and/or pharmacy. Thank you!    Mireille BROOKS CMA ([facility]), ROT

## 2023-01-27 NOTE — PROGRESS NOTES
Post-op (1 week f/u,7  weeks s/p amputate left great toe at DIP joint, flexor tenotomies toes 2,3,4,5; DOS 12.06.2022))      Danae Fong is 7 weeks status post amputate left great toe at DIP joint with flexor tenotomies toes 2, 3, 4, 5, 12/6/2022.. She reports no fever, chills.  However 4 days ago she began to have increased swelling and redness in the left midfoot.  It has also been painful.  She reports it feels like the prior Charcot joint that she had.  No drainage, no open wounds    Past Surgical History:   Procedure Laterality Date   • AMPUTATION DIGIT Right 01/30/2018    Procedure: AMPUTATION RIGHT 2ND TOE;  Surgeon: Charleen Torres MD;  Location:  [facility];  Service:    • AMPUTATION DIGIT Left 12/06/2022    Procedure: amputate left great toe at DIP joint, flexor tenotomies toes 2,3,4,5;  Surgeon: Charleen Torres MD;  Location: [facility];  Service: Orthopedics;  Laterality: Left;   • AORTIC VALVE REPAIR/REPLACEMENT N/A 08/30/2016    Procedure: AIRAM, MEDIAN STERNOTOMY, AORTIC VALVE REPLACEMENT;  Surgeon: Mauricio Grove MD;  Location:  [facility];  Service:    • BLADDER SLING MODIFIED, ANTERIOR AND POSTERIOR VAGINAL REPAIR     • CARDIAC CATHETERIZATION N/A 08/25/2016    Procedure: Left Heart Cath;  Surgeon: Tyler Peterson MD;  Location:  [facility]VASIVE [facility]ATION;  Service:    • CARDIAC CATHETERIZATION N/A 03/21/2018    Procedure: Coronary angiography;  Surgeon: Tyler Peterson IV, MD;  Location:  [facility]ATH INVASIVE LOCATION;  Service: Cardiovascular   • COLONOSCOPY     • DILATATION AND CURETTAGE      of uterus   • ENDOSCOPY     • FOOT SURGERY Left     for osteomyelitis   • HARDWARE REMOVAL Right 03/15/2021    Procedure: REMOVAL OF CERCLAGE WIRE OF RIGHT PROXIMAL FEMUR - RIGHT;  Surgeon: Owen Siu MD;  Location: [facility];  Service: Orthopedics;  Laterality: Right;   • INCISION AND DRAINAGE HIP Right 01/26/2017    Procedure: RIGHT HIP WOUND WASHOUT AND DEBRIDEMENT, WITH  "POLYETHELENE FEMORAL HEAD EXCHANGE ;  Surgeon: Owen Siu MD;  Location:  [facility];  Service:    • JOINT REPLACEMENT  3/2017    Right hip   • PERICARDIAL WINDOW N/A 09/16/2016    Procedure: PERICARDIOCENTESIS WITH PLACEMENT OF DRAINAGE TUBE .;  Surgeon: Mauricio Grove MD;  Location:  [facility];  Service:    • PERICARDIAL WINDOW N/A 09/20/2016    Procedure: SUBXYPHOID PERICARDIAL WINDOW;  Surgeon: Mauricio Grove MD;  Location:  [facility];  Service:    • HI CLTX HIP DISLOCATION TRAUMATIC W/O ANESTHESIA N/A 01/29/2017    Procedure: HIP CLOSED REDUCTION;  Surgeon: Leander Li MD;  Location:  [facility];  Service: Orthopedics   • THORACOTOMY Left 10/05/2016    Procedure: THORACOTOMY Pericardectomy,  AIRAM,  Placement pacing leads, Pump standby;  Surgeon: Mauricio Grove MD;  Location:  [facility];  Service:    • TOTAL HIP ARTHROPLASTY Right 01/23/2017    Procedure: RIGHT TOTAL HIP ARTHROPLASTY ANTERIOR ;  Surgeon: Owen Siu MD;  Location:  [facility];  Service:    • TOTAL HIP ARTHROPLASTY REVISION Right 01/30/2017    Procedure: REPEAT WOUND DEBRIDEMENT ACETABULAR REVISION;  Surgeon: Owen Siu MD;  Location:  [facility];  Service:    • UPPER GASTROINTESTINAL ENDOSCOPY     • WISDOM TOOTH EXTRACTION      all 4       /82   Ht 186.7 cm (73.5\")   Wt 128 kg (281 lb 3.2 oz)   BMI 36.59 kg/m²        Left foot toe amputation site is fully healed, all incisions are healed.  She has midfoot erythema and swelling and warmth, the erythema improves with elevation, this is possibly Charcot    ordered and reviewed x-rays today and reviewed prior lab results    Assessment and Plan:   1. Charcot's joint of left foot  Difficult to know if this is new Charcot versus infection.  If she had not had the recent great toe amputation I would consider it almost certainly Charcot.  Late infection from the toe amputation I think is unlikely but we cannot be certain.  I sent her for labs today and will order a stat MRI.  Its " imperative that we differentiate whether this is new Charcot or infection.  We will get the MRI this weekend and I will see her back Monday or Wednesday.  She should go to the ER if she develops any drainage or if anything gets worse.  I sent Augmentin to her pharmacy, this should cover any infection that might be related to the toe amputation which grew a sensitive Enterococcus faecalis.  - MRI Foot Left With & Without Contrast; Future    2. Osteomyelitis, chronic (HCC)  As above  - MRI Foot Left With & Without Contrast; Future      Addendum: 12:29 PM, I finally obtained her labs.  Her sed rate is elevated at 88, CRP 1.54, white count is normal.  Metabolic panel shows some enzyme abnormalities that I think are unrelated.  Looking back through her labs she has frequently had elevated sed rate and CRP, I am not certain that this indicates infection.  We will continue to plan for the stat MRI.  As above I will put her on Augmentin and.  I will see her Monday or Wednesday.    Charleen Nath MD

## 2023-01-30 ENCOUNTER — TELEPHONE (OUTPATIENT)
Dept: ORTHOPEDIC SURGERY | Facility: CLINIC | Age: 53
End: 2023-01-30

## 2023-01-30 NOTE — TELEPHONE ENCOUNTER
LVM with pt to re-schedule appt for Wednesday per Dr. Nath.    Mireille BROOKS CMA ([facility]), ROT

## 2023-01-30 NOTE — TELEPHONE ENCOUNTER
Patient's appt re-scheduled to Wednesday.    Mireille BROOKS CMA ([facility]), ROT

## 2023-01-31 ENCOUNTER — TELEPHONE (OUTPATIENT)
Dept: ORTHOPEDIC SURGERY | Facility: CLINIC | Age: 53
End: 2023-01-31
Payer: MEDICARE

## 2023-01-31 ENCOUNTER — HOSPITAL ENCOUNTER (OUTPATIENT)
Dept: MRI IMAGING | Facility: HOSPITAL | Age: 53
Discharge: HOME | End: 2023-01-31
Admitting: ORTHOPAEDIC SURGERY
Payer: MEDICARE

## 2023-01-31 DIAGNOSIS — M86.60 OSTEOMYELITIS, CHRONIC: ICD-10-CM

## 2023-01-31 DIAGNOSIS — M14.672 CHARCOT'S JOINT OF LEFT FOOT: ICD-10-CM

## 2023-01-31 PROCEDURE — 0 GADOBENATE DIMEGLUMINE 529 MG/ML SOLUTION: Performed by: ORTHOPAEDIC SURGERY

## 2023-01-31 PROCEDURE — 73720 MRI LWR EXTREMITY W/O&W/DYE: CPT

## 2023-01-31 PROCEDURE — A9577 INJ MULTIHANCE: HCPCS | Performed by: ORTHOPAEDIC SURGERY

## 2023-01-31 RX ADMIN — GADOBENATE DIMEGLUMINE 20 ML: 529 INJECTION, SOLUTION INTRAVENOUS at 13:52

## 2023-02-01 ENCOUNTER — OFFICE VISIT (OUTPATIENT)
Dept: ORTHOPEDIC SURGERY | Facility: CLINIC | Age: 53
End: 2023-02-01
Payer: MEDICARE

## 2023-02-01 DIAGNOSIS — M14.672 CHARCOT'S JOINT OF LEFT FOOT: Primary | ICD-10-CM

## 2023-02-01 PROCEDURE — 99213 OFFICE O/P EST LOW 20 MIN: CPT | Performed by: ORTHOPAEDIC SURGERY

## 2023-02-01 PROCEDURE — 99024 POSTOP FOLLOW-UP VISIT: CPT | Performed by: ORTHOPAEDIC SURGERY

## 2023-02-01 RX ORDER — AMOXICILLIN AND CLAVULANATE POTASSIUM 875; 125 MG/1; MG/1
1 TABLET, FILM COATED ORAL EVERY 12 HOURS SCHEDULED
COMMUNITY
Start: 2023-01-27 | End: 2023-03-21

## 2023-02-01 NOTE — PROGRESS NOTES
ESTABLISHED PATIENT    Patient: Danae Fong  : 1970    Primary Care Provider: Leigh Casey MD    Requesting Provider: As above    Follow-up (2 day MRI follow up -- MRI done 23; 1 month s/p amputate left great toe at DIP joint, flexor tenotomies toes 2,3,4,5; DOS 2022))      History    Chief Complaint: Follow-up    History of Present Illness: She returns for follow-up of the MRI, done 2023, I looked at the films and report.  She does have evidence of new Charcot joint in the midfoot.  At present there is no displacement.  No change in the older Charcot hindfoot.  Her lab studies showed some elevation of inflammatory markers with a normal white count.  I do not think it is infected.  She has not had any fevers or chills.  I think she may stop taking the Augmentin.  She has been less active and the foot is already improved.    Current Outpatient Medications on File Prior to Visit   Medication Sig Dispense Refill   • amoxicillin-clavulanate (AUGMENTIN) 875-125 MG per tablet Take 1 tablet by mouth Every 12 (Twelve) Hours.     • Biotin 90555 MCG tablet Take 1 tablet by mouth Daily.     • bumetanide (BUMEX) 1 MG tablet Take 1 mg by mouth 2 (Two) Times a Day.     • [facility] Aspirin Adult Low Dose 81 MG chewable tablet Chew 81 mg Daily.     • DULoxetine (CYMBALTA) 30 MG capsule Take 30 mg by mouth Daily.     • esomeprazole (nexIUM) 40 MG capsule TAKE 1 CAPSULE BY MOUTH EVERY DAY 90 capsule 1   • fluticasone (FLONASE) 50 MCG/ACT nasal spray 1 spray into the nostril(s) as directed by provider Daily As Needed.     • hydrOXYzine (ATARAX) 50 MG tablet Take 50 mg by mouth Every 6 (Six) Hours As Needed for Anxiety.     • loratadine (CLARITIN) 10 MG tablet Take 10 mg by mouth Daily.     • metoprolol tartrate (LOPRESSOR) 25 MG tablet Take 1 tablet by mouth 2 (Two) Times a Day. 180 tablet 3   • Multiple Vitamins-Minerals (CENTRUM SILVER 50+WOMEN) tablet      • mupirocin (BACTROBAN) 2 % ointment Apply 1  application topically to the appropriate area as directed Daily. 30 g 5   • Progesterone (PROMETRIUM) 100 MG capsule Take 1 capsule by mouth every night at bedtime.     • Prucalopride Succinate (Motegrity) 2 MG tablet Take 1 tablet by mouth Daily. 30 tablet 3   • rosuvastatin (CRESTOR) 10 MG tablet Take 10 mg by mouth Every Night.     • spironolactone (ALDACTONE) 50 MG tablet Take 1 tablet by mouth Daily. 90 tablet 2   • vilazodone (VIIBRYD) 20 MG tablet tablet Take 20 mg by mouth Daily.       Current Facility-Administered Medications on File Prior to Visit   Medication Dose Route Frequency Provider Last Rate Last Admin   • [COMPLETED] gadobenate dimeglumine (MULTIHANCE) injection 20 mL  20 mL Intravenous Once in imaging Charleen Torres MD   20 mL at 01/31/23 1352   • [DISCONTINUED] amoxicillin-clavulanate (AUGMENTIN) 875-125 MG per tablet 1 tablet  1 tablet Oral Q12H Charleen Torres MD          No Known Allergies   Past Medical History:   Diagnosis Date   • A-fib ([facility]) 2016    REMOTE HISTORY AFTER VALVE    • Anemia    • Anxiety    • Aortic valve regurgitation 2016    VALVE REPLACEMENT    • Charcot-Rebekah-Tooth disease    • CHF (congestive heart failure) (Formerly [facility]) 2016   • Colon polyp    • Constipation    • Controlled type 2 diabetes mellitus without complication ([facility])     not medicated??? not sure if type 2-  borderline diabetes    • COPD (chronic obstructive pulmonary disease) ([facility])    • Diabetic neuropathy ([facility])    • Diabetic Ulcer    • Dysfunctional uterine bleeding    • Endocarditis 2016   • GERD (gastroesophageal reflux disease)    • Hypertension    • Liver cirrhosis secondary to MCKEON (CMS/HCC)    • Liver disease    • Low back pain    • Lumbosacral disc disease 5/2009   • MRSA infection 09/2015    TOE   • Primary Osteoarthritis of both hips    • Sepsis ([facility])    • Sleep apnea     RESOLVED - PER PATIENT    • SOB (shortness of breath) on exertion    • Streptococcal arthritis of right hip ([facility])    • Subacute  osteomyelitis of left foot (HCC)    • Transfusion history     PATIENT DENIES REACTION   • Wears reading eyeglasses      Past Surgical History:   Procedure Laterality Date   • AMPUTATION DIGIT Right 01/30/2018    Procedure: AMPUTATION RIGHT 2ND TOE;  Surgeon: Charleen Torres MD;  Location:  [facility] OR;  Service:    • AMPUTATION DIGIT Left 12/06/2022    Procedure: amputate left great toe at DIP joint, flexor tenotomies toes 2,3,4,5;  Surgeon: Charleen Torres MD;  Location:  [facility];  Service: Orthopedics;  Laterality: Left;   • AORTIC VALVE REPAIR/REPLACEMENT N/A 08/30/2016    Procedure: AIRAM, MEDIAN STERNOTOMY, AORTIC VALVE REPLACEMENT;  Surgeon: Mauricio Grove MD;  Location:  [facility];  Service:    • BLADDER SLING MODIFIED, ANTERIOR AND POSTERIOR VAGINAL REPAIR     • CARDIAC CATHETERIZATION N/A 08/25/2016    Procedure: Left Heart Cath;  Surgeon: Tyler Peterson MD;  Location:  [facility] INVASIVE [facility];  Service:    • CARDIAC CATHETERIZATION N/A 03/21/2018    Procedure: Coronary angiography;  Surgeon: Tyler Peterson IV, MD;  Location:  [facility] CATH INVASIVE LOCATION;  Service: Cardiovascular   • COLONOSCOPY     • DILATATION AND CURETTAGE      of uterus   • ENDOSCOPY     • FOOT SURGERY Left     for osteomyelitis   • HARDWARE REMOVAL Right 03/15/2021    Procedure: REMOVAL OF CERCLAGE WIRE OF RIGHT PROXIMAL FEMUR - RIGHT;  Surgeon: Owen Siu MD;  Location:  [facility];  Service: Orthopedics;  Laterality: Right;   • INCISION AND DRAINAGE HIP Right 01/26/2017    Procedure: RIGHT HIP WOUND WASHOUT AND DEBRIDEMENT, WITH POLYETHELENE FEMORAL HEAD EXCHANGE ;  Surgeon: Owen Siu MD;  Location:  [facility] OR;  Service:    • JOINT REPLACEMENT  3/2017    Right hip   • PERICARDIAL WINDOW N/A 09/16/2016    Procedure: PERICARDIOCENTESIS WITH PLACEMENT OF DRAINAGE TUBE .;  Surgeon: Mauricio Grove MD;  Location:  [facility];  Service:    • PERICARDIAL WINDOW N/A 09/20/2016    Procedure: SUBXYPHOID PERICARDIAL  WINDOW;  Surgeon: Mauricio Grove MD;  Location:  [facility];  Service:    • OR CLTX HIP DISLOCATION TRAUMATIC W/O ANESTHESIA N/A 2017    Procedure: HIP CLOSED REDUCTION;  Surgeon: Leander Li MD;  Location:  [facility];  Service: Orthopedics   • THORACOTOMY Left 10/05/2016    Procedure: THORACOTOMY Pericardectomy,  AIRAM,  Placement pacing leads, Pump standby;  Surgeon: Mauricio Grove MD;  Location:  [facility];  Service:    • TOTAL HIP ARTHROPLASTY Right 2017    Procedure: RIGHT TOTAL HIP ARTHROPLASTY ANTERIOR ;  Surgeon: Owen Siu MD;  Location:  [facility];  Service:    • TOTAL HIP ARTHROPLASTY REVISION Right 2017    Procedure: REPEAT WOUND DEBRIDEMENT ACETABULAR REVISION;  Surgeon: Owen Siu MD;  Location:  [facility];  Service:    • UPPER GASTROINTESTINAL ENDOSCOPY     • WISDOM TOOTH EXTRACTION      all 4     Family History   Problem Relation Age of Onset   • Diabetes Mother    • Heart disease Mother    • Hypertension Mother    • Osteoarthritis Mother    • Diabetes Sister    • Arthritis Sister    • No Known Problems Father    • Diabetes Sister    • Diabetes Sister    • Heart disease Other    • Heart attack Other    • Heart disease Other    • Hypertension Other    • Osteoarthritis Other    • Rheum arthritis Other    • Cancer Neg Hx       Social History     Socioeconomic History   • Marital status:    • Number of children: 0   Tobacco Use   • Smoking status: Former     Packs/day: 0.50     Years: 15.00     Pack years: 7.50     Types: Cigarettes     Quit date: 2015     Years since quittin.3   • Smokeless tobacco: Never   Vaping Use   • Vaping Use: Some days   • Substances: THC   Substance and Sexual Activity   • Alcohol use: Not Currently   • Drug use: Yes     Types: Marijuana     Comment: THC gummies occasionally for pain   • Sexual activity: Not Currently     Partners: Male     Birth control/protection: Abstinence        Review of Systems   Constitutional: Negative.     HENT: Negative.    Eyes: Negative.    Respiratory: Negative.    Cardiovascular: Negative.    Gastrointestinal: Negative.    Endocrine: Negative.    Genitourinary: Negative.    Musculoskeletal: Positive for arthralgias.   Skin: Negative.    Allergic/Immunologic: Negative.    Neurological: Negative.    Hematological: Negative.    Psychiatric/Behavioral: Negative.        The following portions of the patient's history were reviewed and updated as appropriate: allergies, current medications, past family history, past medical history, past social history, past surgical history and problem list.    Physical Exam:   There were no vitals taken for this visit.  The left great toe amputation site is fully healed, the Charcot midfoot is less swollen and warm, there is no open wounds, the boot fits well no displacement present of the foot    Medical Decision Making    Data Review:   reviewed prior lab results, reviewed radiology images and reviewed radiology results    Assessment/Plan/Diagnosis/Treatment Options:   1. Charcot's joint of left foot  New Charcot midfoot.  No displacement present.  I think we caught this early and hopefully can prevent displacement.  She needs to stay in the boot.  I think she may stop the Augmentin as above.  She has an appointment in April and I will see her then with standing 2 views of the foot, she understands to come in sooner if she has any worsening problem.  The toe amputation site is fully healed.  Given her dense neuropathy and the prior toe amputations, unfortunately she is still in a high risk group for more proximal amputations.              Part of this encounter note is an electronic transcription/translation of spoken language to printed text. The electronic translation of spoken language may permit erroneous, or at times, nonsensical words or phrases to be inadvertently transcribed; Although I have reviewed the note for such errors, some may still exist.

## 2023-02-15 DIAGNOSIS — K21.9 GASTROESOPHAGEAL REFLUX DISEASE, UNSPECIFIED WHETHER ESOPHAGITIS PRESENT: ICD-10-CM

## 2023-02-15 RX ORDER — ESOMEPRAZOLE MAGNESIUM 40 MG/1
40 CAPSULE, DELAYED RELEASE ORAL DAILY
Qty: 90 CAPSULE | Refills: 1 | Status: SHIPPED | OUTPATIENT
Start: 2023-02-15 | End: 2023-03-12 | Stop reason: SDUPTHER

## 2023-03-12 DIAGNOSIS — K21.9 GASTROESOPHAGEAL REFLUX DISEASE, UNSPECIFIED WHETHER ESOPHAGITIS PRESENT: ICD-10-CM

## 2023-03-13 RX ORDER — ESOMEPRAZOLE MAGNESIUM 40 MG/1
40 CAPSULE, DELAYED RELEASE ORAL DAILY
Qty: 90 CAPSULE | Refills: 1 | Status: SHIPPED | OUTPATIENT
Start: 2023-03-13 | End: 2023-03-22

## 2023-03-20 NOTE — PROGRESS NOTES
"    Cardiology Outpatient Visit      Identification: Danae Fong is a 52 y.o. female who resides in [place], [place].    Reason for visit:  Status post aortic valve replacement with bioprosthetic dannie      Subjective      Carla returns to the office today.  She is doing well and denies any new complaints.  She is dealing with diabetic foot ulcer on the left side.  She denies claudication symptoms in the legs.    Patient denies orthopnea, PND, or unexpected shortness of breath.  No fevers or chills.    Review of Systems   Cardiovascular: Negative.        No Known Allergies      Current Outpatient Medications   Medication Instructions   • amoxicillin (AMOXIL) 2,000 mg, Oral, See Admin Instructions, 4 capsules 1 hour prior to procedure   • aspirin 81 mg, Oral, Daily   • Biotin 72481 MCG tablet 1 tablet, Oral, Daily   • bumetanide (BUMEX) 1 mg, Oral, 2 Times Daily   • DULoxetine (CYMBALTA) 30 mg, Oral, Daily   • esomeprazole (NEXIUM) 40 mg, Oral, Daily   • fluticasone (FLONASE) 50 MCG/ACT nasal spray 1 spray, Nasal, Daily PRN   • hydrOXYzine (ATARAX) 50 mg, Oral, Every 6 Hours PRN   • loratadine (CLARITIN) 10 mg, Oral, Daily   • metoprolol tartrate (LOPRESSOR) 25 mg, Oral, 2 Times Daily   • Motegrity 2 mg, Oral, Daily   • Multiple Vitamins-Minerals (CENTRUM SILVER 50+WOMEN) tablet No dose, route, or frequency recorded.   • mupirocin (BACTROBAN) 2 % ointment 1 application, Topical, Daily   • Psyllium (Metamucil) wafer wafer 2 wafers, Oral, Daily   • rosuvastatin (CRESTOR) 10 mg, Oral, Nightly   • spironolactone (ALDACTONE) 50 mg, Oral, Daily   • vilazodone (VIIBRYD) 20 mg, Oral, Daily         Objective     /79 (BP Location: Right arm, Patient Position: Sitting)   Pulse 57   Ht 185.4 cm (73\")   Wt 126 kg (278 lb)   SpO2 95%   BMI 36.68 kg/m²       Constitutional:       Appearance: Healthy appearance.   Eyes:      General: No scleral icterus.  Neck:      Thyroid: No thyroid mass.      Vascular: No carotid " bruit or JVD. JVD normal.   Pulmonary:      Effort: Pulmonary effort is normal.      Breath sounds: Normal breath sounds.   Cardiovascular:      Normal rate. Regular rhythm.      Murmurs: There is a grade 1/6 harsh midsystolic murmur at the URSB, radiating to the neck.      No gallop.   Skin:     General: Skin is warm. There is no cyanosis.   Neurological:      General: No focal deficit present.      Mental Status: Alert.   Psychiatric:         Attention and Perception: Attention normal.         Result Review  (reviewed with patient):            Lab Results   Component Value Date    GLUCOSE 147 (H) 01/27/2023    BUN 9 01/27/2023    CREATININE 0.99 01/27/2023    EGFR 68.7 01/27/2023    BCR 9.1 01/27/2023    K 4.1 01/27/2023    CO2 31.0 (H) 01/27/2023    CALCIUM 9.8 01/27/2023    PROTENTOTREF 7.0 08/23/2016    ALBUMIN 4.4 01/27/2023    BILITOT 0.3 01/27/2023    AST 46 (H) 01/27/2023    ALT 48 (H) 01/27/2023     Lab Results   Component Value Date    WBC 8.12 01/27/2023    HGB 15.5 01/27/2023    HCT 46.1 01/27/2023    MCV 85.8 01/27/2023     01/27/2023       Lab Results   Component Value Date    HGBA1C 6.30 (H) 12/01/2022           Assessment     Diagnoses and all orders for this visit:    1. Status post aortic valve replacement with bioprosthetic valve (Primary)  Overview:  · Severe nonrheumatic aortic insufficiency by AIRAM, 8/24/2016  · Bioprosthetic AVR (25 mm Magna Ease) by Dr. Mauricio Grove, 8/30/3016  · Initial possibility of vegetation turned out to be a flail leaflet  · Echo (12/2/2017): Normal functioning bioprosthetic valve.  Mild MR.  LVEF 50-55%  · Echo (2/6/18): LVEF 55%. Normal functioning bioprosthetic aortic valve  · Echo (1/14/2020): LVEF 60%. Bioprosthetic aortic valve well-seated and functions normally.  Mean gradient 14 mmHg  · Echo (7/19/2021): LVEF 65%. Bioprosthetic valve with mean gradient 17 mmHg      Assessment & Plan:  · No symptoms of heart failure or infection  · Repeat echo next  year  · SBE prophylaxis prior to dental cleanings  · Continue low-dose aspirin    Orders:  -     aspirin 81 MG EC tablet; Take 1 tablet by mouth Daily.  Dispense: 90 tablet; Refill: 3  -     Adult Transthoracic Echo Complete W/ Cont if Necessary Per Protocol; Future  -     amoxicillin (AMOXIL) 500 MG capsule; Take 4 capsules by mouth See Admin Instructions. 4 capsules 1 hour prior to procedure  Dispense: 12 capsule; Refill: 0    2. Atypical chest pain  Overview:  · Cardiac cath (8/25/2016): Normal coronaries  · MPS (3/8/2018):Small-sized, moderately severe area of ischemia located in the apex. This area of ischemia affects 3% of the myocardium.  Normal LVEF 55%\  · MPS (3/21/2018): Normal coronary arteries      3. Diabetic ulcer of other part of left foot associated with diabetes mellitus due to underlying condition, with necrosis of bone (HCC)  -     Doppler Arterial Multi Level Lower Extremity - Bilateral CAR; Future    4. Hyperlipidemia LDL goal <100  Overview:  · Statin therapy indicated due to diabetic status    Assessment & Plan:  · Continue rosuvastatin     Orders:  -     rosuvastatin (CRESTOR) 10 MG tablet; Take 1 tablet by mouth Every Night.  Dispense: 90 tablet; Refill: 3    5. Chronic diastolic congestive heart failure (HCC)  Overview:  · Echo (10/10/16): Normal LVEF.  Bioprosthetic aortic valve functioning normally.  · Echo (12/2017): LVEF 50-55%.  Bioprosthetic aortic valve functioning normally  · Echo (1/14/2020): LVEF 60%.  Bioprosthetic aortic valve well-seated and functions normally.  Mean gradient 14 mmHg  · Echo (7/19/2021): LVEF 65%. Bioprosthetic valve with mean gradient 17 mmHg      Assessment & Plan:  · Stage C HFrEF with stable NYHA class II symptoms  · Consider SGL 2 inhibitor therapy for diabetes and HFpEF     Orders:  -     bumetanide (BUMEX) 1 MG tablet; Take 1 tablet by mouth 2 (Two) Times a Day.        Plan   • Continue present medical therapy  • Consider SGL 2 inhibitor therapy for  diabetes and HFpEF  • Obtain ABIs to assess arterial insufficiency of left leg  • Echocardiogram next year for bioprosthetic aortic valve surveillance      Follow-up   Return in about 1 year (around 3/21/2024) for Follow-up with cardiology APRN/PA.        Chapin Peterson MD, FACC, [facility]  3/21/2023

## 2023-03-21 ENCOUNTER — OFFICE VISIT (OUTPATIENT)
Dept: CARDIOLOGY | Facility: CLINIC | Age: 53
End: 2023-03-21
Payer: MEDICARE

## 2023-03-21 ENCOUNTER — TELEPHONE (OUTPATIENT)
Dept: GASTROENTEROLOGY | Facility: CLINIC | Age: 53
End: 2023-03-21
Payer: MEDICARE

## 2023-03-21 VITALS
WEIGHT: 278 LBS | SYSTOLIC BLOOD PRESSURE: 110 MMHG | OXYGEN SATURATION: 95 % | HEART RATE: 57 BPM | DIASTOLIC BLOOD PRESSURE: 79 MMHG | HEIGHT: 72 IN | BODY MASS INDEX: 37.65 KG/M2

## 2023-03-21 DIAGNOSIS — Z95.3 STATUS POST AORTIC VALVE REPLACEMENT WITH BIOPROSTHETIC VALVE: Primary | ICD-10-CM

## 2023-03-21 DIAGNOSIS — E08.621 DIABETIC ULCER OF OTHER PART OF LEFT FOOT ASSOCIATED WITH DIABETES MELLITUS DUE TO UNDERLYING CONDITION, WITH NECROSIS OF BONE: ICD-10-CM

## 2023-03-21 DIAGNOSIS — R07.89 ATYPICAL CHEST PAIN: ICD-10-CM

## 2023-03-21 DIAGNOSIS — I50.32 CHRONIC DIASTOLIC CONGESTIVE HEART FAILURE: ICD-10-CM

## 2023-03-21 DIAGNOSIS — L97.524 DIABETIC ULCER OF OTHER PART OF LEFT FOOT ASSOCIATED WITH DIABETES MELLITUS DUE TO UNDERLYING CONDITION, WITH NECROSIS OF BONE: ICD-10-CM

## 2023-03-21 DIAGNOSIS — E78.5 HYPERLIPIDEMIA LDL GOAL <100: ICD-10-CM

## 2023-03-21 PROBLEM — R00.2 PALPITATIONS: Status: RESOLVED | Noted: 2020-08-04 | Resolved: 2023-03-21

## 2023-03-21 PROBLEM — Z98.890 S/P HARDWARE REMOVAL: Status: RESOLVED | Noted: 2021-03-15 | Resolved: 2023-03-21

## 2023-03-21 PROBLEM — IMO0001 CLASS 3 OBESITY DUE TO EXCESS CALORIES IN ADULT: Status: RESOLVED | Noted: 2018-03-21 | Resolved: 2023-03-21

## 2023-03-21 PROCEDURE — 99214 OFFICE O/P EST MOD 30 MIN: CPT | Performed by: INTERNAL MEDICINE

## 2023-03-21 PROCEDURE — 3078F DIAST BP <80 MM HG: CPT | Performed by: INTERNAL MEDICINE

## 2023-03-21 PROCEDURE — 1159F MED LIST DOCD IN RCRD: CPT | Performed by: INTERNAL MEDICINE

## 2023-03-21 PROCEDURE — 1160F RVW MEDS BY RX/DR IN RCRD: CPT | Performed by: INTERNAL MEDICINE

## 2023-03-21 PROCEDURE — 3074F SYST BP LT 130 MM HG: CPT | Performed by: INTERNAL MEDICINE

## 2023-03-21 RX ORDER — BUMETANIDE 1 MG/1
1 TABLET ORAL 2 TIMES DAILY
Start: 2023-03-21

## 2023-03-21 RX ORDER — ASPIRIN 81 MG/1
81 TABLET ORAL DAILY
Qty: 90 TABLET | Refills: 3 | Status: SHIPPED | OUTPATIENT
Start: 2023-03-21

## 2023-03-21 RX ORDER — AMOXICILLIN 500 MG/1
2000 CAPSULE ORAL SEE ADMIN INSTRUCTIONS
Qty: 12 CAPSULE | Refills: 0 | Status: SHIPPED | OUTPATIENT
Start: 2023-03-21

## 2023-03-21 RX ORDER — ROSUVASTATIN CALCIUM 10 MG/1
10 TABLET, COATED ORAL NIGHTLY
Qty: 90 TABLET | Refills: 3 | Status: SHIPPED | OUTPATIENT
Start: 2023-03-21

## 2023-03-21 RX ORDER — PSYLLIUM SEED (WITH DEXTROSE)
2 POWDER (GRAM) ORAL DAILY
COMMUNITY

## 2023-03-21 NOTE — ASSESSMENT & PLAN NOTE
· Stage C HFrEF with stable NYHA class II symptoms  · Consider SGL 2 inhibitor therapy for diabetes and HFpEF

## 2023-03-21 NOTE — ASSESSMENT & PLAN NOTE
· No symptoms of heart failure or infection  · Repeat echo next year  · SBE prophylaxis prior to dental cleanings  · Continue low-dose aspirin

## 2023-03-21 NOTE — TELEPHONE ENCOUNTER
Dr Lee,  [facility] sent over a request for alternative for Esomeprazole 40 mg once daily. Insurance will not cover medication anymore. Thanks

## 2023-03-22 ENCOUNTER — PATIENT MESSAGE (OUTPATIENT)
Dept: GASTROENTEROLOGY | Facility: CLINIC | Age: 53
End: 2023-03-22
Payer: MEDICARE

## 2023-03-22 DIAGNOSIS — K21.00 GASTROESOPHAGEAL REFLUX DISEASE WITH ESOPHAGITIS WITHOUT HEMORRHAGE: Primary | ICD-10-CM

## 2023-03-22 DIAGNOSIS — K59.04 CHRONIC IDIOPATHIC CONSTIPATION: Primary | ICD-10-CM

## 2023-03-22 RX ORDER — DEXLANSOPRAZOLE 60 MG/1
60 CAPSULE, DELAYED RELEASE ORAL DAILY
Qty: 30 CAPSULE | Refills: 5 | Status: SHIPPED | OUTPATIENT
Start: 2023-03-22 | End: 2023-04-21

## 2023-03-22 NOTE — TELEPHONE ENCOUNTER
I SPOKE WITH MS JAMES. PATIENT AGREED TO CALL INSURANCE TO SEE WHAT PPI THEY WILL COVER. SHE WILL SEND US A [employer] MESSAGE BACK.

## 2023-03-22 NOTE — TELEPHONE ENCOUNTER
From: Danae Fong  To: Amos Lee  Sent: 3/22/2023 11:39 AM EDT  Subject: Formulary for replacement prescription     I got am email from my insurance with the formulary drug chart. I sent page with choices . Thanks

## 2023-04-03 ENCOUNTER — TELEPHONE (OUTPATIENT)
Dept: ORTHOPEDIC SURGERY | Facility: CLINIC | Age: 53
End: 2023-04-03
Payer: MEDICARE

## 2023-05-01 ENCOUNTER — TELEPHONE (OUTPATIENT)
Dept: ORTHOPEDIC SURGERY | Facility: CLINIC | Age: 53
End: 2023-05-01

## 2023-05-10 ENCOUNTER — OFFICE VISIT (OUTPATIENT)
Dept: ORTHOPEDIC SURGERY | Facility: CLINIC | Age: 53
End: 2023-05-10
Payer: MEDICARE

## 2023-05-10 VITALS
SYSTOLIC BLOOD PRESSURE: 140 MMHG | WEIGHT: 276 LBS | DIASTOLIC BLOOD PRESSURE: 90 MMHG | HEIGHT: 72 IN | BODY MASS INDEX: 37.38 KG/M2

## 2023-05-10 DIAGNOSIS — M14.672 CHARCOT'S JOINT OF LEFT FOOT: Primary | ICD-10-CM

## 2023-05-10 PROCEDURE — 99213 OFFICE O/P EST LOW 20 MIN: CPT | Performed by: ORTHOPAEDIC SURGERY

## 2023-05-10 NOTE — PROGRESS NOTES
ESTABLISHED PATIENT    Patient: Danae Fong  : 1970    Primary Care Provider: Leigh Casey MD    Requesting Provider: As above    Follow-up (3 month f/u; Charcot's joint of left foot)      History    Chief Complaint: Left Charcot foot    History of Present Illness: She returns for follow-up of the new Charcot changes superimposed on prior Charcot talonavicular and calcaneocuboid joint.  She has been wearing the boot.  She reports it is feeling a lot better.  It still sometimes swells.  She reports her glucose is under better control, A1c is 5.5.    Current Outpatient Medications on File Prior to Visit   Medication Sig Dispense Refill   • aspirin 81 MG EC tablet Take 1 tablet by mouth Daily. 90 tablet 3   • Biotin 56863 MCG tablet Take 1 tablet by mouth Daily.     • bumetanide (BUMEX) 1 MG tablet Take 1 tablet by mouth 2 (Two) Times a Day.     • DULoxetine (CYMBALTA) 30 MG capsule Take 1 capsule by mouth Daily.     • fluticasone (FLONASE) 50 MCG/ACT nasal spray 1 spray into the nostril(s) as directed by provider Daily As Needed.     • hydrOXYzine (ATARAX) 50 MG tablet Take 1 tablet by mouth Every 6 (Six) Hours As Needed for Anxiety.     • linaclotide (Linzess) 145 MCG capsule capsule Take 1 capsule by mouth Every Morning Before Breakfast. 30 capsule 3   • loratadine (CLARITIN) 10 MG tablet Take 1 tablet by mouth Daily.     • metoprolol tartrate (LOPRESSOR) 25 MG tablet Take 1 tablet by mouth 2 (Two) Times a Day. 180 tablet 3   • Multiple Vitamins-Minerals (CENTRUM SILVER 50+WOMEN) tablet      • Psyllium (Metamucil) wafer wafer Take 2 wafers by mouth Daily.     • rosuvastatin (CRESTOR) 10 MG tablet Take 1 tablet by mouth Every Night. 90 tablet 3   • spironolactone (ALDACTONE) 50 MG tablet Take 1 tablet by mouth Daily. 90 tablet 2   • vilazodone (VIIBRYD) 20 MG tablet tablet Take 1 tablet by mouth Daily.     • [DISCONTINUED] amoxicillin (AMOXIL) 500 MG capsule Take 4 capsules by mouth See Admin  Instructions. 4 capsules 1 hour prior to procedure 12 capsule 0   • [DISCONTINUED] mupirocin (BACTROBAN) 2 % ointment Apply 1 application topically to the appropriate area as directed Daily. 30 g 5     No current facility-administered medications on file prior to visit.      No Known Allergies   Past Medical History:   Diagnosis Date   • A-fib 2016    REMOTE HISTORY AFTER VALVE    • Anemia    • Anxiety    • Aortic valve regurgitation 2016    VALVE REPLACEMENT    • Charcot-Rebekah-Tooth disease    • CHF (congestive heart failure) 2016   • Colon polyp    • Constipation    • Controlled type 2 diabetes mellitus without complication     not medicated??? not sure if type 2-  borderline diabetes    • COPD (chronic obstructive pulmonary disease)    • Diabetic neuropathy    • Diabetic Ulcer    • Dysfunctional uterine bleeding    • Endocarditis 2016   • GERD (gastroesophageal reflux disease)    • Hip arthrosis 3/2016   • Hyperlipidemia LDL goal <100 03/21/2023   • Hypertension    • Liver cirrhosis secondary to MCKEON (CMS/HCC)    • Liver disease    • Low back pain    • Lumbosacral disc disease 05/2009   • MRSA infection 09/2015    TOE   • Paroxysmal atrial fibrillation 09/01/2016    · Postoperative afib after AVR · Chads Vasc= 2 · 2-week monitor (10/7/2020): Predominantly normal sinus rhythm without atrial fibrillation/flutter.  1% PACs and PVCs.  Lightheadedness associated with heart rates in the 50 bpm. · 30 day monitor (10/2021): NSR with occasional PVC's and PAC's 1% burden.  Symptoms correspond to Sinus tach   • Primary Osteoarthritis of both hips    • S/P hardware removal( painful circlage right proximal femur/right hip) 03/15/2021   • Sepsis    • Sleep apnea     RESOLVED - PER PATIENT    • SOB (shortness of breath) on exertion    • Streptococcal arthritis of right hip    • Subacute osteomyelitis of left foot    • Transfusion history     PATIENT DENIES REACTION   • Wears reading eyeglasses      Past Surgical History:    Procedure Laterality Date   • AMPUTATION DIGIT Right 01/30/2018    Procedure: AMPUTATION RIGHT 2ND TOE;  Surgeon: Charleen Torres MD;  Location:  [facility];  Service:    • AMPUTATION DIGIT Left 12/06/2022    Procedure: amputate left great toe at DIP joint, flexor tenotomies toes 2,3,4,5;  Surgeon: Charleen Torres MD;  Location:  [facility];  Service: Orthopedics;  Laterality: Left;   • AORTIC VALVE REPAIR/REPLACEMENT N/A 08/30/2016    Procedure: AIRAM, MEDIAN STERNOTOMY, AORTIC VALVE REPLACEMENT;  Surgeon: Mauricio Grove MD;  Location:  [facility];  Service:    • BLADDER SLING MODIFIED, ANTERIOR AND POSTERIOR VAGINAL REPAIR     • CARDIAC CATHETERIZATION N/A 08/25/2016    Procedure: Left Heart Cath;  Surgeon: Tyler Peterson MD;  Location:  [facility] INVASIVE LOCATION;  Service:    • CARDIAC CATHETERIZATION N/A 03/21/2018    Procedure: Coronary angiography;  Surgeon: Tyler Peterson IV, MD;  Location:  [facility] CATH INVASIVE LOCATION;  Service: Cardiovascular   • COLONOSCOPY     • DILATATION AND CURETTAGE      of uterus   • ENDOSCOPY     • FOOT SURGERY Left     for osteomyelitis   • HARDWARE REMOVAL Right 03/15/2021    Procedure: REMOVAL OF CERCLAGE WIRE OF RIGHT PROXIMAL FEMUR - RIGHT;  Surgeon: Owen Siu MD;  Location:  [facility];  Service: Orthopedics;  Laterality: Right;   • INCISION AND DRAINAGE HIP Right 01/26/2017    Procedure: RIGHT HIP WOUND WASHOUT AND DEBRIDEMENT, WITH POLYETHELENE FEMORAL HEAD EXCHANGE ;  Surgeon: Owen Siu MD;  Location:  [facility];  Service:    • JOINT REPLACEMENT  3/2017    Right hip   • PERICARDIAL WINDOW N/A 09/16/2016    Procedure: PERICARDIOCENTESIS WITH PLACEMENT OF DRAINAGE TUBE .;  Surgeon: Mauricio Grove MD;  Location:  [facility];  Service:    • PERICARDIAL WINDOW N/A 09/20/2016    Procedure: SUBXYPHOID PERICARDIAL WINDOW;  Surgeon: Mauricio Grove MD;  Location:  [facility];  Service:    • CO CLTX HIP DISLOCATION TRAUMATIC W/O ANESTHESIA N/A 01/29/2017     Procedure: HIP CLOSED REDUCTION;  Surgeon: Leander Li MD;  Location:  [facility];  Service: Orthopedics   • THORACOTOMY Left 10/05/2016    Procedure: THORACOTOMY Pericardectomy,  AIRAM,  Placement pacing leads, Pump standby;  Surgeon: Mauricio Grove MD;  Location:  [facility];  Service:    • TOTAL HIP ARTHROPLASTY Right 2017    Procedure: RIGHT TOTAL HIP ARTHROPLASTY ANTERIOR ;  Surgeon: Owen Siu MD;  Location:  [facility];  Service:    • TOTAL HIP ARTHROPLASTY REVISION Right 2017    Procedure: REPEAT WOUND DEBRIDEMENT ACETABULAR REVISION;  Surgeon: Owen Siu MD;  Location:  [facility];  Service:    • UPPER GASTROINTESTINAL ENDOSCOPY     • WISDOM TOOTH EXTRACTION      all 4     Family History   Problem Relation Age of Onset   • Diabetes Mother    • Heart disease Mother    • Hypertension Mother    • Osteoarthritis Mother    • Osteoporosis Mother    • Diabetes Sister    • Arthritis Sister    • No Known Problems Father    • Diabetes Sister    • Diabetes Sister    • Heart disease Other    • Heart attack Other    • Heart disease Other    • Hypertension Other    • Osteoarthritis Other    • Rheum arthritis Other    • Cancer Neg Hx       Social History     Socioeconomic History   • Marital status:    • Number of children: 0   Tobacco Use   • Smoking status: Former     Packs/day: 0.50     Years: 15.00     Pack years: 7.50     Types: Cigarettes     Quit date: 2015     Years since quittin.6   • Smokeless tobacco: Never   Vaping Use   • Vaping Use: Some days   • Substances: THC   Substance and Sexual Activity   • Alcohol use: Not Currently   • Drug use: Yes     Types: Marijuana     Comment: THC gummies occasionally for pain   • Sexual activity: Not Currently     Partners: Male     Birth control/protection: Abstinence        Review of Systems   Constitutional: Negative.    HENT: Negative.    Eyes: Negative.    Respiratory: Negative.    Cardiovascular: Negative.    Gastrointestinal:  "Negative.    Endocrine: Negative.    Genitourinary: Negative.    Musculoskeletal: Positive for arthralgias.   Skin: Negative.    Allergic/Immunologic: Negative.    Neurological: Negative.    Hematological: Negative.    Psychiatric/Behavioral: Negative.        The following portions of the patient's history were reviewed and updated as appropriate: allergies, current medications, past family history, past medical history, past social history, past surgical history and problem list.    Physical Exam:   /90   Ht 185.4 cm (73\")   Wt 125 kg (276 lb)   BMI 36.41 kg/m²   No warmth or focal swelling in the left midfoot and hindfoot, no change in dense neuropathy, amputation site is well-healed    Medical Decision Making    Data Review:   ordered and reviewed x-rays today    Assessment/Plan/Diagnosis/Treatment Options:   1. Charcot's joint of left foot  I think she is in stage III Charcot arthropathy.  No new signs of new erosions on the x-ray.  I looked at her right foot also there is no ulcerations.  There is no preulcerative's lesions on either foot.  I will be happy to see her anytime  - XR Foot 3+ View Left                            "

## 2023-05-12 ENCOUNTER — HOSPITAL ENCOUNTER (OUTPATIENT)
Dept: CARDIOLOGY | Facility: HOSPITAL | Age: 53
Discharge: HOME | End: 2023-05-12
Payer: MEDICARE

## 2023-05-12 VITALS — HEIGHT: 72 IN | WEIGHT: 276 LBS | BODY MASS INDEX: 37.38 KG/M2

## 2023-05-12 DIAGNOSIS — Z95.3 STATUS POST AORTIC VALVE REPLACEMENT WITH BIOPROSTHETIC VALVE: ICD-10-CM

## 2023-05-12 DIAGNOSIS — L97.524 DIABETIC ULCER OF OTHER PART OF LEFT FOOT ASSOCIATED WITH DIABETES MELLITUS DUE TO UNDERLYING CONDITION, WITH NECROSIS OF BONE: ICD-10-CM

## 2023-05-12 DIAGNOSIS — E08.621 DIABETIC ULCER OF OTHER PART OF LEFT FOOT ASSOCIATED WITH DIABETES MELLITUS DUE TO UNDERLYING CONDITION, WITH NECROSIS OF BONE: ICD-10-CM

## 2023-05-12 LAB
BH CV LOWER ARTERIAL LEFT 2ND DIGIT SYS MAX: 138
BH CV LOWER ARTERIAL LEFT ABI RATIO: 1.08
BH CV LOWER ARTERIAL LEFT DORSALIS PEDIS SYS MAX: 125
BH CV LOWER ARTERIAL LEFT POST TIBIAL SYS MAX: 128
BH CV LOWER ARTERIAL LEFT TBI RATIO: 1.16
BH CV LOWER ARTERIAL RIGHT ABI RATIO: 1.08
BH CV LOWER ARTERIAL RIGHT DORSALIS PEDIS SYS MAX: 117
BH CV LOWER ARTERIAL RIGHT GREAT TOE SYS MAX: 131
BH CV LOWER ARTERIAL RIGHT POST TIBIAL SYS MAX: 129
BH CV LOWER ARTERIAL RIGHT TBI RATIO: 1.1
MAXIMAL PREDICTED HEART RATE: 168 BPM
STRESS TARGET HR: 143 BPM
UPPER ARTERIAL LEFT ARM BRACHIAL SYS MAX: 114 MMHG
UPPER ARTERIAL RIGHT ARM BRACHIAL SYS MAX: 119 MMHG

## 2023-05-12 PROCEDURE — 93923 UPR/LXTR ART STDY 3+ LVLS: CPT

## 2023-05-12 PROCEDURE — 93306 TTE W/DOPPLER COMPLETE: CPT | Performed by: INTERNAL MEDICINE

## 2023-05-12 PROCEDURE — 93306 TTE W/DOPPLER COMPLETE: CPT

## 2023-05-15 LAB
BH CV ECHO MEAS - AI P1/2T: 564.2 MSEC
BH CV ECHO MEAS - AO MAX PG: 19.9 MMHG
BH CV ECHO MEAS - AO MEAN PG: 13.4 MMHG
BH CV ECHO MEAS - AO ROOT DIAM: 3.3 CM
BH CV ECHO MEAS - AO V2 MAX: 223.2 CM/SEC
BH CV ECHO MEAS - AO V2 VTI: 43.9 CM
BH CV ECHO MEAS - AVA(I,D): 1.33 CM2
BH CV ECHO MEAS - EDV(CUBED): 153.4 ML
BH CV ECHO MEAS - EDV(MOD-SP2): 113 ML
BH CV ECHO MEAS - EDV(MOD-SP4): 170 ML
BH CV ECHO MEAS - EF(MOD-BP): 61 %
BH CV ECHO MEAS - EF(MOD-SP2): 64.6 %
BH CV ECHO MEAS - EF(MOD-SP4): 58.2 %
BH CV ECHO MEAS - ESV(CUBED): 31.1 ML
BH CV ECHO MEAS - ESV(MOD-SP2): 40 ML
BH CV ECHO MEAS - ESV(MOD-SP4): 71 ML
BH CV ECHO MEAS - FS: 41.2 %
BH CV ECHO MEAS - IVS/LVPW: 1.05 CM
BH CV ECHO MEAS - IVSD: 1.04 CM
BH CV ECHO MEAS - LA DIMENSION: 3.3 CM
BH CV ECHO MEAS - LV DIASTOLIC VOL/BSA (35-75): 68.9 CM2
BH CV ECHO MEAS - LV MASS(C)D: 208 GRAMS
BH CV ECHO MEAS - LV MAX PG: 3.2 MMHG
BH CV ECHO MEAS - LV MEAN PG: 1.7 MMHG
BH CV ECHO MEAS - LV SYSTOLIC VOL/BSA (12-30): 28.8 CM2
BH CV ECHO MEAS - LV V1 MAX: 89.3 CM/SEC
BH CV ECHO MEAS - LV V1 VTI: 17.2 CM
BH CV ECHO MEAS - LVIDD: 5.4 CM
BH CV ECHO MEAS - LVIDS: 3.1 CM
BH CV ECHO MEAS - LVOT AREA: 3.4 CM2
BH CV ECHO MEAS - LVOT DIAM: 2.08 CM
BH CV ECHO MEAS - LVPWD: 0.99 CM
BH CV ECHO MEAS - MV A MAX VEL: 50.3 CM/SEC
BH CV ECHO MEAS - MV DEC SLOPE: 174.6 CM/SEC2
BH CV ECHO MEAS - MV DEC TIME: 0.29 MSEC
BH CV ECHO MEAS - MV E MAX VEL: 46.9 CM/SEC
BH CV ECHO MEAS - MV E/A: 0.93
BH CV ECHO MEAS - MV MAX PG: 1.7 MMHG
BH CV ECHO MEAS - MV MEAN PG: 0.95 MMHG
BH CV ECHO MEAS - MV P1/2T: 113.9 MSEC
BH CV ECHO MEAS - MV V2 VTI: 30.8 CM
BH CV ECHO MEAS - MVA(P1/2T): 1.93 CM2
BH CV ECHO MEAS - MVA(VTI): 1.9 CM2
BH CV ECHO MEAS - PA ACC SLOPE: 494.1 CM/SEC2
BH CV ECHO MEAS - PA ACC TIME: 0.14 SEC
BH CV ECHO MEAS - PA PR(ACCEL): 15.6 MMHG
BH CV ECHO MEAS - RAP SYSTOLE: 3 MMHG
BH CV ECHO MEAS - RVSP: 22 MMHG
BH CV ECHO MEAS - SI(MOD-SP2): 29.6 ML/M2
BH CV ECHO MEAS - SI(MOD-SP4): 40.1 ML/M2
BH CV ECHO MEAS - SV(LVOT): 58.5 ML
BH CV ECHO MEAS - SV(MOD-SP2): 73 ML
BH CV ECHO MEAS - SV(MOD-SP4): 99 ML
BH CV ECHO MEAS - TAPSE (>1.6): 1.55 CM
BH CV ECHO MEAS - TR MAX PG: 18.5 MMHG
BH CV ECHO MEAS - TR MAX VEL: 215.3 CM/SEC
BH CV XLRA - RV BASE: 3.7 CM
BH CV XLRA - RV LENGTH: 8.2 CM
BH CV XLRA - RV MID: 2.5 CM
BH CV XLRA - TDI S': 6.9 CM/SEC
LEFT ATRIUM VOLUME INDEX: 12.1 ML/M2
LV EF 2D ECHO EST: 55 %
MAXIMAL PREDICTED HEART RATE: 168 BPM
STRESS TARGET HR: 143 BPM

## 2023-06-19 DIAGNOSIS — R19.7 DIARRHEA, UNSPECIFIED TYPE: Primary | ICD-10-CM

## 2023-11-06 ENCOUNTER — TELEPHONE (OUTPATIENT)
Dept: GASTROENTEROLOGY | Facility: CLINIC | Age: 53
End: 2023-11-06
Payer: MEDICARE

## 2023-11-06 NOTE — TELEPHONE ENCOUNTER
I called and spoke with Ms. Fong.  She is having to push in the vaginal wall to help expel stool.  This is consistent with a significant rectocele.  She had surgery in January of last year by Dr. Santos.  I recommend that the patient reach out to Dr. Santos for reevaluation at this time.  She may need consultation with urogynecologist.

## 2024-04-15 DIAGNOSIS — R00.2 PALPITATIONS: ICD-10-CM

## 2024-04-15 DIAGNOSIS — R07.89 ATYPICAL CHEST PAIN: ICD-10-CM

## 2024-04-15 RX ORDER — METOPROLOL TARTRATE 50 MG/1
50 TABLET, FILM COATED ORAL 2 TIMES DAILY
Qty: 180 TABLET | Refills: 3 | Status: SHIPPED | OUTPATIENT
Start: 2024-04-15

## 2024-04-29 ENCOUNTER — HOSPITAL ENCOUNTER (OUTPATIENT)
Dept: CARDIOLOGY | Facility: HOSPITAL | Age: 54
Discharge: HOME | End: 2024-04-29
Payer: MEDICARE

## 2024-04-29 ENCOUNTER — LAB (OUTPATIENT)
Dept: LAB | Facility: HOSPITAL | Age: 54
End: 2024-04-29
Payer: MEDICARE

## 2024-04-29 VITALS — WEIGHT: 275.57 LBS | BODY MASS INDEX: 37.33 KG/M2 | HEIGHT: 72 IN

## 2024-04-29 DIAGNOSIS — R00.2 PALPITATIONS: ICD-10-CM

## 2024-04-29 DIAGNOSIS — Z95.3 STATUS POST AORTIC VALVE REPLACEMENT WITH BIOPROSTHETIC VALVE: ICD-10-CM

## 2024-04-29 LAB
BH CV ECHO MEAS - AO MAX PG: 25.3 MMHG
BH CV ECHO MEAS - AO MEAN PG: 14 MMHG
BH CV ECHO MEAS - AO ROOT DIAM: 3.6 CM
BH CV ECHO MEAS - AO V2 MAX: 251.5 CM/SEC
BH CV ECHO MEAS - AO V2 VTI: 52 CM
BH CV ECHO MEAS - AVA(I,D): 1.46 CM2
BH CV ECHO MEAS - EDV(CUBED): 116.9 ML
BH CV ECHO MEAS - EDV(MOD-SP2): 118 ML
BH CV ECHO MEAS - EDV(MOD-SP4): 131 ML
BH CV ECHO MEAS - EF(MOD-BP): 64 %
BH CV ECHO MEAS - EF(MOD-SP2): 62.7 %
BH CV ECHO MEAS - EF(MOD-SP4): 64.1 %
BH CV ECHO MEAS - ESV(CUBED): 56.2 ML
BH CV ECHO MEAS - ESV(MOD-SP2): 44 ML
BH CV ECHO MEAS - ESV(MOD-SP4): 47 ML
BH CV ECHO MEAS - FS: 21.7 %
BH CV ECHO MEAS - IVS/LVPW: 0.94 CM
BH CV ECHO MEAS - IVSD: 1.07 CM
BH CV ECHO MEAS - LA DIMENSION: 3.6 CM
BH CV ECHO MEAS - LAT PEAK E' VEL: 12.4 CM/SEC
BH CV ECHO MEAS - LV DIASTOLIC VOL/BSA (35-75): 53.1 CM2
BH CV ECHO MEAS - LV MASS(C)D: 201.1 GRAMS
BH CV ECHO MEAS - LV MAX PG: 3.8 MMHG
BH CV ECHO MEAS - LV MEAN PG: 2 MMHG
BH CV ECHO MEAS - LV SYSTOLIC VOL/BSA (12-30): 19 CM2
BH CV ECHO MEAS - LV V1 MAX: 96.5 CM/SEC
BH CV ECHO MEAS - LV V1 VTI: 22 CM
BH CV ECHO MEAS - LVIDD: 4.9 CM
BH CV ECHO MEAS - LVIDS: 3.8 CM
BH CV ECHO MEAS - LVOT AREA: 3.5 CM2
BH CV ECHO MEAS - LVOT DIAM: 2.1 CM
BH CV ECHO MEAS - LVPWD: 1.14 CM
BH CV ECHO MEAS - MED PEAK E' VEL: 6.02 CM/SEC
BH CV ECHO MEAS - MV A MAX VEL: 55.3 CM/SEC
BH CV ECHO MEAS - MV DEC SLOPE: 209 CM/SEC2
BH CV ECHO MEAS - MV DEC TIME: 0.27 SEC
BH CV ECHO MEAS - MV E MAX VEL: 73.1 CM/SEC
BH CV ECHO MEAS - MV E/A: 1.32
BH CV ECHO MEAS - MV MAX PG: 1.85 MMHG
BH CV ECHO MEAS - MV MEAN PG: 1 MMHG
BH CV ECHO MEAS - MV P1/2T: 95.6 MSEC
BH CV ECHO MEAS - MV V2 VTI: 26.8 CM
BH CV ECHO MEAS - MVA(P1/2T): 2.3 CM2
BH CV ECHO MEAS - MVA(VTI): 2.8 CM2
BH CV ECHO MEAS - PA ACC SLOPE: 668 CM/SEC2
BH CV ECHO MEAS - PA ACC TIME: 0.12 SEC
BH CV ECHO MEAS - PI END-D VEL: 110.5 CM/SEC
BH CV ECHO MEAS - SV(LVOT): 76 ML
BH CV ECHO MEAS - SV(MOD-SP2): 74 ML
BH CV ECHO MEAS - SV(MOD-SP4): 84 ML
BH CV ECHO MEAS - SVI(LVOT): 30.8 ML/M2
BH CV ECHO MEAS - SVI(MOD-SP2): 30 ML/M2
BH CV ECHO MEAS - SVI(MOD-SP4): 34 ML/M2
BH CV ECHO MEAS - TAPSE (>1.6): 2.1 CM
BH CV ECHO MEASUREMENTS AVERAGE E/E' RATIO: 7.94
BH CV VAS BP RIGHT ARM: NORMAL MMHG
BH CV XLRA - RV BASE: 4.9 CM
BH CV XLRA - RV LENGTH: 9.1 CM
BH CV XLRA - RV MID: 3.8 CM
BH CV XLRA - TDI S': 7.64 CM/SEC
LEFT ATRIUM VOLUME INDEX: 15 ML/M2
LV EF 2D ECHO EST: 65 %

## 2024-04-29 PROCEDURE — 93306 TTE W/DOPPLER COMPLETE: CPT

## 2024-04-29 PROCEDURE — 93306 TTE W/DOPPLER COMPLETE: CPT | Performed by: INTERNAL MEDICINE

## 2024-04-29 PROCEDURE — 85027 COMPLETE CBC AUTOMATED: CPT

## 2024-04-29 PROCEDURE — 80053 COMPREHEN METABOLIC PANEL: CPT | Performed by: INTERNAL MEDICINE

## 2024-04-29 PROCEDURE — 83880 ASSAY OF NATRIURETIC PEPTIDE: CPT | Performed by: INTERNAL MEDICINE

## 2024-04-30 ENCOUNTER — TELEPHONE (OUTPATIENT)
Dept: GASTROENTEROLOGY | Facility: CLINIC | Age: 54
End: 2024-04-30
Payer: MEDICARE

## 2024-04-30 DIAGNOSIS — E78.5 HYPERLIPIDEMIA, UNSPECIFIED HYPERLIPIDEMIA TYPE: ICD-10-CM

## 2024-04-30 DIAGNOSIS — R79.89 ABNORMAL LIVER FUNCTION TESTS: Primary | ICD-10-CM

## 2024-04-30 DIAGNOSIS — K76.0 METABOLIC DYSFUNCTION-ASSOCIATED STEATOTIC LIVER DISEASE (MASLD): ICD-10-CM

## 2024-04-30 LAB
DEPRECATED RDW RBC AUTO: 45.8 FL (ref 37–54)
ERYTHROCYTE [DISTWIDTH] IN BLOOD BY AUTOMATED COUNT: 14.5 % (ref 12.3–15.4)
HCT VFR BLD AUTO: 42.5 % (ref 34–46.6)
HGB BLD-MCNC: 14.2 G/DL (ref 12–15.9)
MCH RBC QN AUTO: 28.7 PG (ref 26.6–33)
MCHC RBC AUTO-ENTMCNC: 33.4 G/DL (ref 31.5–35.7)
MCV RBC AUTO: 86 FL (ref 79–97)
PLATELET # BLD AUTO: 198 10*3/MM3 (ref 140–450)
PMV BLD AUTO: 9.7 FL (ref 6–12)
RBC # BLD AUTO: 4.94 10*6/MM3 (ref 3.77–5.28)
WBC NRBC COR # BLD AUTO: 8.92 10*3/MM3 (ref 3.4–10.8)

## 2024-04-30 NOTE — TELEPHONE ENCOUNTER
I called and spoke to Ms. Fong. Will order some additional labs and an ultrasound of the liver. I discussed the diagnosis of metabolic steatotic liver disease.

## 2024-05-02 ENCOUNTER — PATIENT MESSAGE (OUTPATIENT)
Dept: GASTROENTEROLOGY | Facility: CLINIC | Age: 54
End: 2024-05-02
Payer: MEDICARE

## 2024-05-02 NOTE — TELEPHONE ENCOUNTER
From: Danae Fong  To: Amos Lee  Sent: 5/2/2024 3:44 PM EDT  Subject: US of liver    Dr Lee put in an order for an ultrasound for me on Tuesday but I have not heard from scheduling. Do I need to contact them or will they me? Thanks for all you do

## 2024-05-07 ENCOUNTER — LAB (OUTPATIENT)
Dept: LAB | Facility: HOSPITAL | Age: 54
End: 2024-05-07
Payer: MEDICARE

## 2024-05-07 DIAGNOSIS — R79.89 ABNORMAL LIVER FUNCTION TESTS: ICD-10-CM

## 2024-05-15 ENCOUNTER — PATIENT MESSAGE (OUTPATIENT)
Dept: GASTROENTEROLOGY | Facility: CLINIC | Age: 54
End: 2024-05-15
Payer: MEDICARE

## 2024-05-16 ENCOUNTER — TELEPHONE (OUTPATIENT)
Dept: GASTROENTEROLOGY | Facility: CLINIC | Age: 54
End: 2024-05-16
Payer: MEDICARE

## 2024-05-16 LAB
A2 MACROGLOB SERPL-MCNC: 334 MG/DL (ref 110–276)
ALT SERPL W P-5'-P-CCNC: 82 IU/L (ref 0–40)
ANA SER QL IF: NEGATIVE
APO A-I SERPL-MCNC: 118 MG/DL (ref 116–209)
AST SERPL W P-5'-P-CCNC: 117 IU/L (ref 0–40)
BILIRUB SERPL-MCNC: 0.2 MG/DL (ref 0–1.2)
CHOLEST SERPL-MCNC: 155 MG/DL (ref 100–199)
FIBROSIS SCORING:: ABNORMAL
FIBROSIS STAGE SERPL QL: ABNORMAL
GGT SERPL-CCNC: 131 IU/L (ref 0–60)
GLUCOSE SERPL-MCNC: 185 MG/DL (ref 70–99)
HAPTOGLOB SERPL-MCNC: 180 MG/DL (ref 33–346)
LABORATORY COMMENT REPORT: ABNORMAL
LIVER FIBR SCORE SERPL CALC.FIBROSURE: 0.41 (ref 0–0.21)
LIVER STEATOSIS GRADE SERPL QL: ABNORMAL
LIVER STEATOSIS SCORE SERPL: 0.86 (ref 0–0.4)
MITOCHONDRIA M2 IGG SER-ACNC: <20 UNITS (ref 0–20)
NASH GRADE SERPL QL: ABNORMAL
NASH INTERPRETATION SERPL-IMP: ABNORMAL
NASH SCORE SERPL: 0.87 (ref 0–0.25)
NASH SCORING: ABNORMAL
SMA IGG SER-ACNC: 7 UNITS (ref 0–19)
STEATOSIS SCORING: ABNORMAL
TEST PERFORMANCE INFO SPEC: ABNORMAL
TEST PERFORMANCE INFO SPEC: ABNORMAL
TRIGL SERPL-MCNC: 141 MG/DL (ref 0–149)

## 2024-05-16 NOTE — TELEPHONE ENCOUNTER
I called and discussed the results of the labs.  The patient does have  evidence of fibrosis and consistent with MASH.  I recommend that she obtain another hemoglobin A1c from her primary care physician.  She may have underlying diabetes at this time.

## 2024-07-01 ENCOUNTER — HOSPITAL ENCOUNTER (OUTPATIENT)
Facility: HOSPITAL | Age: 54
Discharge: HOME | End: 2024-07-01
Admitting: INTERNAL MEDICINE
Payer: MEDICARE

## 2024-07-01 DIAGNOSIS — K76.0 METABOLIC DYSFUNCTION-ASSOCIATED STEATOTIC LIVER DISEASE (MASLD): ICD-10-CM

## 2024-07-01 DIAGNOSIS — R79.89 ABNORMAL LIVER FUNCTION TESTS: ICD-10-CM

## 2024-07-01 PROCEDURE — 76705 ECHO EXAM OF ABDOMEN: CPT

## 2024-07-18 DIAGNOSIS — K59.04 CHRONIC IDIOPATHIC CONSTIPATION: ICD-10-CM

## 2024-07-18 RX ORDER — LINACLOTIDE 145 UG/1
CAPSULE, GELATIN COATED ORAL
Qty: 90 CAPSULE | Refills: 3 | Status: SHIPPED | OUTPATIENT
Start: 2024-07-18

## 2024-07-18 NOTE — TELEPHONE ENCOUNTER
Rx Refill Note  Requested Prescriptions     Pending Prescriptions Disp Refills    Linzess 145 MCG capsule capsule [Pharmacy Med Name: LINZESS 145 MCG Capsule] 90 capsule 3     Sig: TAKE 1 CAPSULE EVERY MORNING BEFORE BREAKFAST      Last office visit with prescribing clinician: Visit date not found   Last telemedicine visit with prescribing clinician: Visit date not found   Next office visit with prescribing clinician: 11/27/2024                             RADHA Carl  07/18/24, 09:08 EDT

## 2024-09-03 DIAGNOSIS — K21.9 GASTROESOPHAGEAL REFLUX DISEASE, UNSPECIFIED WHETHER ESOPHAGITIS PRESENT: Primary | ICD-10-CM

## 2024-09-03 RX ORDER — ESOMEPRAZOLE MAGNESIUM 40 MG/1
40 CAPSULE, DELAYED RELEASE ORAL DAILY
Qty: 30 CAPSULE | Refills: 5 | Status: SHIPPED | OUTPATIENT
Start: 2024-09-03

## 2024-09-04 NOTE — PROGRESS NOTES
Cardiology Outpatient Visit      Identification: Danae Fong is a 54 y.o. female who resides in [place], [place]    Reason for visit:  Status post aortic valve replacement with bioprosthetic dannie      Subjective      Patient is a 54-year-old female who returns today for follow-up of her aortic valve replacement, chronic diastolic heart failure and cardiac risk factors.  Since her last visit she has been doing well.  She finally got her wound healed on her left toe for which she received partial amputation.  She has been diagnosed with diabetes and started on metformin as well as Jardiance.  She thinks her heart is doing okay.  She did contact our office reporting elevated heart rates and her metoprolol dose was increased.  A monitor was also placed and she wore for 27 days which showed normal sinus rhythm and 1% PACs and PVCs but no atrial fibrillation.  She denies chest pain, dyspnea, orthopnea, or syncope.  She reports palpitations have resolved with the higher dosage of metoprolol.  She had an echocardiogram in April that showed normal functioning bioprosthetic aortic valve.    Review of Systems   Constitutional: Negative for malaise/fatigue.   Eyes:  Negative for vision loss in left eye and vision loss in right eye.   Cardiovascular:  Negative for chest pain, dyspnea on exertion, near-syncope, orthopnea, palpitations, paroxysmal nocturnal dyspnea and syncope.   Musculoskeletal:  Negative for myalgias.   Neurological:  Negative for brief paralysis, excessive daytime sleepiness, focal weakness, numbness, paresthesias and weakness.   All other systems reviewed and are negative.      No Known Allergies      Current Outpatient Medications   Medication Instructions    aspirin 81 mg, Oral, Daily    Biotin 46124 MCG tablet 1 tablet, Oral, Daily    bumetanide (BUMEX) 1 mg, Oral, 2 Times Daily    DULoxetine (CYMBALTA) 30 mg, Oral, Daily    empagliflozin (JARDIANCE) 25 mg, Oral, Daily    esomeprazole (NEXIUM) 40  "mg, Oral, Daily    fluconazole (DIFLUCAN) 200 MG tablet As Needed    fluticasone (FLONASE) 50 MCG/ACT nasal spray 1 spray, Nasal, Daily PRN    hydrOXYzine (ATARAX) 50 mg, Oral, Every 6 Hours PRN    Linzess 145 MCG capsule capsule TAKE 1 CAPSULE EVERY MORNING BEFORE BREAKFAST    loratadine (CLARITIN) 10 mg, Oral, Daily    metFORMIN (GLUCOPHAGE) 500 mg, Oral, 2 Times Daily With Meals    metoprolol tartrate (LOPRESSOR) 50 mg, Oral, 2 Times Daily    Multiple Vitamins-Minerals (CENTRUM SILVER 50+WOMEN) tablet No dose, route, or frequency recorded.    Progesterone (PROMETRIUM) 100 mg, Oral, Daily    Psyllium (Metamucil) wafer wafer 2 wafers, Oral, Daily    rosuvastatin (CRESTOR) 10 mg, Oral, Nightly    spironolactone (ALDACTONE) 50 mg, Oral, Daily    vilazodone (VIIBRYD) 20 mg, Oral, Daily         Objective     /78 (BP Location: Right arm, Patient Position: Sitting)   Pulse 80   Ht 185.4 cm (73\")   Wt 127 kg (280 lb 12.8 oz)   SpO2 97%   BMI 37.05 kg/m²       Constitutional:       Appearance: Healthy appearance. Well-developed.   Eyes:      General: Lids are normal. No scleral icterus.     Conjunctiva/sclera: Conjunctivae normal.   HENT:      Head: Normocephalic and atraumatic.   Neck:      Thyroid: No thyromegaly.      Vascular: No carotid bruit or JVD.   Pulmonary:      Effort: Pulmonary effort is normal.      Breath sounds: Normal breath sounds. No wheezing. No rhonchi. No rales.   Cardiovascular:      Normal rate. Regular rhythm.      Murmurs: There is no murmur.      No gallop.  No rub.   Pulses:     Intact distal pulses.   Edema:     Peripheral edema absent.   Abdominal:      General: There is no distension.      Palpations: Abdomen is soft. There is no abdominal mass.   Musculoskeletal:      Cervical back: Normal range of motion. Skin:     General: Skin is warm and dry.      Findings: No rash.   Neurological:      General: No focal deficit present.      Mental Status: Alert and oriented to person, place, " and time.      Gait: Gait is intact.   Psychiatric:         Attention and Perception: Attention normal.         Mood and Affect: Mood normal.         Behavior: Behavior normal.         Result Review  (reviewed with patient):            Lab Results   Component Value Date    GLUCOSE 185 (H) 04/29/2024    BUN 9 04/29/2024    CREATININE 0.78 04/29/2024     04/29/2024    K 3.4 (L) 04/29/2024    CL 99 04/29/2024    CALCIUM 9.5 04/29/2024    PROTEINTOT 7.6 04/29/2024    ALBUMIN 3.8 04/29/2024    ALT 70 (H) 04/29/2024    AST 83 (H) 04/29/2024    ALKPHOS 135 (H) 04/29/2024    BILITOT 0.3 04/29/2024    GLOB 3.8 04/29/2024    AGRATIO 1.0 04/29/2024    BCR 11.5 04/29/2024    ANIONGAP 10.5 04/29/2024    EGFR 91.0 04/29/2024     Lab Results   Component Value Date    WBC 8.92 04/29/2024    HGB 14.2 04/29/2024    HCT 42.5 04/29/2024    MCV 86.0 04/29/2024     04/29/2024     Lab Results   Component Value Date    CHOL 211 (H) 01/14/2020    TRIG 141 05/07/2024    HDL 33 (L) 01/14/2020     (H) 01/14/2020     Lab Results   Component Value Date    HGBA1C 6.30 (H) 12/01/2022           Assessment     Diagnoses and all orders for this visit:    1. Status post aortic valve replacement with bioprosthetic valve (Primary)  Overview:  Severe nonrheumatic aortic insufficiency by AIRAM, 8/24/2016  Bioprosthetic AVR (25 mm Magna Ease) by Dr. Mauricio Grove, 8/30/3016  Initial possibility of vegetation turned out to be a flail leaflet  Echo (12/2/2017): Normal functioning bioprosthetic valve.  Mild MR.  LVEF 50-55%  Echo (2/6/18): LVEF 55%. Normal functioning bioprosthetic aortic valve  Echo (1/14/2020): LVEF 60%. Bioprosthetic aortic valve well-seated and functions normally.  Mean gradient 14 mmHg  Echo (7/19/2021): LVEF 65%. Bioprosthetic valve with mean gradient 17 mmHg  Echo (4/29/2024):  LVEF = 65%.  Grade 1 diastolic dysfunction.  Bioprosthetic aortic valve functions normally with mean gradient 14 mmHg      Assessment &  Plan:  Continue aspirin 81 mg daily      2. Chronic diastolic congestive heart failure  Overview:  Echo (10/10/16): Normal LVEF.  Bioprosthetic aortic valve functioning normally.  Echo (12/2017): LVEF 50-55%.  Bioprosthetic aortic valve functioning normally  Echo (1/14/2020): LVEF 60%.  Bioprosthetic aortic valve well-seated and functions normally.  Mean gradient 14 mmHg  Echo (7/19/2021): LVEF 65%. Bioprosthetic valve with mean gradient 17 mmHg  Echo (5/15/2023): LVEF 55%. Bioprosthetic aortic valve present (#25 Magna Ease). There is mild aortic insufficiency present.       Assessment & Plan:  Stable NYHA class II symptoms  Continue Bumex 1 mg twice daily  Continue spironolactone 50 mg daily  Continue Jardiance 25 mg daily  Low-sodium diet          3. Essential hypertension  Overview:  Target blood pressure <130/80 mmHg    Assessment & Plan:  Continue metoprolol tartrate 50 mg twice daily  Continue spironolactone 50 mg daily      4. Hyperlipidemia LDL goal <100  Overview:  Statin therapy indicated due to diabetic status    Assessment & Plan:   Continue Crestor 10 mg daily    Orders:  -     Lipid Panel; Future  -     Comprehensive Metabolic Panel; Future          Plan   No signs or symptoms of angina and heart failure symptoms stable and compensated  Obtain CMP and lipid profile  Continue current medications  Hemoglobin A1c goal is to be 7.0 or less      Follow-up   Return in about 1 year (around 9/10/2025), or if symptoms worsen or fail to improve, for Follow-up with Dr. Peterson next visit.      Bertha Butler, BAUTISTA  9/10/2024

## 2024-09-10 ENCOUNTER — OFFICE VISIT (OUTPATIENT)
Dept: CARDIOLOGY | Facility: CLINIC | Age: 54
End: 2024-09-10
Payer: MEDICARE

## 2024-09-10 VITALS
WEIGHT: 280.8 LBS | BODY MASS INDEX: 38.03 KG/M2 | SYSTOLIC BLOOD PRESSURE: 120 MMHG | DIASTOLIC BLOOD PRESSURE: 78 MMHG | HEIGHT: 72 IN | OXYGEN SATURATION: 97 % | HEART RATE: 80 BPM

## 2024-09-10 DIAGNOSIS — E78.5 HYPERLIPIDEMIA LDL GOAL <100: ICD-10-CM

## 2024-09-10 DIAGNOSIS — Z95.3 STATUS POST AORTIC VALVE REPLACEMENT WITH BIOPROSTHETIC VALVE: Primary | ICD-10-CM

## 2024-09-10 DIAGNOSIS — I10 ESSENTIAL HYPERTENSION: ICD-10-CM

## 2024-09-10 DIAGNOSIS — I50.32 CHRONIC DIASTOLIC CONGESTIVE HEART FAILURE: ICD-10-CM

## 2024-09-10 PROBLEM — R07.89 ATYPICAL CHEST PAIN: Status: RESOLVED | Noted: 2020-08-04 | Resolved: 2024-09-10

## 2024-09-10 PROCEDURE — 1160F RVW MEDS BY RX/DR IN RCRD: CPT | Performed by: NURSE PRACTITIONER

## 2024-09-10 PROCEDURE — 99214 OFFICE O/P EST MOD 30 MIN: CPT | Performed by: NURSE PRACTITIONER

## 2024-09-10 PROCEDURE — 1159F MED LIST DOCD IN RCRD: CPT | Performed by: NURSE PRACTITIONER

## 2024-09-10 PROCEDURE — 3078F DIAST BP <80 MM HG: CPT | Performed by: NURSE PRACTITIONER

## 2024-09-10 PROCEDURE — 3074F SYST BP LT 130 MM HG: CPT | Performed by: NURSE PRACTITIONER

## 2024-09-10 RX ORDER — PROGESTERONE 100 MG/1
100 CAPSULE ORAL DAILY
COMMUNITY
Start: 2024-09-09

## 2024-09-10 RX ORDER — FLUCONAZOLE 200 MG/1
TABLET ORAL AS NEEDED
COMMUNITY
Start: 2024-09-09

## 2024-09-10 NOTE — ASSESSMENT & PLAN NOTE
Stable NYHA class II symptoms  Continue Bumex 1 mg twice daily  Continue spironolactone 50 mg daily  Continue Jardiance 25 mg daily  Low-sodium diet

## 2024-09-20 NOTE — TELEPHONE ENCOUNTER
From: REBECCA LUTHER  To: Danae Fong  Sent: 1/5/2021 2:23 PM EST  Subject: OVERDUE GES ORDER    Ms Galloway,  This is reminder to schedule Gastric Emptying Study ordered by Dr Lee in August 2020. Please contact [facility] Scheduling at [phone]. Thanks RADHA Veliz.   The patient has been examined and the H&P has been reviewed:    I concur with the findings and no changes have occurred since H&P was written.    Surgery risks, benefits and alternative options discussed and understood by patient/family.    Proceed to OR as planned for two level TLIF      There are no hospital problems to display for this patient.

## 2024-09-26 DIAGNOSIS — K21.9 GASTROESOPHAGEAL REFLUX DISEASE, UNSPECIFIED WHETHER ESOPHAGITIS PRESENT: ICD-10-CM

## 2024-09-27 RX ORDER — ESOMEPRAZOLE MAGNESIUM 40 MG/1
40 CAPSULE, DELAYED RELEASE ORAL DAILY
Qty: 90 CAPSULE | Refills: 0 | Status: SHIPPED | OUTPATIENT
Start: 2024-09-27 | End: 2024-12-26

## 2024-11-05 ENCOUNTER — PATIENT MESSAGE (OUTPATIENT)
Dept: GASTROENTEROLOGY | Facility: CLINIC | Age: 54
End: 2024-11-05
Payer: MEDICARE

## 2024-11-27 DIAGNOSIS — K21.9 GASTROESOPHAGEAL REFLUX DISEASE, UNSPECIFIED WHETHER ESOPHAGITIS PRESENT: ICD-10-CM

## 2024-11-27 RX ORDER — ESOMEPRAZOLE MAGNESIUM 40 MG/1
40 CAPSULE, DELAYED RELEASE ORAL DAILY
Qty: 90 CAPSULE | Refills: 3 | Status: SHIPPED | OUTPATIENT
Start: 2024-11-27

## 2024-11-27 NOTE — TELEPHONE ENCOUNTER
Rx Refill Note  Requested Prescriptions     Pending Prescriptions Disp Refills    esomeprazole (nexIUM) 40 MG capsule [Pharmacy Med Name: Esomeprazole Magnesium Oral Capsule Delayed Release 40 MG] 90 capsule 3     Sig: TAKE 1 CAPSULE EVERY DAY      Last office visit with prescribing clinician: Visit date not found   Last telemedicine visit with prescribing clinician: Visit date not found   Next office visit with prescribing clinician: 2/20/2025                         Would you like a call back once the refill request has been completed: [] Yes [] No    If the office needs to give you a call back, can they leave a voicemail: [] Yes [] No    RADHA Carl  11/27/24, 11:43 EST

## 2024-12-23 ENCOUNTER — TELEPHONE (OUTPATIENT)
Dept: GASTROENTEROLOGY | Facility: CLINIC | Age: 54
End: 2024-12-23
Payer: MEDICARE

## 2024-12-23 NOTE — TELEPHONE ENCOUNTER
PATIENT IS SCHEDULED WITH DR. TEMPLE ON 1/30/25 FOR COLONOSCOPY, HAS HEART VALVE REPLACEMENT, TAKES ASPIRIN. MAY NEED CARDIAC CLEARANCE.

## 2024-12-23 NOTE — TELEPHONE ENCOUNTER
----- Message from Marga MAIN sent at 12/23/2024 10:56 AM EST -----  Regarding: FW: Please give Danae he  Okay to proceed. Continue aspirin 81 mg daily, per BAUTISTA Finn.  ----- Message -----  From: Bertha Butler APRN  Sent: 12/23/2024   9:07 AM EST  To: Marga Stewart RN  Subject: Please give Danae he                           ----- Message -----  From: Bronwyn Hernandez  Sent: 12/23/2024   8:47 AM EST  To: BAUTISTA Malcolm

## 2025-01-13 RX ORDER — SODIUM, POTASSIUM,MAG SULFATES 17.5-3.13G
1 SOLUTION, RECONSTITUTED, ORAL ORAL EVERY 12 HOURS
Qty: 354 ML | Refills: 0 | Status: SHIPPED | OUTPATIENT
Start: 2025-01-13

## 2025-01-22 ENCOUNTER — OFFICE VISIT (OUTPATIENT)
Dept: ORTHOPEDIC SURGERY | Facility: CLINIC | Age: 55
End: 2025-01-22
Payer: MEDICARE

## 2025-01-22 VITALS
BODY MASS INDEX: 36.44 KG/M2 | HEIGHT: 72 IN | DIASTOLIC BLOOD PRESSURE: 84 MMHG | SYSTOLIC BLOOD PRESSURE: 134 MMHG | WEIGHT: 269 LBS

## 2025-01-22 DIAGNOSIS — M79.674 PAIN OF RIGHT GREAT TOE: ICD-10-CM

## 2025-01-22 DIAGNOSIS — E11.42 TYPE 2 DIABETES MELLITUS WITH DIABETIC POLYNEUROPATHY, WITHOUT LONG-TERM CURRENT USE OF INSULIN: ICD-10-CM

## 2025-01-22 DIAGNOSIS — M14.672 CHARCOT'S JOINT OF LEFT FOOT: Primary | ICD-10-CM

## 2025-01-22 DIAGNOSIS — E66.01 CLASS 2 SEVERE OBESITY DUE TO EXCESS CALORIES WITH SERIOUS COMORBIDITY AND BODY MASS INDEX (BMI) OF 35.0 TO 35.9 IN ADULT: ICD-10-CM

## 2025-01-22 DIAGNOSIS — E66.812 CLASS 2 SEVERE OBESITY DUE TO EXCESS CALORIES WITH SERIOUS COMORBIDITY AND BODY MASS INDEX (BMI) OF 35.0 TO 35.9 IN ADULT: ICD-10-CM

## 2025-01-22 PROCEDURE — 3079F DIAST BP 80-89 MM HG: CPT | Performed by: ORTHOPAEDIC SURGERY

## 2025-01-22 PROCEDURE — 3075F SYST BP GE 130 - 139MM HG: CPT | Performed by: ORTHOPAEDIC SURGERY

## 2025-01-22 PROCEDURE — 1160F RVW MEDS BY RX/DR IN RCRD: CPT | Performed by: ORTHOPAEDIC SURGERY

## 2025-01-22 PROCEDURE — 99213 OFFICE O/P EST LOW 20 MIN: CPT | Performed by: ORTHOPAEDIC SURGERY

## 2025-01-22 PROCEDURE — 1159F MED LIST DOCD IN RCRD: CPT | Performed by: ORTHOPAEDIC SURGERY

## 2025-01-22 RX ORDER — ONDANSETRON 4 MG/1
TABLET, FILM COATED ORAL
COMMUNITY
Start: 2025-01-15

## 2025-01-22 RX ORDER — LEVOCETIRIZINE DIHYDROCHLORIDE 5 MG/1
TABLET, FILM COATED ORAL
COMMUNITY
Start: 2024-09-16

## 2025-01-22 NOTE — PROGRESS NOTES
ESTABLISHED PATIENT    Patient: Danae Fong  : 1970    Primary Care Provider: Leigh Casey MD    Requesting Provider: As above    Pain of the Right Foot and Follow-up (2 year recheck- s/p amputate left great toe at DIP joint, flexor tenotomies toes 2,3,4,5; DOS 2022, charcot left foot)      History    Chief Complaint: Evaluate diabetic foot problems    History of Present Illness: Ms. Fong returns today reporting that she had an injury.  About a week ago her right foot slipped on the ice jammed into a solid object.  She did not really have any pain, but she thinks she also might of injured her left foot.  She noted a bruise on the left midfoot, and a blood blister on the end of the right great toe.  No fever no chills, no signs of infection, no drainage.  She still having a problem with the preulcerative callus on the left third toe.  She has been started back on diabetic medication because her A1c was greater than 7.  She also reports she has gained about 50 pounds.  We talked about weight loss.    Current Outpatient Medications on File Prior to Visit   Medication Sig Dispense Refill    levocetirizine (XYZAL) 5 MG tablet       ondansetron (ZOFRAN) 4 MG tablet       aspirin 81 MG EC tablet Take 1 tablet by mouth Daily. 90 tablet 3    Biotin 12186 MCG tablet Take 1 tablet by mouth Daily.      bumetanide (BUMEX) 1 MG tablet Take 1 tablet by mouth 2 (Two) Times a Day.      DULoxetine (CYMBALTA) 30 MG capsule Take 1 capsule by mouth Daily.      empagliflozin (Jardiance) 25 MG tablet tablet Take 1 tablet by mouth Daily.      esomeprazole (nexIUM) 40 MG capsule TAKE 1 CAPSULE EVERY DAY 90 capsule 3    fluconazole (DIFLUCAN) 200 MG tablet As Needed.      fluticasone (FLONASE) 50 MCG/ACT nasal spray 1 spray into the nostril(s) as directed by provider Daily As Needed.      hydrOXYzine (ATARAX) 50 MG tablet Take 1 tablet by mouth Every 6 (Six) Hours As Needed for Anxiety.      Linzess 145 MCG  capsule capsule TAKE 1 CAPSULE EVERY MORNING BEFORE BREAKFAST 90 capsule 3    loratadine (CLARITIN) 10 MG tablet Take 1 tablet by mouth Daily.      metFORMIN (GLUCOPHAGE) 500 MG tablet Take 1 tablet by mouth 2 (Two) Times a Day With Meals.      metoprolol tartrate (LOPRESSOR) 50 MG tablet Take 1 tablet by mouth 2 (Two) Times a Day. 180 tablet 3    Multiple Vitamins-Minerals (CENTRUM SILVER 50+WOMEN) tablet       Progesterone (PROMETRIUM) 100 MG capsule Take 1 capsule by mouth Daily.      Psyllium (Metamucil) wafer wafer Take 2 wafers by mouth Daily.      rosuvastatin (CRESTOR) 10 MG tablet Take 1 tablet by mouth Every Night. 90 tablet 3    sodium-potassium-magnesium sulfates (Suprep Bowel Prep Kit) 17.5-3.13-1.6 GM/177ML solution oral solution Take 1 bottle by mouth Every 12 (Twelve) Hours. Take 1 bottle by mouth as directed. 354 mL 0    spironolactone (ALDACTONE) 50 MG tablet Take 1 tablet by mouth Daily. 90 tablet 2    vilazodone (VIIBRYD) 20 MG tablet tablet Take 1 tablet by mouth Daily.       No current facility-administered medications on file prior to visit.      No Known Allergies   Past Medical History:   Diagnosis Date    A-fib 2016    REMOTE HISTORY AFTER VALVE     Anemia     Anxiety     Aortic valve regurgitation 2016    VALVE REPLACEMENT     Aortic valve replaced 2016    Charcot-Rebekah-Tooth disease     CHF (congestive heart failure) 2016    Colon polyp     Constipation     Controlled type 2 diabetes mellitus without complication     not medicated??? not sure if type 2-  borderline diabetes     COPD (chronic obstructive pulmonary disease)     Diabetic neuropathy     Diabetic Ulcer     Dysfunctional uterine bleeding     Endocarditis 2016    GERD (gastroesophageal reflux disease)     Heart murmur 2016    Hip arthrosis 3/2016    Hyperlipidemia LDL goal <100 03/21/2023    Hypertension     Liver cirrhosis secondary to MCKEON (CMS/HCC)     Liver disease     Low back pain     Lumbosacral disc disease 05/2009     MRSA infection 09/2015    TOE    Paroxysmal atrial fibrillation 09/01/2016    · Postoperative afib after AVR · Chads Vasc= 2 · 2-week monitor (10/7/2020): Predominantly normal sinus rhythm without atrial fibrillation/flutter.  1% PACs and PVCs.  Lightheadedness associated with heart rates in the 50 bpm. · 30 day monitor (10/2021): NSR with occasional PVC's and PAC's 1% burden.  Symptoms correspond to Sinus tach    Primary Osteoarthritis of both hips     S/P hardware removal( painful circlage right proximal femur/right hip) 03/15/2021    Sepsis     Sleep apnea     RESOLVED - PER PATIENT     SOB (shortness of breath) on exertion     Streptococcal arthritis of right hip     Subacute osteomyelitis of left foot     Transfusion history     PATIENT DENIES REACTION    Wears reading eyeglasses      Past Surgical History:   Procedure Laterality Date    AMPUTATION DIGIT Right 01/30/2018    Procedure: AMPUTATION RIGHT 2ND TOE;  Surgeon: Charleen Torres MD;  Location:  [facility];  Service:     AMPUTATION DIGIT Left 12/06/2022    Procedure: amputate left great toe at DIP joint, flexor tenotomies toes 2,3,4,5;  Surgeon: Charleen Torres MD;  Location:  [facility];  Service: Orthopedics;  Laterality: Left;    AORTIC VALVE REPAIR/REPLACEMENT N/A 08/30/2016    Procedure: AIRAM, MEDIAN STERNOTOMY, AORTIC VALVE REPLACEMENT;  Surgeon: Mauricio Grove MD;  Location:  [facility];  Service:     BLADDER SLING MODIFIED, ANTERIOR AND POSTERIOR VAGINAL REPAIR      CARDIAC CATHETERIZATION N/A 08/25/2016    Procedure: Left Heart Cath;  Surgeon: Tyler Peterson MD;  Location:  [facility];  Service:     CARDIAC CATHETERIZATION N/A 03/21/2018    Procedure: Coronary angiography;  Surgeon: Tyler Peterson IV, MD;  Location:  [facility];  Service: Cardiovascular    CARDIAC VALVE REPLACEMENT  2016    COLONOSCOPY      DILATATION AND CURETTAGE      of uterus    ENDOSCOPY      FOOT SURGERY Left     for  osteomyelitis    HARDWARE REMOVAL Right 03/15/2021    Procedure: REMOVAL OF CERCLAGE WIRE OF RIGHT PROXIMAL FEMUR - RIGHT;  Surgeon: Owen Siu MD;  Location:  [facility];  Service: Orthopedics;  Laterality: Right;    INCISION AND DRAINAGE HIP Right 01/26/2017    Procedure: RIGHT HIP WOUND WASHOUT AND DEBRIDEMENT, WITH POLYETHELENE FEMORAL HEAD EXCHANGE ;  Surgeon: Owen Siu MD;  Location:  [facility];  Service:     JOINT REPLACEMENT  3/2017    Right hip    PERICARDIAL WINDOW N/A 09/16/2016    Procedure: PERICARDIOCENTESIS WITH PLACEMENT OF DRAINAGE TUBE .;  Surgeon: Mauricio Grove MD;  Location:  [facility];  Service:     PERICARDIAL WINDOW N/A 09/20/2016    Procedure: SUBXYPHOID PERICARDIAL WINDOW;  Surgeon: Mauricio Grove MD;  Location:  [facility];  Service:     NH CLTX HIP DISLOCATION TRAUMATIC W/O ANESTHESIA N/A 01/29/2017    Procedure: HIP CLOSED REDUCTION;  Surgeon: Leander Li MD;  Location:  [facility];  Service: Orthopedics    THORACOTOMY Left 10/05/2016    Procedure: THORACOTOMY Pericardectomy,  AIRAM,  Placement pacing leads, Pump standby;  Surgeon: Mauricio Grove MD;  Location:  [facility];  Service:     TOTAL HIP ARTHROPLASTY Right 01/23/2017    Procedure: RIGHT TOTAL HIP ARTHROPLASTY ANTERIOR ;  Surgeon: Owen Siu MD;  Location:  [facility];  Service:     TOTAL HIP ARTHROPLASTY REVISION Right 01/30/2017    Procedure: REPEAT WOUND DEBRIDEMENT ACETABULAR REVISION;  Surgeon: Owen Siu MD;  Location:  [facility];  Service:     UPPER GASTROINTESTINAL ENDOSCOPY      WISDOM TOOTH EXTRACTION      all 4     Family History   Problem Relation Age of Onset    Diabetes Mother     Heart disease Mother     Hypertension Mother     Osteoarthritis Mother     Osteoporosis Mother     Arrhythmia Mother     Hyperlipidemia Mother     Diabetes Sister     Arthritis Sister     Arrhythmia Sister     No Known Problems Father     Diabetes Sister     Diabetes Sister     Asthma Sister     Heart disease Other     Heart  "attack Other     Heart disease Other     Hypertension Other     Osteoarthritis Other     Rheum arthritis Other     Cancer Neg Hx       Social History     Socioeconomic History    Marital status:     Number of children: 0   Tobacco Use    Smoking status: Former     Current packs/day: 0.00     Average packs/day: 0.5 packs/day for 15.0 years (7.5 ttl pk-yrs)     Types: Cigarettes     Start date: 2000     Quit date: 2015     Years since quittin.3     Passive exposure: Past    Smokeless tobacco: Never   Vaping Use    Vaping status: Some Days    Substances: THC, Flavoring    Devices: Disposable   Substance and Sexual Activity    Alcohol use: Not Currently    Drug use: Yes     Types: Marijuana     Comment: THC gummies occasionally for pain    Sexual activity: Not Currently     Partners: Male     Birth control/protection: Abstinence        Review of Systems   Constitutional: Negative.    HENT: Negative.     Eyes: Negative.    Respiratory: Negative.     Cardiovascular: Negative.    Gastrointestinal: Negative.    Endocrine: Negative.    Genitourinary: Negative.    Musculoskeletal:  Positive for arthralgias.   Skin: Negative.    Allergic/Immunologic: Negative.    Neurological: Negative.    Hematological: Negative.    Psychiatric/Behavioral: Negative.         The following portions of the patient's history were reviewed and updated as appropriate: allergies, current medications, past family history, past medical history, past social history, past surgical history, and problem list.    Physical Exam:   /84   Ht 185.4 cm (72.99\")   Wt 122 kg (269 lb)   BMI 35.50 kg/m²   No change in dense neuropathy in both feet, no signs of new Charcot on either side, she has a dry blood blister at the end of the right great toe, she has a preulcerative callus on the third toe on the left side, no open wounds        Medical Decision Making    Data Review:   ordered and reviewed x-rays " today    Assessment/Plan/Diagnosis/Treatment Options:   1. Charcot's joint of left foot  Left Charcot joint scrape.  No signs of new progression.  I gave her some hammertoe crest pads to help decrease the callusing on that toe.  She is at high risk for amputation given that she has had an amputation on both sides.  We also talked about better glucose control and weight loss.  - XR Foot 3+ View Left    2. Pain of right great toe  It is good that she came in for evaluation.  I do not see any signs of infection.  Continue to protect the area and I think it will slowly heal underneath.  - XR Toe 2+ View Right    3. Type 2 diabetes mellitus with diabetic polyneuropathy, without long-term current use of insulin  As above we talked about better glucose control    4. Class 2 severe obesity due to excess calories with serious comorbidity and body mass index (BMI) of 35.0 to 35.9 in adult  We talked about weight loss, BMI is 35.5

## 2025-01-30 ENCOUNTER — OUTSIDE FACILITY SERVICE (OUTPATIENT)
Dept: GASTROENTEROLOGY | Facility: CLINIC | Age: 55
End: 2025-01-30
Payer: MEDICARE

## 2025-01-30 PROCEDURE — G0105 COLORECTAL SCRN; HI RISK IND: HCPCS | Performed by: INTERNAL MEDICINE

## 2025-02-20 ENCOUNTER — OFFICE VISIT (OUTPATIENT)
Dept: GASTROENTEROLOGY | Facility: CLINIC | Age: 55
End: 2025-02-20
Payer: MEDICARE

## 2025-02-20 VITALS
HEIGHT: 72 IN | TEMPERATURE: 97.7 F | HEART RATE: 63 BPM | BODY MASS INDEX: 36.27 KG/M2 | DIASTOLIC BLOOD PRESSURE: 78 MMHG | SYSTOLIC BLOOD PRESSURE: 118 MMHG | OXYGEN SATURATION: 95 % | WEIGHT: 267.8 LBS

## 2025-02-20 DIAGNOSIS — K76.0 METABOLIC DYSFUNCTION-ASSOCIATED STEATOTIC LIVER DISEASE (MASLD): ICD-10-CM

## 2025-02-20 DIAGNOSIS — R79.89 ABNORMAL LIVER FUNCTION TESTS: ICD-10-CM

## 2025-02-20 DIAGNOSIS — R11.0 NAUSEA: Primary | ICD-10-CM

## 2025-02-20 PROCEDURE — 1160F RVW MEDS BY RX/DR IN RCRD: CPT | Performed by: INTERNAL MEDICINE

## 2025-02-20 PROCEDURE — 1159F MED LIST DOCD IN RCRD: CPT | Performed by: INTERNAL MEDICINE

## 2025-02-20 PROCEDURE — 99214 OFFICE O/P EST MOD 30 MIN: CPT | Performed by: INTERNAL MEDICINE

## 2025-02-20 PROCEDURE — 3078F DIAST BP <80 MM HG: CPT | Performed by: INTERNAL MEDICINE

## 2025-02-20 PROCEDURE — 3074F SYST BP LT 130 MM HG: CPT | Performed by: INTERNAL MEDICINE

## 2025-02-20 NOTE — PROGRESS NOTES
Patient Name: ELDER FONG  YOB: 1970   Medical Record number: [id number]     PCP: Leigh Casey MD    Chief Complaint   Patient presents with    Nausea    Abdominal Pain       History of Present Illness:   HPI  Ms. Fong presents to the office for follow-up.  She admits to experiencing some issues with nausea that can occur without eating and sometimes with meals. Ms. Fong does admit to nausea at times when she wakes up in the morning.  Patient denies any vomiting of undigested food.  There is no history of localized abdominal pain with meals.  She does have abdominal pain in the right and left side upper aspect.  There is no radiation to the flanks.  She denies any radiation to the back.  There is no history of unexplained weight loss.  Ms. Fong denies any change in bowel habits or signs of bleeding.  There is no history of change in the color of her eyes or skin.  Past Medical History:   Diagnosis Date    A-fib 2016    REMOTE HISTORY AFTER VALVE     Anemia     Anxiety     Aortic valve regurgitation 2016    VALVE REPLACEMENT     Aortic valve replaced 2016    Charcot-Rebekah-Tooth disease     CHF (congestive heart failure) 2016    Colon polyp     Constipation     Controlled type 2 diabetes mellitus without complication     not medicated??? not sure if type 2-  borderline diabetes     COPD (chronic obstructive pulmonary disease)     Diabetic neuropathy     Diabetic Ulcer     Dysfunctional uterine bleeding     Endocarditis 2016    GERD (gastroesophageal reflux disease)     Heart murmur 2016    Hip arthrosis 3/2016    Hyperlipidemia LDL goal <100 03/21/2023    Hypertension     Liver cirrhosis secondary to MCKEON (CMS/HCC)     Liver disease     Low back pain     Lumbosacral disc disease 05/2009    MRSA infection 09/2015    TOE    Paroxysmal atrial fibrillation 09/01/2016    · Postoperative afib after AVR · Chads Vasc= 2 · 2-week monitor (10/7/2020): Predominantly normal sinus rhythm without  atrial fibrillation/flutter.  1% PACs and PVCs.  Lightheadedness associated with heart rates in the 50 bpm. · 30 day monitor (10/2021): NSR with occasional PVC's and PAC's 1% burden.  Symptoms correspond to Sinus tach    Primary Osteoarthritis of both hips     S/P hardware removal( painful circlage right proximal femur/right hip) 03/15/2021    Sepsis     Sleep apnea     RESOLVED - PER PATIENT     SOB (shortness of breath) on exertion     Streptococcal arthritis of right hip     Subacute osteomyelitis of left foot     Transfusion history     PATIENT DENIES REACTION    Wears reading eyeglasses        Past Surgical History:   Procedure Laterality Date    AMPUTATION DIGIT Right 01/30/2018    Procedure: AMPUTATION RIGHT 2ND TOE;  Surgeon: Charleen Torres MD;  Location:  [facility];  Service:     AMPUTATION DIGIT Left 12/06/2022    Procedure: amputate left great toe at DIP joint, flexor tenotomies toes 2,3,4,5;  Surgeon: Charleen Torres MD;  Location:  [facility];  Service: Orthopedics;  Laterality: Left;    AORTIC VALVE REPAIR/REPLACEMENT N/A 08/30/2016    Procedure: AIRAM, MEDIAN STERNOTOMY, AORTIC VALVE REPLACEMENT;  Surgeon: Mauricio Grove MD;  Location:  [facility];  Service:     BLADDER SLING MODIFIED, ANTERIOR AND POSTERIOR VAGINAL REPAIR      CARDIAC CATHETERIZATION N/A 08/25/2016    Procedure: Left Heart Cath;  Surgeon: Tyler Peterson MD;  Location:  [facility];  Service:     CARDIAC CATHETERIZATION N/A 03/21/2018    Procedure: Coronary angiography;  Surgeon: Tyler Peterson IV, MD;  Location:  [facility];  Service: Cardiovascular    CARDIAC VALVE REPLACEMENT  2016    COLONOSCOPY      DILATATION AND CURETTAGE      of uterus    ENDOSCOPY      FOOT SURGERY Left     for osteomyelitis    HARDWARE REMOVAL Right 03/15/2021    Procedure: REMOVAL OF CERCLAGE WIRE OF RIGHT PROXIMAL FEMUR - RIGHT;  Surgeon: Owen Siu MD;  Location:  [facility];  Service: Orthopedics;   Laterality: Right;    INCISION AND DRAINAGE HIP Right 01/26/2017    Procedure: RIGHT HIP WOUND WASHOUT AND DEBRIDEMENT, WITH POLYETHELENE FEMORAL HEAD EXCHANGE ;  Surgeon: Owen Siu MD;  Location:  [facility];  Service:     JOINT REPLACEMENT  3/2017    Right hip    PERICARDIAL WINDOW N/A 09/16/2016    Procedure: PERICARDIOCENTESIS WITH PLACEMENT OF DRAINAGE TUBE .;  Surgeon: Mauricio Grove MD;  Location:  [facility];  Service:     PERICARDIAL WINDOW N/A 09/20/2016    Procedure: SUBXYPHOID PERICARDIAL WINDOW;  Surgeon: Mauricio Grove MD;  Location:  [facility];  Service:     MT CLTX HIP DISLOCATION TRAUMATIC W/O ANESTHESIA N/A 01/29/2017    Procedure: HIP CLOSED REDUCTION;  Surgeon: Leander Li MD;  Location:  [facility];  Service: Orthopedics    THORACOTOMY Left 10/05/2016    Procedure: THORACOTOMY Pericardectomy,  AIRAM,  Placement pacing leads, Pump standby;  Surgeon: Mauricio Grove MD;  Location:  [facility];  Service:     TOTAL HIP ARTHROPLASTY Right 01/23/2017    Procedure: RIGHT TOTAL HIP ARTHROPLASTY ANTERIOR ;  Surgeon: Owen Siu MD;  Location:  [facility];  Service:     TOTAL HIP ARTHROPLASTY REVISION Right 01/30/2017    Procedure: REPEAT WOUND DEBRIDEMENT ACETABULAR REVISION;  Surgeon: Owen Siu MD;  Location:  [facility];  Service:     UPPER GASTROINTESTINAL ENDOSCOPY      WISDOM TOOTH EXTRACTION      all 4         Current Outpatient Medications:     aspirin 81 MG EC tablet, Take 1 tablet by mouth Daily., Disp: 90 tablet, Rfl: 3    Biotin 46262 MCG tablet, Take 1 tablet by mouth Daily., Disp: , Rfl:     bumetanide (BUMEX) 1 MG tablet, Take 1 tablet by mouth 2 (Two) Times a Day., Disp: , Rfl:     DULoxetine (CYMBALTA) 30 MG capsule, Take 1 capsule by mouth Daily., Disp: , Rfl:     empagliflozin (Jardiance) 25 MG tablet tablet, Take 1 tablet by mouth Daily., Disp: , Rfl:     esomeprazole (nexIUM) 40 MG capsule, TAKE 1 CAPSULE EVERY DAY, Disp: 90 capsule, Rfl: 3    fluticasone (FLONASE) 50 MCG/ACT  nasal spray, Administer 1 spray into the nostril(s) as directed by provider Daily As Needed., Disp: , Rfl:     hydrOXYzine (ATARAX) 50 MG tablet, Take 1 tablet by mouth Every 6 (Six) Hours As Needed for Anxiety., Disp: , Rfl:     levocetirizine (XYZAL) 5 MG tablet, , Disp: , Rfl:     Linzess 145 MCG capsule capsule, TAKE 1 CAPSULE EVERY MORNING BEFORE BREAKFAST, Disp: 90 capsule, Rfl: 3    loratadine (CLARITIN) 10 MG tablet, Take 1 tablet by mouth Daily., Disp: , Rfl:     metFORMIN (GLUCOPHAGE) 500 MG tablet, Take 1 tablet by mouth 2 (Two) Times a Day With Meals., Disp: , Rfl:     metoprolol tartrate (LOPRESSOR) 50 MG tablet, Take 1 tablet by mouth 2 (Two) Times a Day., Disp: 180 tablet, Rfl: 3    Multiple Vitamins-Minerals (CENTRUM SILVER 50+WOMEN) tablet, , Disp: , Rfl:     ondansetron (ZOFRAN) 4 MG tablet, , Disp: , Rfl:     Progesterone (PROMETRIUM) 100 MG capsule, Take 1 capsule by mouth Daily., Disp: , Rfl:     Psyllium (Metamucil) wafer wafer, Take 2 wafers by mouth Daily., Disp: , Rfl:     rosuvastatin (CRESTOR) 10 MG tablet, Take 1 tablet by mouth Every Night., Disp: 90 tablet, Rfl: 3    spironolactone (ALDACTONE) 50 MG tablet, Take 1 tablet by mouth Daily., Disp: 90 tablet, Rfl: 2    vilazodone (VIIBRYD) 20 MG tablet tablet, Take 1 tablet by mouth Daily., Disp: , Rfl:     fluconazole (DIFLUCAN) 200 MG tablet, As Needed. (Patient not taking: Reported on 2/20/2025), Disp: , Rfl:     sodium-potassium-magnesium sulfates (Suprep Bowel Prep Kit) 17.5-3.13-1.6 GM/177ML solution oral solution, Take 1 bottle by mouth Every 12 (Twelve) Hours. Take 1 bottle by mouth as directed. (Patient not taking: Reported on 2/20/2025), Disp: 354 mL, Rfl: 0    No Known Allergies    Family History   Problem Relation Age of Onset    Diabetes Mother     Heart disease Mother     Hypertension Mother     Osteoarthritis Mother     Osteoporosis Mother     Arrhythmia Mother     Hyperlipidemia Mother     Diabetes Sister     Arthritis Sister      Arrhythmia Sister     No Known Problems Father     Diabetes Sister     Diabetes Sister     Asthma Sister     Heart disease Other     Heart attack Other     Heart disease Other     Hypertension Other     Osteoarthritis Other     Rheum arthritis Other     Cancer Neg Hx        Social History     Socioeconomic History    Marital status:     Number of children: 0   Tobacco Use    Smoking status: Former     Current packs/day: 0.00     Average packs/day: 0.5 packs/day for 15.0 years (7.5 ttl pk-yrs)     Types: Cigarettes     Start date: 2000     Quit date: 2015     Years since quittin.4     Passive exposure: Past    Smokeless tobacco: Never   Vaping Use    Vaping status: Former    Substances: THC, Flavoring    Devices: Disposable   Substance and Sexual Activity    Alcohol use: Not Currently    Drug use: Yes     Types: Marijuana     Comment: THC gummies occasionally for pain    Sexual activity: Not Currently     Partners: Male     Birth control/protection: Abstinence       Review of Systems   Constitutional:  Negative for appetite change, fatigue, fever and unexpected weight change.   HENT:  Negative for dental problem, mouth sores, postnasal drip, sneezing, trouble swallowing and voice change.    Eyes:  Negative for pain, redness and itching.   Respiratory:  Negative for cough, shortness of breath and wheezing.    Cardiovascular:  Negative for chest pain, palpitations and leg swelling.   Gastrointestinal:  Negative for abdominal distention, abdominal pain, anal bleeding, blood in stool, constipation, diarrhea, nausea, rectal pain and vomiting.   Endocrine: Negative for cold intolerance, heat intolerance, polydipsia and polyuria.   Genitourinary:  Negative for dysuria, enuresis, flank pain, hematuria and urgency.   Musculoskeletal:  Negative for arthralgias, back pain, joint swelling and myalgias.   Skin:  Negative for color change, pallor and rash.   Allergic/Immunologic: Negative for  environmental allergies, food allergies and immunocompromised state.   Neurological:  Negative for dizziness, tremors, seizures, facial asymmetry, numbness and headaches.   Psychiatric/Behavioral:  Negative for behavioral problems, dysphoric mood, hallucinations and self-injury.        Vitals:    02/20/25 1412   BP: 118/78   Pulse: 63   Temp: 97.7 °F (36.5 °C)   SpO2: 95%       Physical Exam  Vitals reviewed.   Constitutional:       General: She is not in acute distress.     Appearance: Normal appearance.   HENT:      Head: Normocephalic and atraumatic.      Nose: Nose normal.      Mouth/Throat:      Mouth: Mucous membranes are moist.      Pharynx: Oropharynx is clear. No posterior oropharyngeal erythema.   Eyes:      General: No scleral icterus.     Extraocular Movements: Extraocular movements intact.   Cardiovascular:      Rate and Rhythm: Normal rate and regular rhythm.      Heart sounds: No murmur heard.     No gallop.   Pulmonary:      Breath sounds: Normal breath sounds. No wheezing or rales.   Abdominal:      General: Bowel sounds are normal.      Palpations: Abdomen is soft.      Tenderness: There is no abdominal tenderness. There is no guarding.   Musculoskeletal:         General: No swelling. Normal range of motion.      Cervical back: Normal range of motion and neck supple.   Skin:     General: Skin is dry.      Coloration: Skin is not jaundiced.   Neurological:      Mental Status: She is alert and oriented to person, place, and time.   Psychiatric:         Mood and Affect: Mood normal.         Thought Content: Thought content normal.         Judgment: Judgment normal.         Diagnoses and all orders for this visit:    1. Nausea (Primary)  -     NM Gastric Emptying; Future    2. Abnormal liver function tests  -     US Liver; Future    3. Metabolic dysfunction-associated steatotic liver disease (MASLD)  -     US Liver; Future    The patient does have nausea and diabetes last the differential diagnosis  includes gastroparesis.  The ultrasound in the summer 2024 did not demonstrate any evidence for gallstones but will schedule another ultrasound also for history of MASLD.      Plan: Will schedule 4-hour gastric emptying study.           Will schedule ultrasound of the liver.           Discussed  the medication Rezdiffra for metabolic associated steatotic liver disease.           Will follow-up in 6 months.

## 2025-03-19 ENCOUNTER — HOSPITAL ENCOUNTER (OUTPATIENT)
Facility: HOSPITAL | Age: 55
Discharge: HOME | End: 2025-03-19
Admitting: INTERNAL MEDICINE
Payer: MEDICARE

## 2025-03-19 DIAGNOSIS — K76.0 METABOLIC DYSFUNCTION-ASSOCIATED STEATOTIC LIVER DISEASE (MASLD): ICD-10-CM

## 2025-03-19 DIAGNOSIS — R79.89 ABNORMAL LIVER FUNCTION TESTS: ICD-10-CM

## 2025-03-19 PROCEDURE — 76705 ECHO EXAM OF ABDOMEN: CPT

## 2025-03-29 DIAGNOSIS — R00.2 PALPITATIONS: ICD-10-CM

## 2025-03-29 DIAGNOSIS — R07.89 ATYPICAL CHEST PAIN: ICD-10-CM

## 2025-03-31 RX ORDER — METOPROLOL TARTRATE 50 MG
50 TABLET ORAL 2 TIMES DAILY
Qty: 180 TABLET | Refills: 3 | Status: SHIPPED | OUTPATIENT
Start: 2025-03-31

## 2025-04-17 ENCOUNTER — TELEPHONE (OUTPATIENT)
Dept: GASTROENTEROLOGY | Facility: CLINIC | Age: 55
End: 2025-04-17
Payer: MEDICARE

## 2025-04-30 ENCOUNTER — LAB REQUISITION (OUTPATIENT)
Dept: LAB | Facility: HOSPITAL | Age: 55
End: 2025-04-30
Payer: MEDICARE

## 2025-04-30 ENCOUNTER — OUTSIDE FACILITY SERVICE (OUTPATIENT)
Dept: GASTROENTEROLOGY | Facility: CLINIC | Age: 55
End: 2025-04-30
Payer: MEDICARE

## 2025-04-30 DIAGNOSIS — K31.89 OTHER DISEASES OF STOMACH AND DUODENUM: ICD-10-CM

## 2025-04-30 DIAGNOSIS — K76.6 PORTAL HYPERTENSION: ICD-10-CM

## 2025-04-30 DIAGNOSIS — R10.10 UPPER ABDOMINAL PAIN, UNSPECIFIED: ICD-10-CM

## 2025-04-30 DIAGNOSIS — K29.70 GASTRITIS, UNSPECIFIED, WITHOUT BLEEDING: ICD-10-CM

## 2025-04-30 DIAGNOSIS — R11.0 NAUSEA: ICD-10-CM

## 2025-04-30 PROCEDURE — 88305 TISSUE EXAM BY PATHOLOGIST: CPT | Performed by: INTERNAL MEDICINE

## 2025-04-30 PROCEDURE — 43239 EGD BIOPSY SINGLE/MULTIPLE: CPT | Performed by: INTERNAL MEDICINE

## 2025-05-01 LAB — REF LAB TEST METHOD: NORMAL

## 2025-05-02 DIAGNOSIS — K59.04 CHRONIC IDIOPATHIC CONSTIPATION: ICD-10-CM

## 2025-05-02 RX ORDER — LINACLOTIDE 145 UG/1
145 CAPSULE, GELATIN COATED ORAL
Qty: 90 CAPSULE | Refills: 3 | Status: SHIPPED | OUTPATIENT
Start: 2025-05-02

## 2025-05-02 NOTE — TELEPHONE ENCOUNTER
Dr. Vásquez-everything is set up for Monday.  Will let you know how everything looks.   Rx Refill Note  Requested Prescriptions     Pending Prescriptions Disp Refills    Linzess 145 MCG capsule capsule [Pharmacy Med Name: Linzess Oral Capsule 145 MCG] 90 capsule 3     Sig: TAKE 1 CAPSULE EVERY MORNING BEFORE BREAKFAST      Last office visit with prescribing clinician: 2/20/2025   Last telemedicine visit with prescribing clinician: Visit date not found   Next office visit with prescribing clinician: 7/18/2025                             Mine Escobar MA  05/02/25, 07:29 EDT

## 2025-05-14 ENCOUNTER — HOSPITAL ENCOUNTER (OUTPATIENT)
Dept: CARDIOLOGY | Facility: HOSPITAL | Age: 55
Discharge: HOME | End: 2025-05-14
Admitting: NURSE PRACTITIONER
Payer: MEDICARE

## 2025-05-14 VITALS — BODY MASS INDEX: 36.16 KG/M2 | WEIGHT: 267 LBS | HEIGHT: 72 IN

## 2025-05-14 DIAGNOSIS — I50.32 CHRONIC DIASTOLIC CONGESTIVE HEART FAILURE: ICD-10-CM

## 2025-05-14 DIAGNOSIS — R00.2 PALPITATIONS: ICD-10-CM

## 2025-05-14 PROCEDURE — 93306 TTE W/DOPPLER COMPLETE: CPT

## 2025-05-15 LAB
AORTIC DIMENSIONLESS INDEX: 0.34 (DI)
ASCENDING AORTA: 3.8 CM
AV MEAN PRESS GRAD SYS DOP V1V2: 15.2 MMHG
AV VMAX SYS DOP: 271 CM/SEC
BH CV ECHO MEAS - 2D AUTO EF SIEMENS: 65.5 %
BH CV ECHO MEAS - AO MAX PG: 29.5 MMHG
BH CV ECHO MEAS - AO ROOT DIAM: 3.5 CM
BH CV ECHO MEAS - AO V2 VTI: 59 CM
BH CV ECHO MEAS - AVA(I,D): 1.3 CM2
BH CV ECHO MEAS - IVS/LVPW: 0.6 CM
BH CV ECHO MEAS - IVSD: 0.6 CM
BH CV ECHO MEAS - LA DIMENSION: 3.8 CM
BH CV ECHO MEAS - LAT PEAK E' VEL: 15.1 CM/SEC
BH CV ECHO MEAS - LV MAX PG: 2.5 MMHG
BH CV ECHO MEAS - LV MEAN PG: 1.27 MMHG
BH CV ECHO MEAS - LV V1 MAX: 79.3 CM/SEC
BH CV ECHO MEAS - LV V1 VTI: 20.2 CM
BH CV ECHO MEAS - LVIDD: 5.8 CM
BH CV ECHO MEAS - LVIDS: 3.2 CM
BH CV ECHO MEAS - LVOT AREA: 3.8 CM2
BH CV ECHO MEAS - LVOT DIAM: 2.2 CM
BH CV ECHO MEAS - LVPWD: 1 CM
BH CV ECHO MEAS - MED PEAK E' VEL: 6.8 CM/SEC
BH CV ECHO MEAS - MV A MAX VEL: 36.9 CM/SEC
BH CV ECHO MEAS - MV DEC SLOPE: 355.4 CM/SEC2
BH CV ECHO MEAS - MV E MAX VEL: 54 CM/SEC
BH CV ECHO MEAS - MV E/A: 1.46
BH CV ECHO MEAS - MV P1/2T: 60.5 MSEC
BH CV ECHO MEAS - MVA(P1/2T): 4.8 CM2
BH CV ECHO MEAS - PA ACC TIME: 0.1 SEC
BH CV ECHO MEAS - PA V2 MAX: 101.5 CM/SEC
BH CV ECHO MEAS - PAPD(PI EDV): 5 MMHG
BH CV ECHO MEAS - PI END-D VEL: 115.2 CM/SEC
BH CV ECHO MEAS - RAP SYSTOLE: 3 MMHG
BH CV ECHO MEAS - RV MAX PG: 1.93 MMHG
BH CV ECHO MEAS - RV V1 MAX: 69.4 CM/SEC
BH CV ECHO MEAS - RV V1 VTI: 15.5 CM
BH CV ECHO MEAS - RVSP: 21 MMHG
BH CV ECHO MEAS - SV(LVOT): 76.7 ML
BH CV ECHO MEAS - SVI(LVOT): 31.6 ML/M2
BH CV ECHO MEAS - TAPSE (>1.6): 1.25 CM
BH CV ECHO MEAS - TR MAX PG: 16.4 MMHG
BH CV ECHO MEAS - TR MAX VEL: 202.3 CM/SEC
BH CV ECHO MEASUREMENTS AVERAGE E/E' RATIO: 4.93
BH CV VAS BP LEFT ARM: NORMAL MMHG
BH CV XLRA - RV BASE: 6 CM
BH CV XLRA - RV LENGTH: 8.3 CM
BH CV XLRA - RV MID: 5 CM
BH CV XLRA - TDI S': 8.2 CM/SEC
IVRT: 81 MS
LEFT ATRIUM VOLUME INDEX: 13.8 ML/M2
LV EF 2D ECHO EST: 65 %

## 2025-06-04 RX ORDER — LOSARTAN POTASSIUM 25 MG/1
25 TABLET ORAL DAILY
Qty: 90 TABLET | Refills: 3 | Status: SHIPPED | OUTPATIENT
Start: 2025-06-04

## 2025-06-06 ENCOUNTER — HOSPITAL ENCOUNTER (OUTPATIENT)
Facility: HOSPITAL | Age: 55
Discharge: HOME | End: 2025-06-06
Payer: MEDICARE

## 2025-06-06 VITALS
SYSTOLIC BLOOD PRESSURE: 109 MMHG | DIASTOLIC BLOOD PRESSURE: 82 MMHG | WEIGHT: 266.76 LBS | OXYGEN SATURATION: 96 % | HEART RATE: 63 BPM | HEIGHT: 72 IN | BODY MASS INDEX: 36.13 KG/M2

## 2025-06-06 PROCEDURE — 25010000002 REGADENOSON 0.4 MG/5ML SOLUTION: Performed by: NURSE PRACTITIONER

## 2025-06-06 PROCEDURE — A9500 TC99M SESTAMIBI: HCPCS | Performed by: NURSE PRACTITIONER

## 2025-06-06 PROCEDURE — 34310000005 TECHNETIUM SESTAMIBI: Performed by: NURSE PRACTITIONER

## 2025-06-06 PROCEDURE — 78452 HT MUSCLE IMAGE SPECT MULT: CPT

## 2025-06-06 PROCEDURE — 93017 CV STRESS TEST TRACING ONLY: CPT

## 2025-06-06 RX ORDER — REGADENOSON 0.08 MG/ML
0.4 INJECTION, SOLUTION INTRAVENOUS ONCE
Status: COMPLETED | OUTPATIENT
Start: 2025-06-06 | End: 2025-06-06

## 2025-06-06 RX ADMIN — TECHNETIUM TC 99M SESTAMIBI 1 DOSE: 1 INJECTION INTRAVENOUS at 09:50

## 2025-06-06 RX ADMIN — REGADENOSON 0.4 MG: 0.08 INJECTION INTRAVENOUS at 09:46

## 2025-06-06 RX ADMIN — TECHNETIUM TC 99M SESTAMIBI 1 DOSE: 1 INJECTION INTRAVENOUS at 08:15

## 2025-06-11 ENCOUNTER — PREP FOR SURGERY (OUTPATIENT)
Dept: OTHER | Facility: HOSPITAL | Age: 55
End: 2025-06-11
Payer: MEDICARE

## 2025-06-11 ENCOUNTER — DOCUMENTATION (OUTPATIENT)
Dept: CARDIOLOGY | Facility: CLINIC | Age: 55
End: 2025-06-11
Payer: MEDICARE

## 2025-06-11 DIAGNOSIS — R94.39 ABNORMAL STRESS TEST: Primary | ICD-10-CM

## 2025-06-11 RX ORDER — NITROGLYCERIN 0.4 MG/1
0.4 TABLET SUBLINGUAL
Status: CANCELLED | OUTPATIENT
Start: 2025-06-11

## 2025-06-11 RX ORDER — SODIUM CHLORIDE 0.9 % (FLUSH) 0.9 %
10 SYRINGE (ML) INJECTION EVERY 12 HOURS SCHEDULED
Status: CANCELLED | OUTPATIENT
Start: 2025-06-11

## 2025-06-11 RX ORDER — ASPIRIN 81 MG/1
324 TABLET, CHEWABLE ORAL ONCE
Status: CANCELLED | OUTPATIENT
Start: 2025-06-11 | End: 2025-06-11

## 2025-06-11 RX ORDER — SODIUM CHLORIDE 0.9 % (FLUSH) 0.9 %
10 SYRINGE (ML) INJECTION AS NEEDED
Status: CANCELLED | OUTPATIENT
Start: 2025-06-11

## 2025-06-11 RX ORDER — SODIUM CHLORIDE 9 MG/ML
40 INJECTION, SOLUTION INTRAVENOUS AS NEEDED
Status: CANCELLED | OUTPATIENT
Start: 2025-06-11

## 2025-06-11 RX ORDER — ASPIRIN 81 MG/1
81 TABLET ORAL DAILY
Status: CANCELLED | OUTPATIENT
Start: 2025-06-12

## 2025-06-11 NOTE — PROGRESS NOTES
I received the results of Danae's stress test which were abnormal.  She will be scheduled for cardiac catheterization.  Order is placed.  Our nurse will reach out to her.  Stress test was ordered because Danae had been experiencing bilateral arm pain, having spikes in blood pressure and generally feeling unwell.      BAUTISTA Malcolm

## 2025-06-16 ENCOUNTER — HOSPITAL ENCOUNTER (OUTPATIENT)
Facility: HOSPITAL | Age: 55
Setting detail: HOSPITAL OUTPATIENT SURGERY
Discharge: HOME | End: 2025-06-16
Attending: INTERNAL MEDICINE | Admitting: INTERNAL MEDICINE
Payer: MEDICARE

## 2025-06-16 VITALS
WEIGHT: 262 LBS | RESPIRATION RATE: 16 BRPM | OXYGEN SATURATION: 96 % | DIASTOLIC BLOOD PRESSURE: 82 MMHG | SYSTOLIC BLOOD PRESSURE: 120 MMHG | HEART RATE: 49 BPM | TEMPERATURE: 97 F | BODY MASS INDEX: 35.49 KG/M2 | HEIGHT: 72 IN

## 2025-06-16 DIAGNOSIS — R00.2 PALPITATIONS: ICD-10-CM

## 2025-06-16 DIAGNOSIS — R94.39 ABNORMAL STRESS TEST: ICD-10-CM

## 2025-06-16 DIAGNOSIS — R07.89 ATYPICAL CHEST PAIN: ICD-10-CM

## 2025-06-16 LAB
ALBUMIN SERPL-MCNC: 4 G/DL (ref 3.5–5.2)
ALBUMIN/GLOB SERPL: 1.3 G/DL
ALP SERPL-CCNC: 136 U/L (ref 39–117)
ALT SERPL W P-5'-P-CCNC: 38 U/L (ref 1–33)
ANION GAP SERPL CALCULATED.3IONS-SCNC: 12 MMOL/L (ref 5–15)
AST SERPL-CCNC: 50 U/L (ref 1–32)
B-HCG UR QL: NEGATIVE
BASOPHILS # BLD AUTO: 0.03 10*3/MM3 (ref 0–0.2)
BASOPHILS NFR BLD AUTO: 0.3 % (ref 0–1.5)
BILIRUB SERPL-MCNC: 0.3 MG/DL (ref 0–1.2)
BUN SERPL-MCNC: 10.7 MG/DL (ref 6–20)
BUN/CREAT SERPL: 14.1 (ref 7–25)
CALCIUM SPEC-SCNC: 9 MG/DL (ref 8.6–10.5)
CHLORIDE SERPL-SCNC: 100 MMOL/L (ref 98–107)
CHOLEST SERPL-MCNC: 148 MG/DL (ref 0–200)
CO2 SERPL-SCNC: 27 MMOL/L (ref 22–29)
CREAT SERPL-MCNC: 0.76 MG/DL (ref 0.57–1)
DEPRECATED RDW RBC AUTO: 48 FL (ref 37–54)
EGFRCR SERPLBLD CKD-EPI 2021: 93.3 ML/MIN/1.73
EOSINOPHIL # BLD AUTO: 0.1 10*3/MM3 (ref 0–0.4)
EOSINOPHIL NFR BLD AUTO: 1.1 % (ref 0.3–6.2)
ERYTHROCYTE [DISTWIDTH] IN BLOOD BY AUTOMATED COUNT: 16.7 % (ref 12.3–15.4)
GLOBULIN UR ELPH-MCNC: 3.1 GM/DL
GLUCOSE SERPL-MCNC: 118 MG/DL (ref 65–99)
HBA1C MFR BLD: 6.7 % (ref 4.8–5.6)
HCT VFR BLD AUTO: 44.4 % (ref 34–46.6)
HDLC SERPL-MCNC: 38 MG/DL (ref 40–60)
HGB BLD-MCNC: 14.4 G/DL (ref 12–15.9)
IMM GRANULOCYTES # BLD AUTO: 0.03 10*3/MM3 (ref 0–0.05)
IMM GRANULOCYTES NFR BLD AUTO: 0.3 % (ref 0–0.5)
LDLC SERPL CALC-MCNC: 91 MG/DL (ref 0–100)
LDLC/HDLC SERPL: 2.36 {RATIO}
LYMPHOCYTES # BLD AUTO: 2.18 10*3/MM3 (ref 0.7–3.1)
LYMPHOCYTES NFR BLD AUTO: 25 % (ref 19.6–45.3)
MCH RBC QN AUTO: 25.8 PG (ref 26.6–33)
MCHC RBC AUTO-ENTMCNC: 32.4 G/DL (ref 31.5–35.7)
MCV RBC AUTO: 79.6 FL (ref 79–97)
MONOCYTES # BLD AUTO: 0.56 10*3/MM3 (ref 0.1–0.9)
MONOCYTES NFR BLD AUTO: 6.4 % (ref 5–12)
NEUTROPHILS NFR BLD AUTO: 5.81 10*3/MM3 (ref 1.7–7)
NEUTROPHILS NFR BLD AUTO: 66.9 % (ref 42.7–76)
NRBC BLD AUTO-RTO: 0 /100 WBC (ref 0–0.2)
PLATELET # BLD AUTO: 188 10*3/MM3 (ref 140–450)
PMV BLD AUTO: 9 FL (ref 6–12)
POTASSIUM SERPL-SCNC: 3.4 MMOL/L (ref 3.5–5.2)
PROT SERPL-MCNC: 7.1 G/DL (ref 6–8.5)
RBC # BLD AUTO: 5.58 10*6/MM3 (ref 3.77–5.28)
SODIUM SERPL-SCNC: 139 MMOL/L (ref 136–145)
TRIGL SERPL-MCNC: 102 MG/DL (ref 0–150)
VLDLC SERPL-MCNC: 19 MG/DL (ref 5–40)
WBC NRBC COR # BLD AUTO: 8.71 10*3/MM3 (ref 3.4–10.8)

## 2025-06-16 PROCEDURE — 80053 COMPREHEN METABOLIC PANEL: CPT | Performed by: NURSE PRACTITIONER

## 2025-06-16 PROCEDURE — 25510000001 IOPAMIDOL PER 1 ML: Performed by: INTERNAL MEDICINE

## 2025-06-16 PROCEDURE — 25010000002 LIDOCAINE PF 1% 1 % SOLUTION: Performed by: INTERNAL MEDICINE

## 2025-06-16 PROCEDURE — 80061 LIPID PANEL: CPT | Performed by: NURSE PRACTITIONER

## 2025-06-16 PROCEDURE — C1769 GUIDE WIRE: HCPCS | Performed by: INTERNAL MEDICINE

## 2025-06-16 PROCEDURE — 25010000002 MIDAZOLAM PER 1 MG: Performed by: INTERNAL MEDICINE

## 2025-06-16 PROCEDURE — C1894 INTRO/SHEATH, NON-LASER: HCPCS | Performed by: INTERNAL MEDICINE

## 2025-06-16 PROCEDURE — 93458 L HRT ARTERY/VENTRICLE ANGIO: CPT | Performed by: INTERNAL MEDICINE

## 2025-06-16 PROCEDURE — 25010000002 NICARDIPINE 2.5 MG/ML SOLUTION: Performed by: INTERNAL MEDICINE

## 2025-06-16 PROCEDURE — 36415 COLL VENOUS BLD VENIPUNCTURE: CPT

## 2025-06-16 PROCEDURE — 25810000003 SODIUM CHLORIDE 0.9 % SOLUTION: Performed by: NURSE PRACTITIONER

## 2025-06-16 PROCEDURE — S0260 H&P FOR SURGERY: HCPCS | Performed by: INTERNAL MEDICINE

## 2025-06-16 PROCEDURE — 85025 COMPLETE CBC W/AUTO DIFF WBC: CPT | Performed by: NURSE PRACTITIONER

## 2025-06-16 PROCEDURE — 83036 HEMOGLOBIN GLYCOSYLATED A1C: CPT | Performed by: NURSE PRACTITIONER

## 2025-06-16 PROCEDURE — 25010000002 HEPARIN (PORCINE) PER 1000 UNITS: Performed by: INTERNAL MEDICINE

## 2025-06-16 PROCEDURE — 25010000002 FENTANYL CITRATE (PF) 50 MCG/ML SOLUTION: Performed by: INTERNAL MEDICINE

## 2025-06-16 PROCEDURE — 81025 URINE PREGNANCY TEST: CPT | Performed by: INTERNAL MEDICINE

## 2025-06-16 RX ORDER — NITROGLYCERIN 0.4 MG/1
0.4 TABLET SUBLINGUAL
Status: DISCONTINUED | OUTPATIENT
Start: 2025-06-16 | End: 2025-06-16 | Stop reason: HOSPADM

## 2025-06-16 RX ORDER — HEPARIN SODIUM 1000 [USP'U]/ML
INJECTION, SOLUTION INTRAVENOUS; SUBCUTANEOUS
Status: DISCONTINUED | OUTPATIENT
Start: 2025-06-16 | End: 2025-06-16 | Stop reason: HOSPADM

## 2025-06-16 RX ORDER — SODIUM CHLORIDE 0.9 % (FLUSH) 0.9 %
10 SYRINGE (ML) INJECTION EVERY 12 HOURS SCHEDULED
Status: DISCONTINUED | OUTPATIENT
Start: 2025-06-16 | End: 2025-06-16 | Stop reason: HOSPADM

## 2025-06-16 RX ORDER — SODIUM CHLORIDE 0.9 % (FLUSH) 0.9 %
10 SYRINGE (ML) INJECTION AS NEEDED
Status: DISCONTINUED | OUTPATIENT
Start: 2025-06-16 | End: 2025-06-16 | Stop reason: HOSPADM

## 2025-06-16 RX ORDER — ASPIRIN 81 MG/1
81 TABLET ORAL DAILY
Status: DISCONTINUED | OUTPATIENT
Start: 2025-06-17 | End: 2025-06-16 | Stop reason: HOSPADM

## 2025-06-16 RX ORDER — NICARDIPINE HYDROCHLORIDE 2.5 MG/ML
INJECTION INTRAVENOUS
Status: DISCONTINUED | OUTPATIENT
Start: 2025-06-16 | End: 2025-06-16 | Stop reason: HOSPADM

## 2025-06-16 RX ORDER — METOPROLOL TARTRATE 100 MG/1
100 TABLET ORAL 2 TIMES DAILY
Qty: 180 TABLET | Refills: 3 | Status: SHIPPED | OUTPATIENT
Start: 2025-06-16

## 2025-06-16 RX ORDER — FENTANYL CITRATE 50 UG/ML
INJECTION, SOLUTION INTRAMUSCULAR; INTRAVENOUS
Status: DISCONTINUED | OUTPATIENT
Start: 2025-06-16 | End: 2025-06-16 | Stop reason: HOSPADM

## 2025-06-16 RX ORDER — ASPIRIN 81 MG/1
324 TABLET, CHEWABLE ORAL ONCE
Status: COMPLETED | OUTPATIENT
Start: 2025-06-16 | End: 2025-06-16

## 2025-06-16 RX ORDER — SODIUM CHLORIDE 9 MG/ML
40 INJECTION, SOLUTION INTRAVENOUS AS NEEDED
Status: DISCONTINUED | OUTPATIENT
Start: 2025-06-16 | End: 2025-06-16 | Stop reason: HOSPADM

## 2025-06-16 RX ORDER — MIDAZOLAM HYDROCHLORIDE 1 MG/ML
INJECTION, SOLUTION INTRAMUSCULAR; INTRAVENOUS
Status: DISCONTINUED | OUTPATIENT
Start: 2025-06-16 | End: 2025-06-16 | Stop reason: HOSPADM

## 2025-06-16 RX ORDER — OMEPRAZOLE 40 MG/1
40 CAPSULE, DELAYED RELEASE ORAL DAILY
COMMUNITY

## 2025-06-16 RX ORDER — IOPAMIDOL 755 MG/ML
INJECTION, SOLUTION INTRAVASCULAR
Status: DISCONTINUED | OUTPATIENT
Start: 2025-06-16 | End: 2025-06-16 | Stop reason: HOSPADM

## 2025-06-16 RX ORDER — LIDOCAINE HYDROCHLORIDE 10 MG/ML
INJECTION, SOLUTION EPIDURAL; INFILTRATION; INTRACAUDAL; PERINEURAL
Status: DISCONTINUED | OUTPATIENT
Start: 2025-06-16 | End: 2025-06-16 | Stop reason: HOSPADM

## 2025-06-16 RX ADMIN — SODIUM CHLORIDE 330 ML: 9 INJECTION, SOLUTION INTRAVENOUS at 08:51

## 2025-06-16 RX ADMIN — ASPIRIN 81 MG 324 MG: 81 TABLET ORAL at 08:51

## 2025-06-16 NOTE — H&P
Pre-Procedure History and Physical  [facility]      Patient:  Danae Fong  :  1970  MRN: [id number]    PCP:  Leigh Casey MD  PHONE:  [phone]    DATE: 2025  ID: Danae Fong is a 54 y.o. female from [place], [place].    HPI: Patient is a 54-year-old  female with history of bioprosthetic aortic valve, chronic diastolic congestive heart failure, hypertension, type II DM, and hyperlipidemia is here for left heart catheterization secondary to shortness of breath and chest pain on exertion which radiates to both of her arms.  Patient had abnormal stress test which showed anterior lateral wall ischemia.  Patient takes daily aspirin 81 mg.  Denies chest pain pressure at this time.     Cardiac Risk Factors: dyslipidemia, hypertension, and obesity (BMI >= 30 kg/m2),  Past Medical History:   Diagnosis Date    -2016    REMOTE HISTORY AFTER VALVE     Anemia     Anxiety     Aortic valve regurgitation 2016    VALVE REPLACEMENT     Aortic valve replaced     Charcot-Rebekah-Tooth disease     CHF (congestive heart failure) 2016    Colon polyp     Constipation     Controlled type 2 diabetes mellitus without complication     not medicated??? not sure if type 2-  borderline diabetes     COPD (chronic obstructive pulmonary disease)     Diabetic neuropathy     Diabetic Ulcer     Dysfunctional uterine bleeding     Endocarditis 2016    GERD (gastroesophageal reflux disease)     Heart murmur 2016    Hip arthrosis 3/2016    Hyperlipidemia LDL goal <100 2023    Hypertension     Liver cirrhosis secondary to MCKEON (CMS/HCC)     Liver disease     Low back pain     Lumbosacral disc disease 2009    MRSA infection 2015    TOE    Paroxysmal atrial fibrillation 2016    · Postoperative afib after AVR · Chads Vasc= 2 · 2-week monitor (10/7/2020): Predominantly normal sinus rhythm without atrial fibrillation/flutter.  1% PACs and PVCs.   Lightheadedness associated with heart rates in the 50 bpm. · 30 day monitor (10/2021): NSR with occasional PVC's and PAC's 1% burden.  Symptoms correspond to Sinus tach    Primary Osteoarthritis of both hips     S/P hardware removal( painful circlage right proximal femur/right hip) 03/15/2021    Sepsis     Sleep apnea     RESOLVED - PER PATIENT     SOB (shortness of breath) on exertion     Streptococcal arthritis of right hip     Subacute osteomyelitis of left foot     Transfusion history     PATIENT DENIES REACTION    Wears reading eyeglasses       Past Surgical History:   Procedure Laterality Date    AMPUTATION DIGIT Right 01/30/2018    Procedure: AMPUTATION RIGHT 2ND TOE;  Surgeon: Charleen Torres MD;  Location:  [facility];  Service:     AMPUTATION DIGIT Left 12/06/2022    Procedure: amputate left great toe at DIP joint, flexor tenotomies toes 2,3,4,5;  Surgeon: Charleen Torres MD;  Location:  [facility];  Service: Orthopedics;  Laterality: Left;    AORTIC VALVE REPAIR/REPLACEMENT N/A 08/30/2016    Procedure: AIRAM, MEDIAN STERNOTOMY, AORTIC VALVE REPLACEMENT;  Surgeon: Mauricio Grove MD;  Location:  [facility];  Service:     BLADDER SLING MODIFIED, ANTERIOR AND POSTERIOR VAGINAL REPAIR      CARDIAC CATHETERIZATION N/A 08/25/2016    Procedure: Left Heart Cath;  Surgeon: Tyler Peterson MD;  Location:  [facility];  Service:     CARDIAC CATHETERIZATION N/A 03/21/2018    Procedure: Coronary angiography;  Surgeon: Tyler Peterson IV, MD;  Location:  [facility];  Service: Cardiovascular    CARDIAC VALVE REPLACEMENT  2016    COLONOSCOPY      DILATATION AND CURETTAGE      of uterus    ENDOSCOPY      FOOT SURGERY Left     for osteomyelitis    HARDWARE REMOVAL Right 03/15/2021    Procedure: REMOVAL OF CERCLAGE WIRE OF RIGHT PROXIMAL FEMUR - RIGHT;  Surgeon: Owen Siu MD;  Location:  [facility];  Service: Orthopedics;  Laterality: Right;    INCISION AND DRAINAGE HIP  Right 01/26/2017    Procedure: RIGHT HIP WOUND WASHOUT AND DEBRIDEMENT, WITH POLYETHELENE FEMORAL HEAD EXCHANGE ;  Surgeon: Owen Siu MD;  Location:  [facility];  Service:     JOINT REPLACEMENT  3/2017    Right hip    PERICARDIAL WINDOW N/A 09/16/2016    Procedure: PERICARDIOCENTESIS WITH PLACEMENT OF DRAINAGE TUBE .;  Surgeon: Mauricio Grove MD;  Location:  [facility];  Service:     PERICARDIAL WINDOW N/A 09/20/2016    Procedure: SUBXYPHOID PERICARDIAL WINDOW;  Surgeon: Mauricio Grove MD;  Location:  [facility];  Service:     FL CLTX HIP DISLOCATION TRAUMATIC W/O ANESTHESIA N/A 01/29/2017    Procedure: HIP CLOSED REDUCTION;  Surgeon: Leander Li MD;  Location:  [facility];  Service: Orthopedics    THORACOTOMY Left 10/05/2016    Procedure: THORACOTOMY Pericardectomy,  AIRAM,  Placement pacing leads, Pump standby;  Surgeon: Mauricio Grove MD;  Location:  [facility];  Service:     TOTAL HIP ARTHROPLASTY Right 01/23/2017    Procedure: RIGHT TOTAL HIP ARTHROPLASTY ANTERIOR ;  Surgeon: Owen Siu MD;  Location:  [facility];  Service:     TOTAL HIP ARTHROPLASTY REVISION Right 01/30/2017    Procedure: REPEAT WOUND DEBRIDEMENT ACETABULAR REVISION;  Surgeon: Owen Siu MD;  Location:  [facility];  Service:     UPPER GASTROINTESTINAL ENDOSCOPY      WISDOM TOOTH EXTRACTION      all 4      Allergies:      No Known Allergies    MEDICATIONS:  Current Outpatient Medications   Medication Instructions    aspirin 81 mg, Oral, Daily    Biotin 74735 MCG tablet 1 tablet, Daily    bumetanide (BUMEX) 1 mg, Oral, 2 Times Daily    DULoxetine (CYMBALTA) 30 mg, Nightly    empagliflozin (JARDIANCE) 25 mg, Daily    fluticasone (FLONASE) 50 MCG/ACT nasal spray 1 spray, Daily PRN    hydrOXYzine (ATARAX) 50 mg, Every 6 Hours PRN    levocetirizine (XYZAL) 5 MG tablet Take 1 tablet by mouth Every Evening.    Linzess 145 mcg, Oral    loratadine (CLARITIN) 10 mg, Daily    losartan (COZAAR) 25 mg, Oral, Daily    metFORMIN (GLUCOPHAGE) 500 mg, 2  "Times Daily With Meals    metoprolol tartrate (LOPRESSOR) 50 mg, Oral, 2 Times Daily    Multiple Vitamins-Minerals (CENTRUM SILVER 50+WOMEN) tablet 1 tablet, Daily    omeprazole (PRILOSEC) 40 mg, Daily    ondansetron (ZOFRAN) 4 MG tablet Take 1 tablet by mouth Every 8 (Eight) Hours As Needed for Nausea or Vomiting.    Psyllium (Metamucil) wafer wafer 2 wafers, Daily    rosuvastatin (CRESTOR) 10 mg, Oral, Nightly    spironolactone (ALDACTONE) 50 mg, Oral, Daily    vilazodone (VIIBRYD) 20 mg, Daily       Past medical & surgical history, social and family history reviewed in the electronic medical record.    ROS: Pertinent positives listed in the HPI and problem list above. All others reviewed and negative.     Physical Exam:   /72 (BP Location: Left arm, Patient Position: Lying)   Pulse (!) 48   Temp 97 °F (36.1 °C) (Temporal)   Resp 16   Ht 185.4 cm (73\")   Wt 119 kg (262 lb)   SpO2 96%   BMI 34.57 kg/m²     Constitutional:    Alert, cooperative, in no acute distress   Neck:     No Jugular venous distention, adenopathy, or thyromegaly noted.    Heart:    Regular rhythm and normal rate, normal S1 and S2, no murmurs,gallops, rubs, or clicks. No distinct PMI noted.    Lungs:     Clear to auscultation bilaterally, respirations regular, even and unlabored    Abdomen:     Soft nontender, nondistended, normal bowel sounds   Extremities:   No gross deformities, no edema, clubbing, or cyanosis.    Pulses:   Peripheral pulses palpable and equal bilaterally.       Labs and Diagnostic Data:      Results from last 7 days   Lab Units 06/16/25  0829   WBC 10*3/mm3 8.71   HEMOGLOBIN g/dL 14.4   HEMATOCRIT % 44.4   PLATELETS 10*3/mm3 188     Lab Results   Component Value Date    CHOL 211 (H) 01/14/2020    TRIG 141 05/07/2024    HDL 33 (L) 01/14/2020     (H) 01/14/2020    AST 83 (H) 04/29/2024    ALT 70 (H) 04/29/2024         Active Hospital Problems    Diagnosis     **Abnormal stress test      Echo 5/14/2025: LVEF " 65%, dilated LV, normal functioning bioprosthetic aortic valve, MG 15 mmHg, RV moderately dilated.  Stress test 6/6/2025: moderate-sized, moderately severe ischemia to anterior wall and lateral wall, LVEF 67%             IMPRESSION:  The patient is here for a left heart catheterization plus minus CBI secondary to abnormal stress test which showed anterior and lateral wall ischemia. Risk and benefits of the procedure were discussed with the patient and patient agreed to proceed.    Will proceed with LHC via right radial approach.  Modified Zelalem's positive.  Further recommendations to follow procedure    Rosette Arellano, APRN

## 2025-06-17 ENCOUNTER — TELEPHONE (OUTPATIENT)
Dept: GASTROENTEROLOGY | Facility: CLINIC | Age: 55
End: 2025-06-17

## 2025-06-17 NOTE — TELEPHONE ENCOUNTER
Caller: Danae Fong    Relationship to patient: Self    Best call back number: [phone]    Chief complaint: R/S F/U APPT    Type of visit: F/U APPT    Requested date: AFTER GASTRIC EMPTYING ON 07/24/25    If rescheduling, when is the original appointment: 07/18/25     Additional notes:PT NEEDS TO R/S F/U APPT FOR AFTER 07/24/25. HUB SHOWS NOTHING AVAIL WITHIN A REASONABLE TIMEFRAME. PLEASE CALL PT BACK TO R/S

## 2025-06-17 NOTE — TELEPHONE ENCOUNTER
I spoke with patient. I explained to patient that her follow up with Dr Lee is for her liver. I would keep follow up appt. Once the GES come back. Our office will give her call with those results.

## 2025-07-21 RX ORDER — LOSARTAN POTASSIUM 25 MG/1
25 TABLET ORAL DAILY
Qty: 90 TABLET | Refills: 3 | Status: SHIPPED | OUTPATIENT
Start: 2025-07-21

## 2025-07-21 NOTE — TELEPHONE ENCOUNTER
Lab Results   Component Value Date    GLUCOSE 118 (H) 06/16/2025    CALCIUM 9.0 06/16/2025     06/16/2025    K 3.4 (L) 06/16/2025    CO2 27.0 06/16/2025     06/16/2025    BUN 10.7 06/16/2025    CREATININE 0.76 06/16/2025    EGFR 93.3 06/16/2025    BCR 14.1 06/16/2025    ANIONGAP 12.0 06/16/2025      
no

## 2025-07-28 ENCOUNTER — OFFICE VISIT (OUTPATIENT)
Age: 55
End: 2025-07-28
Payer: MEDICARE

## 2025-07-28 ENCOUNTER — LAB (OUTPATIENT)
Facility: HOSPITAL | Age: 55
End: 2025-07-28
Payer: MEDICARE

## 2025-07-28 VITALS
WEIGHT: 257 LBS | HEART RATE: 56 BPM | HEIGHT: 72 IN | BODY MASS INDEX: 34.81 KG/M2 | SYSTOLIC BLOOD PRESSURE: 113 MMHG | OXYGEN SATURATION: 98 % | DIASTOLIC BLOOD PRESSURE: 77 MMHG

## 2025-07-28 DIAGNOSIS — K58.1 IRRITABLE BOWEL SYNDROME WITH CONSTIPATION: Primary | ICD-10-CM

## 2025-07-28 DIAGNOSIS — K76.0 METABOLIC DYSFUNCTION-ASSOCIATED STEATOTIC LIVER DISEASE (MASLD): ICD-10-CM

## 2025-07-28 DIAGNOSIS — R93.2 ABNORMAL FINDINGS ON DIAGNOSTIC IMAGING OF LIVER AND BILIARY TRACT: ICD-10-CM

## 2025-07-28 DIAGNOSIS — K76.0 FATTY (CHANGE OF) LIVER, NOT ELSEWHERE CLASSIFIED: ICD-10-CM

## 2025-07-28 DIAGNOSIS — K76.9 LIVER DISEASE, UNSPECIFIED: ICD-10-CM

## 2025-07-28 DIAGNOSIS — I10 ESSENTIAL (PRIMARY) HYPERTENSION: ICD-10-CM

## 2025-07-28 LAB
BASOPHILS # BLD AUTO: 0.05 10*3/MM3 (ref 0–0.2)
BASOPHILS NFR BLD AUTO: 0.5 % (ref 0–1.5)
DEPRECATED RDW RBC AUTO: 49 FL (ref 37–54)
EOSINOPHIL # BLD AUTO: 0.14 10*3/MM3 (ref 0–0.4)
EOSINOPHIL NFR BLD AUTO: 1.3 % (ref 0.3–6.2)
ERYTHROCYTE [DISTWIDTH] IN BLOOD BY AUTOMATED COUNT: 18.1 % (ref 12.3–15.4)
HCT VFR BLD AUTO: 45.5 % (ref 34–46.6)
HGB BLD-MCNC: 15 G/DL (ref 12–15.9)
IMM GRANULOCYTES # BLD AUTO: 0.03 10*3/MM3 (ref 0–0.05)
IMM GRANULOCYTES NFR BLD AUTO: 0.3 % (ref 0–0.5)
INR PPP: 1.14 (ref 0.89–1.12)
LYMPHOCYTES # BLD AUTO: 2.87 10*3/MM3 (ref 0.7–3.1)
LYMPHOCYTES NFR BLD AUTO: 27.4 % (ref 19.6–45.3)
MCH RBC QN AUTO: 26.3 PG (ref 26.6–33)
MCHC RBC AUTO-ENTMCNC: 33 G/DL (ref 31.5–35.7)
MCV RBC AUTO: 79.7 FL (ref 79–97)
MONOCYTES # BLD AUTO: 0.63 10*3/MM3 (ref 0.1–0.9)
MONOCYTES NFR BLD AUTO: 6 % (ref 5–12)
NEUTROPHILS NFR BLD AUTO: 6.76 10*3/MM3 (ref 1.7–7)
NEUTROPHILS NFR BLD AUTO: 64.5 % (ref 42.7–76)
NRBC BLD AUTO-RTO: 0 /100 WBC (ref 0–0.2)
PLATELET # BLD AUTO: 227 10*3/MM3 (ref 140–450)
PMV BLD AUTO: 9.4 FL (ref 6–12)
PROTHROMBIN TIME: 15.4 SECONDS (ref 12.2–15.3)
RBC # BLD AUTO: 5.71 10*6/MM3 (ref 3.77–5.28)
WBC NRBC COR # BLD AUTO: 10.48 10*3/MM3 (ref 3.4–10.8)

## 2025-07-28 PROCEDURE — 83010 ASSAY OF HAPTOGLOBIN QUANT: CPT

## 2025-07-28 PROCEDURE — 82465 ASSAY BLD/SERUM CHOLESTEROL: CPT

## 2025-07-28 PROCEDURE — 99214 OFFICE O/P EST MOD 30 MIN: CPT | Performed by: NURSE PRACTITIONER

## 2025-07-28 PROCEDURE — 82172 ASSAY OF APOLIPOPROTEIN: CPT

## 2025-07-28 PROCEDURE — 82947 ASSAY GLUCOSE BLOOD QUANT: CPT

## 2025-07-28 PROCEDURE — 84466 ASSAY OF TRANSFERRIN: CPT

## 2025-07-28 PROCEDURE — 82105 ALPHA-FETOPROTEIN SERUM: CPT

## 2025-07-28 PROCEDURE — 82977 ASSAY OF GGT: CPT

## 2025-07-28 PROCEDURE — 83540 ASSAY OF IRON: CPT

## 2025-07-28 PROCEDURE — 82728 ASSAY OF FERRITIN: CPT

## 2025-07-28 PROCEDURE — 36415 COLL VENOUS BLD VENIPUNCTURE: CPT

## 2025-07-28 PROCEDURE — 80053 COMPREHEN METABOLIC PANEL: CPT

## 2025-07-28 PROCEDURE — 83883 ASSAY NEPHELOMETRY NOT SPEC: CPT

## 2025-07-28 PROCEDURE — 82247 BILIRUBIN TOTAL: CPT

## 2025-07-28 PROCEDURE — 85610 PROTHROMBIN TIME: CPT

## 2025-07-28 PROCEDURE — 84478 ASSAY OF TRIGLYCERIDES: CPT

## 2025-07-28 PROCEDURE — 85025 COMPLETE CBC W/AUTO DIFF WBC: CPT

## 2025-07-28 RX ORDER — TENAPANOR HYDROCHLORIDE 53.2 MG/1
50 TABLET ORAL 2 TIMES DAILY
Qty: 180 TABLET | Refills: 3 | Status: SHIPPED | OUTPATIENT
Start: 2025-07-28

## 2025-07-28 NOTE — PROGRESS NOTES
Follow Up      Date: 2025   Patient Name: Danae Fong  : 1970   MRN: [id number]     Chief Complaint:    Chief Complaint   Patient presents with    Nausea       History of Present Illness:   History of Present Illness  The patient is a 55-year-old female presenting for follow-up on elevated liver enzymes and chronic nausea.    MCKEON  - She is here for follow-up on MCKEON.  - Her doctor mentioned starting a new medication.  - She underwent lab work, colonoscopy, and EGD.  - A gastric emptying test was postponed due to a power outage and rescheduled for 2025.  - She has had 3-4 such tests in the past without significant delays.  - nausea has worsened this summer.  - Certain food smells trigger her nausea, and she subsists on strawberries and plain breadsticks.    Menstrual Cycle and Menopause  - She has a history of irregular menstrual cycles, possibly due to undiagnosed PCOS.  - She is nearing the end of menopause with decreasing hot flashes and night sweats but persistent mood swings.    Polyps and Iron Levels  - She has a history of polyps and low iron levels but is not taking iron supplements.  - iron studies have not been checked in a while.     IBS with Constipation  - She experiences IBS with constipation, leading to alternating diarrhea and constipation.  - She had rectocele repair due to constipation.  - She experiences constant morning nausea, uses Zofran for severe nausea, and finds relief from ginger ale.  - She takes Linzess, MiraLAX, stool softeners, and fiber nightly, resulting in bowel movements every other day.  - Severe constipation occurs if stool softeners are discontinued.  - She is interested in trying Ibsrela.    Reflux  - Her reflux is manageable, though certain foods like rice trigger it.  - She has difficulty swallowing fiber pills, exacerbating her reflux.  - She was advised to tuck her chin when swallowing pills.    Open Heart Surgery and Related Complications  -  She had open heart surgery due to septic arthritis from cutting off a foot callus on her right foot-> resulting in aortic valve replacement and loss of 2 toes.  - She was under Dr. Anaya's care for liver and kidney failure.  - Stayed in the hospital for 60 days.  - Had 3-4 hip debridement surgeries and a pericardectomy.  - She takes 2 fluid pills daily to prevent fluid retention and weighs herself regularly.    SOCIAL HISTORY  Occupation: Formerly worked in [place]  Diet: Living on strawberries and plain breadsticks due to nausea  Coffee: Drinks coffee    FAMILY HISTORY  - Mother: Hysterectomy  - Negative for colon cancer    EGD (04/30/2025) For upper abdominal pain nausea and history of MASLD:  Portal hypertensive gastropathy and gastritis.  Otherwise normal EGD.  Path: Focal minimal chronic inflammation on gastric body biopsy.  Antral biopsy with reactive chemical gastropathy.  Normal duodenal biopsy.    Colonoscopy (01/30/2025) for personal history of polyps: Mild diverticulosis in the sigmoid colon.  Nonbleeding internal hemorrhoids.  Repeat colonoscopy recommended in 5 years for surveillance.    US Liver (03/19/2025)   Impression:  1.Increased hepatic echogenicity with a nodular contour indicative of cirrhosis.  2.Echogenic foci in the right kidney which could represent nonobstructing stones.    US Liver (07/01/2024)   IMPRESSION:  Heterogeneous appearance of the liver parenchyma consistent with provided history of parenchymal disease. No acute or suspicious findings otherwise.    Subjective      Review of Systems:   Review of Systems   Gastrointestinal:  Positive for constipation, nausea and GERD.       I have reviewed the patients family history, social history, past medical history, past surgical history and have updated it as appropriate.     Medications:     Current Outpatient Medications:     aspirin 81 MG EC tablet, Take 1 tablet by mouth Daily., Disp: 90 tablet, Rfl: 3    Biotin 09746 MCG  tablet, Take 1 tablet by mouth Daily., Disp: , Rfl:     bumetanide (BUMEX) 1 MG tablet, Take 1 tablet by mouth 2 (Two) Times a Day., Disp: , Rfl:     DULoxetine (CYMBALTA) 30 MG capsule, Take 1 capsule by mouth Every Night., Disp: , Rfl:     empagliflozin (Jardiance) 25 MG tablet tablet, Take 1 tablet by mouth Daily., Disp: , Rfl:     fluticasone (FLONASE) 50 MCG/ACT nasal spray, Administer 1 spray into the nostril(s) as directed by provider Daily As Needed., Disp: , Rfl:     hydrOXYzine (ATARAX) 50 MG tablet, Take 1 tablet by mouth Every 6 (Six) Hours As Needed for Anxiety., Disp: , Rfl:     levocetirizine (XYZAL) 5 MG tablet, Take 1 tablet by mouth Every Evening., Disp: , Rfl:     Linzess 145 MCG capsule capsule, TAKE 1 CAPSULE EVERY MORNING BEFORE BREAKFAST, Disp: 90 capsule, Rfl: 3    loratadine (CLARITIN) 10 MG tablet, Take 1 tablet by mouth Daily., Disp: , Rfl:     losartan (Cozaar) 25 MG tablet, Take 1 tablet by mouth Daily., Disp: 90 tablet, Rfl: 3    metFORMIN (GLUCOPHAGE) 500 MG tablet, Take 1 tablet by mouth 2 (Two) Times a Day With Meals., Disp: , Rfl:     metoprolol tartrate (LOPRESSOR) 100 MG tablet, Take 1 tablet by mouth 2 (Two) Times a Day., Disp: 180 tablet, Rfl: 3    Multiple Vitamins-Minerals (CENTRUM SILVER 50+WOMEN) tablet, Take 1 tablet by mouth Daily., Disp: , Rfl:     omeprazole (priLOSEC) 40 MG capsule, Take 1 capsule by mouth Daily., Disp: , Rfl:     ondansetron (ZOFRAN) 4 MG tablet, Take 1 tablet by mouth Every 8 (Eight) Hours As Needed for Nausea or Vomiting., Disp: , Rfl:     Psyllium (Metamucil) wafer wafer, Take 2 wafers by mouth Daily., Disp: , Rfl:     rosuvastatin (CRESTOR) 10 MG tablet, Take 1 tablet by mouth Every Night., Disp: 90 tablet, Rfl: 3    spironolactone (ALDACTONE) 50 MG tablet, Take 1 tablet by mouth Daily., Disp: 90 tablet, Rfl: 2    vilazodone (VIIBRYD) 20 MG tablet tablet, Take 1 tablet by mouth Daily., Disp: , Rfl:     Tenapanor HCl (Ibsrela) 50 MG tablet, Take 1  "tablet by mouth 2 (Two) Times a Day., Disp: 180 tablet, Rfl: 3    Allergies:   No Known Allergies      Objective     Physical Exam:  Vital Signs:   Vitals:    07/28/25 0900   BP: 113/77   Pulse: 56   SpO2: 98%   Weight: 117 kg (257 lb)   Height: 185.4 cm (72.99\")     Body mass index is 33.91 kg/m².     Metrics:   Danae Fong  reports that she quit smoking about 9 years ago. Her smoking use included cigarettes. She started smoking about 24 years ago. She has a 7.5 pack-year smoking history. She has been exposed to tobacco smoke. She has never used smokeless tobacco.        Colonoscopy/Colon Cancer Screening: due 1/2030  Colonoscopy (01/30/2025) for personal history of polyps: Mild diverticulosis in the sigmoid colon.  Nonbleeding internal hemorrhoids.  Repeat colonoscopy recommended in 5 years for surveillance.    Physical Exam  Vitals and nursing note reviewed.   Constitutional:       General: She is not in acute distress.     Appearance: Normal appearance. She is well-developed. She is obese.   HENT:      Head: Normocephalic and atraumatic.      Right Ear: External ear normal.      Left Ear: External ear normal.      Nose: Nose normal.      Mouth/Throat:      Mouth: Mucous membranes are moist.   Eyes:      Extraocular Movements: Extraocular movements intact.      Conjunctiva/sclera: Conjunctivae normal.      Pupils: Pupils are equal, round, and reactive to light.   Cardiovascular:      Rate and Rhythm: Normal rate and regular rhythm.      Heart sounds: Normal heart sounds.   Pulmonary:      Effort: Pulmonary effort is normal.      Breath sounds: Normal breath sounds.   Abdominal:      General: Abdomen is flat. Bowel sounds are normal. There is no distension.      Palpations: Abdomen is soft.      Tenderness: There is no abdominal tenderness.      Hernia: No hernia is present.   Musculoskeletal:         General: Normal range of motion.      Cervical back: Normal range of motion.   Skin:     General: Skin is " warm and dry.   Neurological:      General: No focal deficit present.      Mental Status: She is alert and oriented to person, place, and time.   Psychiatric:         Attention and Perception: Attention normal.         Mood and Affect: Mood normal.         Speech: Speech normal.         Behavior: Behavior normal. Behavior is cooperative.         Thought Content: Thought content normal.         Cognition and Memory: Cognition normal.         Judgment: Judgment normal.         Assessment / Plan      Assessment/Plan:   Diagnoses and all orders for this visit:    1. Irritable bowel syndrome with constipation (Primary)  -     Tenapanor HCl (Ibsrela) 50 MG tablet; Take 1 tablet by mouth 2 (Two) Times a Day.  Dispense: 180 tablet; Refill: 3    2. Metabolic dysfunction-associated steatotic liver disease (MASLD)  -     MCKEON FibroSure® With Reflex to Enhanced Liver Fibrosis (ELF); Future  -     Comprehensive Metabolic Panel; Future  -     CBC & Differential; Future  -     Protime-INR; Future  -     AFP Tumor Marker; Future    3. Essential (primary) hypertension  -     MKCEON FibroSure® With Reflex to Enhanced Liver Fibrosis (ELF); Future    4. Abnormal findings on diagnostic imaging of liver and biliary tract  -     AFP Tumor Marker; Future  -     Ferritin; Future  -     Iron Profile w/o Ferritin; Future    5. Fatty (change of) liver, not elsewhere classified  -     Ferritin; Future    6. Liver disease, unspecified  -     Iron Profile w/o Ferritin; Future         Assessment & Plan  1. Elevated liver enzymes:  - Abnormal lab results from a year ago indicated F1-F2 fibrosis.  - Stage II fibrosis required for new medication -> Rezdiffera   - Will check labs, including MCKEON Fibrosure with ELF  - Ultrasound of liver    2. Chronic nausea:  - Chronic morning nausea.  - Suggested natalee suckers or candy.  - Gastric emptying scan to determine delayed gastric emptying.    3. IBS with constipation:  - Taking Linzess, MiraLAX, stool  softeners, and fiber supplements.  - Start Ibsrela, 1 tablet twice daily.  - Start with 1 tablet daily if concerned about diarrhea.  - Discontinue Linzess upon starting Ibsrela.    4. GERD:  - Occasional reflux/regurgitation, particularly with fiber pills.  - Recommended tucking chin while swallowing pills.      Follow-up: Scheduled in 3 months.    Follow Up:   Return in about 3 months (around 10/28/2025).    Plan of care reviewed with the patient at the conclusion of today's visit.  Education was provided regarding diagnosis, management, and any prescribed or recommended OTC medications.  Patient verbalized understanding of and agreement with management plan.     NOTE TO PATIENT: The 21st Century Cures Act makes medical notes like these available to patients in the interest of transparency. However, be advised this is a medical document. It is intended as peer to peer communication. It is written in medical language and may contain abbreviations or verbiage that are unfamiliar. It may appear blunt or direct. Medical documents are intended to carry relevant information, facts as evident, and the clinical opinion of the practitioner.     Patient or patient representative verbalized consent for the use of Ambient Listening during the visit with  BAUTISTA Edmondson for chart documentation. 7/28/2025    BAUTISTA Carrillo  [facility]

## 2025-07-29 LAB
ALBUMIN SERPL-MCNC: 4.3 G/DL (ref 3.5–5.2)
ALBUMIN/GLOB SERPL: 0.9 G/DL
ALP SERPL-CCNC: 141 U/L (ref 39–117)
ALPHA-FETOPROTEIN: 8.01 NG/ML (ref 0–8.3)
ALT SERPL W P-5'-P-CCNC: 43 U/L (ref 1–33)
ANION GAP SERPL CALCULATED.3IONS-SCNC: 11.5 MMOL/L (ref 5–15)
AST SERPL-CCNC: 54 U/L (ref 1–32)
BILIRUB SERPL-MCNC: 0.4 MG/DL (ref 0–1.2)
BUN SERPL-MCNC: 10 MG/DL (ref 6–20)
BUN/CREAT SERPL: 12.8 (ref 7–25)
CALCIUM SPEC-SCNC: 9.9 MG/DL (ref 8.6–10.5)
CHLORIDE SERPL-SCNC: 100 MMOL/L (ref 98–107)
CO2 SERPL-SCNC: 28.5 MMOL/L (ref 22–29)
CREAT SERPL-MCNC: 0.78 MG/DL (ref 0.57–1)
EGFRCR SERPLBLD CKD-EPI 2021: 89.8 ML/MIN/1.73
FERRITIN SERPL-MCNC: 103 NG/ML (ref 13–150)
GLOBULIN UR ELPH-MCNC: 4.6 GM/DL
GLUCOSE SERPL-MCNC: 103 MG/DL (ref 65–99)
IRON 24H UR-MRATE: 75 MCG/DL (ref 37–145)
IRON SATN MFR SERPL: 16 % (ref 20–50)
POTASSIUM SERPL-SCNC: 3.4 MMOL/L (ref 3.5–5.2)
PROT SERPL-MCNC: 8.9 G/DL (ref 6–8.5)
SODIUM SERPL-SCNC: 140 MMOL/L (ref 136–145)
TIBC SERPL-MCNC: 474 MCG/DL (ref 298–536)
TRANSFERRIN SERPL-MCNC: 318 MG/DL (ref 200–360)

## 2025-08-01 LAB
A2 MACROGLOB SERPL-MCNC: 331 MG/DL (ref 110–276)
ALT SERPL W P-5'-P-CCNC: 49 IU/L (ref 0–40)
APO A-I SERPL-MCNC: 131 MG/DL (ref 116–209)
AST SERPL W P-5'-P-CCNC: 62 IU/L (ref 0–40)
BILIRUB SERPL-MCNC: 0.3 MG/DL (ref 0–1.2)
CHOLEST SERPL-MCNC: 161 MG/DL (ref 100–199)
FIBROSIS SCORING:: ABNORMAL
FIBROSIS STAGE SERPL QL: ABNORMAL
GGT SERPL-CCNC: 53 IU/L (ref 0–60)
GLUCOSE SERPL-MCNC: 105 MG/DL (ref 70–99)
HAPTOGLOB SERPL-MCNC: 142 MG/DL (ref 33–346)
LABORATORY COMMENT REPORT: ABNORMAL
LIVER FIBR SCORE SERPL CALC.FIBROSURE: 0.39 (ref 0–0.21)
LIVER STEATOSIS GRADE SERPL QL: ABNORMAL
LIVER STEATOSIS SCORE SERPL: 0.53 (ref 0–0.4)
NASH GRADE SERPL QL: ABNORMAL
NASH INTERPRETATION SERPL-IMP: ABNORMAL
NASH SCORE SERPL: 0.75 (ref 0–0.25)
NASH SCORING: ABNORMAL
STEATOSIS SCORING: ABNORMAL
TEST PERFORMANCE INFO SPEC: ABNORMAL
TEST PERFORMANCE INFO SPEC: ABNORMAL
TRIGL SERPL-MCNC: 119 MG/DL (ref 0–149)

## 2025-08-11 ENCOUNTER — RESULTS FOLLOW-UP (OUTPATIENT)
Age: 55
End: 2025-08-11
Payer: MEDICARE

## 2025-08-11 ENCOUNTER — PRIOR AUTHORIZATION (OUTPATIENT)
Dept: GASTROENTEROLOGY | Facility: CLINIC | Age: 55
End: 2025-08-11
Payer: MEDICARE

## 2025-08-11 ENCOUNTER — PATIENT MESSAGE (OUTPATIENT)
Age: 55
End: 2025-08-11
Payer: MEDICARE

## 2025-08-26 ENCOUNTER — TELEPHONE (OUTPATIENT)
Dept: OBSTETRICS AND GYNECOLOGY | Facility: CLINIC | Age: 55
End: 2025-08-26
Payer: MEDICARE

## (undated) DEVICE — PK EXTREM LOWR 10

## (undated) DEVICE — BNDG ELAS ELITE V/CLOSE 4IN 5YD LF STRL

## (undated) DEVICE — CATH DIAG EXPO M/ PK 6FR FL4/FR4 PIG 3PK

## (undated) DEVICE — 1010 S-DRAPE TOWEL DRAPE 10/BX: Brand: STERI-DRAPE™

## (undated) DEVICE — UNDERGLV SURG BIOGEL INDICAT PF 61/2 GRN

## (undated) DEVICE — GLV SURG SIGNATURE TOUCH PF LTX 7 STRL

## (undated) DEVICE — TRAP FLD MINIVAC MEGADYNE 100ML

## (undated) DEVICE — GLIDESHEATH SLENDER STAINLESS STEEL KIT: Brand: GLIDESHEATH SLENDER

## (undated) DEVICE — SPNG GZ WOVN 4X4IN 12PLY 10/BX STRL

## (undated) DEVICE — GLV SURG PREMIERPRO MIC LTX PF SZ8.5 BRN

## (undated) DEVICE — CVR HNDL LT SURG ACCSSRY BLU STRL

## (undated) DEVICE — HANDPIECE SET WITH HIGH FLOW TIP AND SUCTION TUBE: Brand: INTERPULSE

## (undated) DEVICE — TBG PENCL TELESCP MEGADYNE SMOKE EVAC 10FT

## (undated) DEVICE — PATIENT RETURN ELECTRODE, SINGLE-USE, CONTACT QUALITY MONITORING, ADULT, WITH 9FT CORD, FOR PATIENTS WEIGING OVER 33LBS. (15KG): Brand: MEGADYNE

## (undated) DEVICE — CANNULA,OXY,ADULT,SUPERSOFT,W/7'TUB,UC: Brand: MEDLINE

## (undated) DEVICE — CVR HNDL LIGHT RIGID

## (undated) DEVICE — STERILE PVP: Brand: MEDLINE INDUSTRIES, INC.

## (undated) DEVICE — CONTAINER,SPECIMEN,OR STERILE,4OZ: Brand: MEDLINE

## (undated) DEVICE — MODEL AT P65, P/N 701554-001KIT CONTENTS: HAND CONTROLLER, 3-WAY HIGH-PRESSURE STOPCOCK WITH ROTATING END AND PREMIUM HIGH-PRESSURE TUBING: Brand: ANGIOTOUCH® KIT

## (undated) DEVICE — ADAPT ST INFUS ADMIN SYR 70IN

## (undated) DEVICE — UNDERCAST PADDING: Brand: DEROYAL

## (undated) DEVICE — PICO 7 10CM X 30CM: Brand: PICO™ 7

## (undated) DEVICE — DRSNG TELFA PAD NONADH STR 1S 3X8IN

## (undated) DEVICE — ANTIBACTERIAL UNDYED BRAIDED (POLYGLACTIN 910), SYNTHETIC ABSORBABLE SUTURE: Brand: COATED VICRYL

## (undated) DEVICE — PK HIP TOTL UNIV 10

## (undated) DEVICE — GLIDESHEATH BASIC HYDROPHILIC COATED INTRODUCER SHEATH: Brand: GLIDESHEATH

## (undated) DEVICE — GUARDIAN LVC: Brand: GUARDIAN

## (undated) DEVICE — NDL HYPO ECLPS SFTY 18G 1 1/2IN

## (undated) DEVICE — SUT ETHLN 4/0 PS2 18IN 1667H

## (undated) DEVICE — GW PERIPH GUIDERIGHT STD/EXCHNG/J/TIP SS 0.035IN 5X260CM

## (undated) DEVICE — INTENDED FOR TISSUE SEPARATION, AND OTHER PROCEDURES THAT REQUIRE A SHARP SURGICAL BLADE TO PUNCTURE OR CUT.: Brand: BARD-PARKER ® SAFETYLOCK CARBON RIB-BACK BLADES

## (undated) DEVICE — TR BAND RADIAL ARTERY COMPRESSION DEVICE: Brand: TR BAND

## (undated) DEVICE — ADULT, W/LG. BACK PAD, RADIOTRANSPARENT ELEMENT AND LEAD WIRE COMPATIBLE W/: Brand: DEFIBRILLATION ELECTRODES

## (undated) DEVICE — SYR LUERLOK 50ML

## (undated) DEVICE — CATH DIAG EXPO .056 FL3.5 6F 100CM

## (undated) DEVICE — Device

## (undated) DEVICE — DEV COMP RAD PRELUDESYNC 24CM

## (undated) DEVICE — MODEL BT2000 P/N 700287-012KIT CONTENTS: MANIFOLD WITH SALINE AND CONTRAST PORTS, SALINE TUBING WITH SPIKE AND HAND SYRINGE, TRANSDUCER: Brand: BT2000 AUTOMATED MANIFOLD KIT

## (undated) DEVICE — SUT MNCRYL 2/0 SH 27IN UD MCP417H

## (undated) DEVICE — SYR LL TP 10ML STRL

## (undated) DEVICE — CVR PROB ULTRASND/TRANSD W/GEL 7X11IN STRL

## (undated) DEVICE — PK CATH CARD 10

## (undated) DEVICE — NDL ANGIOGR ADV THN SMOTH SGLWALL 21G 1.5

## (undated) DEVICE — DELFI MATCHING LIMB PROTECTION SLEEVES (MLPS) HELP PROTECT THE PATIENT’S LIMB FROM POSSIBLE WRINKLING, PINCHING AND SHEARING OF SKIN AND SOFT TISSUES OF THE LIMB.EACH DELFI MATCHING LIMB PROTECTION SLEEVE IS INTENDED FOR USE WITH A SPECIFIC DELFI TOURNIQUET CUFF. THIS SLEEVE IS SPECIFICALLY FOR THIGH.: Brand: MATCHING LIMB PROTECTION SLEEVES - VARI-FIT

## (undated) DEVICE — ST INF PRI SMRTSTE 20DRP 2VLV 24ML 117

## (undated) DEVICE — GLV SURG SIGNATURE TOUCH PF LTX 8 STRL BX/50

## (undated) DEVICE — APPL CHLORAPREP W/TINT 26ML BLU

## (undated) DEVICE — SUCTION CANISTER, 2500CC, RIGID: Brand: DEROYAL

## (undated) DEVICE — CATH DIAG IMPULSE FL3.5 6F 100CM